# Patient Record
Sex: MALE | Race: WHITE | NOT HISPANIC OR LATINO | Employment: FULL TIME | URBAN - METROPOLITAN AREA
[De-identification: names, ages, dates, MRNs, and addresses within clinical notes are randomized per-mention and may not be internally consistent; named-entity substitution may affect disease eponyms.]

---

## 2023-03-30 PROBLEM — R55 VASOVAGAL EPISODE: Status: ACTIVE | Noted: 2023-03-30

## 2023-03-30 PROBLEM — E78.5 HYPERLIPIDEMIA: Status: ACTIVE | Noted: 2023-03-30

## 2023-03-30 PROBLEM — R00.1 BRADYCARDIA: Status: ACTIVE | Noted: 2023-03-30

## 2023-03-30 PROBLEM — Z86.69 HISTORY OF MIGRAINE HEADACHES: Status: ACTIVE | Noted: 2023-03-30

## 2023-03-30 PROBLEM — R42 VERTIGO: Status: ACTIVE | Noted: 2023-03-30

## 2023-03-30 PROBLEM — R42 DIZZINESS: Status: ACTIVE | Noted: 2023-03-30

## 2023-03-30 PROBLEM — M54.50 LOWER BACK PAIN: Status: ACTIVE | Noted: 2023-03-30

## 2023-03-30 PROBLEM — G43.909 MIGRAINES: Status: ACTIVE | Noted: 2023-03-30

## 2023-03-30 RX ORDER — ATORVASTATIN CALCIUM 10 MG/1
1 TABLET, FILM COATED ORAL DAILY
COMMUNITY
End: 2024-04-23 | Stop reason: ALTCHOICE

## 2023-03-31 ENCOUNTER — OFFICE VISIT (OUTPATIENT)
Dept: PRIMARY CARE | Facility: CLINIC | Age: 53
End: 2023-03-31
Payer: COMMERCIAL

## 2023-03-31 VITALS
OXYGEN SATURATION: 95 % | BODY MASS INDEX: 26.2 KG/M2 | WEIGHT: 183 LBS | SYSTOLIC BLOOD PRESSURE: 124 MMHG | HEART RATE: 75 BPM | DIASTOLIC BLOOD PRESSURE: 79 MMHG | TEMPERATURE: 97.7 F | HEIGHT: 70 IN

## 2023-03-31 DIAGNOSIS — R31.29 OTHER MICROSCOPIC HEMATURIA: ICD-10-CM

## 2023-03-31 DIAGNOSIS — R10.9 ACUTE LEFT FLANK PAIN: Primary | ICD-10-CM

## 2023-03-31 LAB
POC APPEARANCE, URINE: CLEAR
POC BILIRUBIN, URINE: NEGATIVE
POC BLOOD, URINE: ABNORMAL
POC COLOR, URINE: YELLOW
POC GLUCOSE, URINE: NEGATIVE MG/DL
POC KETONES, URINE: NEGATIVE MG/DL
POC LEUKOCYTES, URINE: NEGATIVE
POC NITRITE,URINE: NEGATIVE
POC PH, URINE: 5.5 PH
POC PROTEIN, URINE: NEGATIVE MG/DL
POC SPECIFIC GRAVITY, URINE: 1.01
POC UROBILINOGEN, URINE: 0.2 EU/DL

## 2023-03-31 PROCEDURE — 99214 OFFICE O/P EST MOD 30 MIN: CPT | Performed by: FAMILY MEDICINE

## 2023-03-31 PROCEDURE — 1036F TOBACCO NON-USER: CPT | Performed by: FAMILY MEDICINE

## 2023-03-31 PROCEDURE — 81003 URINALYSIS AUTO W/O SCOPE: CPT | Performed by: FAMILY MEDICINE

## 2023-03-31 ASSESSMENT — ENCOUNTER SYMPTOMS
FREQUENCY: 0
CHILLS: 0
FEVER: 0
DIARRHEA: 0
CONSTIPATION: 0
HEMATURIA: 0

## 2023-03-31 NOTE — PROGRESS NOTES
"Subjective   Patient ID: Trevor Yarbrough is a 52 y.o. male who presents for left flank pain. States his sx's onset 4 wks ago intermittent at first but as of this past week persistently. Denies urinary sx's.     3 weeks  with left lateral flank pain   Dull more constant   3/10      No kidney stones in past   Tylenol / motrin minimal relief     No change with positions     No back issues          Review of Systems   Constitutional:  Negative for chills and fever.   Gastrointestinal:  Negative for constipation and diarrhea.   Genitourinary:  Negative for frequency and hematuria.       Objective   /79   Pulse 75   Temp 36.5 °C (97.7 °F)   Ht 1.778 m (5' 10\")   Wt 83 kg (183 lb)   SpO2 95%   BMI 26.26 kg/m²     Physical Exam  Constitutional:       Appearance: Normal appearance. He is well-developed.   Cardiovascular:      Rate and Rhythm: Normal rate and regular rhythm.      Heart sounds: Normal heart sounds. No murmur heard.  Pulmonary:      Effort: Pulmonary effort is normal.      Breath sounds: Normal breath sounds.   Abdominal:      General: Abdomen is flat. Bowel sounds are normal.      Palpations: Abdomen is soft.   Musculoskeletal:      Comments: No rashes  No costovertebral angle tenderness   Neurological:      General: No focal deficit present.      Mental Status: He is alert.         Assessment/Plan   Problem List Items Addressed This Visit    None  Visit Diagnoses       Acute left flank pain    -  Primary    Relevant Orders    CT abdomen pelvis wo IV contrast    POCT UA Automated manually resulted    Other microscopic hematuria        Relevant Orders    CT abdomen pelvis wo IV contrast    POCT UA Automated manually resulted               "

## 2023-03-31 NOTE — PATIENT INSTRUCTIONS
Stat CT to rule out kidney stones      You can take nonsteroidal anti-inflammatories for your pain: Such as ibuprofen or Aleve.  It is important he follow the instructions on the labeling with these medications, taken with food.  Watch for stomach upset with these medications.  Tylenol is also helpful for pain, this medication does not treat inflammation, however, generally tends to not cause stomach upset.

## 2023-04-03 ENCOUNTER — TELEPHONE (OUTPATIENT)
Dept: PRIMARY CARE | Facility: CLINIC | Age: 53
End: 2023-04-03
Payer: COMMERCIAL

## 2023-04-03 NOTE — TELEPHONE ENCOUNTER
----- Message from Lexi Darden MD sent at 4/3/2023  9:33 AM EDT -----  Please call pt about radiology results CAT scan is normal  Likely symptoms are related to musculoskeletal pain, if not improving let me know, take some anti-inflammatories Advil or Aleve

## 2023-04-03 NOTE — TELEPHONE ENCOUNTER
Result Communication    Resulted Orders   CT abdomen pelvis wo IV contrast    Narrative    Interpreted By:  SCHOENBERGER, JOSEPH, MD  MRN: [id number]  Patient Name: RUPESH IBANEZ     STUDY:  CT ABDOMEN AND PELVIS WO CONTRAST;  3/31/2023 2:04 pm     INDICATION:  rule out stones.     COMPARISON:  None     ACCESSION NUMBER(S):  [id number]     ORDERING CLINICIAN:  AIMEE CARREON     TECHNIQUE:  CT of the abdomen and pelvis was performed. Contiguous axial images  were obtained at 3 mm slice thickness through the abdomen and pelvis.  Coronal and sagittal reconstructions at 3 mm slice thickness were  performed.  No intravenous or oral contrast agents were administered.     FINDINGS:  Please note that the evaluation of vessels, lymph nodes and organs is  limited without intravenous contrast.     LOWER CHEST:  Unremarkable     ABDOMEN:     LIVER:  Tiny cyst high in the right liver unchanged from prior.     BILE DUCTS:  Normal caliber.     GALLBLADDER:  No calcified stones. No wall thickening.     PANCREAS:  Within normal limits.     SPLEEN:  Within normal limits.     ADRENAL GLANDS:  Bilateral adrenal glands appear normal.     KIDNEYS AND URETERS:  The kidneys are normal in size and unremarkable in appearance. No  radiopaque collecting system stones are evident. No  hydroureteronephrosis or nephroureterolithiasis is identified.     PELVIS:     BLADDER:  Within normal limits.     REPRODUCTIVE ORGANS:  Prostate is somewhat enlarged. Otherwise unremarkable.     BOWEL:  The stomach is unremarkable.  The small and large bowel are normal in  caliber and demonstrate no wall thickening.  The appendix is not  definitely visualized. There is however no pericecal stranding or  fluid.     VESSELS:  There is no aneurysmal dilatation of the abdominal aorta. The IVC  appears normal.     PERITONEUM/RETROPERITONEUM/LYMPH NODES:  No ascites or free air, no fluid collection.  No abdominopelvic  lymphadenopathy is present.     ABDOMINAL  WALL:  Abdominal wall structures appear intact     BONES:  No suspicious osseous lesions are identified.       Impression    1.  No acute abdominal or pelvic findings. No evidence for  nephrolithiasis or obstructive uropathy or ureter stone. See  discussion above.          11:53 AM    SEBASTIAN. CA

## 2023-04-03 NOTE — RESULT ENCOUNTER NOTE
Please call pt about radiology results CAT scan is normal  Likely symptoms are related to musculoskeletal pain, if not improving let me know, take some anti-inflammatories Advil or Aleve

## 2023-09-29 LAB
ANION GAP IN SER/PLAS: 12 MMOL/L (ref 10–20)
BETA HYDROXYBUTYRATE (MMOL/L) IN SER/PLAS: NORMAL
C PEPTIDE (NG/ML) IN SER/PLAS: NORMAL
CALCIUM (MG/DL) IN SER/PLAS: 9 MG/DL (ref 8.6–10.3)
CARBON DIOXIDE, TOTAL (MMOL/L) IN SER/PLAS: 28 MMOL/L (ref 21–32)
CHLORIDE (MMOL/L) IN SER/PLAS: 102 MMOL/L (ref 98–107)
CORTISOL (UG/DL) IN SERUM - AM: 13.5 UG/DL (ref 5–20)
CREATININE (MG/DL) IN SER/PLAS: 1.01 MG/DL (ref 0.5–1.3)
ESTIMATED AVERAGE GLUCOSE FOR HBA1C: 117 MG/DL
GFR MALE: 88 ML/MIN/1.73M2
GLUCOSE (MG/DL) IN SER/PLAS: 90 MG/DL (ref 74–99)
GLUCOSE (MG/DL) IN SER/PLAS: NORMAL
HEMOGLOBIN A1C/HEMOGLOBIN TOTAL IN BLOOD: 5.7 %
INSULIN: NORMAL
POTASSIUM (MMOL/L) IN SER/PLAS: 4.2 MMOL/L (ref 3.5–5.3)
PROINSULIN, INTACT: NORMAL
SODIUM (MMOL/L) IN SER/PLAS: 138 MMOL/L (ref 136–145)
THYROTROPIN (MIU/L) IN SER/PLAS BY DETECTION LIMIT <= 0.05 MIU/L: 1.98 MIU/L (ref 0.44–3.98)
UREA NITROGEN (MG/DL) IN SER/PLAS: 16 MG/DL (ref 6–23)

## 2023-10-11 ENCOUNTER — LAB (OUTPATIENT)
Dept: LAB | Facility: LAB | Age: 53
End: 2023-10-11
Payer: COMMERCIAL

## 2023-10-11 ENCOUNTER — PHARMACY VISIT (OUTPATIENT)
Dept: PHARMACY | Facility: CLINIC | Age: 53
End: 2023-10-11
Payer: COMMERCIAL

## 2023-10-11 DIAGNOSIS — E16.1 OTHER HYPOGLYCEMIA: Primary | ICD-10-CM

## 2023-10-11 LAB — GLUCOSE SERPL-MCNC: 20 MG/DL (ref 74–99)

## 2023-10-11 PROCEDURE — 84206 ASSAY OF PROINSULIN: CPT

## 2023-10-11 PROCEDURE — 82010 KETONE BODYS QUAN: CPT

## 2023-10-11 PROCEDURE — 83525 ASSAY OF INSULIN: CPT

## 2023-10-11 PROCEDURE — 36415 COLL VENOUS BLD VENIPUNCTURE: CPT

## 2023-10-11 PROCEDURE — 82947 ASSAY GLUCOSE BLOOD QUANT: CPT

## 2023-10-11 PROCEDURE — 84681 ASSAY OF C-PEPTIDE: CPT

## 2023-10-12 ENCOUNTER — TELEPHONE (OUTPATIENT)
Dept: ENDOCRINOLOGY | Facility: HOSPITAL | Age: 53
End: 2023-10-12
Payer: COMMERCIAL

## 2023-10-12 DIAGNOSIS — E16.1 HYPOGLYCEMIA, ENDOGENOUS HYPERINSULINEMIA: Primary | ICD-10-CM

## 2023-10-12 LAB
B-OH-BUTYR SERPL-SCNC: 0.06 MMOL/L (ref 0.02–0.27)
C PEPTIDE SERPL-MCNC: 4 NG/ML (ref 0.7–3.9)
INSULIN SERPL-ACNC: 15 UIU/ML (ref 3–25)

## 2023-10-12 NOTE — TELEPHONE ENCOUNTER
I spoke to Trevor at 11:10.   Hypoglycemia with inappropriate insulin production.  He is finding hypoglycemic readings overnight on Sunita as well.     Advised hypoglycemia after fundoplication is not common in adults but well described in children, due to dumping syndrome.  Timing of onset of hypoglycemic reactions favors a complication of surgery.     However, fasting episodes indicate concern to rule out insulinoma.  No pancreatic mass on CT from March 2023.    Recommend:  Urine sulfonylurea screen, today  MRI pancreas    He may need a nuclear scan as well    If there is evidence of a discrete lesion, then will pursue endoscopic US and surgical consultation    Otherwise medical treatments may start with acarbose.  Alternatives of diazoxide, octreotide.

## 2023-10-13 ENCOUNTER — HOSPITAL ENCOUNTER (OUTPATIENT)
Dept: RADIOLOGY | Facility: HOSPITAL | Age: 53
Discharge: HOME | End: 2023-10-13
Payer: COMMERCIAL

## 2023-10-13 ENCOUNTER — LAB (OUTPATIENT)
Dept: LAB | Facility: LAB | Age: 53
End: 2023-10-13
Payer: COMMERCIAL

## 2023-10-13 ENCOUNTER — PHARMACY VISIT (OUTPATIENT)
Dept: PHARMACY | Facility: CLINIC | Age: 53
End: 2023-10-13
Payer: COMMERCIAL

## 2023-10-13 DIAGNOSIS — E16.1 HYPOGLYCEMIA, ENDOGENOUS HYPERINSULINEMIA: ICD-10-CM

## 2023-10-13 DIAGNOSIS — E16.1: Primary | ICD-10-CM

## 2023-10-13 DIAGNOSIS — D13.7: Primary | ICD-10-CM

## 2023-10-13 PROCEDURE — 74183 MRI ABD W/O CNTR FLWD CNTR: CPT | Performed by: RADIOLOGY

## 2023-10-13 PROCEDURE — RXMED WILLOW AMBULATORY MEDICATION CHARGE

## 2023-10-13 PROCEDURE — A9575 INJ GADOTERATE MEGLUMI 0.1ML: HCPCS | Performed by: INTERNAL MEDICINE

## 2023-10-13 PROCEDURE — 76376 3D RENDER W/INTRP POSTPROCES: CPT | Performed by: RADIOLOGY

## 2023-10-13 PROCEDURE — 82542 COL CHROMOTOGRAPHY QUAL/QUAN: CPT

## 2023-10-13 PROCEDURE — 74183 MRI ABD W/O CNTR FLWD CNTR: CPT

## 2023-10-13 PROCEDURE — 2550000001 HC RX 255 CONTRASTS: Performed by: INTERNAL MEDICINE

## 2023-10-13 RX ORDER — GADOTERATE MEGLUMINE 376.9 MG/ML
0.2 INJECTION INTRAVENOUS
Status: COMPLETED | OUTPATIENT
Start: 2023-10-13 | End: 2023-10-13

## 2023-10-13 RX ADMIN — GADOTERATE MEGLUMINE 15 ML: 376.9 INJECTION INTRAVENOUS at 17:05

## 2023-10-15 LAB — PROINSULIN P 12H FAST SERPL-SCNC: 4.5 PMOL/L

## 2023-10-20 ENCOUNTER — PHARMACY VISIT (OUTPATIENT)
Dept: PHARMACY | Facility: CLINIC | Age: 53
End: 2023-10-20
Payer: COMMERCIAL

## 2023-10-20 DIAGNOSIS — E16.1 HYPOGLYCEMIA, ENDOGENOUS HYPERINSULINEMIA: Primary | ICD-10-CM

## 2023-10-20 RX ORDER — DIAZOXIDE 50 MG/ML
3 SUSPENSION ORAL EVERY 8 HOURS
Qty: 150 ML | Refills: 1 | Status: SHIPPED | OUTPATIENT
Start: 2023-10-20 | End: 2024-02-26 | Stop reason: HOSPADM

## 2023-10-23 ENCOUNTER — PHARMACY VISIT (OUTPATIENT)
Dept: PHARMACY | Facility: CLINIC | Age: 53
End: 2023-10-23
Payer: COMMERCIAL

## 2023-10-24 ENCOUNTER — PHARMACY VISIT (OUTPATIENT)
Dept: PHARMACY | Facility: CLINIC | Age: 53
End: 2023-10-24

## 2023-10-24 PROCEDURE — RXOTC WILLOW AMBULATORY OTC CHARGE

## 2023-10-30 ENCOUNTER — PHARMACY VISIT (OUTPATIENT)
Dept: PHARMACY | Facility: CLINIC | Age: 53
End: 2023-10-30
Payer: COMMERCIAL

## 2023-10-30 PROCEDURE — RXMED WILLOW AMBULATORY MEDICATION CHARGE

## 2023-10-31 ENCOUNTER — HOSPITAL ENCOUNTER (OUTPATIENT)
Dept: RADIOLOGY | Facility: HOSPITAL | Age: 53
Discharge: HOME | End: 2023-10-31
Payer: COMMERCIAL

## 2023-10-31 DIAGNOSIS — E16.1: ICD-10-CM

## 2023-10-31 DIAGNOSIS — D13.7: ICD-10-CM

## 2023-10-31 PROCEDURE — 3430000001 HC RX 343 DIAGNOSTIC RADIOPHARMACEUTICALS: Performed by: INTERNAL MEDICINE

## 2023-10-31 PROCEDURE — 78815 PET IMAGE W/CT SKULL-THIGH: CPT | Mod: PET TUMOR INIT TX STRAT | Performed by: RADIOLOGY

## 2023-10-31 PROCEDURE — A9587 GALLIUM GA-68: HCPCS | Performed by: INTERNAL MEDICINE

## 2023-10-31 PROCEDURE — 78815 PET IMAGE W/CT SKULL-THIGH: CPT | Mod: PI

## 2023-10-31 RX ADMIN — 68GA-DOTATATE 5.9 MILLICURIE: KIT INTRAVENOUS at 07:55

## 2023-11-03 DIAGNOSIS — E16.1 HYPOGLYCEMIA, ENDOGENOUS HYPERINSULINEMIA: Primary | ICD-10-CM

## 2023-11-03 LAB
ACETOHEXAMIDE UR-MCNC: NEGATIVE UG/ML
CHLORPROPAMIDE UR-MCNC: NEGATIVE UG/ML
GLIMEPIRIDE UR-MCNC: NEGATIVE NG/ML
GLIPIZIDE SPEC-MCNC: NEGATIVE NG/ML
GLYBURIDE [MASS/VOLUME] IN SPECIMEN: NEGATIVE NG/ML
NATEGLINIDE UR-MCNC: NEGATIVE NG/ML
REPAGLINIDE UR-MCNC: NEGATIVE NG/ML
TOLAZAMIDE [MASS/VOLUME] IN SPECIMEN: NEGATIVE UG/ML
TOLBUTAMIDE [MASS/VOLUME] IN SPECIMEN: NEGATIVE UG/ML

## 2023-11-06 DIAGNOSIS — D13.7 INSULINOMA: Primary | ICD-10-CM

## 2023-11-10 DIAGNOSIS — D13.7 INSULINOMA: ICD-10-CM

## 2023-11-10 DIAGNOSIS — R93.89 ABNORMAL FINDING OF DIAGNOSTIC IMAGING: Primary | ICD-10-CM

## 2023-11-13 ENCOUNTER — PREP FOR PROCEDURE (OUTPATIENT)
Dept: GASTROENTEROLOGY | Facility: HOSPITAL | Age: 53
End: 2023-11-13
Payer: COMMERCIAL

## 2023-11-20 ENCOUNTER — HOSPITAL ENCOUNTER (OUTPATIENT)
Dept: GASTROENTEROLOGY | Facility: HOSPITAL | Age: 53
Setting detail: OUTPATIENT SURGERY
Discharge: HOME | End: 2023-11-20
Payer: COMMERCIAL

## 2023-11-20 ENCOUNTER — ANESTHESIA EVENT (OUTPATIENT)
Dept: GASTROENTEROLOGY | Facility: HOSPITAL | Age: 53
End: 2023-11-20
Payer: COMMERCIAL

## 2023-11-20 ENCOUNTER — ANESTHESIA (OUTPATIENT)
Dept: GASTROENTEROLOGY | Facility: HOSPITAL | Age: 53
End: 2023-11-20
Payer: COMMERCIAL

## 2023-11-20 VITALS
HEART RATE: 67 BPM | WEIGHT: 175 LBS | RESPIRATION RATE: 21 BRPM | DIASTOLIC BLOOD PRESSURE: 80 MMHG | HEIGHT: 71 IN | SYSTOLIC BLOOD PRESSURE: 114 MMHG | TEMPERATURE: 97.4 F | BODY MASS INDEX: 24.5 KG/M2 | OXYGEN SATURATION: 95 %

## 2023-11-20 DIAGNOSIS — D13.7 INSULINOMA: ICD-10-CM

## 2023-11-20 DIAGNOSIS — R93.89 ABNORMAL FINDING OF DIAGNOSTIC IMAGING: ICD-10-CM

## 2023-11-20 LAB — GLUCOSE BLD MANUAL STRIP-MCNC: 110 MG/DL (ref 74–99)

## 2023-11-20 PROCEDURE — 2500000004 HC RX 250 GENERAL PHARMACY W/ HCPCS (ALT 636 FOR OP/ED)

## 2023-11-20 PROCEDURE — 43259 EGD US EXAM DUODENUM/JEJUNUM: CPT | Performed by: INTERNAL MEDICINE

## 2023-11-20 PROCEDURE — 3700000002 HC GENERAL ANESTHESIA TIME - EACH INCREMENTAL 1 MINUTE: Performed by: INTERNAL MEDICINE

## 2023-11-20 PROCEDURE — 3700000001 HC GENERAL ANESTHESIA TIME - INITIAL BASE CHARGE: Performed by: INTERNAL MEDICINE

## 2023-11-20 PROCEDURE — 7100000010 HC PHASE TWO TIME - EACH INCREMENTAL 1 MINUTE: Performed by: INTERNAL MEDICINE

## 2023-11-20 PROCEDURE — 7100000009 HC PHASE TWO TIME - INITIAL BASE CHARGE: Performed by: INTERNAL MEDICINE

## 2023-11-20 PROCEDURE — A43237 PR ESOPHAGOGASTRODUODENOSCOPY US SCOPE W/ADJ STRXRS

## 2023-11-20 PROCEDURE — 82947 ASSAY GLUCOSE BLOOD QUANT: CPT

## 2023-11-20 PROCEDURE — A43237 PR ESOPHAGOGASTRODUODENOSCOPY US SCOPE W/ADJ STRXRS: Performed by: ANESTHESIOLOGY

## 2023-11-20 RX ORDER — PROPOFOL 10 MG/ML
INJECTION, EMULSION INTRAVENOUS AS NEEDED
Status: DISCONTINUED | OUTPATIENT
Start: 2023-11-20 | End: 2023-11-20

## 2023-11-20 RX ORDER — SODIUM CHLORIDE, SODIUM LACTATE, POTASSIUM CHLORIDE, CALCIUM CHLORIDE 600; 310; 30; 20 MG/100ML; MG/100ML; MG/100ML; MG/100ML
100 INJECTION, SOLUTION INTRAVENOUS CONTINUOUS
Status: DISCONTINUED | OUTPATIENT
Start: 2023-11-20 | End: 2023-11-21 | Stop reason: HOSPADM

## 2023-11-20 RX ORDER — LIDOCAINE HYDROCHLORIDE 10 MG/ML
0.1 INJECTION INFILTRATION; PERINEURAL ONCE
Status: DISCONTINUED | OUTPATIENT
Start: 2023-11-20 | End: 2023-11-21 | Stop reason: HOSPADM

## 2023-11-20 RX ORDER — PROPOFOL 10 MG/ML
INJECTION, EMULSION INTRAVENOUS CONTINUOUS PRN
Status: DISCONTINUED | OUTPATIENT
Start: 2023-11-20 | End: 2023-11-20

## 2023-11-20 RX ADMIN — PROPOFOL 60 MG: 10 INJECTION, EMULSION INTRAVENOUS at 08:11

## 2023-11-20 RX ADMIN — SODIUM CHLORIDE, SODIUM LACTATE, POTASSIUM CHLORIDE, AND CALCIUM CHLORIDE: 600; 310; 30; 20 INJECTION, SOLUTION INTRAVENOUS at 08:02

## 2023-11-20 RX ADMIN — PROPOFOL 25 MG: 10 INJECTION, EMULSION INTRAVENOUS at 08:24

## 2023-11-20 RX ADMIN — PROPOFOL 50 MG: 10 INJECTION, EMULSION INTRAVENOUS at 08:08

## 2023-11-20 RX ADMIN — PROPOFOL 200 MCG/KG/MIN: 10 INJECTION, EMULSION INTRAVENOUS at 08:08

## 2023-11-20 ASSESSMENT — PAIN SCALES - GENERAL
PAINLEVEL_OUTOF10: 0 - NO PAIN

## 2023-11-20 ASSESSMENT — COLUMBIA-SUICIDE SEVERITY RATING SCALE - C-SSRS
1. IN THE PAST MONTH, HAVE YOU WISHED YOU WERE DEAD OR WISHED YOU COULD GO TO SLEEP AND NOT WAKE UP?: NO
6. HAVE YOU EVER DONE ANYTHING, STARTED TO DO ANYTHING, OR PREPARED TO DO ANYTHING TO END YOUR LIFE?: NO
2. HAVE YOU ACTUALLY HAD ANY THOUGHTS OF KILLING YOURSELF?: NO

## 2023-11-20 ASSESSMENT — PAIN - FUNCTIONAL ASSESSMENT
PAIN_FUNCTIONAL_ASSESSMENT: 0-10
PAIN_FUNCTIONAL_ASSESSMENT: FLACC (FACE, LEGS, ACTIVITY, CRY, CONSOLABILITY)
PAIN_FUNCTIONAL_ASSESSMENT: 0-10
PAIN_FUNCTIONAL_ASSESSMENT: 0-10

## 2023-11-20 NOTE — ANESTHESIA POSTPROCEDURE EVALUATION
Patient: Trevor Yarbrough    Procedure Summary       Date: 11/20/23 Room / Location: [facility]    Anesthesia Start: 0802 Anesthesia Stop: 0834    Procedure: ENDOSCOPIC ULTRASOUND (UPPER) Diagnosis:       Abnormal finding of diagnostic imaging      Insulinoma    Scheduled Providers: Verona Borrego MD; Heavenly Chong RN; Tatyana Red MD Responsible Provider: Tatyana Red MD    Anesthesia Type: MAC ASA Status: 2            Anesthesia Type: MAC    Vitals Value Taken Time   /80 11/20/23 0905   Temp 36.3 °C (97.4 °F) 11/20/23 0835   Pulse 67 11/20/23 0905   Resp 21 11/20/23 0905   SpO2 95 % 11/20/23 0905       Anesthesia Post Evaluation    Patient participation: complete - patient participated  Level of consciousness: awake  Pain management: adequate  Airway patency: patent  Cardiovascular status: acceptable  Respiratory status: acceptable  Hydration status: acceptable  Postoperative Nausea and Vomiting: none    There were no known notable events for this encounter.

## 2023-11-20 NOTE — ANESTHESIA PREPROCEDURE EVALUATION
Patient: Trevor Yarbrough    Procedure Information       Date/Time: 11/20/23 0730    Scheduled providers: Verona Borrego MD; Heavenly Chong RN; Tatyana Red MD    Procedure: ENDOSCOPIC ULTRASOUND (UPPER)    Location: [facility]            Relevant Problems   Anesthesia (within normal limits)      Cardiovascular   (+) Hyperlipidemia      Endocrine  Work up for insulinoma has episodes of hypoglycemia      GI (within normal limits)      /Renal (within normal limits)      Neuro/Psych   (+) History of migraine headaches      Pulmonary (within normal limits)      GI/Hepatic (within normal limits)      Hematology (within normal limits)      Musculoskeletal (within normal limits)      Eyes, Ears, Nose, and Throat (within normal limits)      Infectious Disease (within normal limits)       Clinical information reviewed:   Tobacco  Allergies  Meds   Med Hx  Surg Hx   Fam Hx  Soc Hx        NPO Detail:  NPO/Void Status  Date of Last Liquid: 11/19/23  Time of Last Liquid: 2200  Date of Last Solid: 11/19/23  Time of Last Solid: 1700  Last Intake Type: Clear fluids         Physical Exam    Airway  Mallampati: I  TM distance: >3 FB  Neck ROM: full     Cardiovascular   Rhythm: regular  Rate: normal     Dental   Comments: Intact mising upper crown   Pulmonary - normal exam     Abdominal          Vitals:    11/20/23 0712   BP: 138/81   Pulse: 68   Resp: 18   Temp: 36.3 °C (97.3 °F)   SpO2: 97%       Past Surgical History:   Procedure Laterality Date    OTHER SURGICAL HISTORY  12/03/2018    Complete colonoscopy    OTHER SURGICAL HISTORY  12/03/2018    Knee arthroscopy    OTHER SURGICAL HISTORY  12/03/2018    Appendectomy    OTHER SURGICAL HISTORY  01/27/2020    Hernia repair    OTHER SURGICAL HISTORY  01/27/2020    Nissen fundoplication laparoscopic    OTHER SURGICAL HISTORY  01/27/2020    Diaphragmatic hernia repair     Past Medical History:   Diagnosis Date    Epigastric pain 05/09/2022    Abdominal  pain, acute, epigastric    Other chest pain 01/31/2022    Atypical chest pain    Personal history of other diseases of the digestive system 05/09/2022    History of gastroesophageal reflux (GERD)    Personal history of other diseases of the digestive system 01/27/2020    History of hiatal hernia    Personal history of other diseases of the digestive system 11/22/2019    History of esophagitis    Personal history of other specified conditions 01/31/2022    History of dysphagia       Current Outpatient Medications:     blood-glucose sensor device, CHANGE EVERY 14 DAYS, Disp: 2 each, Rfl: 11    diazoxide (Proglycem) 50 mg/mL suspension, Take 1.6 mL (80 mg) by mouth every 8 hours., Disp: 150 mL, Rfl: 1    atorvastatin (Lipitor) 10 mg tablet, Take 1 tablet (10 mg) by mouth once daily., Disp: , Rfl:   Prior to Admission medications    Medication Sig Start Date End Date Taking? Authorizing Provider   blood-glucose sensor device CHANGE EVERY 14 DAYS 9/22/23 9/21/24 Yes Naren Vega MD   diazoxide (Proglycem) 50 mg/mL suspension Take 1.6 mL (80 mg) by mouth every 8 hours. 10/20/23 12/19/23 Yes Naren Vega MD   atorvastatin (Lipitor) 10 mg tablet Take 1 tablet (10 mg) by mouth once daily.    Historical Provider, MD     No Known Allergies  Social History     Tobacco Use    Smoking status: Never    Smokeless tobacco: Never   Substance Use Topics    Alcohol use: Not Currently         Chemistry    Lab Results   Component Value Date/Time     09/29/2023 1032    K 4.2 09/29/2023 1032     09/29/2023 1032    CO2 28 09/29/2023 1032    BUN 16 09/29/2023 1032    CREATININE 1.01 09/29/2023 1032    Lab Results   Component Value Date/Time    CALCIUM 9.0 09/29/2023 1032    ALKPHOS 59 08/02/2022 2253    AST 24 08/02/2022 2253    ALT 23 08/02/2022 2253    BILITOT 0.5 08/02/2022 2253          Lab Results   Component Value Date/Time    WBC 6.5 08/02/2022 2253    HGB 14.4 08/02/2022 2253    HCT 42.2 08/02/2022 2253    PLT  158 08/02/2022 2253     Lab Results   Component Value Date/Time    PROTIME 12.5 12/13/2021 1645    INR 1.1 12/13/2021 1645     No results found for this or any previous visit (from the past 4464 hour(s)).  No results found for this or any previous visit from the past 1095 days.     Anesthesia Plan    ASA 2     general     Anesthetic plan and risks discussed with patient.  Use of blood products discussed with patient who consented to blood products.    Plan discussed with [facility].

## 2023-11-20 NOTE — H&P
History Of Present Illness  Trevor Yarbrough is a 53 y.o. male with reactive hypoglycemia. Concern for insulinoma.        Past Medical History  Past Medical History:   Diagnosis Date    Epigastric pain 05/09/2022    Abdominal pain, acute, epigastric    Other chest pain 01/31/2022    Atypical chest pain    Personal history of other diseases of the digestive system 05/09/2022    History of gastroesophageal reflux (GERD)    Personal history of other diseases of the digestive system 01/27/2020    History of hiatal hernia    Personal history of other diseases of the digestive system 11/22/2019    History of esophagitis    Personal history of other specified conditions 01/31/2022    History of dysphagia       Surgical History  Past Surgical History:   Procedure Laterality Date    OTHER SURGICAL HISTORY  12/03/2018    Complete colonoscopy    OTHER SURGICAL HISTORY  12/03/2018    Knee arthroscopy    OTHER SURGICAL HISTORY  12/03/2018    Appendectomy    OTHER SURGICAL HISTORY  01/27/2020    Hernia repair    OTHER SURGICAL HISTORY  01/27/2020    Nissen fundoplication laparoscopic    OTHER SURGICAL HISTORY  01/27/2020    Diaphragmatic hernia repair        Social History  He reports that he has never smoked. He has never used smokeless tobacco. He reports that he does not currently use alcohol. He reports that he does not use drugs.    Family History  Family History   Problem Relation Name Age of Onset    Hypertension Mother      Lung cancer Mother      Coronary artery disease Father      Hyperlipidemia Father      Diabetes Brother      Other (polyp of large intestine) Brother      Colon cancer Father's Brother  60    Colon cancer Other uncle     Other (polyp of large intestine) Other uncle         Allergies  Patient has no known allergies.    Review of Systems   All other systems reviewed and are negative.         Physical Exam  Vitals and nursing note reviewed.   Constitutional:       Appearance: Normal appearance.   HENT:      " Head: Normocephalic and atraumatic.   Cardiovascular:      Rate and Rhythm: Normal rate and regular rhythm.   Pulmonary:      Effort: Pulmonary effort is normal.      Breath sounds: Normal breath sounds.   Abdominal:      General: Abdomen is flat.      Palpations: Abdomen is soft.   Musculoskeletal:      Cervical back: Normal range of motion.   Skin:     General: Skin is dry.   Neurological:      Mental Status: He is alert.            Last Recorded Vitals  Blood pressure 138/81, pulse 68, temperature 36.3 °C (97.3 °F), temperature source Temporal, resp. rate 18, height 1.803 m (5' 11\"), weight 79.4 kg (175 lb), SpO2 97 %.    Relevant Results  Reviewed chart       Assessment/Plan   Proceed with EUS         Verona Borrego MD   "

## 2023-11-21 NOTE — ADDENDUM NOTE
Encounter addended by: Kisha Ponce RN on: 11/21/2023 11:36 AM   Actions taken: Flowsheet accepted no

## 2023-12-04 ENCOUNTER — PHARMACY VISIT (OUTPATIENT)
Dept: PHARMACY | Facility: CLINIC | Age: 53
End: 2023-12-04
Payer: COMMERCIAL

## 2023-12-04 PROCEDURE — RXMED WILLOW AMBULATORY MEDICATION CHARGE

## 2023-12-15 ENCOUNTER — OFFICE VISIT (OUTPATIENT)
Dept: ENDOCRINOLOGY | Facility: CLINIC | Age: 53
End: 2023-12-15
Payer: COMMERCIAL

## 2023-12-15 VITALS
WEIGHT: 184 LBS | HEART RATE: 78 BPM | BODY MASS INDEX: 25.66 KG/M2 | SYSTOLIC BLOOD PRESSURE: 129 MMHG | DIASTOLIC BLOOD PRESSURE: 73 MMHG

## 2023-12-15 DIAGNOSIS — E16.1 ADULT ONSET PERSISTENT HYPERINSULINEMIC HYPOGLYCEMIA WITHOUT INSULINOMA: Primary | ICD-10-CM

## 2023-12-15 PROCEDURE — 1036F TOBACCO NON-USER: CPT | Performed by: INTERNAL MEDICINE

## 2023-12-15 PROCEDURE — 99213 OFFICE O/P EST LOW 20 MIN: CPT | Performed by: INTERNAL MEDICINE

## 2023-12-15 ASSESSMENT — ENCOUNTER SYMPTOMS: UNEXPECTED WEIGHT CHANGE: 1

## 2023-12-15 NOTE — PATIENT INSTRUCTIONS
RECOMMENDATIONS  Continue diazoxide 80 mg three times daily    Alternative regimens  Acarbose before meals.  Side effect of flatulence  Octreotide injection, can be converted to monthly if successful.    Follow up 3 months  Repeat Imaging (MRI vs endoscopic US) before next appointment

## 2023-12-15 NOTE — PROGRESS NOTES
History Of Present Illness  Trevor Yarbrough is a 53 y.o. male with persistent hyperinsulinemic hypoglycemia    On and off diazoxide, worked dose back up to 80 mg TID at present and has had stable glucose for the past week.    Recurring GI symptoms on diazoxide.       Past Medical History  He has a past medical history of Epigastric pain (05/09/2022), Other chest pain (01/31/2022), Personal history of other diseases of the digestive system (05/09/2022), Personal history of other diseases of the digestive system (01/27/2020), Personal history of other diseases of the digestive system (11/22/2019), and Personal history of other specified conditions (01/31/2022).    Surgical History  He has a past surgical history that includes Other surgical history (12/03/2018); Other surgical history (12/03/2018); Other surgical history (12/03/2018); Other surgical history (01/27/2020); Other surgical history (01/27/2020); and Other surgical history (01/27/2020).     Social History  He reports that he has never smoked. He has never used smokeless tobacco. He reports that he does not currently use alcohol. He reports that he does not use drugs.    Family History  Family History   Problem Relation Name Age of Onset    Hypertension Mother      Lung cancer Mother      Coronary artery disease Father      Hyperlipidemia Father      Diabetes Brother      Other (polyp of large intestine) Brother      Colon cancer Father's Brother  60    Colon cancer Other uncle     Other (polyp of large intestine) Other uncle        Medications  Current Outpatient Medications   Medication Instructions    atorvastatin (Lipitor) 10 mg tablet 1 tablet, oral, Daily    blood-glucose sensor device CHANGE EVERY 14 DAYS    diazoxide (PROGLYCEM) 3 mg/kg/day, oral, Every 8 hours       Allergies  Patient has no known allergies.    Review of Systems   Constitutional:  Positive for unexpected weight change (gain).         Last Recorded Vitals  Blood pressure 129/73,  pulse 78, weight 83.5 kg (184 lb).    Physical Exam  Constitutional:       General: He is not in acute distress.  HENT:      Head: Normocephalic.   Neurological:      Mental Status: He is alert.   Psychiatric:         Mood and Affect: Affect normal.          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   No localization of tumor in pancreas on MRCP or endoscopic ultrasound.  Diazoxide is often effective but also causes hyperglycemia and GI side effects.      RECOMMENDATIONS  Continue diazoxide 80 mg three times daily    Alternative regimens  Acarbose before meals.  Side effect of flatulence  Octreotide injection, can be converted to monthly if successful.    Advised invasive option of partial pancreatectomy, which I do not recommend at this time.     Follow up 3 months  Repeat Imaging (MRI vs endoscopic US) before next appointment

## 2024-01-03 PROCEDURE — RXMED WILLOW AMBULATORY MEDICATION CHARGE

## 2024-01-05 ENCOUNTER — PHARMACY VISIT (OUTPATIENT)
Dept: PHARMACY | Facility: CLINIC | Age: 54
End: 2024-01-05
Payer: COMMERCIAL

## 2024-01-30 PROCEDURE — RXMED WILLOW AMBULATORY MEDICATION CHARGE

## 2024-01-31 ENCOUNTER — SPECIALTY PHARMACY (OUTPATIENT)
Dept: PHARMACY | Facility: CLINIC | Age: 54
End: 2024-01-31

## 2024-01-31 ENCOUNTER — TELEPHONE (OUTPATIENT)
Dept: ENDOCRINOLOGY | Facility: CLINIC | Age: 54
End: 2024-01-31
Payer: COMMERCIAL

## 2024-01-31 DIAGNOSIS — E16.1 ADULT ONSET PERSISTENT HYPERINSULINEMIC HYPOGLYCEMIA WITHOUT INSULINOMA: Primary | ICD-10-CM

## 2024-01-31 PROCEDURE — RXMED WILLOW AMBULATORY MEDICATION CHARGE

## 2024-01-31 RX ORDER — OCTREOTIDE ACETATE 100 UG/ML
100 INJECTION, SOLUTION INTRAVENOUS; SUBCUTANEOUS 3 TIMES DAILY
Qty: 90 ML | Refills: 0 | Status: SHIPPED | OUTPATIENT
Start: 2024-01-31 | End: 2024-02-26 | Stop reason: HOSPADM

## 2024-01-31 NOTE — TELEPHONE ENCOUNTER
I spoke to patient at 09:25.  Recurring hypoglycemia this weekend, resumed diazoxide 80 mg and still has a lot of hypoglycemia today.    History of spiking glucose and crashes when he takes diazoxide regularly.  He can go for several days without it usually.    Advised to try diazoxide at 140-150 mg q8h for the current crisis and will pursue a switch to Octreotide subcutaneous TID.  Rx to  Specialty pharmacy.

## 2024-02-01 ENCOUNTER — PHARMACY VISIT (OUTPATIENT)
Dept: PHARMACY | Facility: CLINIC | Age: 54
End: 2024-02-01
Payer: COMMERCIAL

## 2024-02-05 ENCOUNTER — PHARMACY VISIT (OUTPATIENT)
Dept: PHARMACY | Facility: CLINIC | Age: 54
End: 2024-02-05
Payer: COMMERCIAL

## 2024-02-06 ENCOUNTER — HOSPITAL ENCOUNTER (EMERGENCY)
Facility: HOSPITAL | Age: 54
Discharge: OTHER INSTITUTION | End: 2024-02-06
Attending: EMERGENCY MEDICINE
Payer: COMMERCIAL

## 2024-02-06 ENCOUNTER — APPOINTMENT (OUTPATIENT)
Dept: RADIOLOGY | Facility: HOSPITAL | Age: 54
DRG: 853 | End: 2024-02-06
Payer: COMMERCIAL

## 2024-02-06 ENCOUNTER — APPOINTMENT (OUTPATIENT)
Dept: RADIOLOGY | Facility: HOSPITAL | Age: 54
End: 2024-02-06
Payer: COMMERCIAL

## 2024-02-06 ENCOUNTER — ANESTHESIA (OUTPATIENT)
Dept: OPERATING ROOM | Facility: HOSPITAL | Age: 54
DRG: 853 | End: 2024-02-06
Payer: COMMERCIAL

## 2024-02-06 ENCOUNTER — ANESTHESIA EVENT (OUTPATIENT)
Dept: OPERATING ROOM | Facility: HOSPITAL | Age: 54
DRG: 853 | End: 2024-02-06
Payer: COMMERCIAL

## 2024-02-06 ENCOUNTER — APPOINTMENT (OUTPATIENT)
Dept: CARDIOLOGY | Facility: HOSPITAL | Age: 54
End: 2024-02-06
Payer: COMMERCIAL

## 2024-02-06 ENCOUNTER — HOSPITAL ENCOUNTER (INPATIENT)
Facility: HOSPITAL | Age: 54
LOS: 20 days | Discharge: HOME | DRG: 853 | End: 2024-02-26
Attending: EMERGENCY MEDICINE | Admitting: SURGERY
Payer: COMMERCIAL

## 2024-02-06 VITALS
RESPIRATION RATE: 26 BRPM | TEMPERATURE: 98.4 F | WEIGHT: 184.08 LBS | HEART RATE: 97 BPM | DIASTOLIC BLOOD PRESSURE: 114 MMHG | HEIGHT: 71 IN | OXYGEN SATURATION: 100 % | BODY MASS INDEX: 25.77 KG/M2 | SYSTOLIC BLOOD PRESSURE: 156 MMHG

## 2024-02-06 DIAGNOSIS — K85.90 ACUTE PANCREATITIS, UNSPECIFIED COMPLICATION STATUS, UNSPECIFIED PANCREATITIS TYPE (HHS-HCC): ICD-10-CM

## 2024-02-06 DIAGNOSIS — K56.609 SMALL BOWEL OBSTRUCTION (MULTI): Primary | ICD-10-CM

## 2024-02-06 DIAGNOSIS — K63.1 PERFORATED BOWEL (MULTI): Primary | ICD-10-CM

## 2024-02-06 DIAGNOSIS — G89.18 POSTOPERATIVE PAIN: ICD-10-CM

## 2024-02-06 DIAGNOSIS — K25.5 GASTRIC PERFORATION (MULTI): ICD-10-CM

## 2024-02-06 DIAGNOSIS — R78.81 BACTEREMIA: ICD-10-CM

## 2024-02-06 DIAGNOSIS — K66.8 PNEUMOPERITONEUM: ICD-10-CM

## 2024-02-06 PROBLEM — K21.9 GASTROESOPHAGEAL REFLUX DISEASE: Status: ACTIVE | Noted: 2024-02-06

## 2024-02-06 LAB
ABO GROUP (TYPE) IN BLOOD: NORMAL
ABO GROUP (TYPE) IN BLOOD: NORMAL
ALBUMIN SERPL BCP-MCNC: 3.2 G/DL (ref 3.4–5)
ALBUMIN SERPL-MCNC: 4.3 G/DL (ref 3.5–5)
ALP BLD-CCNC: 73 U/L (ref 35–125)
ALT SERPL-CCNC: 36 U/L (ref 5–40)
AMPHETAMINES UR QL SCN: ABNORMAL
ANION GAP BLDA CALCULATED.4IONS-SCNC: 10 MMO/L (ref 10–25)
ANION GAP BLDA CALCULATED.4IONS-SCNC: 12 MMO/L (ref 10–25)
ANION GAP BLDA CALCULATED.4IONS-SCNC: 17 MMO/L (ref 10–25)
ANION GAP SERPL CALC-SCNC: 12 MMOL/L (ref 10–20)
ANION GAP SERPL CALC-SCNC: 14 MMOL/L
ANION GAP SERPL CALC-SCNC: 14 MMOL/L (ref 10–20)
ANTIBODY SCREEN: NORMAL
APTT PPP: 27 SECONDS (ref 27–38)
APTT PPP: 30 SECONDS (ref 27–38)
AST SERPL-CCNC: 36 U/L (ref 5–40)
BARBITURATES UR QL SCN: ABNORMAL
BASE EXCESS BLDA CALC-SCNC: -12.2 MMOL/L (ref -2–3)
BASE EXCESS BLDA CALC-SCNC: -3.7 MMOL/L (ref -2–3)
BASE EXCESS BLDA CALC-SCNC: -5.5 MMOL/L (ref -2–3)
BASE EXCESS BLDA CALC-SCNC: -5.9 MMOL/L (ref -2–3)
BASE EXCESS BLDA CALC-SCNC: -6.3 MMOL/L (ref -2–3)
BASE EXCESS BLDA CALC-SCNC: -6.8 MMOL/L (ref -2–3)
BASE EXCESS BLDA CALC-SCNC: -7.3 MMOL/L (ref -2–3)
BASOPHILS # BLD AUTO: 0.04 X10*3/UL (ref 0–0.1)
BASOPHILS NFR BLD AUTO: 0.4 %
BENZODIAZ UR QL SCN: ABNORMAL
BILIRUB SERPL-MCNC: 0.3 MG/DL (ref 0.1–1.2)
BODY TEMPERATURE: 37 DEGREES CELSIUS
BUN SERPL-MCNC: 19 MG/DL (ref 8–25)
BUN SERPL-MCNC: 20 MG/DL (ref 6–23)
BUN SERPL-MCNC: 21 MG/DL (ref 6–23)
BZE UR QL SCN: ABNORMAL
CA-I BLDA-SCNC: 1.05 MMOL/L (ref 1.1–1.33)
CA-I BLDA-SCNC: 1.09 MMOL/L (ref 1.1–1.33)
CA-I BLDA-SCNC: 1.12 MMOL/L (ref 1.1–1.33)
CA-I BLDA-SCNC: 1.15 MMOL/L (ref 1.1–1.33)
CA-I BLDA-SCNC: 1.16 MMOL/L (ref 1.1–1.33)
CA-I BLDA-SCNC: 1.16 MMOL/L (ref 1.1–1.33)
CALCIUM SERPL-MCNC: 7.9 MG/DL (ref 8.6–10.6)
CALCIUM SERPL-MCNC: 8.2 MG/DL (ref 8.6–10.6)
CALCIUM SERPL-MCNC: 9.7 MG/DL (ref 8.5–10.4)
CANNABINOIDS UR QL SCN: ABNORMAL
CHLORIDE BLDA-SCNC: 105 MMOL/L (ref 98–107)
CHLORIDE BLDA-SCNC: 106 MMOL/L (ref 98–107)
CHLORIDE BLDA-SCNC: 106 MMOL/L (ref 98–107)
CHLORIDE BLDA-SCNC: 108 MMOL/L (ref 98–107)
CHLORIDE BLDA-SCNC: 109 MMOL/L (ref 98–107)
CHLORIDE BLDA-SCNC: 110 MMOL/L (ref 98–107)
CHLORIDE SERPL-SCNC: 103 MMOL/L (ref 97–107)
CHLORIDE SERPL-SCNC: 108 MMOL/L (ref 98–107)
CHLORIDE SERPL-SCNC: 111 MMOL/L (ref 98–107)
CO2 SERPL-SCNC: 21 MMOL/L (ref 21–32)
CO2 SERPL-SCNC: 22 MMOL/L (ref 21–32)
CO2 SERPL-SCNC: 25 MMOL/L (ref 24–31)
CREAT SERPL-MCNC: 1.2 MG/DL (ref 0.4–1.6)
CREAT SERPL-MCNC: 1.22 MG/DL (ref 0.5–1.3)
CREAT SERPL-MCNC: 1.27 MG/DL (ref 0.5–1.3)
EGFRCR SERPLBLD CKD-EPI 2021: 67 ML/MIN/1.73M*2
EGFRCR SERPLBLD CKD-EPI 2021: 70 ML/MIN/1.73M*2
EGFRCR SERPLBLD CKD-EPI 2021: 72 ML/MIN/1.73M*2
EOSINOPHIL # BLD AUTO: 0.38 X10*3/UL (ref 0–0.7)
EOSINOPHIL NFR BLD AUTO: 3.7 %
ERYTHROCYTE [DISTWIDTH] IN BLOOD BY AUTOMATED COUNT: 13.1 % (ref 11.5–14.5)
ERYTHROCYTE [DISTWIDTH] IN BLOOD BY AUTOMATED COUNT: 13.2 % (ref 11.5–14.5)
ERYTHROCYTE [DISTWIDTH] IN BLOOD BY AUTOMATED COUNT: 13.6 % (ref 11.5–14.5)
FENTANYL+NORFENTANYL UR QL SCN: ABNORMAL
GLUCOSE BLD MANUAL STRIP-MCNC: 124 MG/DL (ref 74–99)
GLUCOSE BLD MANUAL STRIP-MCNC: 132 MG/DL (ref 74–99)
GLUCOSE BLD MANUAL STRIP-MCNC: 150 MG/DL (ref 74–99)
GLUCOSE BLDA-MCNC: 104 MG/DL (ref 74–99)
GLUCOSE BLDA-MCNC: 142 MG/DL (ref 74–99)
GLUCOSE BLDA-MCNC: 175 MG/DL (ref 74–99)
GLUCOSE BLDA-MCNC: 179 MG/DL (ref 74–99)
GLUCOSE BLDA-MCNC: 180 MG/DL (ref 74–99)
GLUCOSE BLDA-MCNC: 93 MG/DL (ref 74–99)
GLUCOSE SERPL-MCNC: 123 MG/DL (ref 74–99)
GLUCOSE SERPL-MCNC: 124 MG/DL (ref 65–99)
GLUCOSE SERPL-MCNC: 169 MG/DL (ref 74–99)
HCO3 BLDA-SCNC: 16.7 MMOL/L (ref 22–26)
HCO3 BLDA-SCNC: 19.3 MMOL/L (ref 22–26)
HCO3 BLDA-SCNC: 19.7 MMOL/L (ref 22–26)
HCO3 BLDA-SCNC: 20.2 MMOL/L (ref 22–26)
HCO3 BLDA-SCNC: 20.7 MMOL/L (ref 22–26)
HCO3 BLDA-SCNC: 21.4 MMOL/L (ref 22–26)
HCO3 BLDA-SCNC: 22.1 MMOL/L (ref 22–26)
HCT VFR BLD AUTO: 40.2 % (ref 41–52)
HCT VFR BLD AUTO: 41.5 % (ref 41–52)
HCT VFR BLD AUTO: 44.6 % (ref 41–52)
HCT VFR BLD EST: 36 % (ref 41–52)
HCT VFR BLD EST: 37 % (ref 41–52)
HCT VFR BLD EST: 40 % (ref 41–52)
HCT VFR BLD EST: 43 % (ref 41–52)
HCT VFR BLD EST: 44 % (ref 41–52)
HCT VFR BLD EST: 48 % (ref 41–52)
HGB BLD-MCNC: 13.5 G/DL (ref 13.5–17.5)
HGB BLD-MCNC: 14.1 G/DL (ref 13.5–17.5)
HGB BLD-MCNC: 15.4 G/DL (ref 13.5–17.5)
HGB BLDA-MCNC: 12 G/DL (ref 13.5–17.5)
HGB BLDA-MCNC: 12.3 G/DL (ref 13.5–17.5)
HGB BLDA-MCNC: 13.3 G/DL (ref 13.5–17.5)
HGB BLDA-MCNC: 14.2 G/DL (ref 13.5–17.5)
HGB BLDA-MCNC: 14.6 G/DL (ref 13.5–17.5)
HGB BLDA-MCNC: 15.9 G/DL (ref 13.5–17.5)
IMM GRANULOCYTES # BLD AUTO: 0.03 X10*3/UL (ref 0–0.7)
IMM GRANULOCYTES NFR BLD AUTO: 0.3 % (ref 0–0.9)
INHALED O2 CONCENTRATION: 30 %
INHALED O2 CONCENTRATION: 50 %
INHALED O2 CONCENTRATION: 50 %
INHALED O2 CONCENTRATION: 60 %
INHALED O2 CONCENTRATION: 60 %
INR PPP: 1.2 (ref 0.9–1.1)
INR PPP: 1.3 (ref 0.9–1.1)
LACTATE BLDA-SCNC: 2.6 MMOL/L (ref 0.4–2)
LACTATE BLDA-SCNC: 3.4 MMOL/L (ref 0.4–2)
LACTATE BLDA-SCNC: 3.6 MMOL/L (ref 0.4–2)
LACTATE BLDA-SCNC: 3.8 MMOL/L (ref 0.4–2)
LACTATE BLDA-SCNC: 4 MMOL/L (ref 0.4–2)
LACTATE BLDA-SCNC: 6.9 MMOL/L (ref 0.4–2)
LACTATE BLDV-SCNC: 2 MMOL/L (ref 0.4–2)
LACTATE BLDV-SCNC: 4.1 MMOL/L (ref 0.4–2)
LIPASE SERPL-CCNC: 383 U/L (ref 16–63)
LYMPHOCYTES # BLD AUTO: 4.67 X10*3/UL (ref 1.2–4.8)
LYMPHOCYTES NFR BLD AUTO: 45.8 %
MAGNESIUM SERPL-MCNC: 1.52 MG/DL (ref 1.6–2.4)
MAGNESIUM SERPL-MCNC: 2.15 MG/DL (ref 1.6–2.4)
MCH RBC QN AUTO: 27.6 PG (ref 26–34)
MCH RBC QN AUTO: 27.8 PG (ref 26–34)
MCH RBC QN AUTO: 28.1 PG (ref 26–34)
MCHC RBC AUTO-ENTMCNC: 33.6 G/DL (ref 32–36)
MCHC RBC AUTO-ENTMCNC: 34 G/DL (ref 32–36)
MCHC RBC AUTO-ENTMCNC: 34.5 G/DL (ref 32–36)
MCV RBC AUTO: 80 FL (ref 80–100)
MCV RBC AUTO: 83 FL (ref 80–100)
MCV RBC AUTO: 83 FL (ref 80–100)
MONOCYTES # BLD AUTO: 0.95 X10*3/UL (ref 0.1–1)
MONOCYTES NFR BLD AUTO: 9.3 %
NEUTROPHILS # BLD AUTO: 4.12 X10*3/UL (ref 1.2–7.7)
NEUTROPHILS NFR BLD AUTO: 40.5 %
NRBC BLD-RTO: 0 /100 WBCS (ref 0–0)
OPIATES UR QL SCN: ABNORMAL
OXYCODONE+OXYMORPHONE UR QL SCN: ABNORMAL
OXYHGB MFR BLDA: 95.8 % (ref 94–98)
OXYHGB MFR BLDA: 96.1 % (ref 94–98)
OXYHGB MFR BLDA: 96.9 % (ref 94–98)
OXYHGB MFR BLDA: 97.2 % (ref 94–98)
OXYHGB MFR BLDA: 97.8 % (ref 94–98)
OXYHGB MFR BLDA: 97.9 % (ref 94–98)
OXYHGB MFR BLDA: 98.1 % (ref 94–98)
PCO2 BLDA: 41 MM HG (ref 38–42)
PCO2 BLDA: 42 MM HG (ref 38–42)
PCO2 BLDA: 48 MM HG (ref 38–42)
PCO2 BLDA: 50 MM HG (ref 38–42)
PCP UR QL SCN: ABNORMAL
PH BLDA: 7.15 PH (ref 7.38–7.42)
PH BLDA: 7.24 PH (ref 7.38–7.42)
PH BLDA: 7.27 PH (ref 7.38–7.42)
PH BLDA: 7.28 PH (ref 7.38–7.42)
PH BLDA: 7.3 PH (ref 7.38–7.42)
PH BLDA: 7.3 PH (ref 7.38–7.42)
PH BLDA: 7.33 PH (ref 7.38–7.42)
PHOSPHATE SERPL-MCNC: 2.8 MG/DL (ref 2.5–4.9)
PHOSPHATE SERPL-MCNC: 3.1 MG/DL (ref 2.5–4.9)
PLATELET # BLD AUTO: 184 X10*3/UL (ref 150–450)
PLATELET # BLD AUTO: 236 X10*3/UL (ref 150–450)
PLATELET # BLD AUTO: 294 X10*3/UL (ref 150–450)
PO2 BLDA: 103 MM HG (ref 85–95)
PO2 BLDA: 181 MM HG (ref 85–95)
PO2 BLDA: 200 MM HG (ref 85–95)
PO2 BLDA: 208 MM HG (ref 85–95)
PO2 BLDA: 210 MM HG (ref 85–95)
PO2 BLDA: 89 MM HG (ref 85–95)
PO2 BLDA: 94 MM HG (ref 85–95)
POTASSIUM BLDA-SCNC: 3.9 MMOL/L (ref 3.5–5.3)
POTASSIUM BLDA-SCNC: 3.9 MMOL/L (ref 3.5–5.3)
POTASSIUM BLDA-SCNC: 4.1 MMOL/L (ref 3.5–5.3)
POTASSIUM BLDA-SCNC: 5.1 MMOL/L (ref 3.5–5.3)
POTASSIUM BLDA-SCNC: 5.4 MMOL/L (ref 3.5–5.3)
POTASSIUM BLDA-SCNC: 5.6 MMOL/L (ref 3.5–5.3)
POTASSIUM SERPL-SCNC: 3.4 MMOL/L (ref 3.4–5.1)
POTASSIUM SERPL-SCNC: 4.4 MMOL/L (ref 3.5–5.3)
POTASSIUM SERPL-SCNC: 5.3 MMOL/L (ref 3.5–5.3)
PROT SERPL-MCNC: 7.6 G/DL (ref 5.9–7.9)
PROTHROMBIN TIME: 14 SECONDS (ref 9.8–12.8)
PROTHROMBIN TIME: 14.3 SECONDS (ref 9.8–12.8)
RBC # BLD AUTO: 4.85 X10*6/UL (ref 4.5–5.9)
RBC # BLD AUTO: 5.01 X10*6/UL (ref 4.5–5.9)
RBC # BLD AUTO: 5.57 X10*6/UL (ref 4.5–5.9)
RH FACTOR (ANTIGEN D): NORMAL
RH FACTOR (ANTIGEN D): NORMAL
SAO2 % BLDA: 100 % (ref 94–100)
SAO2 % BLDA: 98 % (ref 94–100)
SAO2 % BLDA: 99 % (ref 94–100)
SODIUM BLDA-SCNC: 132 MMOL/L (ref 136–145)
SODIUM BLDA-SCNC: 133 MMOL/L (ref 136–145)
SODIUM BLDA-SCNC: 135 MMOL/L (ref 136–145)
SODIUM BLDA-SCNC: 136 MMOL/L (ref 136–145)
SODIUM BLDA-SCNC: 136 MMOL/L (ref 136–145)
SODIUM BLDA-SCNC: 138 MMOL/L (ref 136–145)
SODIUM SERPL-SCNC: 138 MMOL/L (ref 136–145)
SODIUM SERPL-SCNC: 141 MMOL/L (ref 136–145)
SODIUM SERPL-SCNC: 142 MMOL/L (ref 133–145)
TROPONIN T SERPL-MCNC: 9 NG/L
WBC # BLD AUTO: 10.2 X10*3/UL (ref 4.4–11.3)
WBC # BLD AUTO: 2.5 X10*3/UL (ref 4.4–11.3)
WBC # BLD AUTO: 7.5 X10*3/UL (ref 4.4–11.3)

## 2024-02-06 PROCEDURE — 96375 TX/PRO/DX INJ NEW DRUG ADDON: CPT | Mod: 59 | Performed by: EMERGENCY MEDICINE

## 2024-02-06 PROCEDURE — 83735 ASSAY OF MAGNESIUM: CPT

## 2024-02-06 PROCEDURE — 3E043XZ INTRODUCTION OF VASOPRESSOR INTO CENTRAL VEIN, PERCUTANEOUS APPROACH: ICD-10-PCS | Performed by: SURGERY

## 2024-02-06 PROCEDURE — 84132 ASSAY OF SERUM POTASSIUM: CPT

## 2024-02-06 PROCEDURE — 2500000004 HC RX 250 GENERAL PHARMACY W/ HCPCS (ALT 636 FOR OP/ED)

## 2024-02-06 PROCEDURE — 1200000002 HC GENERAL ROOM WITH TELEMETRY DAILY

## 2024-02-06 PROCEDURE — 87040 BLOOD CULTURE FOR BACTERIA: CPT | Mod: 59,TRILAB | Performed by: EMERGENCY MEDICINE

## 2024-02-06 PROCEDURE — 2500000004 HC RX 250 GENERAL PHARMACY W/ HCPCS (ALT 636 FOR OP/ED): Performed by: STUDENT IN AN ORGANIZED HEALTH CARE EDUCATION/TRAINING PROGRAM

## 2024-02-06 PROCEDURE — 96376 TX/PRO/DX INJ SAME DRUG ADON: CPT | Mod: 59 | Performed by: EMERGENCY MEDICINE

## 2024-02-06 PROCEDURE — 99285 EMERGENCY DEPT VISIT HI MDM: CPT | Mod: 25 | Performed by: EMERGENCY MEDICINE

## 2024-02-06 PROCEDURE — 84132 ASSAY OF SERUM POTASSIUM: CPT | Performed by: NURSE PRACTITIONER

## 2024-02-06 PROCEDURE — 84484 ASSAY OF TROPONIN QUANT: CPT | Performed by: EMERGENCY MEDICINE

## 2024-02-06 PROCEDURE — 80307 DRUG TEST PRSMV CHEM ANLYZR: CPT

## 2024-02-06 PROCEDURE — 37799 UNLISTED PX VASCULAR SURGERY: CPT

## 2024-02-06 PROCEDURE — 99291 CRITICAL CARE FIRST HOUR: CPT | Performed by: SURGERY

## 2024-02-06 PROCEDURE — P9045 ALBUMIN (HUMAN), 5%, 250 ML: HCPCS | Mod: JZ

## 2024-02-06 PROCEDURE — 3700000002 HC GENERAL ANESTHESIA TIME - EACH INCREMENTAL 1 MINUTE: Performed by: SURGERY

## 2024-02-06 PROCEDURE — 36415 COLL VENOUS BLD VENIPUNCTURE: CPT | Performed by: EMERGENCY MEDICINE

## 2024-02-06 PROCEDURE — 99285 EMERGENCY DEPT VISIT HI MDM: CPT | Performed by: EMERGENCY MEDICINE

## 2024-02-06 PROCEDURE — 80053 COMPREHEN METABOLIC PANEL: CPT | Performed by: EMERGENCY MEDICINE

## 2024-02-06 PROCEDURE — 83605 ASSAY OF LACTIC ACID: CPT | Performed by: EMERGENCY MEDICINE

## 2024-02-06 PROCEDURE — 0DH67UZ INSERTION OF FEEDING DEVICE INTO STOMACH, VIA NATURAL OR ARTIFICIAL OPENING: ICD-10-PCS | Performed by: SURGERY

## 2024-02-06 PROCEDURE — 71045 X-RAY EXAM CHEST 1 VIEW: CPT

## 2024-02-06 PROCEDURE — 83605 ASSAY OF LACTIC ACID: CPT

## 2024-02-06 PROCEDURE — 82947 ASSAY GLUCOSE BLOOD QUANT: CPT

## 2024-02-06 PROCEDURE — 74177 CT ABD & PELVIS W/CONTRAST: CPT

## 2024-02-06 PROCEDURE — 0DB60ZZ EXCISION OF STOMACH, OPEN APPROACH: ICD-10-PCS | Performed by: SURGERY

## 2024-02-06 PROCEDURE — 93005 ELECTROCARDIOGRAM TRACING: CPT

## 2024-02-06 PROCEDURE — 0DBU0ZZ EXCISION OF OMENTUM, OPEN APPROACH: ICD-10-PCS | Performed by: SURGERY

## 2024-02-06 PROCEDURE — 2720000007 HC OR 272 NO HCPCS: Performed by: SURGERY

## 2024-02-06 PROCEDURE — 85027 COMPLETE CBC AUTOMATED: CPT

## 2024-02-06 PROCEDURE — 85610 PROTHROMBIN TIME: CPT

## 2024-02-06 PROCEDURE — 88307 TISSUE EXAM BY PATHOLOGIST: CPT | Mod: TC,SUR | Performed by: EMERGENCY MEDICINE

## 2024-02-06 PROCEDURE — 96374 THER/PROPH/DIAG INJ IV PUSH: CPT | Mod: 59 | Performed by: EMERGENCY MEDICINE

## 2024-02-06 PROCEDURE — 2500000004 HC RX 250 GENERAL PHARMACY W/ HCPCS (ALT 636 FOR OP/ED): Performed by: NURSE PRACTITIONER

## 2024-02-06 PROCEDURE — 99222 1ST HOSP IP/OBS MODERATE 55: CPT | Performed by: STUDENT IN AN ORGANIZED HEALTH CARE EDUCATION/TRAINING PROGRAM

## 2024-02-06 PROCEDURE — 96372 THER/PROPH/DIAG INJ SC/IM: CPT | Performed by: EMERGENCY MEDICINE

## 2024-02-06 PROCEDURE — 71045 X-RAY EXAM CHEST 1 VIEW: CPT | Mod: FOREIGN READ | Performed by: RADIOLOGY

## 2024-02-06 PROCEDURE — 0D160ZA BYPASS STOMACH TO JEJUNUM, OPEN APPROACH: ICD-10-PCS | Performed by: SURGERY

## 2024-02-06 PROCEDURE — 84100 ASSAY OF PHOSPHORUS: CPT

## 2024-02-06 PROCEDURE — 83690 ASSAY OF LIPASE: CPT | Performed by: EMERGENCY MEDICINE

## 2024-02-06 PROCEDURE — 85610 PROTHROMBIN TIME: CPT | Performed by: NURSE PRACTITIONER

## 2024-02-06 PROCEDURE — 2500000005 HC RX 250 GENERAL PHARMACY W/O HCPCS

## 2024-02-06 PROCEDURE — 88342 IMHCHEM/IMCYTCHM 1ST ANTB: CPT | Performed by: STUDENT IN AN ORGANIZED HEALTH CARE EDUCATION/TRAINING PROGRAM

## 2024-02-06 PROCEDURE — 82805 BLOOD GASES W/O2 SATURATION: CPT

## 2024-02-06 PROCEDURE — 2500000004 HC RX 250 GENERAL PHARMACY W/ HCPCS (ALT 636 FOR OP/ED): Performed by: EMERGENCY MEDICINE

## 2024-02-06 PROCEDURE — 37799 UNLISTED PX VASCULAR SURGERY: CPT | Performed by: NURSE PRACTITIONER

## 2024-02-06 PROCEDURE — 71045 X-RAY EXAM CHEST 1 VIEW: CPT | Performed by: STUDENT IN AN ORGANIZED HEALTH CARE EDUCATION/TRAINING PROGRAM

## 2024-02-06 PROCEDURE — 88341 IMHCHEM/IMCYTCHM EA ADD ANTB: CPT | Performed by: STUDENT IN AN ORGANIZED HEALTH CARE EDUCATION/TRAINING PROGRAM

## 2024-02-06 PROCEDURE — 3600000008 HC OR TIME - EACH INCREMENTAL 1 MINUTE - PROCEDURE LEVEL THREE: Performed by: SURGERY

## 2024-02-06 PROCEDURE — 83735 ASSAY OF MAGNESIUM: CPT | Performed by: NURSE PRACTITIONER

## 2024-02-06 PROCEDURE — 96374 THER/PROPH/DIAG INJ IV PUSH: CPT

## 2024-02-06 PROCEDURE — 96372 THER/PROPH/DIAG INJ SC/IM: CPT

## 2024-02-06 PROCEDURE — 0DN60ZZ RELEASE STOMACH, OPEN APPROACH: ICD-10-PCS | Performed by: SURGERY

## 2024-02-06 PROCEDURE — 3600000003 HC OR TIME - INITIAL BASE CHARGE - PROCEDURE LEVEL THREE: Performed by: SURGERY

## 2024-02-06 PROCEDURE — 85025 COMPLETE CBC W/AUTO DIFF WBC: CPT | Performed by: EMERGENCY MEDICINE

## 2024-02-06 PROCEDURE — 02HV33Z INSERTION OF INFUSION DEVICE INTO SUPERIOR VENA CAVA, PERCUTANEOUS APPROACH: ICD-10-PCS | Performed by: SURGERY

## 2024-02-06 PROCEDURE — 3700000001 HC GENERAL ANESTHESIA TIME - INITIAL BASE CHARGE: Performed by: SURGERY

## 2024-02-06 PROCEDURE — 0DB70ZZ EXCISION OF STOMACH, PYLORUS, OPEN APPROACH: ICD-10-PCS | Performed by: SURGERY

## 2024-02-06 PROCEDURE — 88307 TISSUE EXAM BY PATHOLOGIST: CPT | Performed by: STUDENT IN AN ORGANIZED HEALTH CARE EDUCATION/TRAINING PROGRAM

## 2024-02-06 PROCEDURE — 86900 BLOOD TYPING SEROLOGIC ABO: CPT

## 2024-02-06 PROCEDURE — 71045 X-RAY EXAM CHEST 1 VIEW: CPT | Performed by: RADIOLOGY

## 2024-02-06 PROCEDURE — 85730 THROMBOPLASTIN TIME PARTIAL: CPT

## 2024-02-06 PROCEDURE — 88312 SPECIAL STAINS GROUP 1: CPT | Performed by: STUDENT IN AN ORGANIZED HEALTH CARE EDUCATION/TRAINING PROGRAM

## 2024-02-06 PROCEDURE — 74177 CT ABD & PELVIS W/CONTRAST: CPT | Mod: FOREIGN READ | Performed by: RADIOLOGY

## 2024-02-06 PROCEDURE — 44050 REDUCE BOWEL OBSTRUCTION: CPT | Performed by: STUDENT IN AN ORGANIZED HEALTH CARE EDUCATION/TRAINING PROGRAM

## 2024-02-06 PROCEDURE — 2550000001 HC RX 255 CONTRASTS: Performed by: EMERGENCY MEDICINE

## 2024-02-06 RX ORDER — SUCCINYLCHOLINE CHLORIDE 20 MG/ML
INJECTION INTRAMUSCULAR; INTRAVENOUS AS NEEDED
Status: DISCONTINUED | OUTPATIENT
Start: 2024-02-06 | End: 2024-02-06

## 2024-02-06 RX ORDER — DEXAMETHASONE SODIUM PHOSPHATE 4 MG/ML
INJECTION, SOLUTION INTRA-ARTICULAR; INTRALESIONAL; INTRAMUSCULAR; INTRAVENOUS; SOFT TISSUE AS NEEDED
Status: DISCONTINUED | OUTPATIENT
Start: 2024-02-06 | End: 2024-02-06

## 2024-02-06 RX ORDER — FAMOTIDINE 10 MG/ML
20 INJECTION INTRAVENOUS 2 TIMES DAILY
Status: DISCONTINUED | OUTPATIENT
Start: 2024-02-06 | End: 2024-02-09

## 2024-02-06 RX ORDER — HYDROMORPHONE HYDROCHLORIDE 1 MG/ML
1 INJECTION, SOLUTION INTRAMUSCULAR; INTRAVENOUS; SUBCUTANEOUS ONCE
Status: COMPLETED | OUTPATIENT
Start: 2024-02-06 | End: 2024-02-06

## 2024-02-06 RX ORDER — PHENYLEPHRINE 10 MG/250 ML(40 MCG/ML)IN 0.9 % SOD.CHLORIDE INTRAVENOUS
CONTINUOUS PRN
Status: DISCONTINUED | OUTPATIENT
Start: 2024-02-06 | End: 2024-02-06

## 2024-02-06 RX ORDER — FENTANYL CITRATE 50 UG/ML
50 INJECTION, SOLUTION INTRAMUSCULAR; INTRAVENOUS ONCE
Status: COMPLETED | OUTPATIENT
Start: 2024-02-06 | End: 2024-02-06

## 2024-02-06 RX ORDER — FLUCONAZOLE 2 MG/ML
400 INJECTION, SOLUTION INTRAVENOUS EVERY 24 HOURS
Status: DISCONTINUED | OUTPATIENT
Start: 2024-02-06 | End: 2024-02-10

## 2024-02-06 RX ORDER — MIDAZOLAM HYDROCHLORIDE 1 MG/ML
INJECTION INTRAMUSCULAR; INTRAVENOUS AS NEEDED
Status: DISCONTINUED | OUTPATIENT
Start: 2024-02-06 | End: 2024-02-06

## 2024-02-06 RX ORDER — HYDROMORPHONE HYDROCHLORIDE 1 MG/ML
0.4 INJECTION, SOLUTION INTRAMUSCULAR; INTRAVENOUS; SUBCUTANEOUS EVERY 5 MIN PRN
Status: CANCELLED | OUTPATIENT
Start: 2024-02-06

## 2024-02-06 RX ORDER — FENTANYL CITRATE 50 UG/ML
100 INJECTION, SOLUTION INTRAMUSCULAR; INTRAVENOUS ONCE
Status: COMPLETED | OUTPATIENT
Start: 2024-02-06 | End: 2024-02-06

## 2024-02-06 RX ORDER — PHENYLEPHRINE HCL IN 0.9% NACL 0.4MG/10ML
SYRINGE (ML) INTRAVENOUS AS NEEDED
Status: DISCONTINUED | OUTPATIENT
Start: 2024-02-06 | End: 2024-02-06

## 2024-02-06 RX ORDER — POTASSIUM CHLORIDE 14.9 MG/ML
20 INJECTION INTRAVENOUS EVERY 6 HOURS PRN
Status: DISCONTINUED | OUTPATIENT
Start: 2024-02-06 | End: 2024-02-12

## 2024-02-06 RX ORDER — SODIUM CHLORIDE, SODIUM LACTATE, POTASSIUM CHLORIDE, CALCIUM CHLORIDE 600; 310; 30; 20 MG/100ML; MG/100ML; MG/100ML; MG/100ML
100 INJECTION, SOLUTION INTRAVENOUS CONTINUOUS
Status: CANCELLED | OUTPATIENT
Start: 2024-02-06

## 2024-02-06 RX ORDER — DIPHENHYDRAMINE HYDROCHLORIDE 50 MG/ML
50 INJECTION INTRAMUSCULAR; INTRAVENOUS ONCE
Status: COMPLETED | OUTPATIENT
Start: 2024-02-06 | End: 2024-02-06

## 2024-02-06 RX ORDER — LIDOCAINE HYDROCHLORIDE 20 MG/ML
INJECTION, SOLUTION INFILTRATION; PERINEURAL AS NEEDED
Status: DISCONTINUED | OUTPATIENT
Start: 2024-02-06 | End: 2024-02-06

## 2024-02-06 RX ORDER — CALCIUM GLUCONATE 20 MG/ML
1 INJECTION, SOLUTION INTRAVENOUS EVERY 6 HOURS PRN
Status: DISCONTINUED | OUTPATIENT
Start: 2024-02-06 | End: 2024-02-12

## 2024-02-06 RX ORDER — LIDOCAINE HYDROCHLORIDE 10 MG/ML
0.1 INJECTION INFILTRATION; PERINEURAL ONCE
Status: CANCELLED | OUTPATIENT
Start: 2024-02-06 | End: 2024-02-06

## 2024-02-06 RX ORDER — SODIUM CHLORIDE, SODIUM LACTATE, POTASSIUM CHLORIDE, CALCIUM CHLORIDE 600; 310; 30; 20 MG/100ML; MG/100ML; MG/100ML; MG/100ML
75 INJECTION, SOLUTION INTRAVENOUS CONTINUOUS
Status: DISCONTINUED | OUTPATIENT
Start: 2024-02-06 | End: 2024-02-11

## 2024-02-06 RX ORDER — ACETAMINOPHEN 325 MG/1
650 TABLET ORAL EVERY 4 HOURS PRN
Status: CANCELLED | OUTPATIENT
Start: 2024-02-06

## 2024-02-06 RX ORDER — CALCIUM CHLORIDE INJECTION 100 MG/ML
INJECTION, SOLUTION INTRAVENOUS AS NEEDED
Status: DISCONTINUED | OUTPATIENT
Start: 2024-02-06 | End: 2024-02-06

## 2024-02-06 RX ORDER — ROCURONIUM BROMIDE 10 MG/ML
INJECTION, SOLUTION INTRAVENOUS AS NEEDED
Status: DISCONTINUED | OUTPATIENT
Start: 2024-02-06 | End: 2024-02-06

## 2024-02-06 RX ORDER — ENOXAPARIN SODIUM 100 MG/ML
40 INJECTION SUBCUTANEOUS DAILY
Status: DISCONTINUED | OUTPATIENT
Start: 2024-02-07 | End: 2024-02-07

## 2024-02-06 RX ORDER — MAGNESIUM SULFATE HEPTAHYDRATE 40 MG/ML
4 INJECTION, SOLUTION INTRAVENOUS EVERY 6 HOURS PRN
Status: DISCONTINUED | OUTPATIENT
Start: 2024-02-06 | End: 2024-02-12

## 2024-02-06 RX ORDER — CALCIUM GLUCONATE 20 MG/ML
2 INJECTION, SOLUTION INTRAVENOUS EVERY 6 HOURS PRN
Status: DISCONTINUED | OUTPATIENT
Start: 2024-02-06 | End: 2024-02-12

## 2024-02-06 RX ORDER — OXYCODONE HYDROCHLORIDE 5 MG/1
5 TABLET ORAL EVERY 4 HOURS PRN
Status: CANCELLED | OUTPATIENT
Start: 2024-02-06

## 2024-02-06 RX ORDER — FENTANYL CITRATE-0.9 % NACL/PF 10 MCG/ML
25-200 PLASTIC BAG, INJECTION (ML) INTRAVENOUS CONTINUOUS
Status: DISCONTINUED | OUTPATIENT
Start: 2024-02-06 | End: 2024-02-10

## 2024-02-06 RX ORDER — ONDANSETRON HYDROCHLORIDE 2 MG/ML
4 INJECTION, SOLUTION INTRAVENOUS ONCE
Status: COMPLETED | OUTPATIENT
Start: 2024-02-06 | End: 2024-02-06

## 2024-02-06 RX ORDER — PROPOFOL 10 MG/ML
5-40 INJECTION, EMULSION INTRAVENOUS CONTINUOUS
Status: DISCONTINUED | OUTPATIENT
Start: 2024-02-06 | End: 2024-02-10

## 2024-02-06 RX ORDER — FLUCONAZOLE 2 MG/ML
INJECTION, SOLUTION INTRAVENOUS AS NEEDED
Status: DISCONTINUED | OUTPATIENT
Start: 2024-02-06 | End: 2024-02-06

## 2024-02-06 RX ORDER — ENOXAPARIN SODIUM 100 MG/ML
30 INJECTION SUBCUTANEOUS EVERY 12 HOURS
Status: DISCONTINUED | OUTPATIENT
Start: 2024-02-06 | End: 2024-02-06

## 2024-02-06 RX ORDER — OXYCODONE HYDROCHLORIDE 5 MG/1
10 TABLET ORAL EVERY 4 HOURS PRN
Status: CANCELLED | OUTPATIENT
Start: 2024-02-06

## 2024-02-06 RX ORDER — NORETHINDRONE AND ETHINYL ESTRADIOL 0.5-0.035
KIT ORAL AS NEEDED
Status: DISCONTINUED | OUTPATIENT
Start: 2024-02-06 | End: 2024-02-06

## 2024-02-06 RX ORDER — HYDROMORPHONE HYDROCHLORIDE 1 MG/ML
0.2 INJECTION, SOLUTION INTRAMUSCULAR; INTRAVENOUS; SUBCUTANEOUS EVERY 5 MIN PRN
Status: CANCELLED | OUTPATIENT
Start: 2024-02-06

## 2024-02-06 RX ORDER — PROPOFOL 10 MG/ML
INJECTION, EMULSION INTRAVENOUS AS NEEDED
Status: DISCONTINUED | OUTPATIENT
Start: 2024-02-06 | End: 2024-02-06

## 2024-02-06 RX ORDER — FENTANYL CITRATE 50 UG/ML
INJECTION, SOLUTION INTRAMUSCULAR; INTRAVENOUS AS NEEDED
Status: DISCONTINUED | OUTPATIENT
Start: 2024-02-06 | End: 2024-02-06

## 2024-02-06 RX ORDER — PROPOFOL 10 MG/ML
INJECTION, EMULSION INTRAVENOUS CONTINUOUS PRN
Status: DISCONTINUED | OUTPATIENT
Start: 2024-02-06 | End: 2024-02-06

## 2024-02-06 RX ORDER — MAGNESIUM SULFATE HEPTAHYDRATE 40 MG/ML
2 INJECTION, SOLUTION INTRAVENOUS EVERY 6 HOURS PRN
Status: DISCONTINUED | OUTPATIENT
Start: 2024-02-06 | End: 2024-02-12

## 2024-02-06 RX ORDER — NOREPINEPHRINE BITARTRATE/D5W 8 MG/250ML
0-3.3 PLASTIC BAG, INJECTION (ML) INTRAVENOUS CONTINUOUS
Status: DISCONTINUED | OUTPATIENT
Start: 2024-02-06 | End: 2024-02-08

## 2024-02-06 RX ORDER — METOCLOPRAMIDE HYDROCHLORIDE 5 MG/ML
10 INJECTION INTRAMUSCULAR; INTRAVENOUS ONCE
Status: COMPLETED | OUTPATIENT
Start: 2024-02-06 | End: 2024-02-06

## 2024-02-06 RX ORDER — SODIUM CHLORIDE, SODIUM LACTATE, POTASSIUM CHLORIDE, CALCIUM CHLORIDE 600; 310; 30; 20 MG/100ML; MG/100ML; MG/100ML; MG/100ML
INJECTION, SOLUTION INTRAVENOUS CONTINUOUS PRN
Status: DISCONTINUED | OUTPATIENT
Start: 2024-02-06 | End: 2024-02-06

## 2024-02-06 RX ORDER — ALBUMIN HUMAN 50 G/1000ML
SOLUTION INTRAVENOUS AS NEEDED
Status: DISCONTINUED | OUTPATIENT
Start: 2024-02-06 | End: 2024-02-06

## 2024-02-06 RX ORDER — VANCOMYCIN HYDROCHLORIDE 1 G/200ML
1000 INJECTION, SOLUTION INTRAVENOUS EVERY 12 HOURS
Status: DISCONTINUED | OUTPATIENT
Start: 2024-02-06 | End: 2024-02-10

## 2024-02-06 RX ORDER — HYDROMORPHONE HYDROCHLORIDE 1 MG/ML
INJECTION, SOLUTION INTRAMUSCULAR; INTRAVENOUS; SUBCUTANEOUS AS NEEDED
Status: DISCONTINUED | OUTPATIENT
Start: 2024-02-06 | End: 2024-02-06

## 2024-02-06 RX ORDER — KETOROLAC TROMETHAMINE 30 MG/ML
15 INJECTION, SOLUTION INTRAMUSCULAR; INTRAVENOUS ONCE
Status: COMPLETED | OUTPATIENT
Start: 2024-02-06 | End: 2024-02-06

## 2024-02-06 RX ADMIN — HYDROMORPHONE HYDROCHLORIDE 0.2 MG: 1 INJECTION, SOLUTION INTRAMUSCULAR; INTRAVENOUS; SUBCUTANEOUS at 10:17

## 2024-02-06 RX ADMIN — SODIUM CHLORIDE, POTASSIUM CHLORIDE, SODIUM LACTATE AND CALCIUM CHLORIDE 1000 ML: 600; 310; 30; 20 INJECTION, SOLUTION INTRAVENOUS at 22:09

## 2024-02-06 RX ADMIN — EPHEDRINE SULFATE 5 MG: 50 INJECTION, SOLUTION INTRAVENOUS at 10:02

## 2024-02-06 RX ADMIN — FENTANYL CITRATE 100 MCG: 50 INJECTION INTRAMUSCULAR; INTRAVENOUS at 01:24

## 2024-02-06 RX ADMIN — SUCCINYLCHOLINE CHLORIDE 140 MG: 20 INJECTION, SOLUTION INTRAMUSCULAR; INTRAVENOUS at 07:16

## 2024-02-06 RX ADMIN — ENOXAPARIN SODIUM 30 MG: 100 INJECTION SUBCUTANEOUS at 11:25

## 2024-02-06 RX ADMIN — FLUCONAZOLE 400 MG: 2 INJECTION, SOLUTION INTRAVENOUS at 08:38

## 2024-02-06 RX ADMIN — FENTANYL CITRATE 50 MCG: 50 INJECTION INTRAMUSCULAR; INTRAVENOUS at 02:46

## 2024-02-06 RX ADMIN — SODIUM CHLORIDE, POTASSIUM CHLORIDE, SODIUM LACTATE AND CALCIUM CHLORIDE 100 ML/HR: 600; 310; 30; 20 INJECTION, SOLUTION INTRAVENOUS at 11:26

## 2024-02-06 RX ADMIN — PIPERACILLIN SODIUM AND TAZOBACTAM SODIUM 3.38 G: 3; .375 INJECTION, SOLUTION INTRAVENOUS at 23:43

## 2024-02-06 RX ADMIN — MIDAZOLAM HYDROCHLORIDE 1 MG: 1 INJECTION, SOLUTION INTRAMUSCULAR; INTRAVENOUS at 07:08

## 2024-02-06 RX ADMIN — LIDOCAINE HYDROCHLORIDE 80 MG: 20 INJECTION, SOLUTION INFILTRATION; PERINEURAL at 07:16

## 2024-02-06 RX ADMIN — Medication 25 MCG/HR: at 11:26

## 2024-02-06 RX ADMIN — PROPOFOL 20 MCG/KG/MIN: 10 INJECTION, EMULSION INTRAVENOUS at 11:26

## 2024-02-06 RX ADMIN — Medication 100 MCG/HR: at 18:38

## 2024-02-06 RX ADMIN — PROPOFOL 110 MG: 10 INJECTION, EMULSION INTRAVENOUS at 07:16

## 2024-02-06 RX ADMIN — FLUCONAZOLE 400 MG: 2 INJECTION, SOLUTION INTRAVENOUS at 15:11

## 2024-02-06 RX ADMIN — FENTANYL CITRATE 100 MCG: 50 INJECTION INTRAMUSCULAR; INTRAVENOUS at 05:06

## 2024-02-06 RX ADMIN — Medication 200 MCG: at 08:27

## 2024-02-06 RX ADMIN — HYDROCORTISONE SODIUM SUCCINATE 60 MG: 100 INJECTION, POWDER, FOR SOLUTION INTRAMUSCULAR; INTRAVENOUS at 23:11

## 2024-02-06 RX ADMIN — PIPERACILLIN SODIUM AND TAZOBACTAM SODIUM 4.5 G: 4; .5 INJECTION, SOLUTION INTRAVENOUS at 08:33

## 2024-02-06 RX ADMIN — ONDANSETRON 4 MG: 2 INJECTION INTRAMUSCULAR; INTRAVENOUS at 02:08

## 2024-02-06 RX ADMIN — ROCURONIUM BROMIDE 20 MG: 10 INJECTION INTRAVENOUS at 08:39

## 2024-02-06 RX ADMIN — PIPERACILLIN SODIUM AND TAZOBACTAM SODIUM 3.38 G: 3; .375 INJECTION, SOLUTION INTRAVENOUS at 11:25

## 2024-02-06 RX ADMIN — ROCURONIUM BROMIDE 20 MG: 10 INJECTION INTRAVENOUS at 09:47

## 2024-02-06 RX ADMIN — EPHEDRINE SULFATE 10 MG: 50 INJECTION, SOLUTION INTRAVENOUS at 10:15

## 2024-02-06 RX ADMIN — FENTANYL CITRATE 100 MCG: 50 INJECTION, SOLUTION INTRAMUSCULAR; INTRAVENOUS at 04:02

## 2024-02-06 RX ADMIN — FENTANYL CITRATE 25 MCG: 50 INJECTION, SOLUTION INTRAMUSCULAR; INTRAVENOUS at 07:08

## 2024-02-06 RX ADMIN — SODIUM CHLORIDE, POTASSIUM CHLORIDE, SODIUM LACTATE AND CALCIUM CHLORIDE: 600; 310; 30; 20 INJECTION, SOLUTION INTRAVENOUS at 07:08

## 2024-02-06 RX ADMIN — FAMOTIDINE 20 MG: 10 INJECTION INTRAVENOUS at 11:26

## 2024-02-06 RX ADMIN — EPHEDRINE SULFATE 10 MG: 50 INJECTION, SOLUTION INTRAVENOUS at 10:36

## 2024-02-06 RX ADMIN — Medication 80 MCG: at 07:16

## 2024-02-06 RX ADMIN — NOREPINEPHRINE BITARTRATE 0.02 MCG/KG/MIN: 8 INJECTION, SOLUTION INTRAVENOUS at 15:48

## 2024-02-06 RX ADMIN — HYDROMORPHONE HYDROCHLORIDE 1 MG: 1 INJECTION, SOLUTION INTRAMUSCULAR; INTRAVENOUS; SUBCUTANEOUS at 02:08

## 2024-02-06 RX ADMIN — FENTANYL CITRATE 50 MCG: 50 INJECTION INTRAMUSCULAR; INTRAVENOUS at 04:49

## 2024-02-06 RX ADMIN — KETOROLAC TROMETHAMINE 15 MG: 30 INJECTION INTRAMUSCULAR; INTRAVENOUS at 02:46

## 2024-02-06 RX ADMIN — PIPERACILLIN SODIUM AND TAZOBACTAM SODIUM 4.5 G: 4; .5 INJECTION, SOLUTION INTRAVENOUS at 04:33

## 2024-02-06 RX ADMIN — ALBUMIN HUMAN 250 ML: 0.05 INJECTION, SOLUTION INTRAVENOUS at 08:02

## 2024-02-06 RX ADMIN — Medication 80 MCG: at 09:56

## 2024-02-06 RX ADMIN — DEXAMETHASONE SODIUM PHOSPHATE 4 MG: 4 INJECTION, SOLUTION INTRAMUSCULAR; INTRAVENOUS at 07:46

## 2024-02-06 RX ADMIN — Medication 80 MCG: at 08:50

## 2024-02-06 RX ADMIN — FAMOTIDINE 20 MG: 10 INJECTION INTRAVENOUS at 20:11

## 2024-02-06 RX ADMIN — NOREPINEPHRINE BITARTRATE 0.14 MCG/KG/MIN: 8 INJECTION, SOLUTION INTRAVENOUS at 23:43

## 2024-02-06 RX ADMIN — EPHEDRINE SULFATE 5 MG: 50 INJECTION, SOLUTION INTRAVENOUS at 09:37

## 2024-02-06 RX ADMIN — MAGNESIUM SULFATE 4 G: 4 INJECTION INTRAVENOUS at 13:45

## 2024-02-06 RX ADMIN — ROCURONIUM BROMIDE 60 MG: 10 INJECTION INTRAVENOUS at 07:21

## 2024-02-06 RX ADMIN — ALBUMIN HUMAN 250 ML: 0.05 INJECTION, SOLUTION INTRAVENOUS at 08:32

## 2024-02-06 RX ADMIN — Medication 80 MCG: at 09:13

## 2024-02-06 RX ADMIN — SODIUM CHLORIDE, POTASSIUM CHLORIDE, SODIUM LACTATE AND CALCIUM CHLORIDE 1000 ML: 600; 310; 30; 20 INJECTION, SOLUTION INTRAVENOUS at 19:30

## 2024-02-06 RX ADMIN — Medication 120 MCG: at 07:37

## 2024-02-06 RX ADMIN — Medication 0.2 MCG/KG/MIN: at 07:38

## 2024-02-06 RX ADMIN — Medication 120 MCG: at 07:24

## 2024-02-06 RX ADMIN — Medication 80 MCG: at 07:28

## 2024-02-06 RX ADMIN — Medication 120 MCG: at 08:16

## 2024-02-06 RX ADMIN — VASOPRESSIN 0.03 UNITS/MIN: 0.2 INJECTION INTRAVENOUS at 19:16

## 2024-02-06 RX ADMIN — ALBUMIN HUMAN 250 ML: 0.05 INJECTION, SOLUTION INTRAVENOUS at 09:26

## 2024-02-06 RX ADMIN — CALCIUM CHLORIDE 0.5 G: 100 INJECTION INTRAVENOUS; INTRAVENTRICULAR at 09:15

## 2024-02-06 RX ADMIN — IOHEXOL 75 ML: 350 INJECTION, SOLUTION INTRAVENOUS at 02:26

## 2024-02-06 RX ADMIN — FENTANYL CITRATE 100 MCG: 50 INJECTION INTRAMUSCULAR; INTRAVENOUS at 03:15

## 2024-02-06 RX ADMIN — PIPERACILLIN SODIUM AND TAZOBACTAM SODIUM 3.38 G: 3; .375 INJECTION, SOLUTION INTRAVENOUS at 17:10

## 2024-02-06 RX ADMIN — PROPOFOL 20 MCG/KG/MIN: 10 INJECTION, EMULSION INTRAVENOUS at 18:38

## 2024-02-06 RX ADMIN — FENTANYL CITRATE 25 MCG: 50 INJECTION, SOLUTION INTRAMUSCULAR; INTRAVENOUS at 07:16

## 2024-02-06 RX ADMIN — HYDROMORPHONE HYDROCHLORIDE 1 MG: 1 INJECTION, SOLUTION INTRAMUSCULAR; INTRAVENOUS; SUBCUTANEOUS at 06:18

## 2024-02-06 RX ADMIN — MIDAZOLAM HYDROCHLORIDE 1 MG: 1 INJECTION, SOLUTION INTRAMUSCULAR; INTRAVENOUS at 07:16

## 2024-02-06 RX ADMIN — METOCLOPRAMIDE 10 MG: 5 INJECTION, SOLUTION INTRAMUSCULAR; INTRAVENOUS at 02:32

## 2024-02-06 RX ADMIN — SODIUM CHLORIDE, POTASSIUM CHLORIDE, SODIUM LACTATE AND CALCIUM CHLORIDE 500 ML: 600; 310; 30; 20 INJECTION, SOLUTION INTRAVENOUS at 13:49

## 2024-02-06 RX ADMIN — PROPOFOL 30 MCG/KG/MIN: 10 INJECTION, EMULSION INTRAVENOUS at 10:28

## 2024-02-06 RX ADMIN — VANCOMYCIN HYDROCHLORIDE 1000 MG: 1 INJECTION, SOLUTION INTRAVENOUS at 22:38

## 2024-02-06 RX ADMIN — SODIUM CHLORIDE, POTASSIUM CHLORIDE, SODIUM LACTATE AND CALCIUM CHLORIDE 1000 ML: 600; 310; 30; 20 INJECTION, SOLUTION INTRAVENOUS at 14:59

## 2024-02-06 RX ADMIN — DIPHENHYDRAMINE HYDROCHLORIDE 50 MG: 50 INJECTION, SOLUTION INTRAMUSCULAR; INTRAVENOUS at 02:32

## 2024-02-06 RX ADMIN — SODIUM CHLORIDE, POTASSIUM CHLORIDE, SODIUM LACTATE AND CALCIUM CHLORIDE 500 ML: 600; 310; 30; 20 INJECTION, SOLUTION INTRAVENOUS at 13:02

## 2024-02-06 RX ADMIN — ONDANSETRON 4 MG: 2 INJECTION INTRAMUSCULAR; INTRAVENOUS at 05:06

## 2024-02-06 RX ADMIN — Medication 200 MCG: at 08:29

## 2024-02-06 SDOH — HEALTH STABILITY: MENTAL HEALTH: CURRENT SMOKER: 0

## 2024-02-06 SDOH — SOCIAL STABILITY: SOCIAL INSECURITY: WERE YOU ABLE TO COMPLETE ALL THE BEHAVIORAL HEALTH SCREENINGS?: NO

## 2024-02-06 SDOH — SOCIAL STABILITY: SOCIAL INSECURITY: ABUSE: ADULT

## 2024-02-06 SDOH — SOCIAL STABILITY: SOCIAL INSECURITY: HAS ANYONE EVER THREATENED TO HURT YOUR FAMILY OR YOUR PETS?: UNABLE TO ASSESS

## 2024-02-06 SDOH — SOCIAL STABILITY: SOCIAL INSECURITY: ARE YOU OR HAVE YOU BEEN THREATENED OR ABUSED PHYSICALLY, EMOTIONALLY, OR SEXUALLY BY ANYONE?: UNABLE TO ASSESS

## 2024-02-06 SDOH — SOCIAL STABILITY: SOCIAL INSECURITY: DO YOU FEEL ANYONE HAS EXPLOITED OR TAKEN ADVANTAGE OF YOU FINANCIALLY OR OF YOUR PERSONAL PROPERTY?: UNABLE TO ASSESS

## 2024-02-06 SDOH — SOCIAL STABILITY: SOCIAL INSECURITY: DO YOU FEEL UNSAFE GOING BACK TO THE PLACE WHERE YOU ARE LIVING?: UNABLE TO ASSESS

## 2024-02-06 SDOH — SOCIAL STABILITY: SOCIAL INSECURITY: DOES ANYONE TRY TO KEEP YOU FROM HAVING/CONTACTING OTHER FRIENDS OR DOING THINGS OUTSIDE YOUR HOME?: UNABLE TO ASSESS

## 2024-02-06 SDOH — SOCIAL STABILITY: SOCIAL INSECURITY: ARE THERE ANY APPARENT SIGNS OF INJURIES/BEHAVIORS THAT COULD BE RELATED TO ABUSE/NEGLECT?: UNABLE TO ASSESS

## 2024-02-06 ASSESSMENT — PAIN SCALES - WONG BAKER: WONGBAKER_NUMERICALRESPONSE: HURTS WHOLE LOT

## 2024-02-06 ASSESSMENT — PAIN DESCRIPTION - PAIN TYPE
TYPE: ACUTE PAIN

## 2024-02-06 ASSESSMENT — PAIN - FUNCTIONAL ASSESSMENT
PAIN_FUNCTIONAL_ASSESSMENT: 0-10
PAIN_FUNCTIONAL_ASSESSMENT: CPOT (CRITICAL CARE PAIN OBSERVATION TOOL)
PAIN_FUNCTIONAL_ASSESSMENT: 0-10

## 2024-02-06 ASSESSMENT — PAIN SCALES - GENERAL
PAINLEVEL_OUTOF10: 6
PAINLEVEL_OUTOF10: 0 - NO PAIN
PAINLEVEL_OUTOF10: 10 - WORST POSSIBLE PAIN
PAINLEVEL_OUTOF10: 10 - WORST POSSIBLE PAIN
PAINLEVEL_OUTOF10: 8
PAINLEVEL_OUTOF10: 10 - WORST POSSIBLE PAIN
PAINLEVEL_OUTOF10: 10 - WORST POSSIBLE PAIN
PAINLEVEL_OUTOF10: 8
PAINLEVEL_OUTOF10: 8
PAINLEVEL_OUTOF10: 10 - WORST POSSIBLE PAIN
PAINLEVEL_OUTOF10: 10 - WORST POSSIBLE PAIN
PAINLEVEL_OUTOF10: 6
PAINLEVEL_OUTOF10: 10 - WORST POSSIBLE PAIN
PAINLEVEL_OUTOF10: 4

## 2024-02-06 ASSESSMENT — ACTIVITIES OF DAILY LIVING (ADL)
FEEDING YOURSELF: UNABLE TO ASSESS
ADEQUATE_TO_COMPLETE_ADL: UNABLE TO ASSESS
HEARING - RIGHT EAR: UNABLE TO ASSESS
DRESSING YOURSELF: UNABLE TO ASSESS
WALKS IN HOME: UNABLE TO ASSESS
JUDGMENT_ADEQUATE_SAFELY_COMPLETE_DAILY_ACTIVITIES: UNABLE TO ASSESS
TOILETING: UNABLE TO ASSESS
BATHING: UNABLE TO ASSESS
GROOMING: UNABLE TO ASSESS
PATIENT'S MEMORY ADEQUATE TO SAFELY COMPLETE DAILY ACTIVITIES?: UNABLE TO ASSESS
HEARING - LEFT EAR: UNABLE TO ASSESS
LACK_OF_TRANSPORTATION: PATIENT UNABLE TO ANSWER

## 2024-02-06 ASSESSMENT — COGNITIVE AND FUNCTIONAL STATUS - GENERAL: PATIENT BASELINE BEDBOUND: UNABLE TO ASSESS AT THIS TIME

## 2024-02-06 ASSESSMENT — PAIN DESCRIPTION - FREQUENCY: FREQUENCY: CONSTANT/CONTINUOUS

## 2024-02-06 ASSESSMENT — PAIN DESCRIPTION - LOCATION
LOCATION: ABDOMEN

## 2024-02-06 ASSESSMENT — PAIN DESCRIPTION - ONSET: ONSET: SUDDEN

## 2024-02-06 ASSESSMENT — COLUMBIA-SUICIDE SEVERITY RATING SCALE - C-SSRS
2. HAVE YOU ACTUALLY HAD ANY THOUGHTS OF KILLING YOURSELF?: NO
2. HAVE YOU ACTUALLY HAD ANY THOUGHTS OF KILLING YOURSELF?: NO
1. IN THE PAST MONTH, HAVE YOU WISHED YOU WERE DEAD OR WISHED YOU COULD GO TO SLEEP AND NOT WAKE UP?: NO
1. IN THE PAST MONTH, HAVE YOU WISHED YOU WERE DEAD OR WISHED YOU COULD GO TO SLEEP AND NOT WAKE UP?: NO
6. HAVE YOU EVER DONE ANYTHING, STARTED TO DO ANYTHING, OR PREPARED TO DO ANYTHING TO END YOUR LIFE?: NO
6. HAVE YOU EVER DONE ANYTHING, STARTED TO DO ANYTHING, OR PREPARED TO DO ANYTHING TO END YOUR LIFE?: NO

## 2024-02-06 ASSESSMENT — PAIN DESCRIPTION - DESCRIPTORS
DESCRIPTORS: ACHING;CRAMPING;SHARP
DESCRIPTORS: CRAMPING;SHARP

## 2024-02-06 ASSESSMENT — PAIN DESCRIPTION - PROGRESSION: CLINICAL_PROGRESSION: NOT CHANGED

## 2024-02-06 NOTE — ED PROVIDER NOTES
HPI   Chief Complaint   Patient presents with    Abdominal Pain     Pt has had severe abdominal pain for the last 2 hours. Pt started a new medication recently. Pt feeling bloated and nauseous.       49-year-old male with a history of an insulinoma comes to the emergency department with a chief complaint of abdominal pain.  The patient just recently started octreotide for treatment of his insulinoma.  Soon after he developed acute abdominal pain with distention.  He has been unable to eat or drink anything and his pain has been increasing steadily for the last 3 hours.  He denies any history of trauma, constipation, diarrhea, fevers, recent surgeries.  He notes that he had an appendectomy as a child and a remote fundoplication.      History provided by:  Patient and relative  History limited by:  Acuity of condition   used: No                        No data recorded                  Patient History   Past Medical History:   Diagnosis Date    Epigastric pain 05/09/2022    Abdominal pain, acute, epigastric    Other chest pain 01/31/2022    Atypical chest pain    Personal history of other diseases of the digestive system 05/09/2022    History of gastroesophageal reflux (GERD)    Personal history of other diseases of the digestive system 01/27/2020    History of hiatal hernia    Personal history of other diseases of the digestive system 11/22/2019    History of esophagitis    Personal history of other specified conditions 01/31/2022    History of dysphagia     Past Surgical History:   Procedure Laterality Date    OTHER SURGICAL HISTORY  12/03/2018    Complete colonoscopy    OTHER SURGICAL HISTORY  12/03/2018    Knee arthroscopy    OTHER SURGICAL HISTORY  12/03/2018    Appendectomy    OTHER SURGICAL HISTORY  01/27/2020    Hernia repair    OTHER SURGICAL HISTORY  01/27/2020    Nissen fundoplication laparoscopic    OTHER SURGICAL HISTORY  01/27/2020    Diaphragmatic hernia repair     Family History    Problem Relation Name Age of Onset    Hypertension Mother      Lung cancer Mother      Coronary artery disease Father      Hyperlipidemia Father      Diabetes Brother      Other (polyp of large intestine) Brother      Colon cancer Father's Brother  60    Colon cancer Other uncle     Other (polyp of large intestine) Other uncle      Social History     Tobacco Use    Smoking status: Never    Smokeless tobacco: Never   Substance Use Topics    Alcohol use: Not Currently    Drug use: Never       Physical Exam   ED Triage Vitals [02/06/24 0113]   Temperature Heart Rate Respirations BP   36.3 °C (97.3 °F) 73 (!) 22 --      Pulse Ox Temp Source Heart Rate Source Patient Position   99 % Temporal Monitor Sitting      BP Location FiO2 (%)     Right arm --       Physical Exam  Vitals and nursing note reviewed.   Constitutional:       General: He is in acute distress.      Appearance: He is well-developed and normal weight. He is ill-appearing and diaphoretic.   HENT:      Head: Normocephalic and atraumatic.   Eyes:      General: No scleral icterus.     Conjunctiva/sclera: Conjunctivae normal.   Cardiovascular:      Rate and Rhythm: Normal rate and regular rhythm.      Heart sounds: No murmur heard.  Pulmonary:      Effort: Respiratory distress present.      Breath sounds: Normal breath sounds.      Comments: Tachypneic  Abdominal:      General: Bowel sounds are decreased. There is distension.      Palpations: Abdomen is rigid.      Tenderness: There is generalized abdominal tenderness. There is guarding.   Musculoskeletal:         General: No swelling.      Cervical back: Neck supple.   Skin:     General: Skin is warm.      Capillary Refill: Capillary refill takes less than 2 seconds.   Neurological:      General: No focal deficit present.      Mental Status: He is alert.   Psychiatric:         Mood and Affect: Mood normal.         ED Course & MDM   ED Course as of 03/11/24 1004   Tue Feb 06, 2024   0812 ECG 12 lead  See  interpretation  ECG performed at 1:44 AM and interpreted by me showing  Normal sinus rhythm  NAD  Intervals within normal limits  No STEMI  Nonspecific ST abnormality  Abnormal ECG  When compared with ECG of 13-DEC-2021 15:59,  Nonspecific T wave abnormality now evident in Anterior leads [EG]   Mon Mar 11, 2024   0957 ECG 12 Lead [EG]      ED Course User Index  [EG] Michelle Jose MD         Diagnoses as of 03/11/24 1004   Small bowel obstruction (CMS/HCC)   Gastric perforation (CMS/HCC)   Acute pancreatitis, unspecified complication status, unspecified pancreatitis type       Medical Decision Making    HPI:  As Above  PMHx/PSHx/Meds/Allergies/SH/FH as per nursing documentation and reviewed.  Review of systems: Total of 10 systems reviewed and otherwise negative except as noted elsewhere    DDX: As described in MDM    If performed, radiology listed above interpreted by me and confirmed by the Radiologist.  Medications administered during this visit (name and route): see MAR  Social determinants of health considered for this visit: lives at home  If performed, EKG interpreted by me and detailed above    [facility] Summary/considerations:  53-year-old male presenting with acute abdominal pain and CT evidence of obstruction and perforation.  Patient was given several doses of fentanyl and Dilaudid with minimal relief.  He was also given ketorolac IV and 80 mg of Protonix.  As the patient had significant distention NG tube was placed for decompression of his stomach that is likely source of the abdominal free air.  As he had a perforation, he will be treated with Zosyn 4.5 mg IV and a blood culture is taken although his vital signs and lack of leukocytosis or leukopenia do not support the diagnosis of sepsis.  However, the gastric contents in his abdomen can lead to serious infection and potential sepsis.  Patient is instructed to be n.p.o. and had requested transfer to [facility] for surgical management as [facility] does  not have a general surgeon at this time.  Due to capacity, the only available general surgery service is at [facility].  Patient will be transferred to [facility].     31 minutes of critical care time were spent on re-examinations, close monitoring, data analysis and communication with patient/family/other providers and do not include time spent on procedures. Not concurrent with other providers    The patient was seen and triaged by our nursing/medic staff, their vitals were taken and the staff notes were reviewed.  If the patient arrived by an EMS squad or an outside agency, we discussed the case with transporting EMS medic, police, or other historians. My initial assessment was attention to their airway, breathing, and circulatory status.  We addressed any immediate or life threatening findings and completed a medical history and a physical exam if the patient or those legally responsible were in agreement with this.   Prior to the patient being discharged, I or my PA/NP or the nursing staff discussed the differential, results and discharge plan with the patient and/or family/friend/caregiver if present.  I emphasized the importance of follow-up in 2-3 days unless otherwise specified.  I explained reasons for the patient to return to the Emergency Department. Additional verbal discharge instructions were also given and discussed with the patient to supplement those generated by the EMR. We also discussed medications that were prescribed (if any) including common side effects and interactions. The patient was advised to abstain from driving, operating heavy machinery or making significant decisions while taking medications such as antihistamines, benzodiazepines, opiates and muscle relaxers. All questions were addressed.  They understand return precautions and discharge instructions. The patient and/or family/friend/caregiver expressed understanding.  **Disclaimer:  This note was dictated by speech recognition  technology.  Minor errors in transcription may be present.  Please contact for clarification or corrections.    In the case the patient eloped or refused treatment/admission, we offered to the best of our ability to provide care to the patient at the time of this encounter.    Amount and/or Complexity of Data Reviewed  Labs: ordered. Decision-making details documented in ED Course.  Radiology: ordered and independent interpretation performed. Decision-making details documented in ED Course.  ECG/medicine tests: ordered and independent interpretation performed. Decision-making details documented in ED Course.        Procedure  Procedures     Michelle Jose MD  02/06/24 0816       Michelle Jose MD  03/11/24 1004

## 2024-02-06 NOTE — ANESTHESIA PROCEDURE NOTES
Airway  Date/Time: 2/6/2024 7:17 AM  Urgency: emergent    Airway not difficult    Staffing  Performed: PHYLLIS   Authorized by: Freddy Murillo DO    Performed by: PHYLLIS Gonzalez  Patient location during procedure: OR    Consent for Airway (if performed for an anesthetic, see related documentation for consents)  Patient identity confirmed: verbally with patient  Consent: No emergent situation. Verbal consent obtained. Written consent obtained.  Risks and benefits: risks, benefits and alternatives were discussed  Consent given by: spouse      Indications and Patient Condition  Indications for airway management: airway protection and anesthesia  Sedation level: deep  Preoxygenated: yes  Patient position: sniffing  Mask difficulty assessment: 0 - not attempted    Final Airway Details  Final airway type: endotracheal airway      Successful airway: ETT  Cuffed: yes   Successful intubation technique: direct laryngoscopy  Facilitating devices/methods: intubating stylet and cricoid pressure  Endotracheal tube insertion site: oral  Blade: Nils  Blade size: #4  ETT size (mm): 7.5  Cormack-Lehane Classification: grade IIb - view of arytenoids or posterior of glottis only  Placement verified by: chest auscultation and capnometry   Measured from: lips  ETT to lips (cm): 23  Number of attempts at approach: 1

## 2024-02-06 NOTE — BRIEF OP NOTE
Date: 2024  OR Location: [facility] OR    Name: Trevor Yarbrough, : 1970, Age: 53 y.o., MRN: [id number], Sex: male    Diagnosis  Pre-op Diagnosis     * Pneumoperitoneum [K66.8] Post-op Diagnosis     * Pneumoperitoneum [K66.8]     Procedures  Exploration Laparotomy, DEVOLVULIZED BOWEL, SUBTOTAL GASTRECTOMY  34498 - SD EXPLORATORY LAPAROTOMY CELIOTOMY W/WO BIOPSY SPX      Surgeons      * Aniket Tai - Primary     * Pam Loaiza    Resident/Fellow/Other Assistant:  Surgeon(s) and Role:     * Rianna Flynn DO - Assisting     * Dylon Webster MD - Resident - Assisting     * Nik Quiroz MD - Resident - Assisting     * Pam Loaiza MD    Procedure Summary  Anesthesia: General  ASA: II  Anesthesia Staff: Anesthesiologist: Freddy Murillo DO  C-AA: PHYLLIS Tobin; PHYLLIS Gonzalez  Estimated Blood Loss: 200 mL  Intra-op Medications: Administrations occurring from 0700 to 1000 on 24:  * No intraprocedure medications in log *           Anesthesia Record               Intraprocedure I/O Totals          Intake    Propofol Drip 0.00 mL    The total shown is the total volume documented since Anesthesia Start was filed.    Phenylephrine Drip 0.00 mL    The total shown is the total volume documented since Anesthesia Start was filed.    LR infusion 2700.00 mL    Total Intake 2700 mL       Output    Urine 325 mL    Total Output 325 mL       Net    Net Volume 2375 mL          Specimen:   ID Type Source Tests Collected by Time   1 : STOMACH Tissue STOMACH GASTRECTOMY SURGICAL PATHOLOGY EXAM Aniket Tai MD 2024 0916        Staff:   Circulator: Yessenia Doyle, RN; Anayeli Rodriguez, JENNIFER; Real Ryder RN  Scrub Person: Bonny Thibodeaux; Na Herrera RN          Findings: Massive hole in the lesser curvature of stomach with food particulate in the abdomen, Volvulus of the small intestine with two distinct points of compression. Small bowel viable. Stomach mucosa appeared  ischemic.     Complications:  None; patient tolerated the procedure well.     Disposition: ICU - intubated and critically ill.  Condition: stable  Specimens Collected:   ID Type Source Tests Collected by Time   1 : STOMACH Tissue STOMACH GASTRECTOMY SURGICAL PATHOLOGY EXAM Aniket Tai MD 2/6/2024 0992     Attending Attestation: I was present and scrubbed for the entire procedure.    Aniket Tia  Phone Number: [phone]

## 2024-02-06 NOTE — CARE PLAN
Problem: Pain  Goal: My pain/discomfort is manageable  Outcome: Progressing     Problem: Safety  Goal: Patient will be injury free during hospitalization  Outcome: Progressing  Goal: I will remain free of falls  Outcome: Progressing     Problem: Daily Care  Goal: Daily care needs are met  Outcome: Progressing     Problem: Psychosocial Needs  Goal: Demonstrates ability to cope with hospitalization/illness  Outcome: Progressing  Goal: Collaborate with me, my family, and caregiver to identify my specific goals  Outcome: Progressing     Problem: Discharge Barriers  Goal: My discharge needs are met  Outcome: Progressing     Problem: Skin  Goal: Decreased wound size/increased tissue granulation at next dressing change  Outcome: Progressing  Goal: Participates in plan/prevention/treatment measures  Outcome: Progressing  Goal: Prevent/manage excess moisture  Outcome: Progressing  Goal: Prevent/minimize sheer/friction injuries  Outcome: Progressing  Goal: Promote/optimize nutrition  Outcome: Progressing  Goal: Promote skin healing  Outcome: Progressing     Problem: Fall/Injury  Goal: Not fall by end of shift  Outcome: Progressing  Goal: Be free from injury by end of the shift  Outcome: Progressing  Goal: Verbalize understanding of personal risk factors for fall in the hospital  Outcome: Progressing  Goal: Verbalize understanding of risk factor reduction measures to prevent injury from fall in the home  Outcome: Progressing  Goal: Use assistive devices by end of the shift  Outcome: Progressing  Goal: Pace activities to prevent fatigue by end of the shift  Outcome: Progressing     Problem: Safety - Medical Restraint  Goal: Remains free of injury from restraints (Restraint for Interference with Medical Device)  Outcome: Progressing  Goal: Free from restraint(s) (Restraint for Interference with Medical Device)  Outcome: Progressing   The patient's goals for the shift include Unable to state patient intubated    The clinical  goals for the shift include pain management, vital sign stability.     Over the shift, the patient did not make progress toward the following goals. Barriers to progression include patient is intubated with an open abdomen. Recommendations to address these barriers include extubation and closure of abdomen.

## 2024-02-06 NOTE — SIGNIFICANT EVENT
Wife(Divina Orozco) has taken patients cell phone home with her.  Divina states there are no other belongings left at the hospital

## 2024-02-06 NOTE — ED NOTES
Nursing report given to Chaparrita Cary  Main ED     Geovany Rahman, JENNIFER  02/06/24 0661     Name band;

## 2024-02-06 NOTE — ED NOTES
Patient transported to OR with RN and provider at bedside. Patient stable.      Naima Clark RN  02/06/24 0682

## 2024-02-06 NOTE — ANESTHESIA PROCEDURE NOTES
Peripheral IV  Date/Time: 2/6/2024 7:15 AM      Placement  Needle size: 16 G  Laterality: right  Location: wrist  Site prep: alcohol  Technique: anatomical landmarks  Attempts: 1

## 2024-02-06 NOTE — ANESTHESIA PROCEDURE NOTES
Arterial Line:    Date/Time: 2/6/2024 7:35 AM    Staffing  Performed: attending   Authorized by: Freddy Murillo DO    Performed by: PHYLLIS Gonzalez    An arterial line was placed. Procedure performed using surface landmarks.in the OR for the following indication(s): continuous blood pressure monitoring and blood sampling needed.    A 20 gauge (size), 1 and 3/4 inch (length), Angiocath (type) catheter was placed into the Left radial artery, secured by Tegaderm,   Seldinger technique not used.  Events:  greater than 3 attempts and patient tolerated procedure well with no complications.

## 2024-02-06 NOTE — H&P
[facility]  TRAUMA ICU - PROGRESS NOTE    Patient Name: Trevor Yarbrough  MRN: [id number]  Admit Date: 206  : 1970  AGE: 53 y.o.   GENDER: male    ==============================================================================  TODAY'S ASSESSMENT AND PLAN OF CARE:  Trevor Yarbrough is a 53 y.o. male with a history of insulinoma and prior fundoplication for hiatal hernia who was transferred from OSH after presenting with abdominal pain and findings of pneumoperitoneum on imaging one day after starting octreotide for management of insulinoma. Patient was taken to the OR on  with ACS and found to have a gastric perforation along the lesser curvature as well as a small bowel volvulus. He underwent an ex lap and partial gastrectomy, and was ultimately left in discontinuity with an Abthera in place. Per ACS, proximal stomach is questionably viable.     Of note, patient hyperinsulinemic hypoglycemia without any localization of tumor (presumably insulinoma). Has been on and off diazoxide. Had started octreotide tx yesterday for management.     OR Course:   - ex lap, partial gastrectomy, Abthera placement    NEURO/PAIN/SEDATION:   #Pain  #Sedation  - Fentanyl, prop gtt  - Follow up tox screen    RESPIRATORY:  #Intubated  - Daily SBT  - Will remain intubated until RTOR    CARDIOVASC:  - Continue to monitor    GI:  #Hx insulinoma  #Hx fundoplication  #Gastric perforation s/p partial gastrectomy, open abdomen  #SBO 2/2 volvulus  - NPO  - NGT to LIWS  - Follow up pathology  - Pending RTOR with ACS, possibly     FEN/:   - Garnett in place, strict I's and O's  - mIVF  - Replete lytes prn     HEMATOLOGIC:   - Daily CBC    ENDOCRINE:  #Hyperinsulinemic hypoglycemia, insulinoma  - q4h POCT  - Blood glucose 124 on arrival, will continue to closely monitor    MUSCULOSKELETAL/SKIN:   - PT/OT when able    INFECTIOUS DISEASE:   - Daily CBC  - Fluconazole, zosyn (-) for anticipated 4 day  "course    GI PROPHYLAXIS: H2 antagonist    DVT PROPHYLAXIS: Lovenox, SCDs    DISPOSITION: TSICU    Discussed with Dr. Kasper.    Marie Aden MD  PGY-2 General Surgery  TSICU [id number]  ==============================================================================  CHIEF COMPLAINT / OVERNIGHT EVENTS / HPI:   Gastric perforation. Intubated, sedated, open abdomen.    MEDICAL HISTORY / ROS:  Admission history and ROS reviewed. Pertinent changes as follows: Unable to obtain given patient condition.    PHYSICAL EXAM:  Heart Rate:  []   Temp:  [36.3 °C (97.3 °F)-36.9 °C (98.4 °F)]   Resp:  [18-26]   BP: (139-178)/()   Height:  [180.3 cm (5' 11\")]   Weight:  [83.5 kg (184 lb 1.4 oz)]   SpO2:  [94 %-100 %]     General: critically ill in ICU bed  HEENT: ETT in place, NGT to LIWS with dark sanguinous output in cannister  Resp: intubated, ventilated on AC/VC  CV: sinus tachycardia to low 100's on monitor  Abd: soft, distended, Abthera in place to suction with ss output in cannister  : carlton with clear yellow urine in bag  MSK: moves all extremities  Ext: no LE edema  Neuro: follows commands    IMAGING SUMMARY:   Preoperative CT A/P:  Large amount of intra-abdominal free air. Mid small bowel obstruction.   There is marked distention of the fluid-filled stomach. The perforation appears to be along the lesser curvature of the stomach with adjacent gastric contents outside the gastric lumen.   Small LEFT inguinal hernia containing free air. No associated herniated bowel.   Lingular and LEFT lower lobe subsegmental atelectasis.   There are a few scattered colonic diverticulum. No findings of diverticulitis.     LABS:  Results from last 7 days   Lab Units 02/06/24  0136   WBC AUTO x10*3/uL 10.2   HEMOGLOBIN g/dL 15.4   HEMATOCRIT % 44.6   PLATELETS AUTO x10*3/uL 236   NEUTROS PCT AUTO % 40.5   LYMPHS PCT AUTO % 45.8   MONOS PCT AUTO % 9.3   EOS PCT AUTO % 3.7         Results from last 7 days   Lab Units 02/06/24  0136 "   SODIUM mmol/L 142   POTASSIUM mmol/L 3.4   CHLORIDE mmol/L 103   CO2 mmol/L 25   BUN mg/dL 19   CREATININE mg/dL 1.20   CALCIUM mg/dL 9.7   PROTEIN TOTAL g/dL 7.6   BILIRUBIN TOTAL mg/dL 0.3   ALK PHOS U/L 73   ALT U/L 36   AST U/L 36   GLUCOSE mg/dL 124*     Results from last 7 days   Lab Units 02/06/24  0136   BILIRUBIN TOTAL mg/dL 0.3     Results from last 7 days   Lab Units 02/06/24  0945 02/06/24  0900 02/06/24  0753   POCT PH, ARTERIAL pH 7.28* 7.27* 7.15*   POCT PCO2, ARTERIAL mm Hg 42 42 48*   POCT PO2, ARTERIAL mm Hg 208* 210* 200*   POCT HCO3 CALCULATED, ARTERIAL mmol/L 19.7* 19.3* 16.7*   POCT BASE EXCESS, ARTERIAL mmol/L -6.8* -7.3* -12.2*     I have reviewed all medications, laboratory results, and imaging pertinent for today's encounter.   Chart reviewed discussed with ACS team patient examined with team at bedside discussed with family at bedside wife and father. Details of care reviewed he remains intubated sedated appears comfortable Symmetric chest expansion RRR ABD post op changes NGT in remnant of proximal stomach  ( contained with wrap depth marked so as to preclude advancement)  all questions addressed         This critically ill patient continues to be at-risk for clinically significant deterioration / failure due to the above mentioned dysfunctional, unstable organ systems.  I have personally identified and managed all complex critical care issues to prevent aforementioned clinical deterioration.  Critical care time is spent at bedside and/or the immediate area and has included, but is not limited to, the review of diagnostic tests, labs, radiographs, serial assessments of hemodynamics, respiratory status, ventilatory management, and family updates.  Time spent in procedures and teaching are reported separately.     CRITICAL CARE TIME:  45 minutes   Will Kasper MD

## 2024-02-06 NOTE — H&P
[facility]  ACUTE CARE SURGERY - HISTORY AND PHYSICAL / CONSULT    Patient Name: Trevor Yarbrough  MRN: [id number]  Admit Date: 206  : 1970  AGE: 53 y.o.   GENDER: male  ==============================================================================  TODAY'S ASSESSMENT AND PLAN OF CARE:  52yo M with insulinoma who presents with pneumoperitoneum. Peritonitic on exam.     OR with ACS for emergent exlap. Admit to ACS afterwards.    ==============================================================================  CHIEF COMPLAINT/REASON FOR CONSULT:  Pt is a 52yo M presenting with pneumoperitoneum. Hx of insulinoma and took octreotide for the first time yesterday afternoon. Felt 10/10 pain a few hours later with n/v.     In [facility] ED, tachycardic to 100s but not hypotensive. Peritonitic on exam.    Fundoplication 4 yrs ago for hiatal hernia and remote appendectomy    PAST MEDICAL HISTORY:   PMH:   Past Medical History:   Diagnosis Date    Epigastric pain 2022    Abdominal pain, acute, epigastric    Other chest pain 2022    Atypical chest pain    Personal history of other diseases of the digestive system 2022    History of gastroesophageal reflux (GERD)    Personal history of other diseases of the digestive system 2020    History of hiatal hernia    Personal history of other diseases of the digestive system 2019    History of esophagitis    Personal history of other specified conditions 2022    History of dysphagia         PSH:   Past Surgical History:   Procedure Laterality Date    OTHER SURGICAL HISTORY  2018    Complete colonoscopy    OTHER SURGICAL HISTORY  2018    Knee arthroscopy    OTHER SURGICAL HISTORY  2018    Appendectomy    OTHER SURGICAL HISTORY  2020    Hernia repair    OTHER SURGICAL HISTORY  2020    Nissen fundoplication laparoscopic    OTHER SURGICAL HISTORY  2020    Diaphragmatic hernia repair      FH:   Family History   Problem Relation Name Age of Onset    Hypertension Mother      Lung cancer Mother      Coronary artery disease Father      Hyperlipidemia Father      Diabetes Brother      Other (polyp of large intestine) Brother      Colon cancer Father's Brother  60    Colon cancer Other uncle     Other (polyp of large intestine) Other uncle      SOCIAL HISTORY:    Smoking:    Social History     Tobacco Use   Smoking Status Never   Smokeless Tobacco Never       Alcohol:    Social History     Substance and Sexual Activity   Alcohol Use Not Currently       MEDICATIONS:   Prior to Admission medications    Medication Sig Start Date End Date Taking? Authorizing Provider   atorvastatin (Lipitor) 10 mg tablet Take 1 tablet (10 mg) by mouth once daily.    Historical Provider, MD   blood-glucose sensor device CHANGE EVERY 14 DAYS 9/22/23 9/21/24  Naren Vega MD   diazoxide (Proglycem) 50 mg/mL suspension Take 1.6 mL (80 mg) by mouth every 8 hours. 10/20/23 12/19/23  Naren Vega MD   octreotide (SandoSTATIN) 100 mcg/mL injection Inject 1 mL (100 mcg) under the skin 3 times a day. 1/31/24 3/6/24  Naren Vega MD     ALLERGIES:   No Known Allergies    REVIEW OF SYSTEMS:  Review of Systems    See above     PHYSICAL EXAM:  Physical Exam    Physical Exam     Constitutional- acute distress  Cards- tachycardic to 100s but not hypotensive   Resp- labored breathing d/t pain on 2L NC  Abdomen- significantly distended, firm, peritonitic  Extremities- BURKETT   Skin- warm, dry   Neuro- alert and oriented x3   Psych- anxious mood   Tubes/lines- PIV   IMAGING SUMMARY:  (summary of findings, not a copy of dictation)  Pneumoperitoneum on CT    LABS:  Results from last 7 days   Lab Units 02/06/24  0136   WBC AUTO x10*3/uL 10.2   HEMOGLOBIN g/dL 15.4   HEMATOCRIT % 44.6   PLATELETS AUTO x10*3/uL 236   NEUTROS PCT AUTO % 40.5   LYMPHS PCT AUTO % 45.8   MONOS PCT AUTO % 9.3   EOS PCT AUTO % 3.7         Results from last  7 days   Lab Units 02/06/24  0136   SODIUM mmol/L 142   POTASSIUM mmol/L 3.4   CHLORIDE mmol/L 103   CO2 mmol/L 25   BUN mg/dL 19   CREATININE mg/dL 1.20   CALCIUM mg/dL 9.7   PROTEIN TOTAL g/dL 7.6   BILIRUBIN TOTAL mg/dL 0.3   ALK PHOS U/L 73   ALT U/L 36   AST U/L 36   GLUCOSE mg/dL 124*     Results from last 7 days   Lab Units 02/06/24  0136   BILIRUBIN TOTAL mg/dL 0.3             I have reviewed all laboratory and imaging results ordered/pertinent for this encounter.

## 2024-02-06 NOTE — SIGNIFICANT EVENT
ACS Post-Operative Check    S: Patient is s/p ex lap and subtotal gastrectomy earlier today. Patient became hypotensive post operatively and was not responsive to fluids so a central line was placed and he is now on 0.07 of Levo. Nursing reports 500cc serosanguinous output from abthera since OR.    O:  Temp:  [36.3 °C (97.3 °F)-36.9 °C (98.4 °F)] 36.6 °C (97.9 °F)  Heart Rate:  [] 89  Resp:  [16-26] 20  BP: ()/() 100/74  Arterial Line BP 1: ()/(46-63) 85/52  FiO2 (%):  [28 %] 28 %  General: Intubated and sedated  Neck: L subclavian central line in place  Cardio: On Levo 0.07  Abd: NG in place with minimal dark red output. Abthera in place with approximately 500cc of serosanguinous output since OR.    A: Patient is s/p ex lap and subtotal gastrectomy earlier today. Patient remains critically ill in the ICU on pressors.     - Continue to monitor abthera output  - Continue fluconazole and zosyn  - Start SQH tonight for DVT ppx  - NPO with NG in place to LIWS  - Return to OR in the coming days for second look    Pat Cornell MD  General Surgery  ACS [id number]

## 2024-02-06 NOTE — OP NOTE
Exploration Laparotomy Operative Note     Date: 2024  OR Location: [facility] OR    Name: Trevor Yarbrough, : 1970, Age: 53 y.o., MRN: [id number], Sex: male    Diagnosis  Pre-op Diagnosis     * Pneumoperitoneum [K66.8] Post-op Diagnosis     * Pneumoperitoneum [K66.8]     Procedures  Exploration Laparotomy  51893 - AL EXPLORATORY LAPAROTOMY CELIOTOMY W/WO BIOPSY SPX      Surgeons   Pam Loaiza MD    Resident/Fellow/Other Assistant:  Surgeon(s) and Role:     * Dylon Webster MD - Resident - Assisting     * Nik Quiroz MD - Resident - Assisting     * Aniket Tai MD    Procedure Summary  Anesthesia: General  ASA: II  Anesthesia Staff: Anesthesiologist: Freddy Murillo DO  C-AA: PHYLLIS Tobin; PHYLLIS Gonzalez  Estimated Blood Loss: 5 mL   Intra-op Medications: Administrations occurring from 0700 to 1000 on 24:  * No intraprocedure medications in log *           Anesthesia Record               Intraprocedure I/O Totals          Intake    Phenylephrine Drip 0.00 mL    The total shown is the total volume documented since Anesthesia Start was filed.    LR infusion 1000.00 mL    Total Intake 1000 mL          Specimen: No specimens collected     Staff:   Circulator: Yessenia Doyle RN; Anayeli Rodriguez RN; Real Ryder RN  Scrub Person: Bonny Thibodeaux         Drains and/or Catheters:   Urethral Catheter (Active)       Indications: Trevor Yarbrough is an 53 y.o. male who is having surgery for Pneumoperitoneum [K66.8]. Suspect gastric perforation. Also CT evidence of small bowel obstruction.     The patient was seen in the preoperative area. The risks, benefits, complications, treatment options, non-operative alternatives, expected recovery and outcomes were discussed with the patient. The possibilities of reaction to medication, pulmonary aspiration, injury to surrounding structures, bleeding, recurrent infection, the need for additional procedures, failure to diagnose a condition, and  creating a complication requiring transfusion or operation were discussed with the patient. The patient concurred with the proposed plan, giving informed consent.  The site of surgery was properly noted/marked if necessary per policy. The patient has been actively warmed in preoperative area. Preoperative antibiotics (Zosyn)  have been ordered and given within 2 hours of incision. Venous thrombosis prophylaxis have been ordered including bilateral sequential compression devices    Procedure Details:   The patient was identified preoperatively by two identifiers. They were brought to the operating room and placed on the table in supine position. A standard surgical briefing was performed. General anesthesia was induced and the patient was intubated. Patient was secured in position with arms out and all pressure points were padded. Skin was prepped using ChloraPrep  and draped in the usual sterile fashion. A timeout was performed.    We made an upper midline laparotomy. Upon entry into the peritoneum there was al large rush of air, and we encountered a large amount of murky brown fluid. We noted a diffusely distended and taut stomach. The Anesthesia team worked on replacing his NGT. We elevated the transverse colon to identify the ligament of Tritz. We ran the small bowel from proximal to distal. The proximal bowel was markedly dilated, but without evidence of perforation. About jail through the length of the bowel, we encounter a mesenteric volvulus that was causing an overt obstruction. The small bowel distal to this was completely decompressed, to the ileocecal junction. We untorsed the volvulus and inspected the small bowel again, with no new findings.     We then turned our attention to the stomach. Despite functional NGT it was still distended. There were no obvious anterior or greater curve perforations. We entered the lesser sac, and there was a scant amount of brown particulate fluid and air within, but no  posterior wall perforation seen. We took down some adhesions to the liver, and encountered the likely perforation along the lesser curve of the stomach in the prepyloric region. There was air and gastric content actively spewing from this area. We began dividing the lesser omentum/gastrohepatic ligament to better identify the perforation. At this point my partner Dr. Meliton Tai came in, and I turned over the remainder of the procedure to him.    As the attending surgeon, I was present for all portions of the procedure.      Attending Attestation: I was present and scrubbed for the portions of the procedure noted above.    Pam Loaiza  Phone Number: [phone]

## 2024-02-06 NOTE — ED TRIAGE NOTES
Transfer from [facility] for free air in abd. Upon arrival pt has hard distended abd. Zosyn 4.5 running upon arrival to ED. NG in place.

## 2024-02-06 NOTE — PROCEDURES
Central Line Procedure Note    02/06/24    Performed by: Marie Aden MD    Indication: Hypotension 2/2 septic shock    Site: Left subclavian vein    [x] Procedure done under sterile conditions     The patient was prepped and draped in the usual sterile manner using chlorhexidine scrub. The deltopectoral groove, sternal notch, and distal 2/3 of the clavicle were identified as landmarks. 5 ml 1% lidocaine was inserted along the midpoint of the clavicle for local anesthesia. Then, a needle was inserted one fingerbreadth below this point, aiming towards the sternoclavicular notch. Upon contact with the clavicle, the needle was directed posteriorly until dark venous blood was aspirated. A manometer was then attached to the needle and did not show any rise in the level of blood, thereby confirming venous placement of the wire. A wire was passed through the needle into the lumen of the vessel. A skin nick was made using an 11-blade and the path was dilated. Finally, the catheter was placed over the wire and the wire was fed back through the brown port. All three lumens were flushed to confirm patency and the catheter was sutured in place.    The patient tolerated the procedure well without any immediate complications. The area was cleaned and a sterile chlorhexidine dressing was applied. An X-ray has been ordered. Will confirm placement on imaging prior to use.    # Attempts: 1    [x] Successful [] Unsuccessful     Complications: None    Marie Aden MD  PGY-2 General Surgery  [facility] [id number]

## 2024-02-07 ENCOUNTER — ANESTHESIA (OUTPATIENT)
Dept: OPERATING ROOM | Facility: HOSPITAL | Age: 54
DRG: 853 | End: 2024-02-07
Payer: COMMERCIAL

## 2024-02-07 ENCOUNTER — APPOINTMENT (OUTPATIENT)
Dept: CARDIOLOGY | Facility: HOSPITAL | Age: 54
DRG: 853 | End: 2024-02-07
Payer: COMMERCIAL

## 2024-02-07 ENCOUNTER — APPOINTMENT (OUTPATIENT)
Dept: RADIOLOGY | Facility: HOSPITAL | Age: 54
DRG: 853 | End: 2024-02-07
Payer: COMMERCIAL

## 2024-02-07 ENCOUNTER — ANESTHESIA EVENT (OUTPATIENT)
Dept: OPERATING ROOM | Facility: HOSPITAL | Age: 54
DRG: 853 | End: 2024-02-07
Payer: COMMERCIAL

## 2024-02-07 PROBLEM — D64.9 ANEMIA: Status: ACTIVE | Noted: 2024-02-07

## 2024-02-07 LAB
ALBUMIN SERPL BCP-MCNC: 2.3 G/DL (ref 3.4–5)
ALBUMIN SERPL BCP-MCNC: 2.3 G/DL (ref 3.4–5)
ALBUMIN SERPL BCP-MCNC: 2.4 G/DL (ref 3.4–5)
ALBUMIN SERPL BCP-MCNC: 2.4 G/DL (ref 3.4–5)
ALBUMIN SERPL BCP-MCNC: 2.6 G/DL (ref 3.4–5)
ANION GAP BLDA CALCULATED.4IONS-SCNC: 10 MMO/L (ref 10–25)
ANION GAP BLDA CALCULATED.4IONS-SCNC: 10 MMO/L (ref 10–25)
ANION GAP BLDA CALCULATED.4IONS-SCNC: 11 MMO/L (ref 10–25)
ANION GAP BLDA CALCULATED.4IONS-SCNC: 11 MMO/L (ref 10–25)
ANION GAP BLDA CALCULATED.4IONS-SCNC: 12 MMO/L (ref 10–25)
ANION GAP BLDA CALCULATED.4IONS-SCNC: 8 MMO/L (ref 10–25)
ANION GAP SERPL CALC-SCNC: 12 MMOL/L (ref 10–20)
ANION GAP SERPL CALC-SCNC: 13 MMOL/L (ref 10–20)
ANION GAP SERPL CALC-SCNC: 9 MMOL/L (ref 10–20)
AORTIC VALVE PEAK VELOCITY: 1.54 M/S
APTT PPP: 30 SECONDS (ref 27–38)
APTT PPP: 32 SECONDS (ref 27–38)
AV PEAK GRADIENT: 9.5 MMHG
AVA (PEAK VEL): 3.16 CM2
BASE EXCESS BLDA CALC-SCNC: -0.7 MMOL/L (ref -2–3)
BASE EXCESS BLDA CALC-SCNC: -2.8 MMOL/L (ref -2–3)
BASE EXCESS BLDA CALC-SCNC: -3 MMOL/L (ref -2–3)
BASE EXCESS BLDA CALC-SCNC: -3.2 MMOL/L (ref -2–3)
BASE EXCESS BLDA CALC-SCNC: -3.5 MMOL/L (ref -2–3)
BASE EXCESS BLDA CALC-SCNC: -4.5 MMOL/L (ref -2–3)
BODY TEMPERATURE: 37 DEGREES CELSIUS
BUN SERPL-MCNC: 20 MG/DL (ref 6–23)
BUN SERPL-MCNC: 21 MG/DL (ref 6–23)
BUN SERPL-MCNC: 22 MG/DL (ref 6–23)
BUN SERPL-MCNC: 23 MG/DL (ref 6–23)
BUN SERPL-MCNC: 23 MG/DL (ref 6–23)
CA-I BLD-SCNC: 1.05 MMOL/L (ref 1.1–1.33)
CA-I BLD-SCNC: 1.07 MMOL/L (ref 1.1–1.33)
CA-I BLD-SCNC: 1.11 MMOL/L (ref 1.1–1.33)
CA-I BLD-SCNC: 1.11 MMOL/L (ref 1.1–1.33)
CA-I BLDA-SCNC: 1.1 MMOL/L (ref 1.1–1.33)
CA-I BLDA-SCNC: 1.11 MMOL/L (ref 1.1–1.33)
CA-I BLDA-SCNC: 1.12 MMOL/L (ref 1.1–1.33)
CA-I BLDA-SCNC: 1.12 MMOL/L (ref 1.1–1.33)
CA-I BLDA-SCNC: 1.13 MMOL/L (ref 1.1–1.33)
CA-I BLDA-SCNC: 1.16 MMOL/L (ref 1.1–1.33)
CALCIUM SERPL-MCNC: 7.3 MG/DL (ref 8.6–10.6)
CALCIUM SERPL-MCNC: 7.4 MG/DL (ref 8.6–10.6)
CALCIUM SERPL-MCNC: 7.4 MG/DL (ref 8.6–10.6)
CALCIUM SERPL-MCNC: 7.5 MG/DL (ref 8.6–10.6)
CALCIUM SERPL-MCNC: 7.6 MG/DL (ref 8.6–10.6)
CHLORIDE BLDA-SCNC: 101 MMOL/L (ref 98–107)
CHLORIDE BLDA-SCNC: 102 MMOL/L (ref 98–107)
CHLORIDE BLDA-SCNC: 103 MMOL/L (ref 98–107)
CHLORIDE BLDA-SCNC: 104 MMOL/L (ref 98–107)
CHLORIDE SERPL-SCNC: 104 MMOL/L (ref 98–107)
CHLORIDE SERPL-SCNC: 106 MMOL/L (ref 98–107)
CHLORIDE SERPL-SCNC: 106 MMOL/L (ref 98–107)
CO2 SERPL-SCNC: 23 MMOL/L (ref 21–32)
CO2 SERPL-SCNC: 24 MMOL/L (ref 21–32)
CO2 SERPL-SCNC: 24 MMOL/L (ref 21–32)
CO2 SERPL-SCNC: 25 MMOL/L (ref 21–32)
CO2 SERPL-SCNC: 26 MMOL/L (ref 21–32)
CREAT SERPL-MCNC: 1.04 MG/DL (ref 0.5–1.3)
CREAT SERPL-MCNC: 1.14 MG/DL (ref 0.5–1.3)
CREAT SERPL-MCNC: 1.15 MG/DL (ref 0.5–1.3)
CREAT SERPL-MCNC: 1.17 MG/DL (ref 0.5–1.3)
CREAT SERPL-MCNC: 1.2 MG/DL (ref 0.5–1.3)
EGFRCR SERPLBLD CKD-EPI 2021: 72 ML/MIN/1.73M*2
EGFRCR SERPLBLD CKD-EPI 2021: 74 ML/MIN/1.73M*2
EGFRCR SERPLBLD CKD-EPI 2021: 76 ML/MIN/1.73M*2
EGFRCR SERPLBLD CKD-EPI 2021: 76 ML/MIN/1.73M*2
EGFRCR SERPLBLD CKD-EPI 2021: 85 ML/MIN/1.73M*2
EJECTION FRACTION APICAL 4 CHAMBER: 72.3
EJECTION FRACTION: 74 %
ERYTHROCYTE [DISTWIDTH] IN BLOOD BY AUTOMATED COUNT: 13.4 % (ref 11.5–14.5)
ERYTHROCYTE [DISTWIDTH] IN BLOOD BY AUTOMATED COUNT: 13.5 % (ref 11.5–14.5)
ERYTHROCYTE [DISTWIDTH] IN BLOOD BY AUTOMATED COUNT: 13.5 % (ref 11.5–14.5)
ERYTHROCYTE [DISTWIDTH] IN BLOOD BY AUTOMATED COUNT: 13.6 % (ref 11.5–14.5)
ERYTHROCYTE [DISTWIDTH] IN BLOOD BY AUTOMATED COUNT: 13.7 % (ref 11.5–14.5)
GLUCOSE BLD MANUAL STRIP-MCNC: 129 MG/DL (ref 74–99)
GLUCOSE BLD MANUAL STRIP-MCNC: 138 MG/DL (ref 74–99)
GLUCOSE BLD MANUAL STRIP-MCNC: 170 MG/DL (ref 74–99)
GLUCOSE BLD MANUAL STRIP-MCNC: 183 MG/DL (ref 74–99)
GLUCOSE BLD MANUAL STRIP-MCNC: 190 MG/DL (ref 74–99)
GLUCOSE BLD MANUAL STRIP-MCNC: 211 MG/DL (ref 74–99)
GLUCOSE BLDA-MCNC: 135 MG/DL (ref 74–99)
GLUCOSE BLDA-MCNC: 173 MG/DL (ref 74–99)
GLUCOSE BLDA-MCNC: 189 MG/DL (ref 74–99)
GLUCOSE BLDA-MCNC: 200 MG/DL (ref 74–99)
GLUCOSE BLDA-MCNC: 207 MG/DL (ref 74–99)
GLUCOSE BLDA-MCNC: 207 MG/DL (ref 74–99)
GLUCOSE SERPL-MCNC: 128 MG/DL (ref 74–99)
GLUCOSE SERPL-MCNC: 175 MG/DL (ref 74–99)
GLUCOSE SERPL-MCNC: 190 MG/DL (ref 74–99)
GLUCOSE SERPL-MCNC: 194 MG/DL (ref 74–99)
GLUCOSE SERPL-MCNC: 203 MG/DL (ref 74–99)
HCO3 BLDA-SCNC: 22.1 MMOL/L (ref 22–26)
HCO3 BLDA-SCNC: 22.7 MMOL/L (ref 22–26)
HCO3 BLDA-SCNC: 23.2 MMOL/L (ref 22–26)
HCO3 BLDA-SCNC: 23.5 MMOL/L (ref 22–26)
HCO3 BLDA-SCNC: 23.7 MMOL/L (ref 22–26)
HCO3 BLDA-SCNC: 25.4 MMOL/L (ref 22–26)
HCT VFR BLD AUTO: 31.8 % (ref 41–52)
HCT VFR BLD AUTO: 32.8 % (ref 41–52)
HCT VFR BLD AUTO: 33.4 % (ref 41–52)
HCT VFR BLD AUTO: 33.6 % (ref 41–52)
HCT VFR BLD AUTO: 34.8 % (ref 41–52)
HCT VFR BLD EST: 34 % (ref 41–52)
HCT VFR BLD EST: 34 % (ref 41–52)
HCT VFR BLD EST: 35 % (ref 41–52)
HCT VFR BLD EST: 36 % (ref 41–52)
HCT VFR BLD EST: 36 % (ref 41–52)
HCT VFR BLD EST: 38 % (ref 41–52)
HGB BLD-MCNC: 10.7 G/DL (ref 13.5–17.5)
HGB BLD-MCNC: 10.9 G/DL (ref 13.5–17.5)
HGB BLD-MCNC: 11.1 G/DL (ref 13.5–17.5)
HGB BLD-MCNC: 11.4 G/DL (ref 13.5–17.5)
HGB BLD-MCNC: 12.5 G/DL (ref 13.5–17.5)
HGB BLDA-MCNC: 11.3 G/DL (ref 13.5–17.5)
HGB BLDA-MCNC: 11.4 G/DL (ref 13.5–17.5)
HGB BLDA-MCNC: 11.6 G/DL (ref 13.5–17.5)
HGB BLDA-MCNC: 11.9 G/DL (ref 13.5–17.5)
HGB BLDA-MCNC: 12 G/DL (ref 13.5–17.5)
HGB BLDA-MCNC: 12.6 G/DL (ref 13.5–17.5)
INHALED O2 CONCENTRATION: 30 %
INR PPP: 1.5 (ref 0.9–1.1)
INR PPP: 1.7 (ref 0.9–1.1)
LACTATE BLDA-SCNC: 1.3 MMOL/L (ref 0.4–2)
LACTATE BLDA-SCNC: 1.8 MMOL/L (ref 0.4–2)
LACTATE BLDA-SCNC: 2.5 MMOL/L (ref 0.4–2)
LACTATE BLDA-SCNC: 2.8 MMOL/L (ref 0.4–2)
LACTATE BLDA-SCNC: 3 MMOL/L (ref 0.4–2)
LACTATE BLDA-SCNC: 3.5 MMOL/L (ref 0.4–2)
LEFT ATRIUM VOLUME AREA LENGTH INDEX BSA: 14.6 ML/M2
LEFT VENTRICLE INTERNAL DIMENSION DIASTOLE: 5 CM (ref 3.5–6)
LEFT VENTRICULAR OUTFLOW TRACT DIAMETER: 2.3 CM
MAGNESIUM SERPL-MCNC: 1.88 MG/DL (ref 1.6–2.4)
MAGNESIUM SERPL-MCNC: 2.06 MG/DL (ref 1.6–2.4)
MAGNESIUM SERPL-MCNC: 2.06 MG/DL (ref 1.6–2.4)
MAGNESIUM SERPL-MCNC: 2.12 MG/DL (ref 1.6–2.4)
MAGNESIUM SERPL-MCNC: 2.52 MG/DL (ref 1.6–2.4)
MCH RBC QN AUTO: 27.5 PG (ref 26–34)
MCH RBC QN AUTO: 27.6 PG (ref 26–34)
MCH RBC QN AUTO: 27.9 PG (ref 26–34)
MCH RBC QN AUTO: 28.1 PG (ref 26–34)
MCH RBC QN AUTO: 28.6 PG (ref 26–34)
MCHC RBC AUTO-ENTMCNC: 33.2 G/DL (ref 32–36)
MCHC RBC AUTO-ENTMCNC: 33.2 G/DL (ref 32–36)
MCHC RBC AUTO-ENTMCNC: 33.6 G/DL (ref 32–36)
MCHC RBC AUTO-ENTMCNC: 33.9 G/DL (ref 32–36)
MCHC RBC AUTO-ENTMCNC: 35.9 G/DL (ref 32–36)
MCV RBC AUTO: 80 FL (ref 80–100)
MCV RBC AUTO: 83 FL (ref 80–100)
MITRAL VALVE E/A RATIO: 1.03
MITRAL VALVE E/E' RATIO: 8.47
NRBC BLD-RTO: 0 /100 WBCS (ref 0–0)
OXYHGB MFR BLDA: 93.4 % (ref 94–98)
OXYHGB MFR BLDA: 93.5 % (ref 94–98)
OXYHGB MFR BLDA: 93.7 % (ref 94–98)
OXYHGB MFR BLDA: 94.4 % (ref 94–98)
OXYHGB MFR BLDA: 95.2 % (ref 94–98)
OXYHGB MFR BLDA: 95.8 % (ref 94–98)
PCO2 BLDA: 42 MM HG (ref 38–42)
PCO2 BLDA: 46 MM HG (ref 38–42)
PCO2 BLDA: 46 MM HG (ref 38–42)
PCO2 BLDA: 47 MM HG (ref 38–42)
PCO2 BLDA: 47 MM HG (ref 38–42)
PCO2 BLDA: 50 MM HG (ref 38–42)
PH BLDA: 7.28 PH (ref 7.38–7.42)
PH BLDA: 7.29 PH (ref 7.38–7.42)
PH BLDA: 7.31 PH (ref 7.38–7.42)
PH BLDA: 7.31 PH (ref 7.38–7.42)
PH BLDA: 7.34 PH (ref 7.38–7.42)
PH BLDA: 7.34 PH (ref 7.38–7.42)
PHOSPHATE SERPL-MCNC: 2.6 MG/DL (ref 2.5–4.9)
PHOSPHATE SERPL-MCNC: 2.8 MG/DL (ref 2.5–4.9)
PHOSPHATE SERPL-MCNC: 3.1 MG/DL (ref 2.5–4.9)
PHOSPHATE SERPL-MCNC: 3.1 MG/DL (ref 2.5–4.9)
PHOSPHATE SERPL-MCNC: 3.2 MG/DL (ref 2.5–4.9)
PLATELET # BLD AUTO: 162 X10*3/UL (ref 150–450)
PLATELET # BLD AUTO: 180 X10*3/UL (ref 150–450)
PLATELET # BLD AUTO: 219 X10*3/UL (ref 150–450)
PLATELET # BLD AUTO: 223 X10*3/UL (ref 150–450)
PLATELET # BLD AUTO: 250 X10*3/UL (ref 150–450)
PO2 BLDA: 71 MM HG (ref 85–95)
PO2 BLDA: 73 MM HG (ref 85–95)
PO2 BLDA: 73 MM HG (ref 85–95)
PO2 BLDA: 74 MM HG (ref 85–95)
PO2 BLDA: 77 MM HG (ref 85–95)
PO2 BLDA: 90 MM HG (ref 85–95)
POTASSIUM BLDA-SCNC: 4.2 MMOL/L (ref 3.5–5.3)
POTASSIUM BLDA-SCNC: 4.3 MMOL/L (ref 3.5–5.3)
POTASSIUM BLDA-SCNC: 4.4 MMOL/L (ref 3.5–5.3)
POTASSIUM BLDA-SCNC: 4.4 MMOL/L (ref 3.5–5.3)
POTASSIUM BLDA-SCNC: 4.6 MMOL/L (ref 3.5–5.3)
POTASSIUM BLDA-SCNC: 5.3 MMOL/L (ref 3.5–5.3)
POTASSIUM SERPL-SCNC: 4.2 MMOL/L (ref 3.5–5.3)
POTASSIUM SERPL-SCNC: 4.4 MMOL/L (ref 3.5–5.3)
POTASSIUM SERPL-SCNC: 4.4 MMOL/L (ref 3.5–5.3)
POTASSIUM SERPL-SCNC: 4.6 MMOL/L (ref 3.5–5.3)
POTASSIUM SERPL-SCNC: 5.5 MMOL/L (ref 3.5–5.3)
PROTHROMBIN TIME: 16.6 SECONDS (ref 9.8–12.8)
PROTHROMBIN TIME: 19.4 SECONDS (ref 9.8–12.8)
RBC # BLD AUTO: 3.84 X10*6/UL (ref 4.5–5.9)
RBC # BLD AUTO: 3.95 X10*6/UL (ref 4.5–5.9)
RBC # BLD AUTO: 4.03 X10*6/UL (ref 4.5–5.9)
RBC # BLD AUTO: 4.05 X10*6/UL (ref 4.5–5.9)
RBC # BLD AUTO: 4.37 X10*6/UL (ref 4.5–5.9)
RIGHT VENTRICLE FREE WALL PEAK S': 19.4 CM/S
SAO2 % BLDA: 96 % (ref 94–100)
SAO2 % BLDA: 97 % (ref 94–100)
SAO2 % BLDA: 97 % (ref 94–100)
SAO2 % BLDA: 98 % (ref 94–100)
SODIUM BLDA-SCNC: 131 MMOL/L (ref 136–145)
SODIUM BLDA-SCNC: 132 MMOL/L (ref 136–145)
SODIUM BLDA-SCNC: 133 MMOL/L (ref 136–145)
SODIUM SERPL-SCNC: 135 MMOL/L (ref 136–145)
SODIUM SERPL-SCNC: 136 MMOL/L (ref 136–145)
SODIUM SERPL-SCNC: 136 MMOL/L (ref 136–145)
SODIUM SERPL-SCNC: 137 MMOL/L (ref 136–145)
SODIUM SERPL-SCNC: 137 MMOL/L (ref 136–145)
TRICUSPID ANNULAR PLANE SYSTOLIC EXCURSION: 2.3 CM
VANCOMYCIN SERPL-MCNC: 17.9 UG/ML (ref 5–20)
WBC # BLD AUTO: 10.1 X10*3/UL (ref 4.4–11.3)
WBC # BLD AUTO: 11.9 X10*3/UL (ref 4.4–11.3)
WBC # BLD AUTO: 12.5 X10*3/UL (ref 4.4–11.3)
WBC # BLD AUTO: 7.4 X10*3/UL (ref 4.4–11.3)
WBC # BLD AUTO: 9.6 X10*3/UL (ref 4.4–11.3)

## 2024-02-07 PROCEDURE — 2500000004 HC RX 250 GENERAL PHARMACY W/ HCPCS (ALT 636 FOR OP/ED)

## 2024-02-07 PROCEDURE — 93005 ELECTROCARDIOGRAM TRACING: CPT

## 2024-02-07 PROCEDURE — 80202 ASSAY OF VANCOMYCIN: CPT | Performed by: STUDENT IN AN ORGANIZED HEALTH CARE EDUCATION/TRAINING PROGRAM

## 2024-02-07 PROCEDURE — 93306 TTE W/DOPPLER COMPLETE: CPT | Performed by: INTERNAL MEDICINE

## 2024-02-07 PROCEDURE — 94762 N-INVAS EAR/PLS OXIMTRY CONT: CPT

## 2024-02-07 PROCEDURE — 3700000002 HC GENERAL ANESTHESIA TIME - EACH INCREMENTAL 1 MINUTE: Performed by: SURGERY

## 2024-02-07 PROCEDURE — 71045 X-RAY EXAM CHEST 1 VIEW: CPT | Performed by: STUDENT IN AN ORGANIZED HEALTH CARE EDUCATION/TRAINING PROGRAM

## 2024-02-07 PROCEDURE — 2500000002 HC RX 250 W HCPCS SELF ADMINISTERED DRUGS (ALT 637 FOR MEDICARE OP, ALT 636 FOR OP/ED): Performed by: STUDENT IN AN ORGANIZED HEALTH CARE EDUCATION/TRAINING PROGRAM

## 2024-02-07 PROCEDURE — 37799 UNLISTED PX VASCULAR SURGERY: CPT | Performed by: NURSE PRACTITIONER

## 2024-02-07 PROCEDURE — 93306 TTE W/DOPPLER COMPLETE: CPT

## 2024-02-07 PROCEDURE — 2500000005 HC RX 250 GENERAL PHARMACY W/O HCPCS: Performed by: ANESTHESIOLOGIST ASSISTANT

## 2024-02-07 PROCEDURE — 94003 VENT MGMT INPAT SUBQ DAY: CPT

## 2024-02-07 PROCEDURE — 2500000004 HC RX 250 GENERAL PHARMACY W/ HCPCS (ALT 636 FOR OP/ED): Performed by: SURGERY

## 2024-02-07 PROCEDURE — 99291 CRITICAL CARE FIRST HOUR: CPT | Performed by: SURGERY

## 2024-02-07 PROCEDURE — 93010 ELECTROCARDIOGRAM REPORT: CPT | Performed by: INTERNAL MEDICINE

## 2024-02-07 PROCEDURE — 37799 UNLISTED PX VASCULAR SURGERY: CPT | Performed by: STUDENT IN AN ORGANIZED HEALTH CARE EDUCATION/TRAINING PROGRAM

## 2024-02-07 PROCEDURE — 3600000008 HC OR TIME - EACH INCREMENTAL 1 MINUTE - PROCEDURE LEVEL THREE: Performed by: SURGERY

## 2024-02-07 PROCEDURE — A49000 PR EXPLORATORY OF ABDOMEN: Performed by: PAIN MEDICINE

## 2024-02-07 PROCEDURE — 71045 X-RAY EXAM CHEST 1 VIEW: CPT

## 2024-02-07 PROCEDURE — 85610 PROTHROMBIN TIME: CPT | Performed by: NURSE PRACTITIONER

## 2024-02-07 PROCEDURE — 82330 ASSAY OF CALCIUM: CPT

## 2024-02-07 PROCEDURE — 94640 AIRWAY INHALATION TREATMENT: CPT

## 2024-02-07 PROCEDURE — 82435 ASSAY OF BLOOD CHLORIDE: CPT

## 2024-02-07 PROCEDURE — 2500000005 HC RX 250 GENERAL PHARMACY W/O HCPCS: Performed by: STUDENT IN AN ORGANIZED HEALTH CARE EDUCATION/TRAINING PROGRAM

## 2024-02-07 PROCEDURE — 3600000003 HC OR TIME - INITIAL BASE CHARGE - PROCEDURE LEVEL THREE: Performed by: SURGERY

## 2024-02-07 PROCEDURE — 83735 ASSAY OF MAGNESIUM: CPT

## 2024-02-07 PROCEDURE — 85027 COMPLETE CBC AUTOMATED: CPT | Performed by: NURSE PRACTITIONER

## 2024-02-07 PROCEDURE — 84132 ASSAY OF SERUM POTASSIUM: CPT | Performed by: PHYSICIAN ASSISTANT

## 2024-02-07 PROCEDURE — 2500000004 HC RX 250 GENERAL PHARMACY W/ HCPCS (ALT 636 FOR OP/ED): Performed by: STUDENT IN AN ORGANIZED HEALTH CARE EDUCATION/TRAINING PROGRAM

## 2024-02-07 PROCEDURE — 84132 ASSAY OF SERUM POTASSIUM: CPT

## 2024-02-07 PROCEDURE — 82330 ASSAY OF CALCIUM: CPT | Performed by: STUDENT IN AN ORGANIZED HEALTH CARE EDUCATION/TRAINING PROGRAM

## 2024-02-07 PROCEDURE — 2720000007 HC OR 272 NO HCPCS: Performed by: SURGERY

## 2024-02-07 PROCEDURE — 83735 ASSAY OF MAGNESIUM: CPT | Performed by: NURSE PRACTITIONER

## 2024-02-07 PROCEDURE — 84132 ASSAY OF SERUM POTASSIUM: CPT | Performed by: NURSE PRACTITIONER

## 2024-02-07 PROCEDURE — 2500000005 HC RX 250 GENERAL PHARMACY W/O HCPCS

## 2024-02-07 PROCEDURE — 2500000004 HC RX 250 GENERAL PHARMACY W/ HCPCS (ALT 636 FOR OP/ED): Performed by: NURSE PRACTITIONER

## 2024-02-07 PROCEDURE — A49000 PR EXPLORATORY OF ABDOMEN: Performed by: ANESTHESIOLOGIST ASSISTANT

## 2024-02-07 PROCEDURE — 3700000001 HC GENERAL ANESTHESIA TIME - INITIAL BASE CHARGE: Performed by: SURGERY

## 2024-02-07 PROCEDURE — 85610 PROTHROMBIN TIME: CPT

## 2024-02-07 PROCEDURE — 2500000004 HC RX 250 GENERAL PHARMACY W/ HCPCS (ALT 636 FOR OP/ED): Performed by: ANESTHESIOLOGIST ASSISTANT

## 2024-02-07 PROCEDURE — 82947 ASSAY GLUCOSE BLOOD QUANT: CPT

## 2024-02-07 PROCEDURE — 2020000001 HC ICU ROOM DAILY

## 2024-02-07 PROCEDURE — 85027 COMPLETE CBC AUTOMATED: CPT

## 2024-02-07 RX ORDER — ALBUTEROL SULFATE 0.83 MG/ML
20 SOLUTION RESPIRATORY (INHALATION) ONCE
Status: COMPLETED | OUTPATIENT
Start: 2024-02-07 | End: 2024-02-07

## 2024-02-07 RX ORDER — DEXTROSE MONOHYDRATE 100 MG/ML
0.3 INJECTION, SOLUTION INTRAVENOUS ONCE AS NEEDED
Status: COMPLETED | OUTPATIENT
Start: 2024-02-07 | End: 2024-02-12

## 2024-02-07 RX ORDER — DEXTROSE MONOHYDRATE 100 MG/ML
50 INJECTION, SOLUTION INTRAVENOUS CONTINUOUS
Status: DISCONTINUED | OUTPATIENT
Start: 2024-02-07 | End: 2024-02-07

## 2024-02-07 RX ORDER — HEPARIN SODIUM 5000 [USP'U]/ML
5000 INJECTION, SOLUTION INTRAVENOUS; SUBCUTANEOUS EVERY 8 HOURS
Status: DISCONTINUED | OUTPATIENT
Start: 2024-02-07 | End: 2024-02-12

## 2024-02-07 RX ORDER — MIDAZOLAM HYDROCHLORIDE 1 MG/ML
INJECTION INTRAMUSCULAR; INTRAVENOUS AS NEEDED
Status: DISCONTINUED | OUTPATIENT
Start: 2024-02-07 | End: 2024-02-07

## 2024-02-07 RX ORDER — DEXTROSE 50 % IN WATER (D50W) INTRAVENOUS SYRINGE
25 ONCE
Status: COMPLETED | OUTPATIENT
Start: 2024-02-07 | End: 2024-02-07

## 2024-02-07 RX ORDER — SODIUM CHLORIDE, SODIUM LACTATE, POTASSIUM CHLORIDE, CALCIUM CHLORIDE 600; 310; 30; 20 MG/100ML; MG/100ML; MG/100ML; MG/100ML
INJECTION, SOLUTION INTRAVENOUS CONTINUOUS PRN
Status: DISCONTINUED | OUTPATIENT
Start: 2024-02-07 | End: 2024-02-07

## 2024-02-07 RX ORDER — ROCURONIUM BROMIDE 10 MG/ML
INJECTION, SOLUTION INTRAVENOUS AS NEEDED
Status: DISCONTINUED | OUTPATIENT
Start: 2024-02-07 | End: 2024-02-07

## 2024-02-07 RX ORDER — DEXTROSE 50 % IN WATER (D50W) INTRAVENOUS SYRINGE
25
Status: DISCONTINUED | OUTPATIENT
Start: 2024-02-07 | End: 2024-02-26 | Stop reason: HOSPADM

## 2024-02-07 RX ORDER — FENTANYL CITRATE 50 UG/ML
INJECTION, SOLUTION INTRAMUSCULAR; INTRAVENOUS AS NEEDED
Status: DISCONTINUED | OUTPATIENT
Start: 2024-02-07 | End: 2024-02-07

## 2024-02-07 RX ORDER — INSULIN LISPRO 100 [IU]/ML
0-5 INJECTION, SOLUTION INTRAVENOUS; SUBCUTANEOUS
Status: DISCONTINUED | OUTPATIENT
Start: 2024-02-07 | End: 2024-02-26 | Stop reason: HOSPADM

## 2024-02-07 RX ADMIN — FENTANYL CITRATE 100 MCG: 50 INJECTION, SOLUTION INTRAMUSCULAR; INTRAVENOUS at 11:36

## 2024-02-07 RX ADMIN — PROPOFOL 20 MCG/KG/MIN: 10 INJECTION, EMULSION INTRAVENOUS at 22:11

## 2024-02-07 RX ADMIN — INSULIN HUMAN 10 UNITS: 100 INJECTION, SOLUTION PARENTERAL at 02:30

## 2024-02-07 RX ADMIN — VANCOMYCIN HYDROCHLORIDE 1000 MG: 1 INJECTION, SOLUTION INTRAVENOUS at 10:04

## 2024-02-07 RX ADMIN — FLUCONAZOLE 400 MG: 2 INJECTION, SOLUTION INTRAVENOUS at 11:30

## 2024-02-07 RX ADMIN — SODIUM CHLORIDE, POTASSIUM CHLORIDE, SODIUM LACTATE AND CALCIUM CHLORIDE 100 ML/HR: 600; 310; 30; 20 INJECTION, SOLUTION INTRAVENOUS at 09:41

## 2024-02-07 RX ADMIN — Medication 125 MCG/HR: at 02:26

## 2024-02-07 RX ADMIN — DEXTROSE MONOHYDRATE 25 G: 25 INJECTION, SOLUTION INTRAVENOUS at 02:31

## 2024-02-07 RX ADMIN — HYDROCORTISONE SODIUM SUCCINATE 50 MG: 100 INJECTION, POWDER, FOR SOLUTION INTRAMUSCULAR; INTRAVENOUS at 21:47

## 2024-02-07 RX ADMIN — ROCURONIUM BROMIDE 40 MG: 10 INJECTION, SOLUTION INTRAVENOUS at 11:21

## 2024-02-07 RX ADMIN — INSULIN LISPRO 1 UNITS: 100 INJECTION, SOLUTION INTRAVENOUS; SUBCUTANEOUS at 13:19

## 2024-02-07 RX ADMIN — CALCIUM GLUCONATE 1 G: 20 INJECTION, SOLUTION INTRAVENOUS at 10:20

## 2024-02-07 RX ADMIN — PIPERACILLIN SODIUM AND TAZOBACTAM SODIUM 3.38 G: 3; .375 INJECTION, SOLUTION INTRAVENOUS at 16:41

## 2024-02-07 RX ADMIN — HEPARIN SODIUM 5000 UNITS: 5000 INJECTION INTRAVENOUS; SUBCUTANEOUS at 02:19

## 2024-02-07 RX ADMIN — PROPOFOL 20 MCG/KG/MIN: 10 INJECTION, EMULSION INTRAVENOUS at 03:51

## 2024-02-07 RX ADMIN — HEPARIN SODIUM 5000 UNITS: 5000 INJECTION INTRAVENOUS; SUBCUTANEOUS at 16:41

## 2024-02-07 RX ADMIN — CALCIUM GLUCONATE 1 G: 20 INJECTION, SOLUTION INTRAVENOUS at 01:30

## 2024-02-07 RX ADMIN — HYDROCORTISONE SODIUM SUCCINATE 60 MG: 100 INJECTION, POWDER, FOR SOLUTION INTRAMUSCULAR; INTRAVENOUS at 04:28

## 2024-02-07 RX ADMIN — ALBUTEROL SULFATE 20 MG: 2.5 SOLUTION RESPIRATORY (INHALATION) at 02:53

## 2024-02-07 RX ADMIN — SUGAMMADEX 200 MG: 100 INJECTION, SOLUTION INTRAVENOUS at 12:36

## 2024-02-07 RX ADMIN — HEPARIN SODIUM 5000 UNITS: 5000 INJECTION INTRAVENOUS; SUBCUTANEOUS at 09:24

## 2024-02-07 RX ADMIN — PIPERACILLIN SODIUM AND TAZOBACTAM SODIUM 3.38 G: 3; .375 INJECTION, SOLUTION INTRAVENOUS at 05:45

## 2024-02-07 RX ADMIN — VANCOMYCIN HYDROCHLORIDE 1000 MG: 1 INJECTION, SOLUTION INTRAVENOUS at 21:18

## 2024-02-07 RX ADMIN — MIDAZOLAM HYDROCHLORIDE 2 MG: 1 INJECTION, SOLUTION INTRAMUSCULAR; INTRAVENOUS at 11:05

## 2024-02-07 RX ADMIN — Medication 125 MCG/HR: at 10:20

## 2024-02-07 RX ADMIN — FAMOTIDINE 20 MG: 10 INJECTION INTRAVENOUS at 21:18

## 2024-02-07 RX ADMIN — HYDROCORTISONE SODIUM SUCCINATE 50 MG: 100 INJECTION, POWDER, FOR SOLUTION INTRAMUSCULAR; INTRAVENOUS at 10:04

## 2024-02-07 RX ADMIN — SODIUM CHLORIDE, POTASSIUM CHLORIDE, SODIUM LACTATE AND CALCIUM CHLORIDE: 600; 310; 30; 20 INJECTION, SOLUTION INTRAVENOUS at 11:20

## 2024-02-07 RX ADMIN — FAMOTIDINE 20 MG: 10 INJECTION INTRAVENOUS at 09:24

## 2024-02-07 RX ADMIN — Medication 125 MCG/HR: at 17:58

## 2024-02-07 RX ADMIN — NOREPINEPHRINE BITARTRATE 0.03 MCG/KG/MIN: 8 INJECTION, SOLUTION INTRAVENOUS at 13:08

## 2024-02-07 RX ADMIN — PROPOFOL 20 MCG/KG/MIN: 10 INJECTION, EMULSION INTRAVENOUS at 13:08

## 2024-02-07 RX ADMIN — INSULIN LISPRO 2 UNITS: 100 INJECTION, SOLUTION INTRAVENOUS; SUBCUTANEOUS at 08:44

## 2024-02-07 RX ADMIN — MAGNESIUM SULFATE HEPTAHYDRATE 2 G: 40 INJECTION, SOLUTION INTRAVENOUS at 02:29

## 2024-02-07 RX ADMIN — VASOPRESSIN 0.03 UNITS/MIN: 0.2 INJECTION INTRAVENOUS at 03:42

## 2024-02-07 RX ADMIN — PERFLUTREN 10 ML OF DILUTION: 6.52 INJECTION, SUSPENSION INTRAVENOUS at 13:38

## 2024-02-07 RX ADMIN — HYDROCORTISONE SODIUM SUCCINATE 50 MG: 100 INJECTION, POWDER, FOR SOLUTION INTRAMUSCULAR; INTRAVENOUS at 16:41

## 2024-02-07 RX ADMIN — PIPERACILLIN SODIUM AND TAZOBACTAM SODIUM 3.38 G: 3; .375 INJECTION, SOLUTION INTRAVENOUS at 11:30

## 2024-02-07 ASSESSMENT — PAIN - FUNCTIONAL ASSESSMENT

## 2024-02-07 ASSESSMENT — PAIN SCALES - GENERAL
PAINLEVEL_OUTOF10: 0 - NO PAIN

## 2024-02-07 ASSESSMENT — ACTIVITIES OF DAILY LIVING (ADL): LACK_OF_TRANSPORTATION: PATIENT UNABLE TO ANSWER

## 2024-02-07 NOTE — PROGRESS NOTES
Occupational Therapy    Communication Note    Missed Visit: Yes  Missed Visit Reason:  (Pt discussed during ID rounds with medical team. pt with open abdomen, pending RTOR. OT evaluation held at this time,)      02/07/24 at 8:59 AM   Purvi Arredondo, OT   Rehab Office: [phone]

## 2024-02-07 NOTE — SIGNIFICANT EVENT
Postoperative Check Note    Subjective  Trevor Yarbrough is a 53 y.o. male who is now POD0 s/p ex lap and subtotal gastrectomy. Patient became hypotensive post operatively and was not responsive to fluids so a central line was placed and he is now on 0.07 of Levo. He is currently on .16 of levo.    Objective  Vitals:  Visit Vitals  /65   Pulse 100   Temp 38.4 °C (101.1 °F) (Temporal)   Resp 20       Physical Exam:  GEN: critically ill in ICU  HEENT: Sclera anicteric. Moist mucous membranes. ETT in place, NGT to LIWS with dark sanguinous output  RESP: intubated, ventilated on AC/VC   CV: Regular rate, normotensive  GI: Abdomen soft, moderately distended, extremely tender throughout with guarding   : Garnett in place with clear yellow urine.  MSK: No gross deformities. Moves all extremities spontaneously.  NEURO: follows commands  PSYCH: Appropriate mood and affect.  SKIN: Abthera in place over midline wound with sanguinous output    Most recent labs:            13.5     2.5>-----<184              40.2     141  111  20                  ----------------<123     4.4  22  1.22          Mg 1.52           Assessment  Trevor Yarbrough is a 53 y.o. male who is now POD0 s/p ex lap and subtotal gastrectomy.  Patient is in critical condition in the SICU  The plan is as follows:    - Continue to monitor abthera output  - Continue fluconazole and zosyn  - Start SQH tonight for DVT ppx  - NPO with NG in place to LIWS  - Continue to monitor vitals, pressor requirements       Constanza Rod MD  PGY-1 General Surgery  Acute Care Surgery [id number]

## 2024-02-07 NOTE — CARE PLAN
Problem: Pain  Goal: My pain/discomfort is manageable  Outcome: Progressing     Problem: Safety  Goal: Patient will be injury free during hospitalization  Outcome: Progressing  Goal: I will remain free of falls  Outcome: Progressing     Problem: Daily Care  Goal: Daily care needs are met  Outcome: Progressing     Problem: Psychosocial Needs  Goal: Demonstrates ability to cope with hospitalization/illness  Outcome: Progressing  Goal: Collaborate with me, my family, and caregiver to identify my specific goals  Outcome: Progressing     Problem: Discharge Barriers  Goal: My discharge needs are met  Outcome: Progressing     Problem: Skin  Goal: Decreased wound size/increased tissue granulation at next dressing change  Outcome: Progressing  Flowsheets (Taken 2/7/2024 0735)  Decreased wound size/increased tissue granulation at next dressing change:   Promote sleep for wound healing   Protective dressings over bony prominences   Utilize specialty bed per algorithm  Goal: Participates in plan/prevention/treatment measures  Outcome: Progressing  Flowsheets (Taken 2/7/2024 0735)  Participates in plan/prevention/treatment measures:   Discuss with provider PT/OT consult   Elevate heels   Increase activity/out of bed for meals  Goal: Prevent/manage excess moisture  Outcome: Progressing  Flowsheets (Taken 2/7/2024 0735)  Prevent/manage excess moisture:   Cleanse incontinence/protect with barrier cream   Monitor for/manage infection if present   Follow provider orders for dressing changes   Moisturize dry skin  Goal: Prevent/minimize sheer/friction injuries  Outcome: Progressing  Flowsheets (Taken 2/7/2024 0735)  Prevent/minimize sheer/friction injuries:   Use pull sheet   Increase activity/out of bed for meals   Complete micro-shifts as needed if patient unable. Adjust patient position to relieve pressure points, not a full turn   HOB 30 degrees or less   Turn/reposition every 2 hours/use positioning/transfer devices   Utilize  specialty bed per algorithm  Goal: Promote/optimize nutrition  Outcome: Progressing  Flowsheets (Taken 2/7/2024 0735)  Promote/optimize nutrition:   Monitor/record intake including meals   Assist with feeding   Consume > 50% meals/supplements   Discuss with provider if NPO > 2 days  Goal: Promote skin healing  Outcome: Progressing  Flowsheets (Taken 2/7/2024 0735)  Promote skin healing:   Turn/reposition every 2 hours/use positioning/transfer devices   Protective dressings over bony prominences   Assess skin/pad under line(s)/device(s)   Rotate device position/do not position patient on device   Ensure correct size (line/device) and apply per  instructions     Problem: Fall/Injury  Goal: Not fall by end of shift  Outcome: Progressing  Goal: Be free from injury by end of the shift  Outcome: Progressing  Goal: Verbalize understanding of personal risk factors for fall in the hospital  Outcome: Progressing  Goal: Verbalize understanding of risk factor reduction measures to prevent injury from fall in the home  Outcome: Progressing  Goal: Use assistive devices by end of the shift  Outcome: Progressing  Goal: Pace activities to prevent fatigue by end of the shift  Outcome: Progressing     Problem: Safety - Medical Restraint  Goal: Remains free of injury from restraints (Restraint for Interference with Medical Device)  Outcome: Progressing  Goal: Free from restraint(s) (Restraint for Interference with Medical Device)  Outcome: Progressing     Problem: Pain - Adult  Goal: Verbalizes/displays adequate comfort level or baseline comfort level  Outcome: Progressing     Problem: Safety - Adult  Goal: Free from fall injury  Outcome: Progressing     Problem: Discharge Planning  Goal: Discharge to home or other facility with appropriate resources  Outcome: Progressing     Problem: Chronic Conditions and Co-morbidities  Goal: Patient's chronic conditions and co-morbidity symptoms are monitored and maintained or  improved  Outcome: Progressing     Problem: Knowledge Deficit  Goal: Patient/family/caregiver demonstrates understanding of disease process, treatment plan, medications, and discharge instructions  Outcome: Progressing     Problem: Mechanical Ventilation  Goal: Patient Will Maintain Patent Airway  Outcome: Progressing  Goal: Oral health is maintained or improved  Outcome: Progressing  Goal: Tracheostomy will be managed safely  Outcome: Progressing  Goal: ET tube will be managed safely  Outcome: Progressing  Goal: Ability to express needs and understand communication  Outcome: Progressing  Goal: Mobility/activity is maintained at optimum level for patient  Outcome: Progressing

## 2024-02-07 NOTE — PROGRESS NOTES
[facility]  TRAUMA ICU - PROGRESS NOTE    Patient Name: Trevor Yarbrough  MRN: [id number]  Admit Date: 206  : 1970  AGE: 53 y.o.   GENDER: male    ==============================================================================  TODAY'S ASSESSMENT AND PLAN OF CARE:  Trevor Yarbrough is a 53 y.o. male with a history of insulinoma and prior fundoplication for hiatal hernia who was transferred from OSH after presenting with abdominal pain and findings of pneumoperitoneum on imaging one day after starting octreotide for management of insulinoma. Patient was taken to the OR on  with ACS and found to have a gastric perforation along the lesser curvature as well as a small bowel volvulus. He underwent an ex lap and partial gastrectomy, and was ultimately left in discontinuity with an Abthera in place. Per ACS, proximal stomach is questionably viable. Patient is critically ill in septic shock.    OR Course:   - ex lap, partial gastrectomy, Abthera placement    NEURO/PAIN/SEDATION:   #Pain  #Sedation  - Fentanyl, prop gtt  - Utox negative    RESPIRATORY:  #Intubated  - Daily SBT  - Will remain intubated until RTOR    CARDIOVASC:  #Hypotension 2/2 septic shock  - Initiated on levophed, vasopressin noted addition of Vasopression last evening as well as descrepancey between arterial line and automatic BP cuff.   - Will wean pressors as tolerated utilizing systolic BP from arterial line as wide pulse pressure renders arterial line Map inexact    GI:  #Hx insulinoma  #Hx fundoplication  #Gastric perforation s/p partial gastrectomy, open abdomen  #SBO 2/2 volvulus  - NPO  - NGT to LIWS  - Follow up pathology  - Pending RTOR with ACS, possibly today  discussed with ACS team no gross contaimination residual stomach proximal to mid sntrum not compromised. SB appears perfused  will keep intubated remains in discontinuity RTOR  in future for reevaluation and restoration of continuity  "    FEN/:   - Carlton in place, strict I's and O's --> UOP 0.6 ml/hr  - mIVF: LR @ 100 ml/hr  - Hyperkalemia tx overnight  - Replete lytes prn     HEMATOLOGIC:   - Daily CBC    ENDOCRINE:  #Hyperinsulinemic hypoglycemia, insulinoma  - q4h POCT  - Initiated on stress dose steroids on 2/7    MUSCULOSKELETAL/SKIN:   - PT/OT when able    INFECTIOUS DISEASE:   - Daily CBC  - Fluconazole, zosyn (2/6-) for anticipated 4 day course  - Initiated on vancomycin given gram positive cocci in pairs/chains on bcx     GI PROPHYLAXIS: H2 antagonist    DVT PROPHYLAXIS: Lovenox, SCDs    LINES:  - Melisa (2/6-)  - L subclavian central line (2/6-)  - Carlton (2/6-)  - NGT (2/6-)  - PIV    DISPOSITION: TSICU    Discussed with Dr. Kasper.    Marie Aden MD  PGY-2 General Surgery  TSICU [id number]  ==============================================================================  CHIEF COMPLAINT / OVERNIGHT EVENTS / HPI:   Patient initiated on pressors and stress dose steroids postoperatively. Now on 0.14 levo, 0.03 vaso. Lactate 3.5.    MEDICAL HISTORY / ROS:  Admission history and ROS reviewed. Pertinent changes as follows: Unable to obtain given patient condition.    PHYSICAL EXAM:  Heart Rate:  []   Temp:  [36.1 °C (97 °F)-38.4 °C (101.1 °F)]   Resp:  [16-22]   BP: ()/(60-80)   Height:  [180.3 cm (5' 10.98\")]   Weight:  [85.5 kg (188 lb 7.9 oz)-87.5 kg (193 lb)]   SpO2:  [94 %-100 %]     General: critically ill in ICU bed  HEENT: ETT in place, NGT to LIWS with dark sanguinous output in cannister  Resp: intubated, ventilated on AC/VC  CV: sinus tachycardia on monitor, on pressors  Abd: soft, distended, Abthera in place to suction with ss output in cannister  : carlton with clear yellow urine in bag  MSK: moves all extremities  Ext: no LE edema  Neuro: follows commands    IMAGING SUMMARY:   CXR 2/7: Suggestion of mild pulmonary edema with bibasilar presumed atelectasis with or without trace/small pleural effusions, left more than " right.     LABS:  Results from last 7 days   Lab Units 02/07/24 0428 02/07/24 0013 02/06/24 1921 02/06/24  1111 02/06/24  0136   WBC AUTO x10*3/uL 11.9* 10.1 7.5   < > 10.2   HEMOGLOBIN g/dL 11.4* 12.5* 14.1   < > 15.4   HEMATOCRIT % 33.6* 34.8* 41.5   < > 44.6   PLATELETS AUTO x10*3/uL 219 250 294   < > 236   NEUTROS PCT AUTO %  --   --   --   --  40.5   LYMPHS PCT AUTO %  --   --   --   --  45.8   MONOS PCT AUTO %  --   --   --   --  9.3   EOS PCT AUTO %  --   --   --   --  3.7    < > = values in this interval not displayed.       Results from last 7 days   Lab Units 02/07/24 0013 02/06/24 1921 02/06/24  1111   APTT seconds 30 30 27   INR  1.5* 1.2* 1.3*     Results from last 7 days   Lab Units 02/07/24 0428 02/07/24 0013 02/06/24 1921 02/06/24  1111 02/06/24  0136   SODIUM mmol/L 137 137 138   < > 142   POTASSIUM mmol/L 4.2 5.5* 5.3   < > 3.4   CHLORIDE mmol/L 106 106 108*   < > 103   CO2 mmol/L 23 24 21   < > 25   BUN mg/dL 22 20 21   < > 19   CREATININE mg/dL 1.20 1.15 1.27   < > 1.20   CALCIUM mg/dL 7.6* 7.3* 7.9*   < > 9.7   PROTEIN TOTAL g/dL  --   --   --   --  7.6   BILIRUBIN TOTAL mg/dL  --   --   --   --  0.3   ALK PHOS U/L  --   --   --   --  73   ALT U/L  --   --   --   --  36   AST U/L  --   --   --   --  36   GLUCOSE mg/dL 175* 194* 169*   < > 124*    < > = values in this interval not displayed.       Results from last 7 days   Lab Units 02/06/24  0136   BILIRUBIN TOTAL mg/dL 0.3       Results from last 7 days   Lab Units 02/07/24  0427 02/07/24  0012 02/06/24  2242   POCT PH, ARTERIAL pH 7.29* 7.34* 7.33*   POCT PCO2, ARTERIAL mm Hg 46* 42 42   POCT PO2, ARTERIAL mm Hg 71* 90 94   POCT HCO3 CALCULATED, ARTERIAL mmol/L 22.1 22.7 22.1   POCT BASE EXCESS, ARTERIAL mmol/L -4.5* -3.0* -3.7*       I have reviewed all medications, laboratory results, and imaging pertinent for today's encounter.     Remains intubated sedated upon return from OR discussed with ACS service operative findings noted as  above . RTOR in future for washout and possible re establishment of continuity .  Will synchronize ventilator rate reduced with minimal change in exhaled tidal volume but improvement in hemodynamics. Symmetric chest expansion  RRR ABD post op changes discussed with wife and mother in law at bedside              This critically ill patient continues to be at-risk for clinically significant deterioration / failure due to the above mentioned dysfunctional, unstable organ systems.  I have personally identified and managed all complex critical care issues to prevent aforementioned clinical deterioration.  Critical care time is spent at bedside and/or the immediate area and has included, but is not limited to, the review of diagnostic tests, labs, radiographs, serial assessments of hemodynamics, respiratory status, ventilatory management, and family updates.  Time spent in procedures and teaching are reported separately.     CRITICAL CARE TIME:  45 minutes

## 2024-02-07 NOTE — ANESTHESIA PREPROCEDURE EVALUATION
Patient: Trevor Yarbrough    Procedure Information       Date/Time: 02/07/24 0955    Procedure: Re-opening Exploration Laparotomy, abdominal washout, possible abdominal closure, possible ostomy. (Abdomen)    Location: [facility] OR 11 / [facility] OR    Surgeons: Aniket Tai MD            Relevant Problems   Anesthesia (within normal limits)      Cardiovascular   (+) Hyperlipidemia      Endocrine  Hx hypoglycemia with possible insulinoma treated with octreotide      GI   (+) Gastroesophageal reflux disease      /Renal  Cr 1.17      Neuro/Psych  On sedation in ICU   (+) History of migraine headaches      Pulmonary  Intubated on vent in ICU due to recent abdominal surgery, pressors, etc.       GI/Hepatic (within normal limits)      Hematology   (+) Anemia       Clinical information reviewed:    Allergies  Meds               NPO Detail:  No data recorded     Vitals:    02/07/24 1030   BP:    Pulse: 85   Resp: 12   Temp:    SpO2: 95%       Past Surgical History:   Procedure Laterality Date    OTHER SURGICAL HISTORY  12/03/2018    Complete colonoscopy    OTHER SURGICAL HISTORY  12/03/2018    Knee arthroscopy    OTHER SURGICAL HISTORY  12/03/2018    Appendectomy    OTHER SURGICAL HISTORY  01/27/2020    Hernia repair    OTHER SURGICAL HISTORY  01/27/2020    Nissen fundoplication laparoscopic    OTHER SURGICAL HISTORY  01/27/2020    Diaphragmatic hernia repair     Past Medical History:   Diagnosis Date    Epigastric pain 05/09/2022    Abdominal pain, acute, epigastric    Other chest pain 01/31/2022    Atypical chest pain    Personal history of other diseases of the digestive system 05/09/2022    History of gastroesophageal reflux (GERD)    Personal history of other diseases of the digestive system 01/27/2020    History of hiatal hernia    Personal history of other diseases of the digestive system 11/22/2019    History of esophagitis    Personal history of other specified conditions 01/31/2022    History of  dysphagia       Current Facility-Administered Medications:     calcium gluconate in NS IV 1 g, 1 g, intravenous, q6h PRN, Jason Coles MD, Stopped at 02/07/24 1050    calcium gluconate in NS IV 2 g, 2 g, intravenous, q6h PRN, Jason oCles MD    dextrose 10 % in water (D10W) infusion, 0.3 g/kg/hr, intravenous, Once PRN, Neo Wright DO    dextrose 50 % injection 25 g, 25 g, intravenous, q15 min PRN, Neo Wright DO    famotidine PF (Pepcid) injection 20 mg, 20 mg, intravenous, BID, Marie Aden MD, 20 mg at 02/07/24 0924    fentanyl (Sublimaze) 1000 mcg in sodium chloride 0.9% 100 mL (10 mcg/mL) infusion (premix),  mcg/hr, intravenous, Continuous, Marie Aden MD, Last Rate: 12.5 mL/hr at 02/07/24 1034, 125 mcg/hr at 02/07/24 1034    fluconazole in NaCl (iso-osm) (Diflucan) IVPB 400 mg, 400 mg, intravenous, q24h, Marie Aden MD, 400 mg at 02/06/24 1511    glucagon (Glucagen) injection 1 mg, 1 mg, intramuscular, q15 min PRN, Neo Wright DO    heparin (porcine) injection 5,000 Units, 5,000 Units, subcutaneous, q8h, Tommy Bojorquez MD, 5,000 Units at 02/07/24 0924    hydrocortisone sod succ (PF) (Solu-CORTEF) injection 50 mg, 50 mg, intravenous, q6h, Will Kasper MD, 50 mg at 02/07/24 1004    insulin lispro (HumaLOG) injection 0-5 Units, 0-5 Units, subcutaneous, TID with meals, Neo Wright DO, 2 Units at 02/07/24 0844    lactated Ringer's infusion, 100 mL/hr, intravenous, Continuous, Marie Aden MD, Last Rate: 100 mL/hr at 02/07/24 1034, Continued by Anesthesia at 02/07/24 1034    magnesium sulfate IV 2 g, 2 g, intravenous, q6h PRN, Jason Coles MD, Stopped at 02/07/24 0429    magnesium sulfate IV 4 g, 4 g, intravenous, q6h PRN, Jason Coles MD, Stopped at 02/06/24 1745    norepinephrine (Levophed) 8 mg in dextrose 5% 250 mL (0.032 mg/mL) infusion (premix), 0-3.3 mcg/kg/min, intravenous, Continuous, Marie Aden MD, Last Rate: 19.24 mL/hr at 02/07/24 1034, Continued by Anesthesia  at 02/07/24 1034    oxygen (O2) therapy, , inhalation, Continuous PRN - O2/gases, Will Kasper MD    piperacillin-tazobactam-dextrose (Zosyn) IV 3.375 g, 3.375 g, intravenous, q6h, Marie Aden MD, Stopped at 02/07/24 0615    potassium chloride 20 mEq in 100 mL IV premix, 20 mEq, intravenous, q6h PRN, Jason Coles MD    propofol (Diprivan) infusion, 5-20 mcg/kg/min, intravenous, Continuous, Marie Aden MD, Last Rate: 10.02 mL/hr at 02/07/24 1034, Continued by Anesthesia at 02/07/24 1034    vancomycin (Vancocin) 1,000 mg in dextrose 5% water 200 mL, 1,000 mg, intravenous, q12h, Tommy Bojorquez MD, Last Rate: 200 mL/hr at 02/07/24 1004, 1,000 mg at 02/07/24 1004    vasopressin (Vasostrict) 0.2 unit/mL infusion, 0.03 Units/min, intravenous, Continuous, Verona Otto, OSMIN-CNP, Last Rate: 9 mL/hr at 02/07/24 1034, 0.03 Units/min at 02/07/24 1034  Prior to Admission medications    Medication Sig Start Date End Date Taking? Authorizing Provider   atorvastatin (Lipitor) 10 mg tablet Take 1 tablet (10 mg) by mouth once daily.    Historical Provider, MD   blood-glucose sensor device CHANGE EVERY 14 DAYS 9/22/23 9/21/24  Naren Vega MD   diazoxide (Proglycem) 50 mg/mL suspension Take 1.6 mL (80 mg) by mouth every 8 hours. 10/20/23 12/19/23  Naren Vega MD   octreotide (SandoSTATIN) 100 mcg/mL injection Inject 1 mL (100 mcg) under the skin 3 times a day. 1/31/24 3/6/24  Naren Vega MD     No Known Allergies  Social History     Tobacco Use    Smoking status: Never    Smokeless tobacco: Never   Substance Use Topics    Alcohol use: Not Currently         Chemistry    Lab Results   Component Value Date/Time     02/07/2024 0914    K 4.4 02/07/2024 0914     02/07/2024 0914    CO2 24 02/07/2024 0914    BUN 23 02/07/2024 0914    CREATININE 1.17 02/07/2024 0914    Lab Results   Component Value Date/Time    CALCIUM 7.5 (L) 02/07/2024 0914    ALKPHOS 73 02/06/2024 0136    AST 36 02/06/2024 0136     ALT 36 02/06/2024 0136    BILITOT 0.3 02/06/2024 0136          Lab Results   Component Value Date/Time    WBC 12.5 (H) 02/07/2024 0914    HGB 11.1 (L) 02/07/2024 0914    HCT 33.4 (L) 02/07/2024 0914     02/07/2024 0914     Lab Results   Component Value Date/Time    PROTIME 16.6 (H) 02/07/2024 0013    INR 1.5 (H) 02/07/2024 0013     Encounter Date: 02/06/24   ECG 12 Lead   Result Value    Ventricular Rate 72    Atrial Rate 72    MA Interval 174    QRS Duration 78    QT Interval 366    QTC Calculation(Bazett) 400    P Axis 43    R Axis 55    T Axis 76    QRS Count 11    Q Onset 224    P Onset 137    P Offset 184    T Offset 407    QTC Fredericia 389    Narrative    Normal sinus rhythm  Septal infarct , age undetermined  Abnormal ECG  When compared with ECG of 07-FEB-2024 02:10,  No significant change was found         Physical Exam    Airway  Mallampati: unable to assess     Cardiovascular   Rhythm: regular  Rate: normal  (+) murmur  Comments: Soft ABHINAV RUSB 2/6   Dental   Comments: Intubated on vent   Pulmonary   Breath sounds clear to auscultation     Abdominal            Anesthesia Plan    History of general anesthesia?: yes  History of complications of general anesthesia?: no    ASA 3     general     Anesthetic plan and risks discussed with spouse.    Plan discussed with CAA.

## 2024-02-07 NOTE — PROGRESS NOTES
"Vancomycin Dosing by Pharmacy- INITIAL    Trevor Yarbrough is a 53 y.o. year old male who Pharmacy has been consulted for vancomycin dosing for other abdominal infection . Based on the patient's indication and renal status this patient will be dosed based on a goal AUC of 400-600.     Scr is slightly trending up.    Visit Vitals  /66   Pulse 83   Temp 36.9 °C (98.5 °F) (Temporal)   Resp 20        Lab Results   Component Value Date    CREATININE 1.27 02/06/2024    CREATININE 1.22 02/06/2024    CREATININE 1.20 02/06/2024    CREATININE 1.01 09/29/2023    CREATININE 1.01 08/02/2022    CREATININE 1.01 12/17/2021    CREATININE 0.99 12/13/2021        Patient weight is No results found for: \"PTWEIGHT\"    No results found for: \"CULTURE\"     I/O last 3 completed shifts:  In: 6264.2 (73.3 mL/kg) [I.V.:4418.4 (51.7 mL/kg); IV Piggyback:1845.8]  Out: 1720 (20.1 mL/kg) [Urine:770 (0.3 mL/kg/hr); Drains:750; Blood:200]  Weight: 85.5 kg   [unfilled]    Lab Results   Component Value Date    PATIENTTEMP 37.0 02/06/2024    PATIENTTEMP 37.0 02/06/2024    PATIENTTEMP 37.0 02/06/2024          Assessment/Plan     Patient will not be given a loading dose.  Will initiate vancomycin maintenance,  1000 mg every 12 hours.    This dosing regimen is predicted by [employer] to result in the following pharmacokinetic parameters:  Exposure target: AUC24 (range)400-600 mg/L.hr   AUC24,ss: 577 mg/L.hr  Probability of AUC24 > 400: 86 %  Ctrough,ss: 19.2 mg/L  Probability of Ctrough,ss > 20: 46 %  Probability of nephrotoxicity (Lodise CAROLANN 2009): 16 %    Follow-up level will be ordered on 2/7 with AM labs unless clinically indicated sooner.  Will continue to monitor renal function daily while on vancomycin and order serum creatinine at least every 48 hours if not already ordered.  Follow for continued vancomycin needs, clinical response, and signs/symptoms of toxicity.       Jori Mcgraw, PharmD       "

## 2024-02-07 NOTE — BRIEF OP NOTE
Date: 2024  OR Location: [facility] OR    Name: Trevor Yarbrough, : 1970, Age: 53 y.o., MRN: [id number], Sex: male    Diagnosis  Pre-op Diagnosis     * Pneumoperitoneum [K66.8] Post-op Diagnosis     * Pneumoperitoneum [K66.8]     Procedures  Re-opening Exploration Laparotomy, abdominal washout, possible abdominal closure, possible ostomy.  14014 - MA EXPLORATORY LAPAROTOMY CELIOTOMY W/WO BIOPSY SPX      Surgeons      * Aniket Tai - Primary    Resident/Fellow/Other Assistant:  Surgeon(s) and Role:     * Dylon Webster MD - Resident - Assisting     * Nik Quiroz MD - Resident - Assisting    Procedure Summary  Anesthesia: General  ASA: III  Anesthesia Staff: Anesthesiologist: Francis Alexandre MD  C-AA: PHYLLIS Aleman  Estimated Blood Loss: 10 mL  Intra-op Medications:   Administrations occurring from 0955 to 1345 on 24:   Medication Name Total Dose   calcium gluconate in NS IV 1 g 50 mL   calcium gluconate in NS IV 2 g Cannot be calculated   dextrose 10 % in water (D10W) infusion Cannot be calculated   dextrose 50 % injection 25 g Cannot be calculated   famotidine PF (Pepcid) injection 20 mg Cannot be calculated   fentanyl (Sublimaze) 1000 mcg in sodium chloride 0.9% 100 mL (10 mcg/mL) infusion (premix) 427.08 mcg   fluconazole in NaCl (iso-osm) (Diflucan) IVPB 400 mg 400 mg   glucagon (Glucagen) injection 1 mg Cannot be calculated   heparin (porcine) injection 5,000 Units Cannot be calculated   hydrocortisone sod succ (PF) (Solu-CORTEF) injection 50 mg 50 mg   insulin lispro (HumaLOG) injection 0-5 Units Cannot be calculated   lactated Ringer's infusion Cannot be calculated   magnesium sulfate IV 2 g Cannot be calculated   magnesium sulfate IV 4 g Cannot be calculated   norepinephrine (Levophed) 8 mg in dextrose 5% 250 mL (0.032 mg/mL) infusion (premix) 0.29 mg   piperacillin-tazobactam-dextrose (Zosyn) IV 3.375 g 50 mL   potassium chloride 20 mEq in 100 mL IV premix Cannot be calculated    vancomycin (Vancocin) 1,000 mg in dextrose 5% water 200 mL 200 mL   vasopressin (Vasostrict) 0.2 unit/mL infusion 0.08 Units              Anesthesia Record               Intraprocedure I/O Totals          Intake    Fentanyl Drip 0.00 mL    The total shown is the total volume documented since Anesthesia Start was filed.    Norepinephrine Drip 0.00 mL    The total shown is the total volume documented since Anesthesia Start was filed.    Propofol Drip 0.00 mL    The total shown is the total volume documented since Anesthesia Start was filed.    lactated Ringer's 100.00 mL    Vasopressin Drip 0.00 mL    The total shown is the total volume documented since Anesthesia Start was filed.    Total Intake 100 mL       Output    Urine 40 mL    Est. Blood Loss 10 mL    Total Output 50 mL       Net    Net Volume 50 mL          Specimen: No specimens collected     Staff:   Circulator: Real Ryder RN; Anitra Joe RN  Relief Scrub: [facility]  Scrub Person: Kimberly Mendoza; Bonny Thibodeaux          Findings: proximal stomach viable with negative leak test; distal stomach viable; viable small bowel and colon; Left in discontinuity with abthera    Complications:  None; patient tolerated the procedure well.     Disposition: ICU - intubated and critically ill.  Condition: stable  Specimens Collected: No specimens collected  Attending Attestation:     Aniket Tai  Phone Number: [phone]

## 2024-02-07 NOTE — ANESTHESIA POSTPROCEDURE EVALUATION
Patient: Trevor Yarbrough    Procedure Summary       Date: 02/07/24 Room / Location: [facility] OR 11 / [facility]    Anesthesia Start: 1105 Anesthesia Stop: 1240    Procedure: Re-opening Exploration Laparotomy, abdominal washout, possible abdominal closure, possible ostomy. (Abdomen) Diagnosis:       Pneumoperitoneum      (Pneumoperitoneum [K66.8])    Surgeons: Aniket Tai MD Responsible Provider: Francis Alexandre MD    Anesthesia Type: general ASA Status: 3            Anesthesia Type: general    Vitals Value Taken Time   /59 02/07/24 1400   Temp See RN notes 02/07/24 1403   Pulse 94 02/07/24 1402   Resp 15 02/07/24 1402   SpO2 94 % 02/07/24 1402   Vitals shown include unvalidated device data.    Anesthesia Post Evaluation    Patient location during evaluation: ICU  Patient participation: complete - patient cannot participate  Level of consciousness: sedated  Pain management: adequate  Airway patency: patent  Cardiovascular status: acceptable (still on pressors, but SBP about 100)  Respiratory status: acceptable  Hydration status: acceptable  Postoperative Nausea and Vomiting: none        There were no known notable events for this encounter.

## 2024-02-07 NOTE — PROGRESS NOTES
Physical Therapy                 Therapy Communication Note    Patient Name: Trevor Yarbrough  MRN: [id number]  Today's Date: 2/7/2024     Discipline: Physical Therapy    Missed Visit Reason: Missed Visit Reason:  (Pt on hold pending RTOR, will defer PT eval at this time.)    Missed Time: Attempt

## 2024-02-07 NOTE — ED PROVIDER NOTES
External Records Reviewed: OS records      HPI  Trevor Yarbrough is a 53 y.o. male with a history of esophagitis, GERD presenting to the emergency department as an outside hospital transfer for pneumoperitoneum requiring surgical intervention.  The patient is reporting excruciating abdominal pain and distention.    PMH  Past Medical History:   Diagnosis Date    Epigastric pain 05/09/2022    Abdominal pain, acute, epigastric    Other chest pain 01/31/2022    Atypical chest pain    Personal history of other diseases of the digestive system 05/09/2022    History of gastroesophageal reflux (GERD)    Personal history of other diseases of the digestive system 01/27/2020    History of hiatal hernia    Personal history of other diseases of the digestive system 11/22/2019    History of esophagitis    Personal history of other specified conditions 01/31/2022    History of dysphagia       Meds  Current Outpatient Medications   Medication Instructions    atorvastatin (Lipitor) 10 mg tablet 1 tablet, oral, Daily    blood-glucose sensor device CHANGE EVERY 14 DAYS    diazoxide (PROGLYCEM) 3 mg/kg/day, oral, Every 8 hours    octreotide (SANDOSTATIN) 100 mcg, subcutaneous, 3 times daily       Allergies  No Known Allergies     SHx  Social History     Tobacco Use    Smoking status: Never    Smokeless tobacco: Never   Substance Use Topics    Alcohol use: Not Currently    Drug use: Never       ROS  Review of Systems   Constitutional:  Negative for chills and fever.   HENT:  Negative for ear pain and sore throat.    Eyes:  Negative for pain and visual disturbance.   Respiratory:  Negative for cough and shortness of breath.    Cardiovascular:  Negative for chest pain and palpitations.   Gastrointestinal:  Positive for abdominal distention and abdominal pain. Negative for vomiting.   Genitourinary:  Negative for dysuria and hematuria.   Musculoskeletal:  Negative for arthralgias and back pain.   Skin:  Negative for color change and rash.  "  Neurological:  Negative for seizures and syncope.   All other systems reviewed and are negative.      ------------------------------------------------------------------------------------------------------------------------------------------    /68   Pulse 84   Temp 36.1 °C (97 °F) (Temporal)   Resp 20   Ht 1.803 m (5' 10.98\")   Wt 87.5 kg (193 lb)   SpO2 95%   BMI 26.93 kg/m²     Physical Exam  Vitals and nursing note reviewed.   Constitutional:       General: He is not in acute distress.     Appearance: Normal appearance.   HENT:      Head: Normocephalic and atraumatic.      Right Ear: External ear normal.      Left Ear: External ear normal.   Eyes:      Extraocular Movements: Extraocular movements intact.      Conjunctiva/sclera: Conjunctivae normal.   Cardiovascular:      Rate and Rhythm: Regular rhythm. Tachycardia present.      Pulses: Normal pulses.      Heart sounds: Normal heart sounds. No murmur heard.     No friction rub. No gallop.   Pulmonary:      Effort: Pulmonary effort is normal. No respiratory distress.      Breath sounds: No wheezing, rhonchi or rales.   Abdominal:      General: There is no distension.      Palpations: Abdomen is rigid.      Tenderness: There is generalized abdominal tenderness. There is guarding and rebound.   Musculoskeletal:         General: No swelling. Normal range of motion.      Cervical back: Neck supple.      Right lower leg: No edema.      Left lower leg: No edema.   Skin:     General: Skin is warm and dry.   Neurological:      General: No focal deficit present.      Mental Status: He is alert and oriented to person, place, and time.   Psychiatric:         Mood and Affect: Mood normal.          ------------------------------------------------------------------------------------------------------------------------------------------    Medical Decision Making: This is a 53-year-old male presented to the emergency department and extremis related to abdominal " pain.  His vital signs are normal for tachycardia.  On examination he has a firm, and tender, distended abdomen with guarding.  He was diagnosed with pneumoperitoneum based on his CT scan at the outside hospital.  General surgery was emergently consulted.  He had already been given Zosyn.  Will be taken emergently to the operating room for repair of bowel perforation related to small bowel obstruction.      Diagnoses as of 02/07/24 0650   Perforated bowel (CMS/HCC)       Discussed with: General surgery      Rm Tellez MD  Emergency Medicine Attending       Rm Tellez MD  02/08/24 [id number]

## 2024-02-07 NOTE — SIGNIFICANT EVENT
Post-op check.     Pt is POD 0 from re-exploration, washout, temporary abdominal closure with abthera wound vac.     Remains intubated and sedated   Decreasing pressors to levo 0.04  Making adequate urine  Vac with 600 ml thin serosang output   Abdomen soft, mildly edematous     Lactate normalized; pt less acidotic   Blood cultures from admission show bacteremia - Streptococcus viridans; TTE not showing vegetations     Assessment and plan  - Pt is being resuscitated and will likely return to OR for washout and possible further gastrectomy and bowel resection 2/8 vs 2/9     Dylon Webster MD   Pager [phone]

## 2024-02-08 ENCOUNTER — APPOINTMENT (OUTPATIENT)
Dept: RADIOLOGY | Facility: HOSPITAL | Age: 54
DRG: 853 | End: 2024-02-08
Payer: COMMERCIAL

## 2024-02-08 LAB
ALBUMIN SERPL BCP-MCNC: 2.2 G/DL (ref 3.4–5)
ALBUMIN SERPL BCP-MCNC: 2.4 G/DL (ref 3.4–5)
ANION GAP BLDA CALCULATED.4IONS-SCNC: 6 MMO/L (ref 10–25)
ANION GAP BLDA CALCULATED.4IONS-SCNC: 9 MMO/L (ref 10–25)
ANION GAP SERPL CALC-SCNC: 11 MMOL/L (ref 10–20)
ANION GAP SERPL CALC-SCNC: 11 MMOL/L (ref 10–20)
APTT PPP: 30 SECONDS (ref 27–38)
BASE EXCESS BLDA CALC-SCNC: -0.5 MMOL/L (ref -2–3)
BASE EXCESS BLDA CALC-SCNC: 0.9 MMOL/L (ref -2–3)
BODY TEMPERATURE: 37 DEGREES CELSIUS
BODY TEMPERATURE: 37 DEGREES CELSIUS
BUN SERPL-MCNC: 20 MG/DL (ref 6–23)
BUN SERPL-MCNC: 21 MG/DL (ref 6–23)
CA-I BLD-SCNC: 1.06 MMOL/L (ref 1.1–1.33)
CA-I BLD-SCNC: 1.08 MMOL/L (ref 1.1–1.33)
CA-I BLD-SCNC: 1.11 MMOL/L (ref 1.1–1.33)
CA-I BLDA-SCNC: 1.11 MMOL/L (ref 1.1–1.33)
CA-I BLDA-SCNC: 1.14 MMOL/L (ref 1.1–1.33)
CALCIUM SERPL-MCNC: 7.3 MG/DL (ref 8.6–10.6)
CALCIUM SERPL-MCNC: 7.6 MG/DL (ref 8.6–10.6)
CHLORIDE BLDA-SCNC: 103 MMOL/L (ref 98–107)
CHLORIDE BLDA-SCNC: 103 MMOL/L (ref 98–107)
CHLORIDE SERPL-SCNC: 104 MMOL/L (ref 98–107)
CHLORIDE SERPL-SCNC: 104 MMOL/L (ref 98–107)
CO2 SERPL-SCNC: 26 MMOL/L (ref 21–32)
CO2 SERPL-SCNC: 27 MMOL/L (ref 21–32)
CREAT SERPL-MCNC: 1.12 MG/DL (ref 0.5–1.3)
CREAT SERPL-MCNC: 1.22 MG/DL (ref 0.5–1.3)
EGFRCR SERPLBLD CKD-EPI 2021: 70 ML/MIN/1.73M*2
EGFRCR SERPLBLD CKD-EPI 2021: 78 ML/MIN/1.73M*2
ERYTHROCYTE [DISTWIDTH] IN BLOOD BY AUTOMATED COUNT: 14 % (ref 11.5–14.5)
ERYTHROCYTE [DISTWIDTH] IN BLOOD BY AUTOMATED COUNT: 14 % (ref 11.5–14.5)
GLUCOSE BLD MANUAL STRIP-MCNC: 113 MG/DL (ref 74–99)
GLUCOSE BLD MANUAL STRIP-MCNC: 122 MG/DL (ref 74–99)
GLUCOSE BLD MANUAL STRIP-MCNC: 139 MG/DL (ref 74–99)
GLUCOSE BLD MANUAL STRIP-MCNC: 140 MG/DL (ref 74–99)
GLUCOSE BLD MANUAL STRIP-MCNC: 87 MG/DL (ref 74–99)
GLUCOSE BLD MANUAL STRIP-MCNC: 89 MG/DL (ref 74–99)
GLUCOSE BLD MANUAL STRIP-MCNC: 89 MG/DL (ref 74–99)
GLUCOSE BLDA-MCNC: 117 MG/DL (ref 74–99)
GLUCOSE BLDA-MCNC: 133 MG/DL (ref 74–99)
GLUCOSE SERPL-MCNC: 112 MG/DL (ref 74–99)
GLUCOSE SERPL-MCNC: 129 MG/DL (ref 74–99)
HCO3 BLDA-SCNC: 25.4 MMOL/L (ref 22–26)
HCO3 BLDA-SCNC: 27.4 MMOL/L (ref 22–26)
HCT VFR BLD AUTO: 27.6 % (ref 41–52)
HCT VFR BLD AUTO: 31.1 % (ref 41–52)
HCT VFR BLD EST: 31 % (ref 41–52)
HCT VFR BLD EST: 32 % (ref 41–52)
HGB BLD-MCNC: 10.5 G/DL (ref 13.5–17.5)
HGB BLD-MCNC: 9.2 G/DL (ref 13.5–17.5)
HGB BLDA-MCNC: 10.3 G/DL (ref 13.5–17.5)
HGB BLDA-MCNC: 10.7 G/DL (ref 13.5–17.5)
INHALED O2 CONCENTRATION: 30 %
INHALED O2 CONCENTRATION: 30 %
INR PPP: 1.3 (ref 0.9–1.1)
LACTATE BLDA-SCNC: 1.2 MMOL/L (ref 0.4–2)
LACTATE BLDA-SCNC: 1.5 MMOL/L (ref 0.4–2)
MAGNESIUM SERPL-MCNC: 2.08 MG/DL (ref 1.6–2.4)
MAGNESIUM SERPL-MCNC: 2.1 MG/DL (ref 1.6–2.4)
MAGNESIUM SERPL-MCNC: 2.18 MG/DL (ref 1.6–2.4)
MCH RBC QN AUTO: 27.6 PG (ref 26–34)
MCH RBC QN AUTO: 28.3 PG (ref 26–34)
MCHC RBC AUTO-ENTMCNC: 33.3 G/DL (ref 32–36)
MCHC RBC AUTO-ENTMCNC: 33.8 G/DL (ref 32–36)
MCV RBC AUTO: 83 FL (ref 80–100)
MCV RBC AUTO: 84 FL (ref 80–100)
NRBC BLD-RTO: 0 /100 WBCS (ref 0–0)
NRBC BLD-RTO: 0 /100 WBCS (ref 0–0)
OXYHGB MFR BLDA: 95.2 % (ref 94–98)
OXYHGB MFR BLDA: 96.5 % (ref 94–98)
PCO2 BLDA: 46 MM HG (ref 38–42)
PCO2 BLDA: 52 MM HG (ref 38–42)
PH BLDA: 7.33 PH (ref 7.38–7.42)
PH BLDA: 7.35 PH (ref 7.38–7.42)
PHOSPHATE SERPL-MCNC: 2.7 MG/DL (ref 2.5–4.9)
PHOSPHATE SERPL-MCNC: 2.8 MG/DL (ref 2.5–4.9)
PLATELET # BLD AUTO: 179 X10*3/UL (ref 150–450)
PLATELET # BLD AUTO: 198 X10*3/UL (ref 150–450)
PO2 BLDA: 74 MM HG (ref 85–95)
PO2 BLDA: 89 MM HG (ref 85–95)
POTASSIUM BLDA-SCNC: 4.8 MMOL/L (ref 3.5–5.3)
POTASSIUM BLDA-SCNC: 5.2 MMOL/L (ref 3.5–5.3)
POTASSIUM SERPL-SCNC: 4.8 MMOL/L (ref 3.5–5.3)
POTASSIUM SERPL-SCNC: 5.1 MMOL/L (ref 3.5–5.3)
PROTHROMBIN TIME: 15.1 SECONDS (ref 9.8–12.8)
RBC # BLD AUTO: 3.33 X10*6/UL (ref 4.5–5.9)
RBC # BLD AUTO: 3.71 X10*6/UL (ref 4.5–5.9)
SAO2 % BLDA: 98 % (ref 94–100)
SAO2 % BLDA: 98 % (ref 94–100)
SODIUM BLDA-SCNC: 132 MMOL/L (ref 136–145)
SODIUM BLDA-SCNC: 132 MMOL/L (ref 136–145)
SODIUM SERPL-SCNC: 136 MMOL/L (ref 136–145)
SODIUM SERPL-SCNC: 137 MMOL/L (ref 136–145)
WBC # BLD AUTO: 11.5 X10*3/UL (ref 4.4–11.3)
WBC # BLD AUTO: 9.1 X10*3/UL (ref 4.4–11.3)

## 2024-02-08 PROCEDURE — 2500000004 HC RX 250 GENERAL PHARMACY W/ HCPCS (ALT 636 FOR OP/ED)

## 2024-02-08 PROCEDURE — 84132 ASSAY OF SERUM POTASSIUM: CPT

## 2024-02-08 PROCEDURE — 37799 UNLISTED PX VASCULAR SURGERY: CPT

## 2024-02-08 PROCEDURE — 94003 VENT MGMT INPAT SUBQ DAY: CPT

## 2024-02-08 PROCEDURE — 71045 X-RAY EXAM CHEST 1 VIEW: CPT

## 2024-02-08 PROCEDURE — 82947 ASSAY GLUCOSE BLOOD QUANT: CPT

## 2024-02-08 PROCEDURE — 2020000001 HC ICU ROOM DAILY

## 2024-02-08 PROCEDURE — 99291 CRITICAL CARE FIRST HOUR: CPT | Performed by: SURGERY

## 2024-02-08 PROCEDURE — 36415 COLL VENOUS BLD VENIPUNCTURE: CPT

## 2024-02-08 PROCEDURE — 85610 PROTHROMBIN TIME: CPT

## 2024-02-08 PROCEDURE — 82330 ASSAY OF CALCIUM: CPT

## 2024-02-08 PROCEDURE — 83735 ASSAY OF MAGNESIUM: CPT

## 2024-02-08 PROCEDURE — 2500000004 HC RX 250 GENERAL PHARMACY W/ HCPCS (ALT 636 FOR OP/ED): Performed by: STUDENT IN AN ORGANIZED HEALTH CARE EDUCATION/TRAINING PROGRAM

## 2024-02-08 PROCEDURE — 87040 BLOOD CULTURE FOR BACTERIA: CPT

## 2024-02-08 PROCEDURE — 71045 X-RAY EXAM CHEST 1 VIEW: CPT | Performed by: RADIOLOGY

## 2024-02-08 PROCEDURE — 85027 COMPLETE CBC AUTOMATED: CPT

## 2024-02-08 RX ORDER — NOREPINEPHRINE BITARTRATE/D5W 8 MG/250ML
0-3.3 PLASTIC BAG, INJECTION (ML) INTRAVENOUS CONTINUOUS
Status: DISCONTINUED | OUTPATIENT
Start: 2024-02-08 | End: 2024-02-09

## 2024-02-08 RX ADMIN — SODIUM CHLORIDE, SODIUM LACTATE, POTASSIUM CHLORIDE, AND CALCIUM CHLORIDE 1000 ML: 600; 310; 30; 20 INJECTION, SOLUTION INTRAVENOUS at 09:15

## 2024-02-08 RX ADMIN — PIPERACILLIN SODIUM AND TAZOBACTAM SODIUM 3.38 G: 3; .375 INJECTION, SOLUTION INTRAVENOUS at 04:54

## 2024-02-08 RX ADMIN — HEPARIN SODIUM 5000 UNITS: 5000 INJECTION INTRAVENOUS; SUBCUTANEOUS at 16:42

## 2024-02-08 RX ADMIN — Medication 125 MCG/HR: at 16:43

## 2024-02-08 RX ADMIN — HEPARIN SODIUM 5000 UNITS: 5000 INJECTION INTRAVENOUS; SUBCUTANEOUS at 08:08

## 2024-02-08 RX ADMIN — Medication 150 MCG/HR: at 22:33

## 2024-02-08 RX ADMIN — HYDROCORTISONE SODIUM SUCCINATE 50 MG: 100 INJECTION, POWDER, FOR SOLUTION INTRAMUSCULAR; INTRAVENOUS at 08:08

## 2024-02-08 RX ADMIN — HYDROCORTISONE SODIUM SUCCINATE 50 MG: 100 INJECTION, POWDER, FOR SOLUTION INTRAMUSCULAR; INTRAVENOUS at 04:54

## 2024-02-08 RX ADMIN — VANCOMYCIN HYDROCHLORIDE 1000 MG: 1 INJECTION, SOLUTION INTRAVENOUS at 10:00

## 2024-02-08 RX ADMIN — FLUCONAZOLE 400 MG: 2 INJECTION, SOLUTION INTRAVENOUS at 11:50

## 2024-02-08 RX ADMIN — FAMOTIDINE 20 MG: 10 INJECTION INTRAVENOUS at 08:08

## 2024-02-08 RX ADMIN — HYDROCORTISONE SODIUM SUCCINATE 50 MG: 100 INJECTION, POWDER, FOR SOLUTION INTRAMUSCULAR; INTRAVENOUS at 16:42

## 2024-02-08 RX ADMIN — PROPOFOL 25 MCG/KG/MIN: 10 INJECTION, EMULSION INTRAVENOUS at 22:33

## 2024-02-08 RX ADMIN — SODIUM CHLORIDE, POTASSIUM CHLORIDE, SODIUM LACTATE AND CALCIUM CHLORIDE 125 ML/HR: 600; 310; 30; 20 INJECTION, SOLUTION INTRAVENOUS at 11:49

## 2024-02-08 RX ADMIN — FAMOTIDINE 20 MG: 10 INJECTION INTRAVENOUS at 21:16

## 2024-02-08 RX ADMIN — Medication 125 MCG/HR: at 01:41

## 2024-02-08 RX ADMIN — PIPERACILLIN SODIUM AND TAZOBACTAM SODIUM 3.38 G: 3; .375 INJECTION, SOLUTION INTRAVENOUS at 11:48

## 2024-02-08 RX ADMIN — PIPERACILLIN SODIUM AND TAZOBACTAM SODIUM 3.38 G: 3; .375 INJECTION, SOLUTION INTRAVENOUS at 00:08

## 2024-02-08 RX ADMIN — PROPOFOL 20 MCG/KG/MIN: 10 INJECTION, EMULSION INTRAVENOUS at 13:24

## 2024-02-08 RX ADMIN — HEPARIN SODIUM 5000 UNITS: 5000 INJECTION INTRAVENOUS; SUBCUTANEOUS at 01:05

## 2024-02-08 RX ADMIN — PIPERACILLIN SODIUM AND TAZOBACTAM SODIUM 3.38 G: 3; .375 INJECTION, SOLUTION INTRAVENOUS at 22:33

## 2024-02-08 RX ADMIN — CALCIUM GLUCONATE 1 G: 20 INJECTION, SOLUTION INTRAVENOUS at 01:05

## 2024-02-08 RX ADMIN — HYDROCORTISONE SODIUM SUCCINATE 50 MG: 100 INJECTION, POWDER, FOR SOLUTION INTRAMUSCULAR; INTRAVENOUS at 21:52

## 2024-02-08 RX ADMIN — PROPOFOL 20 MCG/KG/MIN: 10 INJECTION, EMULSION INTRAVENOUS at 05:54

## 2024-02-08 RX ADMIN — VANCOMYCIN HYDROCHLORIDE 1000 MG: 1 INJECTION, SOLUTION INTRAVENOUS at 21:16

## 2024-02-08 RX ADMIN — PIPERACILLIN SODIUM AND TAZOBACTAM SODIUM 3.38 G: 3; .375 INJECTION, SOLUTION INTRAVENOUS at 16:42

## 2024-02-08 RX ADMIN — Medication 125 MCG/HR: at 08:58

## 2024-02-08 ASSESSMENT — ENCOUNTER SYMPTOMS
COLOR CHANGE: 0
ABDOMINAL DISTENTION: 1
FEVER: 0
HEMATURIA: 0
SHORTNESS OF BREATH: 0
DYSURIA: 0
CHILLS: 0
SORE THROAT: 0
ARTHRALGIAS: 0
VOMITING: 0
ABDOMINAL PAIN: 1
PALPITATIONS: 0
EYE PAIN: 0
BACK PAIN: 0
COUGH: 0
SEIZURES: 0

## 2024-02-08 ASSESSMENT — PAIN - FUNCTIONAL ASSESSMENT

## 2024-02-08 ASSESSMENT — PAIN SCALES - GENERAL
PAINLEVEL_OUTOF10: 0 - NO PAIN

## 2024-02-08 NOTE — PROGRESS NOTES
[facility]  TRAUMA ICU - PROGRESS NOTE    Patient Name: Trevor Yarbrough  MRN: [id number]  Admit Date: 206  : 1970  AGE: 53 y.o.   GENDER: male    ==============================================================================  TODAY'S ASSESSMENT AND PLAN OF CARE:  Trevor Yarbrough is a 53 y.o. male with a history of insulinoma and prior fundoplication for hiatal hernia who was transferred from OSH after presenting with abdominal pain and findings of pneumoperitoneum on imaging one day after starting octreotide for management of insulinoma. Patient was taken to the OR on  with ACS and found to have a gastric perforation along the lesser curvature as well as a small bowel volvulus. He underwent an ex lap and partial gastrectomy, and was ultimately left in discontinuity with an Abthera in place. Per ACS, proximal stomach is questionably viable. Patient is critically ill in septic shock.    OR Course:   - ex lap, partial gastrectomy, Abthera placement   - ex lap, washout, replacement of Abthera    NEURO/PAIN/SEDATION:   #Pain  #Sedation  - Fentanyl, prop gtt    RESPIRATORY:  #Intubated  - Daily SBT  - Will remain intubated until RTOR    CARDIOVASC:  #Hypotension 2/2 septic shock  - On levophed, vaso off postoperatively  - Will wean pressors as tolerated with SBP >100 (per cuff) as threshold  - TTE done on , r/o vegetations no vegetations seen     GI:  #Hx insulinoma  #Hx fundoplication  #Gastric perforation s/p partial gastrectomy, open abdomen  #SBO 2/2 volvulus  - NPO  - NGT to LIWS  - Follow up pathology  - S/p washout and replacement of vac on   - Pending RTOR for closure    FEN/:   - Garnett in place, strict I's and O's --> UOP 0.4 ml/kg/hr, will bolus 1L LR  - mIVF increased: LR @ 125 ml/hr  - Replete lytes prn     HEMATOLOGIC:   - Daily CBC    ENDOCRINE:  #Hyperinsulinemic hypoglycemia, insulinoma  - q4h POCT  - Initiated on stress dose steroids on  2/7    MUSCULOSKELETAL/SKIN:   - PT/OT when able    INFECTIOUS DISEASE:   - Daily CBC  - Fluconazole, zosyn (2/6-) for anticipated 4 day course  - Vanco (2/7-)  - Bcx positive for strep viridans, will order repeat cx as this was from OSH inflammatory markers decreasing BP improved weaning pressors    GI PROPHYLAXIS: H2 antagonist    DVT PROPHYLAXIS: SQH, SCDs    LINES:  - [place] (2/6-)  - L subclavian central line (2/6-)  - [place] (2/6-)  - NGT (2/6-)  - PIV    DISPOSITION: [facility]    Seen and discussed with Dr. Kasper.    Marie Aden MD  PGY-2 General Surgery  TSICU [id number]  ==============================================================================  CHIEF COMPLAINT / OVERNIGHT EVENTS / HPI:   Patient stable overnight, on 0.05 levo.    MEDICAL HISTORY / ROS:  Admission history and ROS reviewed. Pertinent changes as follows: Unable to obtain given patient condition.    PHYSICAL EXAM:  Heart Rate:  []   Temp:  [36.2 °C (97.2 °F)-37 °C (98.6 °F)]   Resp:  [7-22]   BP: (103-135)/(58-74)   SpO2:  [93 %-99 %]     General: critically ill in ICU bed  HEENT: ETT in place, NGT to LIWS with dark sanguinous output in cannister  Resp: intubated, ventilated on SIMV  CV: NSR on monitor, on pressors  Abd: soft, distended, Abthera in place to suction with ss output in cannister  : [place] with clear yellow urine in bag  MSK: moves all extremities  Ext: no LE edema  Neuro: follows commands    IMAGING SUMMARY:   CXR 2/8: Improved pulmonary edema, pending final read  TTE:   Left ventricular systolic function is normal with a 65-70% estimated ejection fraction.  No obvious vegetations or significant regurgitant lesions to suggest endocarditis, however the TV was not well seen. If high clinical suspicion for endocarditis consider ANGI to further assess.    LABS:  Results from last 7 days   Lab Units 02/08/24  0014 02/07/24  1731 02/07/24  1243 02/06/24  1111 02/06/24  0136   WBC AUTO x10*3/uL 11.5* 9.6 7.4   < > 10.2   HEMOGLOBIN  g/dL 10.5* 10.7* 10.9*   < > 15.4   HEMATOCRIT % 31.1* 31.8* 32.8*   < > 44.6   PLATELETS AUTO x10*3/uL 198 180 162   < > 236   NEUTROS PCT AUTO %  --   --   --   --  40.5   LYMPHS PCT AUTO %  --   --   --   --  45.8   MONOS PCT AUTO %  --   --   --   --  9.3   EOS PCT AUTO %  --   --   --   --  3.7    < > = values in this interval not displayed.       Results from last 7 days   Lab Units 02/08/24  0014 02/07/24  1243 02/07/24  0013   APTT seconds 30 32 30   INR  1.3* 1.7* 1.5*       Results from last 7 days   Lab Units 02/08/24  0014 02/07/24  1731 02/07/24  1243 02/06/24  1111 02/06/24  0136   SODIUM mmol/L 136 136 135*   < > 142   POTASSIUM mmol/L 5.1 4.6 4.4   < > 3.4   CHLORIDE mmol/L 104 104 104   < > 103   CO2 mmol/L 26 25 26   < > 25   BUN mg/dL 20 21 23   < > 19   CREATININE mg/dL 1.12 1.14 1.04   < > 1.20   CALCIUM mg/dL 7.3* 7.4* 7.4*   < > 9.7   PROTEIN TOTAL g/dL  --   --   --   --  7.6   BILIRUBIN TOTAL mg/dL  --   --   --   --  0.3   ALK PHOS U/L  --   --   --   --  73   ALT U/L  --   --   --   --  36   AST U/L  --   --   --   --  36   GLUCOSE mg/dL 129* 128* 190*   < > 124*    < > = values in this interval not displayed.       Results from last 7 days   Lab Units 02/06/24  0136   BILIRUBIN TOTAL mg/dL 0.3       Results from last 7 days   Lab Units 02/08/24  0556 02/08/24  0013 02/07/24  1728   POCT PH, ARTERIAL pH 7.33* 7.35* 7.34*   POCT PCO2, ARTERIAL mm Hg 52* 46* 47*   POCT PO2, ARTERIAL mm Hg 89 74* 77*   POCT HCO3 CALCULATED, ARTERIAL mmol/L 27.4* 25.4 25.4   POCT BASE EXCESS, ARTERIAL mmol/L 0.9 -0.5 -0.7       I have reviewed all medications, laboratory results, and imaging pertinent for today's encounter.     Seen discussed on rounds all family questions answered, for repeat RTOR in AM continue to wean pressors.  .        This critically ill patient continues to be at-risk for clinically significant deterioration / failure due to the above mentioned dysfunctional, unstable organ systems.  I  have personally identified and managed all complex critical care issues to prevent aforementioned clinical deterioration.  Critical care time is spent at bedside and/or the immediate area and has included, but is not limited to, the review of diagnostic tests, labs, radiographs, serial assessments of hemodynamics, respiratory status, ventilatory management, and family updates.  Time spent in procedures and teaching are reported separately.     CRITICAL CARE TIME: 35 minutes   Will Kasper MD

## 2024-02-08 NOTE — PROGRESS NOTES
Spiritual Care Visit    Clinical Encounter Type  Visited With: Patient not available  Routine Visit: Introduction  Continue Visiting: Yes  Crisis Visit: Critical care  Referral From: Family  Referral To:          Values/Beliefs  Cultural Requests During Hospitalization: none noted  Spiritual Requests During Hospitalization: support and comfort    Initial spiritual care visit with patient. Family not present at time of visit. Family shared with CLS that spiritual care and support by  will be welcome. RN shared patients spouse is present in the morning until about 1:00 when she goes and picks up their children.  left her card and will visit again tomorrow.

## 2024-02-08 NOTE — ANESTHESIA POSTPROCEDURE EVALUATION
Patient: Trevor Yarbrough    Procedure Summary       Date: 02/06/24 Room / Location: [facility] OR 11 / [facility] OR    Anesthesia Start: 0708 Anesthesia Stop: 1055    Procedure: Exploration Laparotomy, DEVOLVULIZED BOWEL, SUBTOTAL GASTRECTOMY (Abdomen) Diagnosis:       Pneumoperitoneum      (Pneumoperitoneum [K66.8])    Surgeons: Aniket Tai MD Responsible Provider: Freddy Murillo DO    Anesthesia Type: general ASA Status: 2 - Emergent            Anesthesia Type: general    Vitals Value Taken Time   /67 02/07/24 2000   Temp 36.9 °C (98.4 °F) 02/07/24 1600   Pulse 94 02/07/24 2011   Resp 13 02/07/24 2011   SpO2 95 % 02/07/24 2011   Vitals shown include unvalidated device data.    Anesthesia Post Evaluation    Patient location during evaluation: ICU  Patient participation: complete - patient cannot participate  Level of consciousness: sedated  Pain management: adequate  Airway patency: patent  Cardiovascular status: acceptable  Respiratory status: acceptable  Hydration status: acceptable  Postoperative Nausea and Vomiting: none  Comments: Transported to icu sedated and intubated        There were no known notable events for this encounter.

## 2024-02-08 NOTE — PROGRESS NOTES
"Vancomycin Dosing by Pharmacy- FOLLOW UP    Trevor Yarbrough is a 53 y.o. year old male who Pharmacy has been consulted for vancomycin dosing for Abdominal infection. Based on the patient's indication and renal status this patient is being dosed based on a goal AUC of 400-600.     Renal function is currently stable.    Current vancomycin dose: 1,000 mg given every 12 hours    Estimated vancomycin AUC on current dose: 549 mg/L.hr     Visit Vitals  /58 (BP Location: Right arm, Patient Position: Lying)   Pulse 87   Temp 37 °C (98.6 °F)   Resp 16        Lab Results   Component Value Date    CREATININE 1.12 02/08/2024    CREATININE 1.14 02/07/2024    CREATININE 1.04 02/07/2024    CREATININE 1.17 02/07/2024        Patient weight is No results found for: \"PTWEIGHT\"    No results found for: \"CULTURE\"     I/O last 3 completed shifts:  In: 89355.1 (138.4 mL/kg) [I.V.:8117.3 (92.7 mL/kg); IV Piggyback:3995.8]  Out: 4322 (49.4 mL/kg) [Urine:1962 (0.6 mL/kg/hr); Emesis/NG output:100; Drains:2050; Blood:210]  Weight: 87.5 kg     Lab Results   Component Value Date    PATIENTTEMP 37.0 02/08/2024    PATIENTTEMP 37.0 02/08/2024    PATIENTTEMP 37.0 02/07/2024        Assessment/Plan    Within goal AUC range. Continue current vancomycin regimen.    This dosing regimen is predicted by [employer] to result in the following pharmacokinetic parameters:    Loading dose: N/A  Regimen: 1000 mg IV every 12 hours.  Start time: 09:18 on 02/08/2024  Exposure target: AUC24 (range)400-600 mg/L.hr   AUC24,ss: 549 mg/L.hr  Probability of AUC24 > 400: 88 %  Ctrough,ss: 16.5 mg/L  Probability of Ctrough,ss > 20: 29 %  Probability of nephrotoxicity (Lodise CAROLANN 2009): 12 %    The next level will be obtained on 2/10 with AM labs. May be obtained sooner if clinically indicated.   Will continue to monitor renal function daily while on vancomycin and order serum creatinine at least every 48 hours if not already ordered.  Follow for continued vancomycin " needs, clinical response, and signs/symptoms of toxicity.       Justine Maldonado, PharmD

## 2024-02-08 NOTE — PROGRESS NOTES
[facility]  ACUTE CARE SURGERY - PROGRESS NOTE    Patient Name: Trevor Yarbrough  MRN: [id number]  Admit Date: 206  : 1970  AGE: 53 y.o.   GENDER: male  ==============================================================================  TODAY'S ASSESSMENT AND PLAN OF CARE:  Trevor Yarbrough is a 53 y.o. male with a history of insulinoma and prior fundoplication for hiatal hernia who was transferred from OSH after presenting with abdominal pain and findings of pneumoperitoneum on imaging one day after starting octreotide for management of insulinoma. Patient was taken to the OR on  with ACS and found to have a gastric perforation along the lesser curvature as well as a small bowel volvulus. He underwent an ex lap and partial gastrectomy, and was ultimately left in discontinuity with an Abthera in place. Proximal stomach was questionably viable. Patient returned to OR  for wash out. Plan to return to OR in coming days for reconstruction and closure.    - Plan for return to OR for reconstruction and closure in next 24-48 hours  - Monitor Abthera output  - Appreciate excellent ICU care    Patient seen and discussed with attending physician, Dr. Zaire Cornell MD  General Surgery  [id number]    ==============================================================================  CHIEF COMPLAINT / EVENTS LAST 24HRS / HPI:  Patient continues to be critically ill requiring pressor support. Per nursing is following commands.    MEDICAL HISTORY / ROS:   Admission history and ROS reviewed.     PHYSICAL EXAM:  Heart Rate:  []   Temp:  [36.2 °C (97.2 °F)-37 °C (98.6 °F)]   Resp:  [7-22]   BP: (103-135)/(58-74)   SpO2:  [93 %-99 %]   Physical Exam  Constitutional:       Appearance: He is ill-appearing.   HENT:      Head: Normocephalic and atraumatic.   Cardiovascular:      Rate and Rhythm: Normal rate.   Pulmonary:      Comments: Intubated  Abdominal:      Palpations: Abdomen is  soft.      Tenderness: There is abdominal tenderness.      Comments: Abthera in place holding good suction with approximately 250cc serosanguinous output   Genitourinary:     Comments: Garnett in place with light yellow urine  Musculoskeletal:      Comments: Moves all 4   Skin:     General: Skin is warm and dry.   Neurological:      Comments: Follows commands       LABS:  Results for orders placed or performed during the hospital encounter of 02/06/24 (from the past 24 hour(s))   CALCIUM, IONIZED   Result Value Ref Range    POCT Calcium, Ionized 1.07 (L) 1.1 - 1.33 mmol/L   CBC   Result Value Ref Range    WBC 12.5 (H) 4.4 - 11.3 x10*3/uL    nRBC 0.0 0.0 - 0.0 /100 WBCs    RBC 4.03 (L) 4.50 - 5.90 x10*6/uL    Hemoglobin 11.1 (L) 13.5 - 17.5 g/dL    Hematocrit 33.4 (L) 41.0 - 52.0 %    MCV 83 80 - 100 fL    MCH 27.5 26.0 - 34.0 pg    MCHC 33.2 32.0 - 36.0 g/dL    RDW 13.5 11.5 - 14.5 %    Platelets 223 150 - 450 x10*3/uL   Magnesium   Result Value Ref Range    Magnesium 2.12 1.60 - 2.40 mg/dL   Renal function panel   Result Value Ref Range    Glucose 203 (H) 74 - 99 mg/dL    Sodium 136 136 - 145 mmol/L    Potassium 4.4 3.5 - 5.3 mmol/L    Chloride 104 98 - 107 mmol/L    Bicarbonate 24 21 - 32 mmol/L    Anion Gap 12 10 - 20 mmol/L    Urea Nitrogen 23 6 - 23 mg/dL    Creatinine 1.17 0.50 - 1.30 mg/dL    eGFR 74 >60 mL/min/1.73m*2    Calcium 7.5 (L) 8.6 - 10.6 mg/dL    Phosphorus 2.8 2.5 - 4.9 mg/dL    Albumin 2.4 (L) 3.4 - 5.0 g/dL   BLOOD GAS ARTERIAL FULL PANEL   Result Value Ref Range    POCT pH, Arterial 7.31 (L) 7.38 - 7.42 pH    POCT pCO2, Arterial 46 (H) 38 - 42 mm Hg    POCT pO2, Arterial 73 (L) 85 - 95 mm Hg    POCT SO2, Arterial 96 94 - 100 %    POCT Oxy Hemoglobin, Arterial 93.7 (L) 94.0 - 98.0 %    POCT Hematocrit Calculated, Arterial 36.0 (L) 41.0 - 52.0 %    POCT Sodium, Arterial 131 (L) 136 - 145 mmol/L    POCT Potassium, Arterial 4.4 3.5 - 5.3 mmol/L    POCT Chloride, Arterial 101 98 - 107 mmol/L    POCT  Ionized Calcium, Arterial 1.13 1.10 - 1.33 mmol/L    POCT Glucose, Arterial 207 (H) 74 - 99 mg/dL    POCT Lactate, Arterial 2.8 (H) 0.4 - 2.0 mmol/L    POCT Base Excess, Arterial -3.2 (L) -2.0 - 3.0 mmol/L    POCT HCO3 Calculated, Arterial 23.2 22.0 - 26.0 mmol/L    POCT Hemoglobin, Arterial 11.9 (L) 13.5 - 17.5 g/dL    POCT Anion Gap, Arterial 11 10 - 25 mmo/L    Patient Temperature 37.0 degrees Celsius    FiO2 30 %   BLOOD GAS ARTERIAL FULL PANEL   Result Value Ref Range    POCT pH, Arterial 7.31 (L) 7.38 - 7.42 pH    POCT pCO2, Arterial 47 (H) 38 - 42 mm Hg    POCT pO2, Arterial 74 (L) 85 - 95 mm Hg    POCT SO2, Arterial 97 94 - 100 %    POCT Oxy Hemoglobin, Arterial 94.4 94.0 - 98.0 %    POCT Hematocrit Calculated, Arterial 35.0 (L) 41.0 - 52.0 %    POCT Sodium, Arterial 132 (L) 136 - 145 mmol/L    POCT Potassium, Arterial 4.4 3.5 - 5.3 mmol/L    POCT Chloride, Arterial 102 98 - 107 mmol/L    POCT Ionized Calcium, Arterial 1.11 1.10 - 1.33 mmol/L    POCT Glucose, Arterial 200 (H) 74 - 99 mg/dL    POCT Lactate, Arterial 2.5 (H) 0.4 - 2.0 mmol/L    POCT Base Excess, Arterial -2.8 (L) -2.0 - 3.0 mmol/L    POCT HCO3 Calculated, Arterial 23.7 22.0 - 26.0 mmol/L    POCT Hemoglobin, Arterial 11.6 (L) 13.5 - 17.5 g/dL    POCT Anion Gap, Arterial 11 10 - 25 mmo/L    Patient Temperature 37.0 degrees Celsius    FiO2 30 %   CALCIUM, IONIZED   Result Value Ref Range    POCT Calcium, Ionized 1.11 1.1 - 1.33 mmol/L   CBC   Result Value Ref Range    WBC 7.4 4.4 - 11.3 x10*3/uL    nRBC 0.0 0.0 - 0.0 /100 WBCs    RBC 3.95 (L) 4.50 - 5.90 x10*6/uL    Hemoglobin 10.9 (L) 13.5 - 17.5 g/dL    Hematocrit 32.8 (L) 41.0 - 52.0 %    MCV 83 80 - 100 fL    MCH 27.6 26.0 - 34.0 pg    MCHC 33.2 32.0 - 36.0 g/dL    RDW 13.5 11.5 - 14.5 %    Platelets 162 150 - 450 x10*3/uL   Coagulation Screen   Result Value Ref Range    Protime 19.4 (H) 9.8 - 12.8 seconds    INR 1.7 (H) 0.9 - 1.1    aPTT 32 27 - 38 seconds   Magnesium   Result Value Ref  Range    Magnesium 2.06 1.60 - 2.40 mg/dL   Renal function panel   Result Value Ref Range    Glucose 190 (H) 74 - 99 mg/dL    Sodium 135 (L) 136 - 145 mmol/L    Potassium 4.4 3.5 - 5.3 mmol/L    Chloride 104 98 - 107 mmol/L    Bicarbonate 26 21 - 32 mmol/L    Anion Gap 9 (L) 10 - 20 mmol/L    Urea Nitrogen 23 6 - 23 mg/dL    Creatinine 1.04 0.50 - 1.30 mg/dL    eGFR 85 >60 mL/min/1.73m*2    Calcium 7.4 (L) 8.6 - 10.6 mg/dL    Phosphorus 3.1 2.5 - 4.9 mg/dL    Albumin 2.3 (L) 3.4 - 5.0 g/dL   BLOOD GAS ARTERIAL FULL PANEL   Result Value Ref Range    POCT pH, Arterial 7.28 (L) 7.38 - 7.42 pH    POCT pCO2, Arterial 50 (H) 38 - 42 mm Hg    POCT pO2, Arterial 73 (L) 85 - 95 mm Hg    POCT SO2, Arterial 96 94 - 100 %    POCT Oxy Hemoglobin, Arterial 93.5 (L) 94.0 - 98.0 %    POCT Hematocrit Calculated, Arterial 34.0 (L) 41.0 - 52.0 %    POCT Sodium, Arterial 131 (L) 136 - 145 mmol/L    POCT Potassium, Arterial 4.3 3.5 - 5.3 mmol/L    POCT Chloride, Arterial 102 98 - 107 mmol/L    POCT Ionized Calcium, Arterial 1.12 1.10 - 1.33 mmol/L    POCT Glucose, Arterial 189 (H) 74 - 99 mg/dL    POCT Lactate, Arterial 1.8 0.4 - 2.0 mmol/L    POCT Base Excess, Arterial -3.5 (L) -2.0 - 3.0 mmol/L    POCT HCO3 Calculated, Arterial 23.5 22.0 - 26.0 mmol/L    POCT Hemoglobin, Arterial 11.4 (L) 13.5 - 17.5 g/dL    POCT Anion Gap, Arterial 10 10 - 25 mmo/L    Patient Temperature 37.0 degrees Celsius    FiO2 30 %   POCT GLUCOSE   Result Value Ref Range    POCT Glucose 190 (H) 74 - 99 mg/dL   Transthoracic Echo (TTE) Complete   Result Value Ref Range    AV pk jillian 1.54 m/s    LVOT diam 2.30 cm    LV biplane EF 74 %    MV avg E/e' ratio 8.47     MV E/A ratio 1.03     LA vol index A/L 14.6 ml/m2    Tricuspid annular plane systolic excursion 2.3 cm    RV free wall pk S' 19.40 cm/s    LVIDd 5.00 cm    Aortic Valve Area by Continuity of Peak Velocity 3.16 cm2    AV pk grad 9.5 mmHg    LV A4C EF 72.3    POCT GLUCOSE   Result Value Ref Range    POCT  Glucose 129 (H) 74 - 99 mg/dL   BLOOD GAS ARTERIAL FULL PANEL   Result Value Ref Range    POCT pH, Arterial 7.34 (L) 7.38 - 7.42 pH    POCT pCO2, Arterial 47 (H) 38 - 42 mm Hg    POCT pO2, Arterial 77 (L) 85 - 95 mm Hg    POCT SO2, Arterial 97 94 - 100 %    POCT Oxy Hemoglobin, Arterial 95.2 94.0 - 98.0 %    POCT Hematocrit Calculated, Arterial 34.0 (L) 41.0 - 52.0 %    POCT Sodium, Arterial 131 (L) 136 - 145 mmol/L    POCT Potassium, Arterial 4.6 3.5 - 5.3 mmol/L    POCT Chloride, Arterial 102 98 - 107 mmol/L    POCT Ionized Calcium, Arterial 1.12 1.10 - 1.33 mmol/L    POCT Glucose, Arterial 135 (H) 74 - 99 mg/dL    POCT Lactate, Arterial 1.3 0.4 - 2.0 mmol/L    POCT Base Excess, Arterial -0.7 -2.0 - 3.0 mmol/L    POCT HCO3 Calculated, Arterial 25.4 22.0 - 26.0 mmol/L    POCT Hemoglobin, Arterial 11.3 (L) 13.5 - 17.5 g/dL    POCT Anion Gap, Arterial 8 (L) 10 - 25 mmo/L    Patient Temperature 37.0 degrees Celsius    FiO2 30 %   CALCIUM, IONIZED   Result Value Ref Range    POCT Calcium, Ionized 1.11 1.1 - 1.33 mmol/L   CBC   Result Value Ref Range    WBC 9.6 4.4 - 11.3 x10*3/uL    nRBC 0.0 0.0 - 0.0 /100 WBCs    RBC 3.84 (L) 4.50 - 5.90 x10*6/uL    Hemoglobin 10.7 (L) 13.5 - 17.5 g/dL    Hematocrit 31.8 (L) 41.0 - 52.0 %    MCV 83 80 - 100 fL    MCH 27.9 26.0 - 34.0 pg    MCHC 33.6 32.0 - 36.0 g/dL    RDW 13.7 11.5 - 14.5 %    Platelets 180 150 - 450 x10*3/uL   Magnesium   Result Value Ref Range    Magnesium 2.06 1.60 - 2.40 mg/dL   Renal function panel   Result Value Ref Range    Glucose 128 (H) 74 - 99 mg/dL    Sodium 136 136 - 145 mmol/L    Potassium 4.6 3.5 - 5.3 mmol/L    Chloride 104 98 - 107 mmol/L    Bicarbonate 25 21 - 32 mmol/L    Anion Gap 12 10 - 20 mmol/L    Urea Nitrogen 21 6 - 23 mg/dL    Creatinine 1.14 0.50 - 1.30 mg/dL    eGFR 76 >60 mL/min/1.73m*2    Calcium 7.4 (L) 8.6 - 10.6 mg/dL    Phosphorus 3.2 2.5 - 4.9 mg/dL    Albumin 2.3 (L) 3.4 - 5.0 g/dL   POCT GLUCOSE   Result Value Ref Range    POCT  Glucose 138 (H) 74 - 99 mg/dL   POCT GLUCOSE   Result Value Ref Range    POCT Glucose 139 (H) 74 - 99 mg/dL   BLOOD GAS ARTERIAL FULL PANEL   Result Value Ref Range    POCT pH, Arterial 7.35 (L) 7.38 - 7.42 pH    POCT pCO2, Arterial 46 (H) 38 - 42 mm Hg    POCT pO2, Arterial 74 (L) 85 - 95 mm Hg    POCT SO2, Arterial 98 94 - 100 %    POCT Oxy Hemoglobin, Arterial 95.2 94.0 - 98.0 %    POCT Hematocrit Calculated, Arterial 32.0 (L) 41.0 - 52.0 %    POCT Sodium, Arterial 132 (L) 136 - 145 mmol/L    POCT Potassium, Arterial 5.2 3.5 - 5.3 mmol/L    POCT Chloride, Arterial 103 98 - 107 mmol/L    POCT Ionized Calcium, Arterial 1.11 1.10 - 1.33 mmol/L    POCT Glucose, Arterial 133 (H) 74 - 99 mg/dL    POCT Lactate, Arterial 1.5 0.4 - 2.0 mmol/L    POCT Base Excess, Arterial -0.5 -2.0 - 3.0 mmol/L    POCT HCO3 Calculated, Arterial 25.4 22.0 - 26.0 mmol/L    POCT Hemoglobin, Arterial 10.7 (L) 13.5 - 17.5 g/dL    POCT Anion Gap, Arterial 9 (L) 10 - 25 mmo/L    Patient Temperature 37.0 degrees Celsius    FiO2 30 %   CBC   Result Value Ref Range    WBC 11.5 (H) 4.4 - 11.3 x10*3/uL    nRBC 0.0 0.0 - 0.0 /100 WBCs    RBC 3.71 (L) 4.50 - 5.90 x10*6/uL    Hemoglobin 10.5 (L) 13.5 - 17.5 g/dL    Hematocrit 31.1 (L) 41.0 - 52.0 %    MCV 84 80 - 100 fL    MCH 28.3 26.0 - 34.0 pg    MCHC 33.8 32.0 - 36.0 g/dL    RDW 14.0 11.5 - 14.5 %    Platelets 198 150 - 450 x10*3/uL   Renal function panel   Result Value Ref Range    Glucose 129 (H) 74 - 99 mg/dL    Sodium 136 136 - 145 mmol/L    Potassium 5.1 3.5 - 5.3 mmol/L    Chloride 104 98 - 107 mmol/L    Bicarbonate 26 21 - 32 mmol/L    Anion Gap 11 10 - 20 mmol/L    Urea Nitrogen 20 6 - 23 mg/dL    Creatinine 1.12 0.50 - 1.30 mg/dL    eGFR 78 >60 mL/min/1.73m*2    Calcium 7.3 (L) 8.6 - 10.6 mg/dL    Phosphorus 2.8 2.5 - 4.9 mg/dL    Albumin 2.4 (L) 3.4 - 5.0 g/dL   Magnesium   Result Value Ref Range    Magnesium 2.08 1.60 - 2.40 mg/dL   Coagulation Screen   Result Value Ref Range    Protime  15.1 (H) 9.8 - 12.8 seconds    INR 1.3 (H) 0.9 - 1.1    aPTT 30 27 - 38 seconds   CALCIUM, IONIZED   Result Value Ref Range    POCT Calcium, Ionized 1.06 (L) 1.1 - 1.33 mmol/L   POCT GLUCOSE   Result Value Ref Range    POCT Glucose 122 (H) 74 - 99 mg/dL   CBC   Result Value Ref Range    WBC 9.1 4.4 - 11.3 x10*3/uL    nRBC 0.0 0.0 - 0.0 /100 WBCs    RBC 3.33 (L) 4.50 - 5.90 x10*6/uL    Hemoglobin 9.2 (L) 13.5 - 17.5 g/dL    Hematocrit 27.6 (L) 41.0 - 52.0 %    MCV 83 80 - 100 fL    MCH 27.6 26.0 - 34.0 pg    MCHC 33.3 32.0 - 36.0 g/dL    RDW 14.0 11.5 - 14.5 %    Platelets 179 150 - 450 x10*3/uL   Renal function panel   Result Value Ref Range    Glucose 112 (H) 74 - 99 mg/dL    Sodium 137 136 - 145 mmol/L    Potassium 4.8 3.5 - 5.3 mmol/L    Chloride 104 98 - 107 mmol/L    Bicarbonate 27 21 - 32 mmol/L    Anion Gap 11 10 - 20 mmol/L    Urea Nitrogen 21 6 - 23 mg/dL    Creatinine 1.22 0.50 - 1.30 mg/dL    eGFR 70 >60 mL/min/1.73m*2    Calcium 7.6 (L) 8.6 - 10.6 mg/dL    Phosphorus 2.7 2.5 - 4.9 mg/dL    Albumin 2.2 (L) 3.4 - 5.0 g/dL   Magnesium   Result Value Ref Range    Magnesium 2.18 1.60 - 2.40 mg/dL   CALCIUM, IONIZED   Result Value Ref Range    POCT Calcium, Ionized 1.08 (L) 1.1 - 1.33 mmol/L   BLOOD GAS ARTERIAL FULL PANEL   Result Value Ref Range    POCT pH, Arterial 7.33 (L) 7.38 - 7.42 pH    POCT pCO2, Arterial 52 (H) 38 - 42 mm Hg    POCT pO2, Arterial 89 85 - 95 mm Hg    POCT SO2, Arterial 98 94 - 100 %    POCT Oxy Hemoglobin, Arterial 96.5 94.0 - 98.0 %    POCT Hematocrit Calculated, Arterial 31.0 (L) 41.0 - 52.0 %    POCT Sodium, Arterial 132 (L) 136 - 145 mmol/L    POCT Potassium, Arterial 4.8 3.5 - 5.3 mmol/L    POCT Chloride, Arterial 103 98 - 107 mmol/L    POCT Ionized Calcium, Arterial 1.14 1.10 - 1.33 mmol/L    POCT Glucose, Arterial 117 (H) 74 - 99 mg/dL    POCT Lactate, Arterial 1.2 0.4 - 2.0 mmol/L    POCT Base Excess, Arterial 0.9 -2.0 - 3.0 mmol/L    POCT HCO3 Calculated, Arterial 27.4 (H)  22.0 - 26.0 mmol/L    POCT Hemoglobin, Arterial 10.3 (L) 13.5 - 17.5 g/dL    POCT Anion Gap, Arterial 6 (L) 10 - 25 mmo/L    Patient Temperature 37.0 degrees Celsius    FiO2 30 %   POCT GLUCOSE   Result Value Ref Range    POCT Glucose 113 (H) 74 - 99 mg/dL     MEDICATIONS:  Current Facility-Administered Medications   Medication Dose Route Frequency Provider Last Rate Last Admin    calcium gluconate in NS IV 1 g  1 g intravenous q6h PRN Marie Aden MD   Stopped at 02/08/24 0135    calcium gluconate in NS IV 2 g  2 g intravenous q6h PRN Marie Aden MD        dextrose 10 % in water (D10W) infusion  0.3 g/kg/hr intravenous Once PRN Marie Aden MD        dextrose 50 % injection 25 g  25 g intravenous q15 min PRN Marie Aden MD        famotidine PF (Pepcid) injection 20 mg  20 mg intravenous BID Marie Aden MD   20 mg at 02/08/24 0808    fentanyl (Sublimaze) 1000 mcg in sodium chloride 0.9% 100 mL (10 mcg/mL) infusion (premix)   mcg/hr intravenous Continuous Marie Aden MD 12.5 mL/hr at 02/08/24 0700 125 mcg/hr at 02/08/24 0700    fluconazole in NaCl (iso-osm) (Diflucan) IVPB 400 mg  400 mg intravenous q24h Marie Aden MD   400 mg at 02/07/24 1130    glucagon (Glucagen) injection 1 mg  1 mg intramuscular q15 min PRN Marie Aden MD        heparin (porcine) injection 5,000 Units  5,000 Units subcutaneous q8h Marie Aden MD   5,000 Units at 02/08/24 0808    hydrocortisone sod succ (PF) (Solu-CORTEF) injection 50 mg  50 mg intravenous q6h Marie Aden MD   50 mg at 02/08/24 0808    insulin lispro (HumaLOG) injection 0-5 Units  0-5 Units subcutaneous TID with meals Marie Aden MD   1 Units at 02/07/24 1319    lactated Ringer's infusion  125 mL/hr intravenous Continuous Marie Aden  mL/hr at 02/08/24 0700 125 mL/hr at 02/08/24 0700    magnesium sulfate IV 2 g  2 g intravenous q6h PRN Marie Aden MD   Stopped at 02/07/24 0429    magnesium sulfate IV 4 g  4 g intravenous q6h PRN Marie Aden MD   Stopped at 02/06/24  1745    norepinephrine (Levophed) 8 mg in dextrose 5% 250 mL (0.032 mg/mL) infusion (premix)  0-3.3 mcg/kg/min intravenous Continuous Marie Aden MD 4.81 mL/hr at 02/08/24 0700 0.03 mcg/kg/min at 02/08/24 0700    oxygen (O2) therapy   inhalation Continuous PRN - O2/gases Marie Aden MD        piperacillin-tazobactam-dextrose (Zosyn) IV 3.375 g  3.375 g intravenous q6h Marie Aden MD   Stopped at 02/08/24 0524    potassium chloride 20 mEq in 100 mL IV premix  20 mEq intravenous q6h PRN Marie Aden MD        propofol (Diprivan) infusion  5-20 mcg/kg/min intravenous Continuous Marie Aden MD 10.02 mL/hr at 02/08/24 0700 20 mcg/kg/min at 02/08/24 0700    vancomycin (Vancocin) 1,000 mg in dextrose 5% water 200 mL  1,000 mg intravenous q12h Marie Aden MD   Stopped at 02/07/24 2218    vasopressin (Vasostrict) 0.2 unit/mL infusion  0.03 Units/min intravenous Continuous Marie Aden MD   Stopped at 02/07/24 1157       IMAGING SUMMARY:  (summary of new imaging findings, not a copy of dictation)      I have reviewed all laboratory and imaging results ordered/pertinent for today's encounter.

## 2024-02-09 ENCOUNTER — APPOINTMENT (OUTPATIENT)
Dept: RADIOLOGY | Facility: HOSPITAL | Age: 54
DRG: 853 | End: 2024-02-09
Payer: COMMERCIAL

## 2024-02-09 ENCOUNTER — ANESTHESIA (OUTPATIENT)
Dept: OPERATING ROOM | Facility: HOSPITAL | Age: 54
DRG: 853 | End: 2024-02-09
Payer: COMMERCIAL

## 2024-02-09 ENCOUNTER — ANESTHESIA EVENT (OUTPATIENT)
Dept: OPERATING ROOM | Facility: HOSPITAL | Age: 54
DRG: 853 | End: 2024-02-09
Payer: COMMERCIAL

## 2024-02-09 LAB
ABO GROUP (TYPE) IN BLOOD: NORMAL
ALBUMIN SERPL BCP-MCNC: 2.1 G/DL (ref 3.4–5)
ALBUMIN SERPL BCP-MCNC: 2.1 G/DL (ref 3.4–5)
ALP SERPL-CCNC: 46 U/L (ref 33–120)
ALT SERPL W P-5'-P-CCNC: 469 U/L (ref 10–52)
ANION GAP BLDA CALCULATED.4IONS-SCNC: 5 MMO/L (ref 10–25)
ANION GAP BLDA CALCULATED.4IONS-SCNC: 5 MMO/L (ref 10–25)
ANION GAP BLDA CALCULATED.4IONS-SCNC: 6 MMO/L (ref 10–25)
ANION GAP BLDA CALCULATED.4IONS-SCNC: 7 MMO/L (ref 10–25)
ANION GAP SERPL CALC-SCNC: 12 MMOL/L (ref 10–20)
ANION GAP SERPL CALC-SCNC: 7 MMOL/L (ref 10–20)
ANTIBODY SCREEN: NORMAL
APTT PPP: 25 SECONDS (ref 27–38)
APTT PPP: 27 SECONDS (ref 27–38)
AST SERPL W P-5'-P-CCNC: 419 U/L (ref 9–39)
BASE EXCESS BLDA CALC-SCNC: 3.3 MMOL/L (ref -2–3)
BASE EXCESS BLDA CALC-SCNC: 3.5 MMOL/L (ref -2–3)
BASE EXCESS BLDA CALC-SCNC: 4.6 MMOL/L (ref -2–3)
BASE EXCESS BLDA CALC-SCNC: 5.6 MMOL/L (ref -2–3)
BILIRUB SERPL-MCNC: 0.4 MG/DL (ref 0–1.2)
BODY TEMPERATURE: 37 DEGREES CELSIUS
BUN SERPL-MCNC: 19 MG/DL (ref 6–23)
BUN SERPL-MCNC: 19 MG/DL (ref 6–23)
CA-I BLD-SCNC: 1.1 MMOL/L (ref 1.1–1.33)
CA-I BLD-SCNC: 1.15 MMOL/L (ref 1.1–1.33)
CA-I BLDA-SCNC: 1.1 MMOL/L (ref 1.1–1.33)
CA-I BLDA-SCNC: 1.11 MMOL/L (ref 1.1–1.33)
CA-I BLDA-SCNC: 1.11 MMOL/L (ref 1.1–1.33)
CA-I BLDA-SCNC: 1.15 MMOL/L (ref 1.1–1.33)
CALCIUM SERPL-MCNC: 7.3 MG/DL (ref 8.6–10.6)
CALCIUM SERPL-MCNC: 7.5 MG/DL (ref 8.6–10.6)
CHLORIDE BLDA-SCNC: 103 MMOL/L (ref 98–107)
CHLORIDE BLDA-SCNC: 105 MMOL/L (ref 98–107)
CHLORIDE BLDA-SCNC: 105 MMOL/L (ref 98–107)
CHLORIDE BLDA-SCNC: 106 MMOL/L (ref 98–107)
CHLORIDE SERPL-SCNC: 103 MMOL/L (ref 98–107)
CHLORIDE SERPL-SCNC: 105 MMOL/L (ref 98–107)
CO2 SERPL-SCNC: 29 MMOL/L (ref 21–32)
CO2 SERPL-SCNC: 30 MMOL/L (ref 21–32)
CREAT SERPL-MCNC: 0.86 MG/DL (ref 0.5–1.3)
CREAT SERPL-MCNC: 0.95 MG/DL (ref 0.5–1.3)
EGFRCR SERPLBLD CKD-EPI 2021: >90 ML/MIN/1.73M*2
EGFRCR SERPLBLD CKD-EPI 2021: >90 ML/MIN/1.73M*2
ERYTHROCYTE [DISTWIDTH] IN BLOOD BY AUTOMATED COUNT: 14.3 % (ref 11.5–14.5)
ERYTHROCYTE [DISTWIDTH] IN BLOOD BY AUTOMATED COUNT: 14.4 % (ref 11.5–14.5)
GLUCOSE BLD MANUAL STRIP-MCNC: 101 MG/DL (ref 74–99)
GLUCOSE BLD MANUAL STRIP-MCNC: 109 MG/DL (ref 74–99)
GLUCOSE BLD MANUAL STRIP-MCNC: 76 MG/DL (ref 74–99)
GLUCOSE BLD MANUAL STRIP-MCNC: 79 MG/DL (ref 74–99)
GLUCOSE BLD MANUAL STRIP-MCNC: 85 MG/DL (ref 74–99)
GLUCOSE BLD MANUAL STRIP-MCNC: 86 MG/DL (ref 74–99)
GLUCOSE BLD MANUAL STRIP-MCNC: 90 MG/DL (ref 74–99)
GLUCOSE BLDA-MCNC: 91 MG/DL (ref 74–99)
GLUCOSE BLDA-MCNC: 92 MG/DL (ref 74–99)
GLUCOSE BLDA-MCNC: 98 MG/DL (ref 74–99)
GLUCOSE BLDA-MCNC: 98 MG/DL (ref 74–99)
GLUCOSE SERPL-MCNC: 92 MG/DL (ref 74–99)
GLUCOSE SERPL-MCNC: 92 MG/DL (ref 74–99)
HCO3 BLDA-SCNC: 27.8 MMOL/L (ref 22–26)
HCO3 BLDA-SCNC: 29.2 MMOL/L (ref 22–26)
HCO3 BLDA-SCNC: 29.8 MMOL/L (ref 22–26)
HCO3 BLDA-SCNC: 29.8 MMOL/L (ref 22–26)
HCT VFR BLD AUTO: 23.1 % (ref 41–52)
HCT VFR BLD AUTO: 23.9 % (ref 41–52)
HCT VFR BLD EST: 26 % (ref 41–52)
HCT VFR BLD EST: 31 % (ref 41–52)
HGB BLD-MCNC: 7.8 G/DL (ref 13.5–17.5)
HGB BLD-MCNC: 7.9 G/DL (ref 13.5–17.5)
HGB BLDA-MCNC: 10.4 G/DL (ref 13.5–17.5)
HGB BLDA-MCNC: 8.5 G/DL (ref 13.5–17.5)
HGB BLDA-MCNC: 8.6 G/DL (ref 13.5–17.5)
HGB BLDA-MCNC: 8.8 G/DL (ref 13.5–17.5)
INHALED O2 CONCENTRATION: 100 %
INHALED O2 CONCENTRATION: 30 %
INHALED O2 CONCENTRATION: 30 %
INHALED O2 CONCENTRATION: 40 %
INR PPP: 1 (ref 0.9–1.1)
INR PPP: 1 (ref 0.9–1.1)
LACTATE BLDA-SCNC: 0.6 MMOL/L (ref 0.4–2)
LACTATE BLDA-SCNC: 0.7 MMOL/L (ref 0.4–2)
LACTATE BLDA-SCNC: 0.8 MMOL/L (ref 0.4–2)
LACTATE BLDA-SCNC: 0.8 MMOL/L (ref 0.4–2)
MAGNESIUM SERPL-MCNC: 1.91 MG/DL (ref 1.6–2.4)
MAGNESIUM SERPL-MCNC: 2.13 MG/DL (ref 1.6–2.4)
MCH RBC QN AUTO: 27.7 PG (ref 26–34)
MCH RBC QN AUTO: 28.8 PG (ref 26–34)
MCHC RBC AUTO-ENTMCNC: 33.1 G/DL (ref 32–36)
MCHC RBC AUTO-ENTMCNC: 33.8 G/DL (ref 32–36)
MCV RBC AUTO: 84 FL (ref 80–100)
MCV RBC AUTO: 85 FL (ref 80–100)
NRBC BLD-RTO: 0 /100 WBCS (ref 0–0)
NRBC BLD-RTO: 0 /100 WBCS (ref 0–0)
OXYHGB MFR BLDA: 91.9 % (ref 94–98)
OXYHGB MFR BLDA: 94.6 % (ref 94–98)
OXYHGB MFR BLDA: 96.2 % (ref 94–98)
OXYHGB MFR BLDA: 96.9 % (ref 94–98)
PCO2 BLDA: 40 MM HG (ref 38–42)
PCO2 BLDA: 41 MM HG (ref 38–42)
PCO2 BLDA: 43 MM HG (ref 38–42)
PCO2 BLDA: 54 MM HG (ref 38–42)
PH BLDA: 7.35 PH (ref 7.38–7.42)
PH BLDA: 7.44 PH (ref 7.38–7.42)
PH BLDA: 7.45 PH (ref 7.38–7.42)
PH BLDA: 7.47 PH (ref 7.38–7.42)
PHOSPHATE SERPL-MCNC: 2.8 MG/DL (ref 2.5–4.9)
PLATELET # BLD AUTO: 138 X10*3/UL (ref 150–450)
PLATELET # BLD AUTO: 169 X10*3/UL (ref 150–450)
PO2 BLDA: 118 MM HG (ref 85–95)
PO2 BLDA: 63 MM HG (ref 85–95)
PO2 BLDA: 71 MM HG (ref 85–95)
PO2 BLDA: 95 MM HG (ref 85–95)
POTASSIUM BLDA-SCNC: 3.7 MMOL/L (ref 3.5–5.3)
POTASSIUM BLDA-SCNC: 4.3 MMOL/L (ref 3.5–5.3)
POTASSIUM SERPL-SCNC: 3.9 MMOL/L (ref 3.5–5.3)
POTASSIUM SERPL-SCNC: 4.2 MMOL/L (ref 3.5–5.3)
PROT SERPL-MCNC: 4 G/DL (ref 6.4–8.2)
PROTHROMBIN TIME: 11.5 SECONDS (ref 9.8–12.8)
PROTHROMBIN TIME: 11.7 SECONDS (ref 9.8–12.8)
RBC # BLD AUTO: 2.71 X10*6/UL (ref 4.5–5.9)
RBC # BLD AUTO: 2.85 X10*6/UL (ref 4.5–5.9)
RH FACTOR (ANTIGEN D): NORMAL
SAO2 % BLDA: 95 % (ref 94–100)
SAO2 % BLDA: 97 % (ref 94–100)
SAO2 % BLDA: 98 % (ref 94–100)
SAO2 % BLDA: 99 % (ref 94–100)
SODIUM BLDA-SCNC: 135 MMOL/L (ref 136–145)
SODIUM BLDA-SCNC: 135 MMOL/L (ref 136–145)
SODIUM BLDA-SCNC: 136 MMOL/L (ref 136–145)
SODIUM BLDA-SCNC: 136 MMOL/L (ref 136–145)
SODIUM SERPL-SCNC: 138 MMOL/L (ref 136–145)
SODIUM SERPL-SCNC: 140 MMOL/L (ref 136–145)
WBC # BLD AUTO: 11 X10*3/UL (ref 4.4–11.3)
WBC # BLD AUTO: 8.1 X10*3/UL (ref 4.4–11.3)

## 2024-02-09 PROCEDURE — 99291 CRITICAL CARE FIRST HOUR: CPT | Performed by: SURGERY

## 2024-02-09 PROCEDURE — 2500000004 HC RX 250 GENERAL PHARMACY W/ HCPCS (ALT 636 FOR OP/ED)

## 2024-02-09 PROCEDURE — 84132 ASSAY OF SERUM POTASSIUM: CPT | Performed by: SURGERY

## 2024-02-09 PROCEDURE — 37799 UNLISTED PX VASCULAR SURGERY: CPT | Performed by: STUDENT IN AN ORGANIZED HEALTH CARE EDUCATION/TRAINING PROGRAM

## 2024-02-09 PROCEDURE — 3600000003 HC OR TIME - INITIAL BASE CHARGE - PROCEDURE LEVEL THREE: Performed by: SURGERY

## 2024-02-09 PROCEDURE — 2720000007 HC OR 272 NO HCPCS: Performed by: SURGERY

## 2024-02-09 PROCEDURE — A43632 PR REMV STOMACH,PART,DISTAL,GASTROJEJUN: Performed by: STUDENT IN AN ORGANIZED HEALTH CARE EDUCATION/TRAINING PROGRAM

## 2024-02-09 PROCEDURE — 85027 COMPLETE CBC AUTOMATED: CPT

## 2024-02-09 PROCEDURE — C9113 INJ PANTOPRAZOLE SODIUM, VIA: HCPCS

## 2024-02-09 PROCEDURE — A43632 PR REMV STOMACH,PART,DISTAL,GASTROJEJUN

## 2024-02-09 PROCEDURE — 84132 ASSAY OF SERUM POTASSIUM: CPT

## 2024-02-09 PROCEDURE — 2020000001 HC ICU ROOM DAILY

## 2024-02-09 PROCEDURE — 71045 X-RAY EXAM CHEST 1 VIEW: CPT | Performed by: RADIOLOGY

## 2024-02-09 PROCEDURE — 88307 TISSUE EXAM BY PATHOLOGIST: CPT | Mod: TC,SUR | Performed by: SURGERY

## 2024-02-09 PROCEDURE — 2500000004 HC RX 250 GENERAL PHARMACY W/ HCPCS (ALT 636 FOR OP/ED): Performed by: STUDENT IN AN ORGANIZED HEALTH CARE EDUCATION/TRAINING PROGRAM

## 2024-02-09 PROCEDURE — 86920 COMPATIBILITY TEST SPIN: CPT

## 2024-02-09 PROCEDURE — 83735 ASSAY OF MAGNESIUM: CPT

## 2024-02-09 PROCEDURE — 88307 TISSUE EXAM BY PATHOLOGIST: CPT | Performed by: STUDENT IN AN ORGANIZED HEALTH CARE EDUCATION/TRAINING PROGRAM

## 2024-02-09 PROCEDURE — 2500000005 HC RX 250 GENERAL PHARMACY W/O HCPCS

## 2024-02-09 PROCEDURE — 74018 RADEX ABDOMEN 1 VIEW: CPT | Performed by: RADIOLOGY

## 2024-02-09 PROCEDURE — 86901 BLOOD TYPING SEROLOGIC RH(D): CPT | Performed by: STUDENT IN AN ORGANIZED HEALTH CARE EDUCATION/TRAINING PROGRAM

## 2024-02-09 PROCEDURE — A4217 STERILE WATER/SALINE, 500 ML: HCPCS | Performed by: SURGERY

## 2024-02-09 PROCEDURE — 43632 REMOVAL OF STOMACH PARTIAL: CPT | Performed by: SURGERY

## 2024-02-09 PROCEDURE — 94003 VENT MGMT INPAT SUBQ DAY: CPT

## 2024-02-09 PROCEDURE — 82947 ASSAY GLUCOSE BLOOD QUANT: CPT

## 2024-02-09 PROCEDURE — 82330 ASSAY OF CALCIUM: CPT

## 2024-02-09 PROCEDURE — 85610 PROTHROMBIN TIME: CPT

## 2024-02-09 PROCEDURE — 43241 EGD TUBE/CATH INSERTION: CPT | Performed by: SURGERY

## 2024-02-09 PROCEDURE — 2500000004 HC RX 250 GENERAL PHARMACY W/ HCPCS (ALT 636 FOR OP/ED): Performed by: SURGERY

## 2024-02-09 PROCEDURE — 3700000002 HC GENERAL ANESTHESIA TIME - EACH INCREMENTAL 1 MINUTE: Performed by: SURGERY

## 2024-02-09 PROCEDURE — 3700000001 HC GENERAL ANESTHESIA TIME - INITIAL BASE CHARGE: Performed by: SURGERY

## 2024-02-09 PROCEDURE — 71045 X-RAY EXAM CHEST 1 VIEW: CPT

## 2024-02-09 PROCEDURE — 37799 UNLISTED PX VASCULAR SURGERY: CPT

## 2024-02-09 PROCEDURE — 3600000008 HC OR TIME - EACH INCREMENTAL 1 MINUTE - PROCEDURE LEVEL THREE: Performed by: SURGERY

## 2024-02-09 PROCEDURE — 74018 RADEX ABDOMEN 1 VIEW: CPT

## 2024-02-09 RX ORDER — SODIUM CHLORIDE 0.9 G/100ML
IRRIGANT IRRIGATION AS NEEDED
Status: DISCONTINUED | OUTPATIENT
Start: 2024-02-09 | End: 2024-02-09 | Stop reason: HOSPADM

## 2024-02-09 RX ORDER — FENTANYL CITRATE 50 UG/ML
INJECTION, SOLUTION INTRAMUSCULAR; INTRAVENOUS AS NEEDED
Status: DISCONTINUED | OUTPATIENT
Start: 2024-02-09 | End: 2024-02-09

## 2024-02-09 RX ORDER — ROCURONIUM BROMIDE 10 MG/ML
INJECTION, SOLUTION INTRAVENOUS AS NEEDED
Status: DISCONTINUED | OUTPATIENT
Start: 2024-02-09 | End: 2024-02-09

## 2024-02-09 RX ORDER — PANTOPRAZOLE SODIUM 40 MG/10ML
40 INJECTION, POWDER, LYOPHILIZED, FOR SOLUTION INTRAVENOUS 2 TIMES DAILY
Status: DISCONTINUED | OUTPATIENT
Start: 2024-02-09 | End: 2024-02-26 | Stop reason: HOSPADM

## 2024-02-09 RX ORDER — MIDAZOLAM HYDROCHLORIDE 1 MG/ML
INJECTION INTRAMUSCULAR; INTRAVENOUS AS NEEDED
Status: DISCONTINUED | OUTPATIENT
Start: 2024-02-09 | End: 2024-02-09

## 2024-02-09 RX ADMIN — SODIUM CHLORIDE, POTASSIUM CHLORIDE, SODIUM LACTATE AND CALCIUM CHLORIDE: 600; 310; 30; 20 INJECTION, SOLUTION INTRAVENOUS at 09:08

## 2024-02-09 RX ADMIN — VANCOMYCIN HYDROCHLORIDE 1000 MG: 1 INJECTION, SOLUTION INTRAVENOUS at 23:10

## 2024-02-09 RX ADMIN — MIDAZOLAM HYDROCHLORIDE 2 MG: 1 INJECTION, SOLUTION INTRAMUSCULAR; INTRAVENOUS at 09:13

## 2024-02-09 RX ADMIN — PROPOFOL 35 MCG/KG/MIN: 10 INJECTION, EMULSION INTRAVENOUS at 23:15

## 2024-02-09 RX ADMIN — ROCURONIUM BROMIDE 20 MG: 10 INJECTION, SOLUTION INTRAVENOUS at 10:49

## 2024-02-09 RX ADMIN — Medication 150 MCG/HR: at 04:59

## 2024-02-09 RX ADMIN — HYDROCORTISONE SODIUM SUCCINATE 100 MG: 100 INJECTION, POWDER, FOR SOLUTION INTRAMUSCULAR; INTRAVENOUS at 09:06

## 2024-02-09 RX ADMIN — FLUCONAZOLE 400 MG: 2 INJECTION, SOLUTION INTRAVENOUS at 11:16

## 2024-02-09 RX ADMIN — HEPARIN SODIUM 5000 UNITS: 5000 INJECTION INTRAVENOUS; SUBCUTANEOUS at 01:55

## 2024-02-09 RX ADMIN — ROCURONIUM BROMIDE 50 MG: 10 INJECTION, SOLUTION INTRAVENOUS at 09:17

## 2024-02-09 RX ADMIN — PROPOFOL 30 MCG/KG/MIN: 10 INJECTION, EMULSION INTRAVENOUS at 17:37

## 2024-02-09 RX ADMIN — SODIUM CHLORIDE, POTASSIUM CHLORIDE, SODIUM LACTATE AND CALCIUM CHLORIDE 125 ML/HR: 600; 310; 30; 20 INJECTION, SOLUTION INTRAVENOUS at 23:15

## 2024-02-09 RX ADMIN — PROPOFOL 25 MCG/KG/MIN: 10 INJECTION, EMULSION INTRAVENOUS at 05:14

## 2024-02-09 RX ADMIN — ROCURONIUM BROMIDE 20 MG: 10 INJECTION, SOLUTION INTRAVENOUS at 11:55

## 2024-02-09 RX ADMIN — VANCOMYCIN HYDROCHLORIDE 1000 MG: 1 INJECTION, SOLUTION INTRAVENOUS at 09:04

## 2024-02-09 RX ADMIN — PIPERACILLIN SODIUM AND TAZOBACTAM SODIUM 3.38 G: 3; .375 INJECTION, SOLUTION INTRAVENOUS at 17:39

## 2024-02-09 RX ADMIN — FENTANYL CITRATE 50 MCG: 50 INJECTION, SOLUTION INTRAMUSCULAR; INTRAVENOUS at 11:56

## 2024-02-09 RX ADMIN — HYDROCORTISONE SODIUM SUCCINATE 50 MG: 100 INJECTION, POWDER, FOR SOLUTION INTRAMUSCULAR; INTRAVENOUS at 04:46

## 2024-02-09 RX ADMIN — HEPARIN SODIUM 5000 UNITS: 5000 INJECTION INTRAVENOUS; SUBCUTANEOUS at 17:39

## 2024-02-09 RX ADMIN — Medication 175 MCG/HR: at 21:19

## 2024-02-09 RX ADMIN — ROCURONIUM BROMIDE 20 MG: 10 INJECTION, SOLUTION INTRAVENOUS at 11:35

## 2024-02-09 RX ADMIN — HEPARIN SODIUM 5000 UNITS: 5000 INJECTION INTRAVENOUS; SUBCUTANEOUS at 09:55

## 2024-02-09 RX ADMIN — PIPERACILLIN SODIUM AND TAZOBACTAM SODIUM 3.38 G: 3; .375 INJECTION, SOLUTION INTRAVENOUS at 04:46

## 2024-02-09 RX ADMIN — ROCURONIUM BROMIDE 20 MG: 10 INJECTION, SOLUTION INTRAVENOUS at 13:37

## 2024-02-09 RX ADMIN — PANTOPRAZOLE SODIUM 40 MG: 40 INJECTION, POWDER, FOR SOLUTION INTRAVENOUS at 20:38

## 2024-02-09 RX ADMIN — FENTANYL CITRATE 50 MCG: 50 INJECTION, SOLUTION INTRAMUSCULAR; INTRAVENOUS at 14:19

## 2024-02-09 RX ADMIN — Medication 150 MCG/HR: at 15:42

## 2024-02-09 ASSESSMENT — PAIN SCALES - GENERAL
PAINLEVEL_OUTOF10: 0 - NO PAIN
PAINLEVEL_OUTOF10: 0 - NO PAIN
PAINLEVEL_OUTOF10: 1

## 2024-02-09 ASSESSMENT — PAIN - FUNCTIONAL ASSESSMENT
PAIN_FUNCTIONAL_ASSESSMENT: CPOT (CRITICAL CARE PAIN OBSERVATION TOOL)

## 2024-02-09 NOTE — ANESTHESIA POSTPROCEDURE EVALUATION
Patient: Trevor Yarbrough    Procedure Summary       Date: 02/09/24 Room / Location: [facility] OR 11 / Virtual [facility] OR    Anesthesia Start: 0849 Anesthesia Stop: 1510    Procedure: Reopening of Exploration Laparotomy, Completion of Enteroectomy, Proximal gastric resection, Partial Omentectomy, Billroth II anastamosis, x2 Clint Drains placed and Subcutaneous wound vacc, A-Line placement and EGD and core-maggi (Abdomen) Diagnosis:       Perforated bowel (CMS/HCC)      (Perforated bowel (CMS/HCC) [K63.1])    Surgeons: Rianna Flynn DO Responsible Provider: Kan Campos DO    Anesthesia Type: general ASA Status: 3            Anesthesia Type: general    Vitals Value Taken Time   /75 02/09/24 1545   Temp 37.2 °C (99 °F) 02/09/24 1505   Pulse 98 02/09/24 1639   Resp 15 02/09/24 1639   SpO2 95 % 02/09/24 1639   Vitals shown include unvalidated device data.    Anesthesia Post Evaluation    Patient location during evaluation: ICU  Patient participation: complete - patient cannot participate  Level of consciousness: sleepy but conscious  Pain management: adequate  Multimodal analgesia pain management approach  Airway patency: patent  Two or more strategies used to mitigate risk of obstructive sleep apnea  Cardiovascular status: acceptable  Respiratory status: acceptable and intubated  Hydration status: acceptable  Postoperative Nausea and Vomiting: none        There were no known notable events for this encounter.

## 2024-02-09 NOTE — BRIEF OP NOTE
Date: 2024 - 2024  OR Location: [facility] OR    Name: Trevor Yarbrough, : 1970, Age: 53 y.o., MRN: [id number], Sex: male    Diagnosis  Pre-op Diagnosis     * Perforated bowel (CMS/HCC) [K63.1] Post-op Diagnosis     * Perforated bowel (CMS/HCC) [K63.1]     Procedures  Re-exploratory Laparotomy, Completion of antrectomy, Proximal gastric resection, Partial Omentectomy, Billroth II anastamosis, x2 Clint Drains placed and Subcutaneous wound vacc, A-Line placement and EGD      Surgeons      * Rianna Flynn - Primary    Resident/Fellow/Other Assistant:  Surgeon(s) and Role:     * Dylon Webster MD - Resident - Assisting     * Nik Quiroz MD - Resident - Assisting    Procedure Summary  Anesthesia: General  ASA: III  Anesthesia Staff: Anesthesiologist: Kan Campos DO  C-AA: PHYLLIS López; PHYLLIS [place]  Estimated Blood Loss: 100mL  Intra-op Medications:   Administrations occurring from 0815 to 1155 on 24:   Medication Name Total Dose   fentanyl (Sublimaze) 1000 mcg in sodium chloride 0.9% 100 mL (10 mcg/mL) infusion (premix) 30 mcg   fluconazole in NaCl (iso-osm) (Diflucan) IVPB 400 mg 400 mg   heparin (porcine) injection 5,000 Units 5,000 Units   lactated Ringer's infusion 347.92 mL   propofol (Diprivan) infusion Cannot be calculated   vancomycin (Vancocin) 1,000 mg in dextrose 5% water 200 mL 200 mL              Anesthesia Record               Intraprocedure I/O Totals          Intake    Fentanyl Drip 0.00 mL    The total shown is the total volume documented since Anesthesia Start was filed.    Propofol Drip 0.00 mL    The total shown is the total volume documented since Anesthesia Start was filed.    lactated Ringer's infusion 1000.00 mL    vancomycin (Vancocin) 1,000 mg in dextrose 5% water 200 mL 200.00 mL    Total Intake 1200 mL       Output    Urine 490 mL    Est. Blood Loss 100 mL    Total Output 590 mL       Net    Net Volume 610 mL          Specimen:   ID Type  Source Tests Collected by Time   1 : Antrum Tissue STOMACH ANTRUM BIOPSY SURGICAL PATHOLOGY EXAM Rianna Flynn, DO 2/9/2024 1015   2 : wedge of stomach Tissue STOMACH GASTRECTOMY SURGICAL PATHOLOGY EXAM Rianna Flynn, DO 2/9/2024 1100   3 : Omentum Tissue SOFT TISSUE RESECTION SURGICAL PATHOLOGY EXAM Rianna Flynn, DO 2/9/2024 1126        Staff:   Circulator: Salima Keys RN  Relief Circulator: Nikia Brantley RN  Relief Scrub: Ashlyn Baez RN  Scrub Person: Yessenia Doyle RN          Findings: Ischemic segment at the staple line of the stomach with small opening, stomach resected to healthy appearing tissue. B2 reconstruction 40cm from LOT. Completion antrectomy with oversewing of D1.     Complications:  None; patient tolerated the procedure well.     Disposition: ICU - intubated and critically ill.  Condition: stable  Specimens Collected:   ID Type Source Tests Collected by Time   1 : Antrum Tissue STOMACH ANTRUM BIOPSY SURGICAL PATHOLOGY EXAM Rianna Flynn, DO 2/9/2024 1015   2 : wedge of stomach Tissue STOMACH GASTRECTOMY SURGICAL PATHOLOGY EXAM Rianna Flynn, DO 2/9/2024 1100   3 : Omentum Tissue SOFT TISSUE RESECTION SURGICAL PATHOLOGY EXAM Rianna Flynn, DO 2/9/2024 1126     Attending Attestation: I was present and scrubbed for the entire procedure.    Rianna Flynn  Phone Number: [phone]

## 2024-02-09 NOTE — PROGRESS NOTES
PLAN: 53 y.o. male with a history of insulinoma and prior fundoplication for hiatal hernia who was transferred from OSH after presenting with abdominal pain and findings of pneumoperitoneum on imaging one day after starting octreotide for management of insulinoma. Patient was taken to the OR on 2/6 with ACS and found to have a gastric perforation along the lesser curvature as well as a small bowel volvulus. He underwent an ex lap and partial gastrectomy, and was ultimately left in discontinuity with an Abthera in place. Proximal stomach was questionably viable. Patient returned to OR 2/7 for wash out. RTOR today for exploration, wash out and closure.   TSICU post-op for ventilation requirement, serial exams with symptom management, wound care, skin, line, ICU tlc.     PAYOR:  Employee     DISPO: TBD    SUPPORT/CONTACT: Divina [phone]  Met with sp to complete assessment. Confirmed demographics on file. IPTA, active with PCP, verbalized prescription coverage with no barrier to co-pays. Dispo needs pending remaining hospital course. Will follow to assist.

## 2024-02-09 NOTE — PROGRESS NOTES
[facility]  TRAUMA ICU - PROGRESS NOTE    Patient Name: Trevor Yarbrough  MRN: [id number]  Admit Date: 206  : 1970  AGE: 53 y.o.   GENDER: male    ==============================================================================  TODAY'S ASSESSMENT AND PLAN OF CARE:  Trevor Yarbrough is a 53 y.o. male with a history of insulinoma and prior fundoplication for hiatal hernia who was transferred from OSH after presenting with abdominal pain and findings of pneumoperitoneum on imaging one day after starting octreotide for management of insulinoma. Patient was taken to the OR on  with ACS and found to have a gastric perforation along the lesser curvature as well as a small bowel volvulus. He underwent an ex lap and partial gastrectomy, and was ultimately left in discontinuity with an Abthera in place. Per ACS, proximal stomach is questionably viable. Patient is critically ill in septic shock.    OR Course:   - ex lap, partial gastrectomy, Abthera placement   - ex lap, washout, replacement of Abthera    NEURO/PAIN/SEDATION:   #Pain  #Sedation  - Fentanyl, prop gtt    RESPIRATORY:  #Intubated  - Daily SBT  - Will remain intubated until RTOR discussed with OR staff currently ventilator synchronized well tolerated ideally if intestinal continuity restored and abdomen closed can proceed with WTE in an expeditious fashion    CARDIOVASC:  #Hypotension /2 septic shock  - Off pressors as of   - Goal: SBP >100 (per cuff)  - TTE done on , no vegetations seen    GI:  #Hx insulinoma  #Hx fundoplication  #Gastric perforation s/p partial gastrectomy, open abdomen  #SBO 2/ volvulus  - NPO  - NGT to LIWS  - Follow up pathology  - S/p washout and replacement of vac on , pending RTOR today    FEN/:   - Garnett in place, strict I's and O's --> UOP 0.8 ml/kg/hr  - LR @ 125 ml/hr  - Replete lytes prn     HEMATOLOGIC:   - Daily CBC    ENDOCRINE:  #Hyperinsulinemic hypoglycemia,  insulinoma  - Initiated on stress dose steroids on 2/7  - SSI    MUSCULOSKELETAL/SKIN:   - PT/OT when able    INFECTIOUS DISEASE:   - Daily CBC  - Fluconazole, zosyn (2/6-) for anticipated 4 day course  - Vanco (2/7-)  - Initial bcx (2/6) positive for strep viridans, repeat cx pending    GI PROPHYLAXIS: H2 antagonist    DVT PROPHYLAXIS: SQH, SCDs    LINES:  - [place] (2/6-)  - L subclavian central line (2/6-)  - [place] (2/6-)  - NGT (2/6-)  - PIV    DISPOSITION: TSICU    Seen and discussed with Dr. Kasper.    Marie Aden MD  PGY-2 General Surgery  TSICU [id number]  ==============================================================================  CHIEF COMPLAINT / OVERNIGHT EVENTS / HPI:   Patient stable overnight, has remained off of pressors.    MEDICAL HISTORY / ROS:  Admission history and ROS reviewed. Pertinent changes as follows: Unable to obtain given patient condition.    PHYSICAL EXAM:  Heart Rate:  []   Temp:  [36.3 °C (97.3 °F)-37.4 °C (99.3 °F)]   Resp:  [8-15]   BP: (104-131)/(55-75)   SpO2:  [93 %-100 %]     General: critically ill in ICU bed  HEENT: ETT in place, NGT to LIWS with ss output in cannister  Resp: intubated, ventilated on SIMV  CV: NSR on monitor, off pressors  Abd: soft, distended, Abthera in place to suction with ss output in cannister  : [place] with clear yellow urine in bag  MSK: moves all extremities  Ext: no LE edema  Neuro: follows commands    IMAGING SUMMARY:   CXR 2/9: stable    LABS:  Results from last 7 days   Lab Units 02/09/24  0116 02/08/24  0555 02/08/24  0014 02/06/24  1111 02/06/24  0136   WBC AUTO x10*3/uL 8.1 9.1 11.5*   < > 10.2   HEMOGLOBIN g/dL 7.8* 9.2* 10.5*   < > 15.4   HEMATOCRIT % 23.1* 27.6* 31.1*   < > 44.6   PLATELETS AUTO x10*3/uL 138* 179 198   < > 236   NEUTROS PCT AUTO %  --   --   --   --  40.5   LYMPHS PCT AUTO %  --   --   --   --  45.8   MONOS PCT AUTO %  --   --   --   --  9.3   EOS PCT AUTO %  --   --   --   --  3.7    < > = values in this interval not  displayed.       Results from last 7 days   Lab Units 02/09/24  0116 02/08/24  0014 02/07/24  1243   APTT seconds 27 30 32   INR  1.0 1.3* 1.7*       Results from last 7 days   Lab Units 02/09/24  0116 02/08/24  0555 02/08/24  0014 02/06/24  1111 02/06/24  0136   SODIUM mmol/L 138 137 136   < > 142   POTASSIUM mmol/L 4.2 4.8 5.1   < > 3.4   CHLORIDE mmol/L 105 104 104   < > 103   CO2 mmol/L 30 27 26   < > 25   BUN mg/dL 19 21 20   < > 19   CREATININE mg/dL 0.95 1.22 1.12   < > 1.20   CALCIUM mg/dL 7.5* 7.6* 7.3*   < > 9.7   PROTEIN TOTAL g/dL  --   --   --   --  7.6   BILIRUBIN TOTAL mg/dL  --   --   --   --  0.3   ALK PHOS U/L  --   --   --   --  73   ALT U/L  --   --   --   --  36   AST U/L  --   --   --   --  36   GLUCOSE mg/dL 92 112* 129*   < > 124*    < > = values in this interval not displayed.       Results from last 7 days   Lab Units 02/06/24  0136   BILIRUBIN TOTAL mg/dL 0.3       Results from last 7 days   Lab Units 02/09/24  0116 02/08/24  0556 02/08/24  0013   POCT PH, ARTERIAL pH 7.35* 7.33* 7.35*   POCT PCO2, ARTERIAL mm Hg 54* 52* 46*   POCT PO2, ARTERIAL mm Hg 95 89 74*   POCT HCO3 CALCULATED, ARTERIAL mmol/L 29.8* 27.4* 25.4   POCT BASE EXCESS, ARTERIAL mmol/L 3.3* 0.9 -0.5       I have reviewed all medications, laboratory results, and imaging pertinent for today's encounter.       See my annotated bold italic notes above discussed with OR staff currently ventilator synchronized well tolerated ideally if intestinal continuity restored and abdomen closed can proceed with WTE in an expeditious fashion   Discussed with wife and mother in law at bedside     This critically ill patient continues to be at-risk for clinically significant deterioration / failure due to the above mentioned dysfunctional, unstable organ systems.  I have personally identified and managed all complex critical care issues to prevent aforementioned clinical deterioration.  Critical care time is spent at bedside and/or the  immediate area and has included, but is not limited to, the review of diagnostic tests, labs, radiographs, serial assessments of hemodynamics, respiratory status, ventilatory management, and family updates.  Time spent in procedures and teaching are reported separately.     CRITICAL CARE TIME:  35minutes    Will Kasper MD

## 2024-02-09 NOTE — ANESTHESIA PREPROCEDURE EVALUATION
Patient: Trevor Yarbrough    Procedure Information       Anesthesia Start Date/Time: 02/09/24 0849    Procedures:       Exploration Laparotomy (Abdomen)      Gastrectomy      Resection Small Intestine      Closure Surgical Wound Abdomen (Abdomen)    Location: [facility] OR  / [facility] OR    Surgeons: Rianna Flynn, DO            Relevant Problems   Anesthesia (within normal limits)      Cardiovascular   (+) Hyperlipidemia      GI   (+) Gastroesophageal reflux disease      Neuro/Psych   (+) History of migraine headaches      Hematology   (+) Anemia       Clinical information reviewed:    Allergies  Meds               NPO Detail:  NPO/Void Status  Date of Last Liquid: 02/08/24  Date of Last Solid: 02/08/24         Physical Exam    Airway  TM distance: >3 FB     Cardiovascular - normal exam     Dental    Pulmonary    Abdominal   (+) obese             Anesthesia Plan    History of general anesthesia?: yes  History of complications of general anesthesia?: no    ASA 3     general     inhalational induction   Anesthetic plan and risks discussed with spouse.    Plan discussed with attending and CAA.

## 2024-02-09 NOTE — PROGRESS NOTES
Occupational Therapy    Communication Note    Missed Visit: Yes  Missed Visit Reason:  (Pt discussed during ID rounds with medical team. Pt going to OR today, OT evaluation held.)      02/09/24 at 8:43 AM   Purvi Arredondo, OT   Rehab Office: [phone]

## 2024-02-10 ENCOUNTER — APPOINTMENT (OUTPATIENT)
Dept: RADIOLOGY | Facility: HOSPITAL | Age: 54
DRG: 853 | End: 2024-02-10
Payer: COMMERCIAL

## 2024-02-10 LAB
ALBUMIN SERPL BCP-MCNC: 2.1 G/DL (ref 3.4–5)
ANION GAP SERPL CALC-SCNC: 13 MMOL/L (ref 10–20)
BACTERIA BLD AEROBE CULT: ABNORMAL
BACTERIA BLD CULT: ABNORMAL
BACTERIA BLD CULT: NORMAL
BUN SERPL-MCNC: 18 MG/DL (ref 6–23)
CA-I BLD-SCNC: 1.07 MMOL/L (ref 1.1–1.33)
CALCIUM SERPL-MCNC: 7 MG/DL (ref 8.6–10.6)
CHLORIDE SERPL-SCNC: 106 MMOL/L (ref 98–107)
CO2 SERPL-SCNC: 27 MMOL/L (ref 21–32)
CREAT SERPL-MCNC: 0.87 MG/DL (ref 0.5–1.3)
EGFRCR SERPLBLD CKD-EPI 2021: >90 ML/MIN/1.73M*2
ERYTHROCYTE [DISTWIDTH] IN BLOOD BY AUTOMATED COUNT: 14.6 % (ref 11.5–14.5)
GLUCOSE BLD MANUAL STRIP-MCNC: 107 MG/DL (ref 74–99)
GLUCOSE BLD MANUAL STRIP-MCNC: 108 MG/DL (ref 74–99)
GLUCOSE BLD MANUAL STRIP-MCNC: 108 MG/DL (ref 74–99)
GLUCOSE BLD MANUAL STRIP-MCNC: 122 MG/DL (ref 74–99)
GLUCOSE BLD MANUAL STRIP-MCNC: 97 MG/DL (ref 74–99)
GLUCOSE BLD MANUAL STRIP-MCNC: 99 MG/DL (ref 74–99)
GLUCOSE SERPL-MCNC: 99 MG/DL (ref 74–99)
GRAM STN SPEC: ABNORMAL
HCT VFR BLD AUTO: 24.6 % (ref 41–52)
HGB BLD-MCNC: 8.3 G/DL (ref 13.5–17.5)
MAGNESIUM SERPL-MCNC: 1.9 MG/DL (ref 1.6–2.4)
MCH RBC QN AUTO: 28 PG (ref 26–34)
MCHC RBC AUTO-ENTMCNC: 33.7 G/DL (ref 32–36)
MCV RBC AUTO: 83 FL (ref 80–100)
NRBC BLD-RTO: 0.2 /100 WBCS (ref 0–0)
PHOSPHATE SERPL-MCNC: 1.9 MG/DL (ref 2.5–4.9)
PLATELET # BLD AUTO: 204 X10*3/UL (ref 150–450)
POTASSIUM SERPL-SCNC: 3.6 MMOL/L (ref 3.5–5.3)
RBC # BLD AUTO: 2.96 X10*6/UL (ref 4.5–5.9)
SODIUM SERPL-SCNC: 142 MMOL/L (ref 136–145)
VANCOMYCIN SERPL-MCNC: 11.7 UG/ML (ref 5–20)
WBC # BLD AUTO: 12.5 X10*3/UL (ref 4.4–11.3)

## 2024-02-10 PROCEDURE — 99291 CRITICAL CARE FIRST HOUR: CPT | Performed by: SURGERY

## 2024-02-10 PROCEDURE — 2500000004 HC RX 250 GENERAL PHARMACY W/ HCPCS (ALT 636 FOR OP/ED)

## 2024-02-10 PROCEDURE — 82330 ASSAY OF CALCIUM: CPT

## 2024-02-10 PROCEDURE — 85027 COMPLETE CBC AUTOMATED: CPT

## 2024-02-10 PROCEDURE — 82947 ASSAY GLUCOSE BLOOD QUANT: CPT

## 2024-02-10 PROCEDURE — 37799 UNLISTED PX VASCULAR SURGERY: CPT

## 2024-02-10 PROCEDURE — 2500000004 HC RX 250 GENERAL PHARMACY W/ HCPCS (ALT 636 FOR OP/ED): Performed by: NURSE PRACTITIONER

## 2024-02-10 PROCEDURE — 2500000004 HC RX 250 GENERAL PHARMACY W/ HCPCS (ALT 636 FOR OP/ED): Performed by: STUDENT IN AN ORGANIZED HEALTH CARE EDUCATION/TRAINING PROGRAM

## 2024-02-10 PROCEDURE — 83735 ASSAY OF MAGNESIUM: CPT

## 2024-02-10 PROCEDURE — 2500000001 HC RX 250 WO HCPCS SELF ADMINISTERED DRUGS (ALT 637 FOR MEDICARE OP): Performed by: STUDENT IN AN ORGANIZED HEALTH CARE EDUCATION/TRAINING PROGRAM

## 2024-02-10 PROCEDURE — C9113 INJ PANTOPRAZOLE SODIUM, VIA: HCPCS

## 2024-02-10 PROCEDURE — 2500000005 HC RX 250 GENERAL PHARMACY W/O HCPCS: Performed by: NURSE PRACTITIONER

## 2024-02-10 PROCEDURE — 71045 X-RAY EXAM CHEST 1 VIEW: CPT | Performed by: RADIOLOGY

## 2024-02-10 PROCEDURE — 2020000001 HC ICU ROOM DAILY

## 2024-02-10 PROCEDURE — 94003 VENT MGMT INPAT SUBQ DAY: CPT

## 2024-02-10 PROCEDURE — 80202 ASSAY OF VANCOMYCIN: CPT

## 2024-02-10 PROCEDURE — 80069 RENAL FUNCTION PANEL: CPT

## 2024-02-10 PROCEDURE — 71045 X-RAY EXAM CHEST 1 VIEW: CPT

## 2024-02-10 RX ORDER — POTASSIUM CHLORIDE 29.8 MG/ML
40 INJECTION INTRAVENOUS ONCE
Status: COMPLETED | OUTPATIENT
Start: 2024-02-10 | End: 2024-02-10

## 2024-02-10 RX ORDER — POLYETHYLENE GLYCOL 3350 17 G/17G
17 POWDER, FOR SOLUTION ORAL DAILY
Status: DISCONTINUED | OUTPATIENT
Start: 2024-02-10 | End: 2024-02-11

## 2024-02-10 RX ORDER — DEXMEDETOMIDINE HYDROCHLORIDE 4 UG/ML
INJECTION, SOLUTION INTRAVENOUS
Status: COMPLETED
Start: 2024-02-10 | End: 2024-02-10

## 2024-02-10 RX ORDER — HYDROMORPHONE HYDROCHLORIDE 1 MG/ML
0.2 INJECTION, SOLUTION INTRAMUSCULAR; INTRAVENOUS; SUBCUTANEOUS EVERY 2 HOUR PRN
Status: DISCONTINUED | OUTPATIENT
Start: 2024-02-10 | End: 2024-02-11

## 2024-02-10 RX ORDER — DEXMEDETOMIDINE HYDROCHLORIDE 4 UG/ML
.5-1.5 INJECTION, SOLUTION INTRAVENOUS CONTINUOUS
Status: DISCONTINUED | OUTPATIENT
Start: 2024-02-10 | End: 2024-02-12

## 2024-02-10 RX ORDER — HYDROMORPHONE HYDROCHLORIDE 1 MG/ML
0.4 INJECTION, SOLUTION INTRAMUSCULAR; INTRAVENOUS; SUBCUTANEOUS EVERY 4 HOURS PRN
Status: DISCONTINUED | OUTPATIENT
Start: 2024-02-10 | End: 2024-02-12

## 2024-02-10 RX ORDER — ACETAMINOPHEN 160 MG/5ML
650 SOLUTION ORAL EVERY 6 HOURS PRN
Status: DISCONTINUED | OUTPATIENT
Start: 2024-02-10 | End: 2024-02-11

## 2024-02-10 RX ADMIN — ACETAMINOPHEN 650 MG: 650 SOLUTION ORAL at 20:44

## 2024-02-10 RX ADMIN — PIPERACILLIN SODIUM AND TAZOBACTAM SODIUM 3.38 G: 3; .375 INJECTION, SOLUTION INTRAVENOUS at 05:29

## 2024-02-10 RX ADMIN — POTASSIUM PHOSPHATE, MONOBASIC POTASSIUM PHOSPHATE, DIBASIC 21 MMOL: 224; 236 INJECTION, SOLUTION, CONCENTRATE INTRAVENOUS at 20:43

## 2024-02-10 RX ADMIN — PROPOFOL 30 MCG/KG/MIN: 10 INJECTION, EMULSION INTRAVENOUS at 05:28

## 2024-02-10 RX ADMIN — VANCOMYCIN HYDROCHLORIDE 1500 MG: 500 INJECTION, POWDER, LYOPHILIZED, FOR SOLUTION INTRAVENOUS at 09:47

## 2024-02-10 RX ADMIN — MAGNESIUM SULFATE HEPTAHYDRATE 2 G: 40 INJECTION, SOLUTION INTRAVENOUS at 05:30

## 2024-02-10 RX ADMIN — Medication 175 MCG/HR: at 03:07

## 2024-02-10 RX ADMIN — DEXMEDETOMIDINE HYDROCHLORIDE 0.5 MCG/KG/HR: 400 INJECTION INTRAVENOUS at 15:49

## 2024-02-10 RX ADMIN — Medication 175 MCG/HR: at 07:17

## 2024-02-10 RX ADMIN — CALCIUM GLUCONATE 1 G: 20 INJECTION, SOLUTION INTRAVENOUS at 03:43

## 2024-02-10 RX ADMIN — PANTOPRAZOLE SODIUM 40 MG: 40 INJECTION, POWDER, FOR SOLUTION INTRAVENOUS at 08:45

## 2024-02-10 RX ADMIN — PIPERACILLIN SODIUM AND TAZOBACTAM SODIUM 3.38 G: 3; .375 INJECTION, SOLUTION INTRAVENOUS at 00:23

## 2024-02-10 RX ADMIN — HEPARIN SODIUM 5000 UNITS: 5000 INJECTION INTRAVENOUS; SUBCUTANEOUS at 08:45

## 2024-02-10 RX ADMIN — HEPARIN SODIUM 5000 UNITS: 5000 INJECTION INTRAVENOUS; SUBCUTANEOUS at 17:57

## 2024-02-10 RX ADMIN — POTASSIUM CHLORIDE 40 MEQ: 29.8 INJECTION, SOLUTION INTRAVENOUS at 05:29

## 2024-02-10 RX ADMIN — SODIUM CHLORIDE, POTASSIUM CHLORIDE, SODIUM LACTATE AND CALCIUM CHLORIDE 500 ML: 600; 310; 30; 20 INJECTION, SOLUTION INTRAVENOUS at 22:30

## 2024-02-10 RX ADMIN — DEXMEDETOMIDINE HYDROCHLORIDE 0.5 MCG/KG/HR: 400 INJECTION INTRAVENOUS at 09:54

## 2024-02-10 RX ADMIN — PANTOPRAZOLE SODIUM 40 MG: 40 INJECTION, POWDER, FOR SOLUTION INTRAVENOUS at 20:44

## 2024-02-10 RX ADMIN — SODIUM CHLORIDE, POTASSIUM CHLORIDE, SODIUM LACTATE AND CALCIUM CHLORIDE 500 ML: 600; 310; 30; 20 INJECTION, SOLUTION INTRAVENOUS at 07:29

## 2024-02-10 RX ADMIN — HEPARIN SODIUM 5000 UNITS: 5000 INJECTION INTRAVENOUS; SUBCUTANEOUS at 01:25

## 2024-02-10 RX ADMIN — DEXMEDETOMIDINE HYDROCHLORIDE 0.5 MCG/KG/HR: 400 INJECTION INTRAVENOUS at 23:44

## 2024-02-10 RX ADMIN — PIPERACILLIN SODIUM AND TAZOBACTAM SODIUM 3.38 G: 3; .375 INJECTION, SOLUTION INTRAVENOUS at 11:49

## 2024-02-10 ASSESSMENT — PAIN - FUNCTIONAL ASSESSMENT

## 2024-02-10 NOTE — CONSULTS
Vancomycin Dosing by Pharmacy- Cessation of Therapy    Consult to pharmacy for vancomycin dosing has been discontinued by the prescriber, pharmacy will sign off at this time.    Please call pharmacy if there are further questions or re-enter a consult if vancomycin is resumed.     Tha Webber, [facility]

## 2024-02-10 NOTE — PROGRESS NOTES
Pharmacy to Dose Vancomycin:  Trevor Yarbrough is a 53 y.o. male ordered pharmacy to dose vancomycin for  abdominal infection . Today is day 5 of therapy.  Goal: -600mg/L*hr  Results from last 7 days   Lab Units 02/10/24  0301 02/09/24  1530 02/09/24  0116   BUN mg/dL 18 19 19   CREATININE mg/dL 0.87 0.86 0.95   WBC AUTO x10*3/uL 12.5* 11.0 8.1   VANCOMYCIN RM ug/mL 11.7  --   --       Blood Culture   Date/Time Value Ref Range Status   02/08/2024 11:19 AM No growth at 1 day  Preliminary     Gram Stain   Date/Time Value Ref Range Status   02/06/2024 04:15 AM Gram positive cocci, pairs and chains (AA)  Preliminary     Comment:     Anaerobic Bottle Positive      Renal function remains stable.  Patient's current regimen is predicted to achieve AUC24,ss of 276.    Plan:  Increase to 1.5GQ12H.  The above dosing regimen is predicted by [employer] to result in the following pharmacokinetic parameters:    Follow-up level ordered for 2/12 AM unless clinically indicated sooner.  Pharmacy will continue daily vancomycin monitoring. Please contact the pharmacy at extension [phone] with any questions.    Thank you,  Tha Webber RPh

## 2024-02-10 NOTE — SIGNIFICANT EVENT
Postoperative Check Note    Subjective  Trevor Yarbrough is a 53 y.o. male who is now POD0 s/p Re-exploratory Laparotomy, Completion of antrectomy, Proximal gastric resection, Partial Omentectomy, Billroth II anastamosis, x2 Clint Drains placed and Subcutaneous wound vacc, A-Line placement and EGD. Postoperatively, patient is recovering in the ICU intubated and sedated.  Objective  Vitals:  Visit Vitals  /80   Pulse (!) 113   Temp 37.2 °C (99 °F) (Temporal)   Resp 19       Physical Exam:  GEN: Critically ill in ICU  HEENT: Atraumatic. Sclera anicteric. Moist mucous membranes.  RESP: Breathing non-labored, equal chest rise. On  ventilator .  CV: Regular rate, normotensive  GI: Abdomen soft, nondistended, nontender. Wound vac in place. Both drains ss  : Garnett in place with clear yellow urine.  MSK: No gross deformities. Moves all extremities spontaneously.  NEURO: Alert and oriented x3. No focal deficits.  PSYCH: Appropriate mood and affect.  SKIN: No rashes or lesions.    Most recent labs:            7.9     11.0>-----<169              23.9     140  103  19                  ----------------<92     3.9  29  0.86          Mg 1.91           Assessment  Trevor Yarbrough is a 53 y.o. male who is now POD0 s/p Re-exploratory Laparotomy, Completion of antrectomy, Proximal gastric resection, Partial Omentectomy, Billroth II anastamosis, x2 Clint Drains placed and Subcutaneous wound vacc, A-Line placement and EGD.  Patient is in stable condition, progressing appropriately through postoperative course. The plan is as follows:    - Will continue to optimize pain management, current pain regimen fentanyl per ICU  - NPO with enteral feeding  -  LR @ 125 mL/hr  - SQH  - Vanco/Zosyn/Fluc  - PPI  - ISS  - Appreciate SICU care     Will update day team in AM regarding exam. Will revisit patient on an as-needed basis.    Constanza Rod MD  PGY-1 General Surgery  Acute Care Surgery [phone]

## 2024-02-10 NOTE — CARE PLAN
The patient's goals for the shift include Unable to state patient intubated    The clinical goals for the shift include Remain hemodynamically stable.  Problem: Safety  Goal: Patient will be injury free during hospitalization  Outcome: Progressing  Goal: I will remain free of falls  Outcome: Progressing       Over the shift, the patient made progress toward the following goals. Barriers to progression include intubation and inability to follow commands.

## 2024-02-10 NOTE — CONSULTS
"Nutrition Initial Assessment:   Nutrition Assessment    Reason for Assessment: Tube feeding recommendations    Patient is a 53 y.o. male transferred from OSH after presenting with abdominal pain and findings of pneumoperitoneum on imaging one day after starting octreotide for management of insulinoma.      OR Course:  2/6 - ex lap, partial gastrectomy, Abthera placement  2/7 - ex lap, washout, replacement of Abthera  2/8 - ex lap, completion antrectomy, proximal gastric resection, billroth II, placement of 2 chandan drains    PMH: insulinoma and prior fundoplication for hiatal hernia     Currently intubated (CPAP trials for possible extubation today), NGT to LIWS, receiving Pivot 1.5 @ 10ml/hr via small-bore feeding tube. Note: abd x-ray read notes possible ileus.     Nutrition History:  Energy Intake: Good > 75 %  Food and Nutrient History: Pt's wife reported pt had a good appetite PTA. Mentioned his eating habits had changed following the fundoplication several years ago - eats smaller, more frequent meals, but this has not changed since that procedure. Further stated he had a \"hearty meal\" the Monday before coming into the hospital. Also mentioned he is very active - likes to run.  Vitamin/Herbal Supplement Use: used to take Emergen-C, has not taken it in a few months.  Food Allergies/Intolerances:  None  Oral Problems: None       Anthropometrics:  Height: 180.3 cm (5' 10.98\")   Weight: 87.5 kg (193 lb)   BMI (Calculated): 26.93  IBW/kg (Dietitian Calculated): 78.2 kg  Percent of IBW: 112 %       Weight History:   Wt Readings from Last 10 Encounters:   02/07/24 87.5 kg (193 lb)   02/06/24 83.5 kg (184 lb 1.4 oz)   12/15/23 83.5 kg (184 lb)   11/20/23 79.4 kg (175 lb)   10/13/23 79.4 kg (175 lb)   03/31/23 83 kg (183 lb)   10/03/22 83 kg (183 lb)   05/09/22 82.6 kg (182 lb)   12/16/21 86.7 kg (191 lb 2.2 oz)   09/07/21 83 kg (183 lb)     Weight Change %:  Weight History / % Weight Change: Pt's wife reported his UBW " is ~180-185# (81.8-84.1kg). Noted no recent loss or gain  Significant Weight Loss: No    Nutrition Focused Physical Exam Findings:  Subcutaneous Fat Loss:   Orbital Fat Pads: Well nourshed (slightly bulging fat pads)  Triceps: Well nourished (ample fat tissue)  Muscle Wasting:  Temporalis: Well nourished (well-defined muscle)  Pectoralis (Clavicular Region): Well nourished (clavicle not visible)  Deltoid/Trapezius: Well nourished (rounded appearance at arm, shoulder, neck)  Interosseous: Well nourished (muscle bulges)  Quadriceps: Well nourished (well developed, well rounded)  Gastrocnemius: Well nourished (well developed bulbous muscle)  Physical Findings:  Hair: Negative  Nails: Negative    Nutrition Significant Labs:  CBC Trend:   Results from last 7 days   Lab Units 02/10/24  0301 02/09/24  1530 02/09/24  0116 02/08/24  0555   WBC AUTO x10*3/uL 12.5* 11.0 8.1 9.1   RBC AUTO x10*6/uL 2.96* 2.85* 2.71* 3.33*   HEMOGLOBIN g/dL 8.3* 7.9* 7.8* 9.2*   HEMATOCRIT % 24.6* 23.9* 23.1* 27.6*   MCV fL 83 84 85 83   PLATELETS AUTO x10*3/uL 204 169 138* 179   BMP Trend:   Results from last 7 days   Lab Units 02/10/24  0301 02/09/24  1530 02/09/24  0116 02/08/24  0555   GLUCOSE mg/dL 99 92 92 112*   CALCIUM mg/dL 7.0* 7.3* 7.5* 7.6*   SODIUM mmol/L 142 140 138 137   POTASSIUM mmol/L 3.6 3.9 4.2 4.8   CO2 mmol/L 27 29 30 27   CHLORIDE mmol/L 106 103 105 104   BUN mg/dL 18 19 19 21   CREATININE mg/dL 0.87 0.86 0.95 1.22   BG POCT trend:   Results from last 7 days   Lab Units 02/10/24  1151 02/10/24  0755 02/10/24  0403 02/09/24  2316 02/09/24  1932   POCT GLUCOSE mg/dL 108* 99 107* 85 79   Renal Lab Trend:   Results from last 7 days   Lab Units 02/10/24  0301 02/09/24  1530 02/09/24  0116 02/08/24  0555   POTASSIUM mmol/L 3.6 3.9 4.2 4.8   PHOSPHORUS mg/dL 1.9*  --  2.8 2.7   SODIUM mmol/L 142 140 138 137   MAGNESIUM mg/dL 1.90 1.91 2.13 2.18   EGFR mL/min/1.73m*2 >90 >90 >90 70   BUN mg/dL 18 19 19 21   CREATININE mg/dL 0.87  0.86 0.95 1.22         Nutrition Specific Medications:  Scheduled medications  heparin (porcine), 5,000 Units, subcutaneous, q8h  insulin lispro, 0-5 Units, subcutaneous, TID with meals  pantoprazole, 40 mg, intravenous, BID  [Held by provider] polyethylene glycol, 17 g, oral, Daily      Continuous medications  dexmedeTOMIDine, 0.5-1.5 mcg/kg/hr, Last Rate: 0.781 mcg/kg/hr (02/10/24 1200)  fentaNYL,  mcg/hr, Last Rate: Stopped (02/10/24 1100)  lactated Ringer's, 75 mL/hr, Last Rate: 75 mL/hr (02/10/24 0750)  propofol, 5-40 mcg/kg/min, Last Rate: Stopped (02/10/24 0929)      Dietary Orders (From admission, onward)       Start     Ordered    02/09/24 1446  Enteral feeding with NPO Pivot 1.5; ND (nasoduodenal tube); 10  Diet effective now        Question Answer Comment   Tube feeding formula: Pivot 1.5    Feeding route: ND (nasoduodenal tube)    Tube feeding bolus frequency: 10        02/09/24 1445         Estimated Needs:   Total Energy Estimated Needs (kCal):  (intubated: 1800; extubated: 5770-9181)  Method for Estimating Needs: intubated: [place]; extubated: kcal/kg per IBW  Total Protein Estimated Needs (g): 120 g  Method for Estimating Needs: ~1.5g/kg per IBW  Total Fluid Estimated Needs (mL):  (per team)        Nutrition Diagnosis   Malnutrition Diagnosis  Patient has Malnutrition Diagnosis: No    Nutrition Diagnosis  Patient has Nutrition Diagnosis: Yes  Diagnosis Status (1): New  Nutrition Diagnosis 1: Increased nutrient needs  Related to (1): increased metabolic demand  As Evidenced by (1): s/p multiple ax lap. washout, partial gastrectomy       Nutrition Interventions/Recommendations   If remains cleared to utilize GI tract, please provide the following if remains intubated:   Continue to increase Pivot 1.5 by 10mls every 8hrs to reach goal of 45ml/hr + 1 packet Pro Stat AWC.   Once pt is successfully extubated: Pivot 1.5 goal is 55ml/hr (no Pro Stat AWC required)  Start at a trickle and slowly  advance by 10mls every 8hrs if he had not previously been receiving goal rate.        Nutrition Prescription:  Individualized Nutrition Prescription Provided for : High protein, immune-modulating TF formula to provide 100% of needs.         Nutrition Interventions:   Interventions: Enteral intake  Goal: Pivot 1.5 @ 45ml/hr + 1 Pro Stat AWC = 1720kcals, 119g pro; Pivot 1.5 @ 55ml/hr = 1980kcals, 124g pro,       Nutrition Monitoring and Evaluation   Food/Nutrient Related History Monitoring  Monitoring and Evaluation Plan: Enteral and parenteral nutrition intake    Body Composition/Growth/Weight History  Monitoring and Evaluation Plan: Weight    Biochemical Data, Medical Tests and Procedures  Monitoring and Evaluation Plan: Electrolyte/renal panel, Glucose/endocrine profile            Time Spent/Follow-up Reminder:   Time Spent (min): 60 minutes  Last Date of Nutrition Visit: 02/10/24  Nutrition Follow-Up Needed?: Dietitian to reassess per policy  Follow up Comment: Intubated/Extubated TF recs

## 2024-02-10 NOTE — PROGRESS NOTES
[facility]  TRAUMA ICU - PROGRESS NOTE    Patient Name: Trevor Yarbrough  MRN: [id number]  Admit Date: 206  : 1970  AGE: 53 y.o.   GENDER: male    ==============================================================================  TODAY'S ASSESSMENT AND PLAN OF CARE:  Trevor Yarbrough is a 53 y.o. male with a history of insulinoma and prior fundoplication for hiatal hernia who was transferred from OSH after presenting with abdominal pain and findings of pneumoperitoneum on imaging one day after starting octreotide for management of insulinoma. Patient was taken to the OR on  with ACS and found to have a gastric perforation along the lesser curvature as well as a small bowel volvulus. He underwent an ex lap and partial gastrectomy, and was ultimately left in discontinuity with an Abthera in place. Continuity has since been restored as of  and patient is no longer in septic shock.     OR Course:   - ex lap, partial gastrectomy, Abthera placement   - ex lap, washout, replacement of Abthera   - ex lap, completion antrectomy, proximal gastric resection, billroth II, placement of 2 chandan drains    NEURO/PAIN/SEDATION:   #Pain  #Sedation  - Fentanyl  - Transitioned from propofol to precedex for sedation opens eyes to name does not focus on examiner nor follow directions reduced both fentanyl as well as propofol     RESPIRATORY:  #Intubated  - Daily SBT, will aim to wean to extubate today    CARDIOVASC:  #Hypotension /2 septic shock  - Off pressors as of   - Goal: SBP >100 (per cuff)  - TTE done on , no vegetations seen    GI:  #Hx insulinoma  #Hx fundoplication  #Gastric perforation s/p billroth II  #SBO 2/2 volvulus  - NPO  - NGT to LIWS  - Trickle TF through dobhoff --> awaiting nutrition recs  - Follow up pathology    FEN/:   - Garnett in place, strict I's and O's --> UOP 0.8 ml/kg/hr  - LR decreased to 75 ml/hr while still on trickle feeds  - Replete lytes  prn     HEMATOLOGIC:   - Daily CBC    ENDOCRINE:  #Hyperinsulinemic hypoglycemia, insulinoma  - Stress dose steroids discontinued on 2/9 given resolution of shock  - SSI    MUSCULOSKELETAL/SKIN:   - PT/OT when able    INFECTIOUS DISEASE:   - Daily CBC  - Fluconazole, zosyn (2/6-10) for anticipated 4 day course  - Vanco (2/7-2/10)  - Initial bcx (2/6) positive for strep viridans, repeat cx with NG at 1 day    GI PROPHYLAXIS: PPI    DVT PROPHYLAXIS: SQH, SCDs    LINES:  - Melisa (2/6-)  - L subclavian central line (2/6-)  - Carlton (2/6-)  - NGT (2/6-)  - Dobhoff (2/9-)  - Drains x2 (2/9-)  - PIV    DISPOSITION: TSICU    Seen and discussed with Dr. Kasper.    Marie Aden MD  PGY-2 General Surgery  TSICU [id number]  ==============================================================================  CHIEF COMPLAINT / OVERNIGHT EVENTS / HPI:   Patient agitated overnight, required more sedation. Tolerating trickle TF. Remains off pressors.    MEDICAL HISTORY / ROS:  Admission history and ROS reviewed. Pertinent changes as follows: Unable to obtain given patient condition.    PHYSICAL EXAM:  Heart Rate:  []   Temp:  [37.2 °C (99 °F)-38 °C (100.4 °F)]   Resp:  [10-28]   BP: (106-153)/(61-80)   SpO2:  [95 %-100 %]     General: agitated   HEENT: ETT in place, NGT to LIWS   Resp: intubated, ventilated on CPAP  CV: sinus tachycardia on monitor  Abd: soft, distended, wound vac in place to suction, drains x 2 with ss output  : carlton with clear yellow urine in bag  MSK: moves all extremities  Ext: no LE edema  Neuro: moves spontaneously, not following commands    IMAGING SUMMARY:   CXR 2/10: stable    I have reviewed all medications, laboratory results, and imaging pertinent for today's encounter.     Discussed with wife and family at bedside. Remains sedated intubated symmetric chests expansion I cannot elicit consistent following of commands has been tolerating a SBT noted switch from propofol to precedex , RRR ABD post op changes  on trickle tube feeds  Plan WTE once MS clears hold fentanyl.    LABS:  CMP:  Results from last 7 days   Lab Units 02/10/24  0301 02/09/24  1530 02/09/24  0116 02/08/24  1646 02/08/24  0555 02/08/24  0014 02/07/24  1731 02/07/24  1243 02/07/24  0914 02/07/24  0428 02/07/24  0013 02/06/24  1111 02/06/24  0136   SODIUM mmol/L 142 140 138  --  137 136 136 135* 136 137 137   < > 142   POTASSIUM mmol/L 3.6 3.9 4.2  --  4.8 5.1 4.6 4.4 4.4 4.2 5.5*   < > 3.4   CHLORIDE mmol/L 106 103 105  --  104 104 104 104 104 106 106   < > 103   CO2 mmol/L 27 29 30  --  27 26 25 26 24 23 24   < > 25   ANION GAP mmol/L 13 12 7*  --  11 11 12 9* 12 12 13   < > 14   BUN mg/dL 18 19 19  --  21 20 21 23 23 22 20   < > 19   CREATININE mg/dL 0.87 0.86 0.95  --  1.22 1.12 1.14 1.04 1.17 1.20 1.15   < > 1.20   EGFR mL/min/1.73m*2 >90 >90 >90  --  70 78 76 85 74 72 76   < > 72   MAGNESIUM mg/dL 1.90 1.91 2.13 2.10 2.18 2.08 2.06 2.06 2.12 2.52* 1.88   < >  --    ALBUMIN g/dL 2.1* 2.1* 2.1*  --  2.2* 2.4* 2.3* 2.3* 2.4* 2.4* 2.6*   < > 4.3   ALT U/L  --  469*  --   --   --   --   --   --   --   --   --   --  36   AST U/L  --  419*  --   --   --   --   --   --   --   --   --   --  36   BILIRUBIN TOTAL mg/dL  --  0.4  --   --   --   --   --   --   --   --   --   --  0.3   LIPASE U/L  --   --   --   --   --   --   --   --   --   --   --   --  383*    < > = values in this interval not displayed.     CBC:  Results from last 7 days   Lab Units 02/10/24  0301 02/09/24  1530 02/09/24  0116 02/08/24  0555 02/08/24  0014 02/07/24  1731 02/07/24  1243 02/07/24  0914   WBC AUTO x10*3/uL 12.5* 11.0 8.1 9.1 11.5* 9.6 7.4 12.5*   HEMOGLOBIN g/dL 8.3* 7.9* 7.8* 9.2* 10.5* 10.7* 10.9* 11.1*   HEMATOCRIT % 24.6* 23.9* 23.1* 27.6* 31.1* 31.8* 32.8* 33.4*   PLATELETS AUTO x10*3/uL 204 169 138* 179 198 180 162 223   MCV fL 83 84 85 83 84 83 83 83     COAG:   Results from last 7 days   Lab Units 02/09/24  1530 02/09/24  0116 02/08/24  0014 02/07/24  1243  02/07/24  0013 02/06/24  1921 02/06/24  1111   INR  1.0 1.0 1.3* 1.7* 1.5* 1.2* 1.3*     ABO:   ABO TYPE   Date Value Ref Range Status   02/09/2024 B  Final           This critically ill patient continues to be at-risk for clinically significant deterioration / failure due to the above mentioned dysfunctional, unstable organ systems.  I have personally identified and managed all complex critical care issues to prevent aforementioned clinical deterioration.  Critical care time is spent at bedside and/or the immediate area and has included, but is not limited to, the review of diagnostic tests, labs, radiographs, serial assessments of hemodynamics, respiratory status, ventilatory management, and family updates.  Time spent in procedures and teaching are reported separately.     CRITICAL CARE TIME: 55 minutes     Will Kasper MD

## 2024-02-10 NOTE — OP NOTE
Reopening of Exploration Laparotomy, Completion of Enteroectomy, Proximal gastric resection, Partial Omentectomy, Billroth II anastamosis, x2 Clint Drains placed and Subcutaneous wound vacc, A-Line placement and EGD and core-maggi Operative Note     Date: 2024  OR Location: [facility] OR    Name: Trevor Yarbrough, : 1970, Age: 53 y.o., MRN: [id number], Sex: male    Diagnosis  Preop diagnosis: Gastric perforation Post op Diagnosis: gastric perforation     Procedures  Reopening recent laparotomy  Completion antrectomy  Proximal gastric resection  Partial omentectomy  Billroth 2 anastamosis  Clint drain x2   Esophagogastroscopy  R radial arterial line placement  Nasogastric tube and Dobhoff placement under endoscopic guidance  Primary fascial closure  Subcutaneous wound VAC    Surgeons      * Rianna Flynn - Primary    Resident/Fellow/Other Assistant:  Surgeon(s) and Role:     * Dylon Webster MD - Resident - Assisting     * Nik Quiroz MD - Resident - Assisting    Procedure Summary  Anesthesia: General  ASA: III  Anesthesia Staff: Anesthesiologist: Kan Campos DO  C-AA: PHYLLIS López; PHYLLIS [place]  Estimated Blood Loss: 100 mL  Intra-op Medications:   Administrations occurring from 0815 to 1155 on 24:   Medication Name Total Dose   fentanyl (Sublimaze) 1000 mcg in sodium chloride 0.9% 100 mL (10 mcg/mL) infusion (premix) 30 mcg   heparin (porcine) injection 5,000 Units 5,000 Units   lactated Ringer's infusion 347.92 mL   propofol (Diprivan) infusion Cannot be calculated   fluconazole in NaCl (iso-osm) (Diflucan) IVPB 400 mg 400 mg   vancomycin (Vancocin) 1,000 mg in dextrose 5% water 200 mL 200 mL              Anesthesia Record               Intraprocedure I/O Totals          Intake    Fentanyl Drip 0.00 mL    The total shown is the total volume documented since Anesthesia Start was filed.    Propofol Drip 0.00 mL    The total shown is the total volume documented since  Anesthesia Start was filed.    lactated Ringer's infusion 1300.00 mL    fluconazole in NaCl (iso-osm) (Diflucan) IVPB 400 mg 200.00 mL    vancomycin (Vancocin) 1,000 mg in dextrose 5% water 200 mL 200.00 mL    Total Intake 1700 mL       Output    Urine 490 mL    Est. Blood Loss 100 mL    Total Output 590 mL       Net    Net Volume 1110 mL          Specimen:   ID Type Source Tests Collected by Time   1 : Antrum Tissue SOFT TISSUE RESECTION SURGICAL PATHOLOGY EXAM Rianna ROSS Rina, DO 2/9/2024 1015   2 : wedge of stomach Tissue STOMACH GASTRECTOMY SURGICAL PATHOLOGY EXAM Rianna ROSS Rina, DO 2/9/2024 1100   3 : Omentum Tissue OMENTUM RESECTION SURGICAL PATHOLOGY EXAM Rianna ROSS Rina, DO 2/9/2024 1126        Staff:   Circulator: Salima Keys RN  Relief Circulator: Nikia Brantley RN  Relief Scrub: Ashlyn Baez RN  Scrub Person: Yessenia Doyle RN         Drains and/or Catheters:   Closed/Suction Drain Left Abdomen Bulb (Active)   Site Description Healing 02/10/24 0800   Dressing Status Clean;Dry 02/10/24 0800   Drainage Appearance Serosanguineous 02/10/24 0800   Status To bulb suction 02/10/24 0800   Output (mL) 0 mL 02/10/24 0800       Closed/Suction Drain Inferior Abdomen Bulb (Active)   Site Description Healing 02/10/24 0800   Dressing Status Clean;Dry 02/10/24 0800   Drainage Appearance Serosanguineous 02/10/24 0800   Status To bulb suction 02/10/24 0800   Output (mL) 0 mL 02/10/24 0800       NG/OG/Feeding Tube Gastric Left nostril (Active)   Tube Status Unclamped;Low intermittent suction 02/10/24 0800   Placement Verification Measurements 02/10/24 0800   Distal Tube Measurement 68 cm 02/10/24 0800   Site Assessment Clean;Dry;Intact 02/10/24 0800   Drainage Appearance Brown;Green 02/10/24 0800   NG/OG Interventions Air injected into blue air vent port 02/10/24 0800   Tube Securement Taped to nostril center 02/10/24 0800   Intake (mL) 0 mL 02/09/24 1850   Output (mL) 50 mL 02/10/24  0800       NG/OG/Feeding Tube Other (Comment) (Active)   Tube Status Continuous tube feeding 02/10/24 0800   Placement Verification X-ray 02/10/24 0800   Site Assessment Clean;Dry;Intact 02/10/24 0800   Tube Securement Nasal bridle 02/10/24 0800   Tube Feeding Frequency Continuous 02/10/24 0800   Tube Feeding Pivot 1.5 02/10/24 0800   Tube Feeding Strength Full strength 02/10/24 0800   Intake (mL) 10 mL 02/10/24 0800       Urethral Catheter (Active)   Site Assessment Clean;Skin intact 02/10/24 0800   Collection Container Standard drainage bag 02/10/24 0800   Securement Method Securing device (Describe) 02/10/24 0800   Reason for Continuing Urinary Catheterization accurate hourly measurement of urine volume in a critically ill patient that cannot be assessed by other volumes and urine collection strategies 02/10/24 0800   Output (mL) 100 mL 02/10/24 1200           Findings: ischemia of lateral stomach    Indications: Trevor Yarbrough is an 53 y.o. male who returns to the OR with an open abdomen s/p exploration for gastric perforation.        Procedure Details: The patient was brought to the operating room suite and put in the supine position on the operating room table.  He was properly identified at this time.  SCDs were applied, safety straps were applied, pressure points were padded.  He had a pre-existing Garnett catheter.  General anesthesia was induced.  The outer portion of the wound VAC was removed his abdomen was prepped and draped in the usual sterile fashion.    A critical pause occurred and all were in agreement.    The wound VAC was removed and the abdomen was inspected.  While this was occurring, esophagogastroscopy was being performed.  There are areas of ischemia noted within the mucosa of the stomach.  These correlated to ischemic appearing areas on the serosa of the stomach.  The EG junction was noted to be healthy.  The remaining antrum of the stomach was dissected free.  A small amount of bleeding  was encountered at the head of the pancreas.  This was controlled with suture ligation and a piece of Surgicel was laid over the area.  This was checked periodically throughout the operation was found to be hemostatic.  The pylorus was then transected with a TA stapler.  The antrum was passed off the specimen.    Attention was then paid to the proximal stomach.  The staple line was opened and the lateral portion of the stomach was noted to be ischemic both with the mucosa and externally on the serosa.  This was resected with a green load BERNARDINO stapler and passed off the specimen.    There was a portion of his omentum that was frankly necrotic.  This was resected using LigaSure and again passed off the specimen.    A handsewn, double layer, end to side gastrojejunostomy was performed using interrupted silk and running Vicryl sutures.  During the creation of the gastrojejunostomy, an NG tube and a Dobbhoff tube were placed.  Anesthesia had difficulty getting the NG tube to pass.  The EGD was then advanced and used to guide the nasogastric tube and the Dobbhoff tube into the stomach.  The nasogastric tube was eventually placed just across the anastomosis into the proximal limb and the Dobbhoff was placed distally and the efferent limb. Once the anastomosis was completed, a leak test was performed and there was no leak noted.    2 Clint drains were brought in through the left abdominal wall.  The inferior drain is placed above the pancreatic body and head.  The superiormost drain is placed over the anastomosis.  The abdomen was then copiously irrigated and running looped PDS sutures were used to reapproximate the fascia.  The abdomen was cleaned and dried and a subcutaneous wound VAC was placed.    Anesthesia noted that the patient's left radial arterial line had not been functioning appropriately throughout the case.  A right radial arterial line was placed in a Seldinger fashion with minimal blood loss.  This was  sutured in place a sterile dressing was applied and a good tracing was achieved.    A x-ray of the abdomen was obtained prior to closure per protocol.  There were no retained sponges seen per report of Dr. Arnold from radiology.  The patient remained intubated and was transported back to the intensive care unit in stable condition.  I discussed the operative findings and procedure with his wife postoperatively.  I also discussed the potential for necrosis of the stomach with need for revision to an esophagojejunostomy and the high likelihood of abscess formation due to the gross amount of contamination he had in his abdomen on presentation.      Complications:  None; patient tolerated the procedure well.    Disposition: PACU - hemodynamically stable. To ICU, not PACU  Condition: stable     Attending Attestation: I was present and scrubbed for the entire procedure.    Rianna Flynn  Phone Number: [phone]

## 2024-02-10 NOTE — PROGRESS NOTES
[facility]  ACUTE CARE SURGERY - PROGRESS NOTE    Patient Name: Trevor Yarbrough  MRN: [id number]  Admit Date: 206  : 1970  AGE: 53 y.o.   GENDER: male  ==============================================================================  TODAY'S ASSESSMENT AND PLAN OF CARE:  Pt is a 54yo M with hx of presumed insulinoma who started octreotide and then felt abdominal pain a few hours later, found to have pneumoperitoneum with peritonitis.     24: exlap, subtotal gastrectomy  : washout  : antrectomy, Billroth 2 anatomy; fascia closed, skin open with wound vac placed    PLAN  - agree with moving to extubation per ICU  - advance TF; no crushed meds through dobhoff - only liquids  - IV PPI BID  - fluc/zosyn x 4 days for intra-abdominal infection; source control ; okay to DC vancomycin  - dvt ppx  - appreciate excellent care per ICU    Discussed with Attending, Dr. Wolfe.     Dylon Webster MD   Pager [phone]     ==============================================================================  CHIEF COMPLAINT / EVENTS LAST 24HRS / HPI:  ICU working on agitation and extubation, started precedex  Dobhoff beyond G-J anastomosis with trickle tube feeds    MEDICAL HISTORY / ROS:   Admission history and ROS reviewed. Pertinent changes as follows:  See above    PHYSICAL EXAM:  Heart Rate:  []   Temp:  [36 °C (96.8 °F)-38 °C (100.4 °F)]   Resp:  [12-29]   BP: (109-171)/(60-84)   SpO2:  [95 %-100 %]   Physical Exam    Physical Exam     Constitutional- intubated, sedated  Cards- regular rate, no pressors  Resp- nonlabored breathing on positive pressure ventilation  Abdomen- soft, edematous, mildly distended; vac holding suction; Superior drain serosang, inferior drain with murky/serosang fluid  Extremities- BURKETT  Skin- warm, dry   Neuro- not following commands but BURKETT spontaneously  Tubes/lines- NG, dobhoff, abdominal drains x2, vac, carlton, arterial line    IMAGING SUMMARY:   (summary of new imaging findings, not a copy of dictation)  See above    LABS:  Results from last 7 days   Lab Units 02/10/24  0301 02/09/24  1530 02/09/24  0116 02/06/24  1111 02/06/24  0136   WBC AUTO x10*3/uL 12.5* 11.0 8.1   < > 10.2   HEMOGLOBIN g/dL 8.3* 7.9* 7.8*   < > 15.4   HEMATOCRIT % 24.6* 23.9* 23.1*   < > 44.6   PLATELETS AUTO x10*3/uL 204 169 138*   < > 236   NEUTROS PCT AUTO %  --   --   --   --  40.5   LYMPHS PCT AUTO %  --   --   --   --  45.8   MONOS PCT AUTO %  --   --   --   --  9.3   EOS PCT AUTO %  --   --   --   --  3.7    < > = values in this interval not displayed.     Results from last 7 days   Lab Units 02/09/24 1530 02/09/24 0116 02/08/24  0014   APTT seconds 25* 27 30   INR  1.0 1.0 1.3*     Results from last 7 days   Lab Units 02/10/24  0301 02/09/24  1530 02/09/24  0116 02/06/24  1111 02/06/24  0136   SODIUM mmol/L 142 140 138   < > 142   POTASSIUM mmol/L 3.6 3.9 4.2   < > 3.4   CHLORIDE mmol/L 106 103 105   < > 103   CO2 mmol/L 27 29 30   < > 25   BUN mg/dL 18 19 19   < > 19   CREATININE mg/dL 0.87 0.86 0.95   < > 1.20   CALCIUM mg/dL 7.0* 7.3* 7.5*   < > 9.7   PROTEIN TOTAL g/dL  --  4.0*  --   --  7.6   BILIRUBIN TOTAL mg/dL  --  0.4  --   --  0.3   ALK PHOS U/L  --  46  --   --  73   ALT U/L  --  469*  --   --  36   AST U/L  --  419*  --   --  36   GLUCOSE mg/dL 99 92 92   < > 124*    < > = values in this interval not displayed.     Results from last 7 days   Lab Units 02/09/24 1530 02/06/24  0136   BILIRUBIN TOTAL mg/dL 0.4 0.3     Results from last 7 days   Lab Units 02/09/24  2115 02/09/24  1814 02/09/24  1428   POCT PH, ARTERIAL pH 7.44* 7.47* 7.45*   POCT PCO2, ARTERIAL mm Hg 43* 41 40   POCT PO2, ARTERIAL mm Hg 118* 63* 71*   POCT HCO3 CALCULATED, ARTERIAL mmol/L 29.2* 29.8* 27.8*   POCT BASE EXCESS, ARTERIAL mmol/L 4.6* 5.6* 3.5*       I have reviewed all medications, laboratory results, and imaging pertinent for today's encounter.

## 2024-02-11 ENCOUNTER — APPOINTMENT (OUTPATIENT)
Dept: RADIOLOGY | Facility: HOSPITAL | Age: 54
DRG: 853 | End: 2024-02-11
Payer: COMMERCIAL

## 2024-02-11 LAB
ALBUMIN SERPL BCP-MCNC: 1.9 G/DL (ref 3.4–5)
ANION GAP BLDA CALCULATED.4IONS-SCNC: 3 MMO/L (ref 10–25)
ANION GAP SERPL CALC-SCNC: 11 MMOL/L (ref 10–20)
BASE EXCESS BLDA CALC-SCNC: 4.9 MMOL/L (ref -2–3)
BLOOD EXPIRATION DATE: NORMAL
BODY TEMPERATURE: 37 DEGREES CELSIUS
BUN SERPL-MCNC: 18 MG/DL (ref 6–23)
CA-I BLD-SCNC: 1.11 MMOL/L (ref 1.1–1.33)
CA-I BLDA-SCNC: 1.11 MMOL/L (ref 1.1–1.33)
CALCIUM SERPL-MCNC: 7 MG/DL (ref 8.6–10.6)
CHLORIDE BLDA-SCNC: 102 MMOL/L (ref 98–107)
CHLORIDE SERPL-SCNC: 105 MMOL/L (ref 98–107)
CO2 SERPL-SCNC: 27 MMOL/L (ref 21–32)
CREAT SERPL-MCNC: 0.79 MG/DL (ref 0.5–1.3)
DISPENSE STATUS: NORMAL
EGFRCR SERPLBLD CKD-EPI 2021: >90 ML/MIN/1.73M*2
ERYTHROCYTE [DISTWIDTH] IN BLOOD BY AUTOMATED COUNT: 14.1 % (ref 11.5–14.5)
ERYTHROCYTE [DISTWIDTH] IN BLOOD BY AUTOMATED COUNT: 14.2 % (ref 11.5–14.5)
ERYTHROCYTE [DISTWIDTH] IN BLOOD BY AUTOMATED COUNT: 14.4 % (ref 11.5–14.5)
GLUCOSE BLD MANUAL STRIP-MCNC: 107 MG/DL (ref 74–99)
GLUCOSE BLD MANUAL STRIP-MCNC: 115 MG/DL (ref 74–99)
GLUCOSE BLD MANUAL STRIP-MCNC: 119 MG/DL (ref 74–99)
GLUCOSE BLD MANUAL STRIP-MCNC: 126 MG/DL (ref 74–99)
GLUCOSE BLD MANUAL STRIP-MCNC: 127 MG/DL (ref 74–99)
GLUCOSE BLDA-MCNC: 141 MG/DL (ref 74–99)
GLUCOSE SERPL-MCNC: 134 MG/DL (ref 74–99)
HCO3 BLDA-SCNC: 29.2 MMOL/L (ref 22–26)
HCT VFR BLD AUTO: 18.4 % (ref 41–52)
HCT VFR BLD AUTO: 19 % (ref 41–52)
HCT VFR BLD AUTO: 21.2 % (ref 41–52)
HCT VFR BLD EST: 44 % (ref 41–52)
HGB BLD-MCNC: 5.9 G/DL (ref 13.5–17.5)
HGB BLD-MCNC: 6.3 G/DL (ref 13.5–17.5)
HGB BLD-MCNC: 7.2 G/DL (ref 13.5–17.5)
HGB BLDA-MCNC: 14.5 G/DL (ref 13.5–17.5)
INHALED O2 CONCENTRATION: 40 %
LACTATE BLDA-SCNC: 0.9 MMOL/L (ref 0.4–2)
MAGNESIUM SERPL-MCNC: 1.99 MG/DL (ref 1.6–2.4)
MCH RBC QN AUTO: 26.7 PG (ref 26–34)
MCH RBC QN AUTO: 27.5 PG (ref 26–34)
MCH RBC QN AUTO: 27.8 PG (ref 26–34)
MCHC RBC AUTO-ENTMCNC: 32.1 G/DL (ref 32–36)
MCHC RBC AUTO-ENTMCNC: 33.2 G/DL (ref 32–36)
MCHC RBC AUTO-ENTMCNC: 34 G/DL (ref 32–36)
MCV RBC AUTO: 82 FL (ref 80–100)
MCV RBC AUTO: 83 FL (ref 80–100)
MCV RBC AUTO: 83 FL (ref 80–100)
NRBC BLD-RTO: 0 /100 WBCS (ref 0–0)
OXYHGB MFR BLDA: 97.7 % (ref 94–98)
PCO2 BLDA: 41 MM HG (ref 38–42)
PH BLDA: 7.46 PH (ref 7.38–7.42)
PHOSPHATE SERPL-MCNC: 3.2 MG/DL (ref 2.5–4.9)
PLATELET # BLD AUTO: 158 X10*3/UL (ref 150–450)
PLATELET # BLD AUTO: 160 X10*3/UL (ref 150–450)
PLATELET # BLD AUTO: 170 X10*3/UL (ref 150–450)
PO2 BLDA: 139 MM HG (ref 85–95)
POTASSIUM BLDA-SCNC: 3.6 MMOL/L (ref 3.5–5.3)
POTASSIUM SERPL-SCNC: 3.7 MMOL/L (ref 3.5–5.3)
PRODUCT BLOOD TYPE: 7300
PRODUCT CODE: NORMAL
RBC # BLD AUTO: 2.21 X10*6/UL (ref 4.5–5.9)
RBC # BLD AUTO: 2.29 X10*6/UL (ref 4.5–5.9)
RBC # BLD AUTO: 2.59 X10*6/UL (ref 4.5–5.9)
SAO2 % BLDA: 100 % (ref 94–100)
SODIUM BLDA-SCNC: 131 MMOL/L (ref 136–145)
SODIUM SERPL-SCNC: 139 MMOL/L (ref 136–145)
UNIT ABO: NORMAL
UNIT NUMBER: NORMAL
UNIT RH: NORMAL
UNIT VOLUME: 350
WBC # BLD AUTO: 6.2 X10*3/UL (ref 4.4–11.3)
WBC # BLD AUTO: 6.3 X10*3/UL (ref 4.4–11.3)
WBC # BLD AUTO: 7.2 X10*3/UL (ref 4.4–11.3)
XM INTEP: NORMAL

## 2024-02-11 PROCEDURE — 84132 ASSAY OF SERUM POTASSIUM: CPT

## 2024-02-11 PROCEDURE — C9113 INJ PANTOPRAZOLE SODIUM, VIA: HCPCS

## 2024-02-11 PROCEDURE — 2500000001 HC RX 250 WO HCPCS SELF ADMINISTERED DRUGS (ALT 637 FOR MEDICARE OP): Performed by: NURSE PRACTITIONER

## 2024-02-11 PROCEDURE — 37799 UNLISTED PX VASCULAR SURGERY: CPT | Performed by: STUDENT IN AN ORGANIZED HEALTH CARE EDUCATION/TRAINING PROGRAM

## 2024-02-11 PROCEDURE — 82330 ASSAY OF CALCIUM: CPT

## 2024-02-11 PROCEDURE — P9016 RBC LEUKOCYTES REDUCED: HCPCS

## 2024-02-11 PROCEDURE — 83735 ASSAY OF MAGNESIUM: CPT

## 2024-02-11 PROCEDURE — 2500000001 HC RX 250 WO HCPCS SELF ADMINISTERED DRUGS (ALT 637 FOR MEDICARE OP)

## 2024-02-11 PROCEDURE — 94003 VENT MGMT INPAT SUBQ DAY: CPT

## 2024-02-11 PROCEDURE — 71045 X-RAY EXAM CHEST 1 VIEW: CPT

## 2024-02-11 PROCEDURE — 85027 COMPLETE CBC AUTOMATED: CPT

## 2024-02-11 PROCEDURE — 82947 ASSAY GLUCOSE BLOOD QUANT: CPT

## 2024-02-11 PROCEDURE — 2020000001 HC ICU ROOM DAILY

## 2024-02-11 PROCEDURE — 2500000004 HC RX 250 GENERAL PHARMACY W/ HCPCS (ALT 636 FOR OP/ED)

## 2024-02-11 PROCEDURE — 85027 COMPLETE CBC AUTOMATED: CPT | Performed by: STUDENT IN AN ORGANIZED HEALTH CARE EDUCATION/TRAINING PROGRAM

## 2024-02-11 PROCEDURE — 99291 CRITICAL CARE FIRST HOUR: CPT | Performed by: SURGERY

## 2024-02-11 PROCEDURE — 36430 TRANSFUSION BLD/BLD COMPNT: CPT

## 2024-02-11 PROCEDURE — 37799 UNLISTED PX VASCULAR SURGERY: CPT

## 2024-02-11 PROCEDURE — 2500000004 HC RX 250 GENERAL PHARMACY W/ HCPCS (ALT 636 FOR OP/ED): Performed by: NURSE PRACTITIONER

## 2024-02-11 PROCEDURE — 2500000004 HC RX 250 GENERAL PHARMACY W/ HCPCS (ALT 636 FOR OP/ED): Performed by: STUDENT IN AN ORGANIZED HEALTH CARE EDUCATION/TRAINING PROGRAM

## 2024-02-11 PROCEDURE — 71045 X-RAY EXAM CHEST 1 VIEW: CPT | Performed by: RADIOLOGY

## 2024-02-11 RX ORDER — OXYCODONE HCL 5 MG/5 ML
10 SOLUTION, ORAL ORAL EVERY 6 HOURS PRN
Status: DISCONTINUED | OUTPATIENT
Start: 2024-02-11 | End: 2024-02-12

## 2024-02-11 RX ORDER — OXYCODONE HCL 5 MG/5 ML
5 SOLUTION, ORAL ORAL EVERY 6 HOURS PRN
Status: DISCONTINUED | OUTPATIENT
Start: 2024-02-11 | End: 2024-02-12

## 2024-02-11 RX ORDER — ACETAMINOPHEN 160 MG/5ML
650 SOLUTION ORAL EVERY 6 HOURS PRN
Status: DISCONTINUED | OUTPATIENT
Start: 2024-02-11 | End: 2024-02-12

## 2024-02-11 RX ORDER — METHOCARBAMOL 500 MG/1
500 TABLET, FILM COATED ORAL EVERY 6 HOURS SCHEDULED
Status: DISCONTINUED | OUTPATIENT
Start: 2024-02-11 | End: 2024-02-13

## 2024-02-11 RX ADMIN — PANTOPRAZOLE SODIUM 40 MG: 40 INJECTION, POWDER, FOR SOLUTION INTRAVENOUS at 20:39

## 2024-02-11 RX ADMIN — POTASSIUM CHLORIDE 20 MEQ: 14.9 INJECTION, SOLUTION INTRAVENOUS at 06:47

## 2024-02-11 RX ADMIN — HYDROMORPHONE HYDROCHLORIDE 0.4 MG: 1 INJECTION, SOLUTION INTRAMUSCULAR; INTRAVENOUS; SUBCUTANEOUS at 01:25

## 2024-02-11 RX ADMIN — SODIUM CHLORIDE, POTASSIUM CHLORIDE, SODIUM LACTATE AND CALCIUM CHLORIDE 75 ML/HR: 600; 310; 30; 20 INJECTION, SOLUTION INTRAVENOUS at 05:50

## 2024-02-11 RX ADMIN — PANTOPRAZOLE SODIUM 40 MG: 40 INJECTION, POWDER, FOR SOLUTION INTRAVENOUS at 09:50

## 2024-02-11 RX ADMIN — HEPARIN SODIUM 5000 UNITS: 5000 INJECTION INTRAVENOUS; SUBCUTANEOUS at 01:25

## 2024-02-11 RX ADMIN — DEXMEDETOMIDINE HYDROCHLORIDE 0.8 MCG/KG/HR: 400 INJECTION INTRAVENOUS at 05:50

## 2024-02-11 RX ADMIN — HEPARIN SODIUM 5000 UNITS: 5000 INJECTION INTRAVENOUS; SUBCUTANEOUS at 09:50

## 2024-02-11 RX ADMIN — HEPARIN SODIUM 5000 UNITS: 5000 INJECTION INTRAVENOUS; SUBCUTANEOUS at 18:18

## 2024-02-11 RX ADMIN — OXYCODONE HYDROCHLORIDE 10 MG: 5 SOLUTION ORAL at 21:54

## 2024-02-11 RX ADMIN — OXYCODONE HYDROCHLORIDE 10 MG: 5 SOLUTION ORAL at 10:41

## 2024-02-11 RX ADMIN — HYDROMORPHONE HYDROCHLORIDE 0.2 MG: 1 INJECTION, SOLUTION INTRAMUSCULAR; INTRAVENOUS; SUBCUTANEOUS at 02:48

## 2024-02-11 RX ADMIN — ACETAMINOPHEN 650 MG: 650 SOLUTION ORAL at 20:39

## 2024-02-11 RX ADMIN — METHOCARBAMOL 500 MG: 500 TABLET ORAL at 20:39

## 2024-02-11 ASSESSMENT — PAIN - FUNCTIONAL ASSESSMENT
PAIN_FUNCTIONAL_ASSESSMENT: CPOT (CRITICAL CARE PAIN OBSERVATION TOOL)
PAIN_FUNCTIONAL_ASSESSMENT: 0-10
PAIN_FUNCTIONAL_ASSESSMENT: CPOT (CRITICAL CARE PAIN OBSERVATION TOOL)
PAIN_FUNCTIONAL_ASSESSMENT: 0-10
PAIN_FUNCTIONAL_ASSESSMENT: CPOT (CRITICAL CARE PAIN OBSERVATION TOOL)

## 2024-02-11 ASSESSMENT — PAIN SCALES - GENERAL
PAINLEVEL_OUTOF10: 0 - NO PAIN
PAINLEVEL_OUTOF10: 7
PAINLEVEL_OUTOF10: 3
PAINLEVEL_OUTOF10: 3

## 2024-02-11 NOTE — PROGRESS NOTES
[facility]  TRAUMA ICU - PROGRESS NOTE    Patient Name: Trevor Yarbrough  MRN: [id number]  Admit Date: 206  : 1970  AGE: 53 y.o.   GENDER: male    ==============================================================================  TODAY'S ASSESSMENT AND PLAN OF CARE:  Trevor Yarbrough is a 53 y.o. male with a history of insulinoma and prior fundoplication for hiatal hernia who was transferred from OSH after presenting with abdominal pain and findings of pneumoperitoneum on imaging one day after starting octreotide for management of insulinoma. Patient was taken to the OR on  with ACS and found to have a gastric perforation along the lesser curvature as well as a small bowel volvulus. He underwent an ex lap and partial gastrectomy, and was ultimately left in discontinuity with an Abthera in place. Continuity has since been restored as of  and patient is no longer in septic shock.     OR Course:   - ex lap, partial gastrectomy, Abthera placement   - ex lap, washout, replacement of Abthera   - ex lap, completion antrectomy, proximal gastric resection, billroth II, placement of 2 chandan drains    NEURO/PAIN/SEDATION:   #Pain  #Sedation  - Fentanyl, precedex discontinued  - Prn liquid oxycodone 5/10 for moderate/severe pain, dilaudid for breakthrough per CPOT    RESPIRATORY:  #Intubated  - Daily SBT, will wean to extubate today    CARDIOVASC:  #Hypotension 2/2 septic shock  - Off pressors as of   - Goal: SBP >100 (per cuff)  - TTE done on , no vegetations seen    GI:  #Hx insulinoma  #Hx fundoplication  #Gastric perforation s/p billroth II  #SBO 2/2 volvulus  - NPO  - NGT to LIWS  - Advance TF to goal per nutrition recs  - Follow up pathology    FEN/:   - Garnett in place, strict I's and O's --> UOP 1.09 ml/kg/hr  - mIVF discontinued  - Replete lytes prn     HEMATOLOGIC:   - Daily CBC    ENDOCRINE:  #Hyperinsulinemic hypoglycemia, insulinoma  - Stress dose steroids  discontinued on 2/9 given resolution of shock  - SSI    MUSCULOSKELETAL/SKIN:   - PT/OT when able    INFECTIOUS DISEASE:   - Daily CBC  - Completed course of fluconazole, zosyn, and vanco  - Initial bcx (2/6) positive for strep viridans, repeat cx negative    GI PROPHYLAXIS: PPI    DVT PROPHYLAXIS: SQH, SCDs    LINES:  - Wilton (2/6-)  - L subclavian central line (2/6-)  - Carlton (2/6-)  - NGT (2/6-)  - Dobhoff (2/9-)  - Drains x2 (2/9-)  - PIV    DISPOSITION: TSICU    Seen and discussed with Dr. Kasper.    Marie Aden MD  PGY-2 General Surgery  TSICU [id number]  ==============================================================================  CHIEF COMPLAINT / OVERNIGHT EVENTS / HPI:   Unable to extubate patient yesterday due to poor mental status. Patient moving spontaneously but not following commands. Overnight, he remained hemodynamically stable. Required 1u pRBC for Hb of 5.9/6.3 on recheck.     MEDICAL HISTORY / ROS:  Admission history and ROS reviewed. Pertinent changes as follows: Unable to obtain given patient condition.    PHYSICAL EXAM:  Heart Rate:  []   Temp:  [36 °C (96.8 °F)-38.4 °C (101.1 °F)]   Resp:  [11-29]   BP: ()/(49-84)   SpO2:  [96 %-100 %]     General: agitated   HEENT: ETT in place, NGT to LIWS   Resp: intubated, ventilated on CPAP  CV: sinus tachycardia on monitor  Abd: soft, distended, wound vac in place to suction, drains x 2 with ss output  : carlton with clear yellow urine in bag  MSK: moves all extremities  Ext: no LE edema  Neuro: moves spontaneously, not following commands    IMAGING SUMMARY:   CXR 2/11: bibasilar atelectasis, stable from day prior    LABS:              6.3     6.3>-----<160              19.0   139  105  18                  ----------------<134     3.7  27  0.79          Ca 7.0 Phos 3.2 Mg 1.99         AlkPhos 46 tBili 0.4         Extubated on rounds depressed mental status all sedation off responding to name follows simple commands symmetric  chest expansion RR ABD post op changes avoid oversedation hold further fentanyl    To remain in ICU   Will Kasper MD

## 2024-02-11 NOTE — CARE PLAN
Problem: Pain  Goal: My pain/discomfort is manageable  Outcome: Progressing     Problem: Safety  Goal: Patient will be injury free during hospitalization  Outcome: Progressing  Goal: I will remain free of falls  Outcome: Progressing     Problem: Daily Care  Goal: Daily care needs are met  Outcome: Progressing     Problem: Psychosocial Needs  Goal: Demonstrates ability to cope with hospitalization/illness  Outcome: Progressing  Goal: Collaborate with me, my family, and caregiver to identify my specific goals  Outcome: Progressing

## 2024-02-12 ENCOUNTER — APPOINTMENT (OUTPATIENT)
Dept: RADIOLOGY | Facility: HOSPITAL | Age: 54
DRG: 853 | End: 2024-02-12
Payer: COMMERCIAL

## 2024-02-12 LAB
ALBUMIN SERPL BCP-MCNC: 2.2 G/DL (ref 3.4–5)
ANION GAP SERPL CALC-SCNC: 12 MMOL/L (ref 10–20)
BACTERIA BLD CULT: NORMAL
BLOOD EXPIRATION DATE: NORMAL
BUN SERPL-MCNC: 22 MG/DL (ref 6–23)
CA-I BLD-SCNC: 1.07 MMOL/L (ref 1.1–1.33)
CALCIUM SERPL-MCNC: 7.3 MG/DL (ref 8.6–10.6)
CHLORIDE SERPL-SCNC: 109 MMOL/L (ref 98–107)
CO2 SERPL-SCNC: 26 MMOL/L (ref 21–32)
CREAT SERPL-MCNC: 0.72 MG/DL (ref 0.5–1.3)
DISPENSE STATUS: NORMAL
EGFRCR SERPLBLD CKD-EPI 2021: >90 ML/MIN/1.73M*2
ERYTHROCYTE [DISTWIDTH] IN BLOOD BY AUTOMATED COUNT: 14.7 % (ref 11.5–14.5)
ERYTHROCYTE [DISTWIDTH] IN BLOOD BY AUTOMATED COUNT: 14.8 % (ref 11.5–14.5)
GLUCOSE BLD MANUAL STRIP-MCNC: 104 MG/DL (ref 74–99)
GLUCOSE BLD MANUAL STRIP-MCNC: 107 MG/DL (ref 74–99)
GLUCOSE BLD MANUAL STRIP-MCNC: 117 MG/DL (ref 74–99)
GLUCOSE BLD MANUAL STRIP-MCNC: 57 MG/DL (ref 74–99)
GLUCOSE BLD MANUAL STRIP-MCNC: 78 MG/DL (ref 74–99)
GLUCOSE BLD MANUAL STRIP-MCNC: 81 MG/DL (ref 74–99)
GLUCOSE BLD MANUAL STRIP-MCNC: 90 MG/DL (ref 74–99)
GLUCOSE BLD MANUAL STRIP-MCNC: 97 MG/DL (ref 74–99)
GLUCOSE SERPL-MCNC: 115 MG/DL (ref 74–99)
HCT VFR BLD AUTO: 21.6 % (ref 41–52)
HCT VFR BLD AUTO: 23.1 % (ref 41–52)
HGB BLD-MCNC: 7.3 G/DL (ref 13.5–17.5)
HGB BLD-MCNC: 7.4 G/DL (ref 13.5–17.5)
MAGNESIUM SERPL-MCNC: 1.96 MG/DL (ref 1.6–2.4)
MCH RBC QN AUTO: 26.7 PG (ref 26–34)
MCH RBC QN AUTO: 28.2 PG (ref 26–34)
MCHC RBC AUTO-ENTMCNC: 32 G/DL (ref 32–36)
MCHC RBC AUTO-ENTMCNC: 33.8 G/DL (ref 32–36)
MCV RBC AUTO: 83 FL (ref 80–100)
MCV RBC AUTO: 83 FL (ref 80–100)
NRBC BLD-RTO: 0 /100 WBCS (ref 0–0)
NRBC BLD-RTO: 0.2 /100 WBCS (ref 0–0)
PHOSPHATE SERPL-MCNC: 2.2 MG/DL (ref 2.5–4.9)
PLATELET # BLD AUTO: 207 X10*3/UL (ref 150–450)
PLATELET # BLD AUTO: 239 X10*3/UL (ref 150–450)
POTASSIUM SERPL-SCNC: 3.4 MMOL/L (ref 3.5–5.3)
PRODUCT BLOOD TYPE: 7300
PRODUCT CODE: NORMAL
RBC # BLD AUTO: 2.59 X10*6/UL (ref 4.5–5.9)
RBC # BLD AUTO: 2.77 X10*6/UL (ref 4.5–5.9)
SODIUM SERPL-SCNC: 144 MMOL/L (ref 136–145)
UNIT ABO: NORMAL
UNIT NUMBER: NORMAL
UNIT RH: NORMAL
UNIT VOLUME: 350
WBC # BLD AUTO: 10 X10*3/UL (ref 4.4–11.3)
WBC # BLD AUTO: 12.7 X10*3/UL (ref 4.4–11.3)
XM INTEP: NORMAL

## 2024-02-12 PROCEDURE — 82947 ASSAY GLUCOSE BLOOD QUANT: CPT

## 2024-02-12 PROCEDURE — 99291 CRITICAL CARE FIRST HOUR: CPT | Performed by: STUDENT IN AN ORGANIZED HEALTH CARE EDUCATION/TRAINING PROGRAM

## 2024-02-12 PROCEDURE — 2500000004 HC RX 250 GENERAL PHARMACY W/ HCPCS (ALT 636 FOR OP/ED)

## 2024-02-12 PROCEDURE — 83735 ASSAY OF MAGNESIUM: CPT

## 2024-02-12 PROCEDURE — 37799 UNLISTED PX VASCULAR SURGERY: CPT

## 2024-02-12 PROCEDURE — 84100 ASSAY OF PHOSPHORUS: CPT

## 2024-02-12 PROCEDURE — 2500000004 HC RX 250 GENERAL PHARMACY W/ HCPCS (ALT 636 FOR OP/ED): Performed by: STUDENT IN AN ORGANIZED HEALTH CARE EDUCATION/TRAINING PROGRAM

## 2024-02-12 PROCEDURE — 71045 X-RAY EXAM CHEST 1 VIEW: CPT

## 2024-02-12 PROCEDURE — 71045 X-RAY EXAM CHEST 1 VIEW: CPT | Performed by: RADIOLOGY

## 2024-02-12 PROCEDURE — 82330 ASSAY OF CALCIUM: CPT

## 2024-02-12 PROCEDURE — 1200000002 HC GENERAL ROOM WITH TELEMETRY DAILY

## 2024-02-12 PROCEDURE — 97162 PT EVAL MOD COMPLEX 30 MIN: CPT | Mod: GP | Performed by: PHYSICAL THERAPIST

## 2024-02-12 PROCEDURE — 2500000001 HC RX 250 WO HCPCS SELF ADMINISTERED DRUGS (ALT 637 FOR MEDICARE OP)

## 2024-02-12 PROCEDURE — C9113 INJ PANTOPRAZOLE SODIUM, VIA: HCPCS

## 2024-02-12 PROCEDURE — 85027 COMPLETE CBC AUTOMATED: CPT

## 2024-02-12 PROCEDURE — P9016 RBC LEUKOCYTES REDUCED: HCPCS

## 2024-02-12 PROCEDURE — 2500000001 HC RX 250 WO HCPCS SELF ADMINISTERED DRUGS (ALT 637 FOR MEDICARE OP): Performed by: NURSE PRACTITIONER

## 2024-02-12 PROCEDURE — 2500000004 HC RX 250 GENERAL PHARMACY W/ HCPCS (ALT 636 FOR OP/ED): Performed by: NURSE PRACTITIONER

## 2024-02-12 PROCEDURE — 36430 TRANSFUSION BLD/BLD COMPNT: CPT

## 2024-02-12 RX ORDER — ACETAMINOPHEN 160 MG/5ML
650 SOLUTION ORAL EVERY 6 HOURS
Status: DISCONTINUED | OUTPATIENT
Start: 2024-02-12 | End: 2024-02-13

## 2024-02-12 RX ORDER — SODIUM CHLORIDE, SODIUM LACTATE, POTASSIUM CHLORIDE, CALCIUM CHLORIDE 600; 310; 30; 20 MG/100ML; MG/100ML; MG/100ML; MG/100ML
75 INJECTION, SOLUTION INTRAVENOUS CONTINUOUS
Status: DISCONTINUED | OUTPATIENT
Start: 2024-02-12 | End: 2024-02-13

## 2024-02-12 RX ORDER — HYDROMORPHONE HYDROCHLORIDE 1 MG/ML
0.5 INJECTION, SOLUTION INTRAMUSCULAR; INTRAVENOUS; SUBCUTANEOUS ONCE
Status: COMPLETED | OUTPATIENT
Start: 2024-02-12 | End: 2024-02-12

## 2024-02-12 RX ORDER — OXYCODONE HYDROCHLORIDE 5 MG/1
5 CAPSULE ORAL EVERY 4 HOURS PRN
Status: DISCONTINUED | OUTPATIENT
Start: 2024-02-12 | End: 2024-02-12

## 2024-02-12 RX ORDER — CALCIUM GLUCONATE 20 MG/ML
1 INJECTION, SOLUTION INTRAVENOUS EVERY 4 HOURS
Status: DISCONTINUED | OUTPATIENT
Start: 2024-02-12 | End: 2024-02-12

## 2024-02-12 RX ORDER — ENOXAPARIN SODIUM 100 MG/ML
40 INJECTION SUBCUTANEOUS EVERY 24 HOURS
Status: DISCONTINUED | OUTPATIENT
Start: 2024-02-12 | End: 2024-02-26 | Stop reason: HOSPADM

## 2024-02-12 RX ORDER — OXYCODONE HCL 5 MG/5 ML
5 SOLUTION, ORAL ORAL EVERY 4 HOURS PRN
Status: DISCONTINUED | OUTPATIENT
Start: 2024-02-12 | End: 2024-02-19

## 2024-02-12 RX ORDER — OXYCODONE HYDROCHLORIDE 5 MG/1
10 CAPSULE ORAL EVERY 4 HOURS PRN
Status: DISCONTINUED | OUTPATIENT
Start: 2024-02-12 | End: 2024-02-12

## 2024-02-12 RX ORDER — PETROLATUM 420 MG/G
OINTMENT TOPICAL
Status: DISCONTINUED | OUTPATIENT
Start: 2024-02-12 | End: 2024-02-26 | Stop reason: HOSPADM

## 2024-02-12 RX ORDER — METHOCARBAMOL 100 MG/ML
1000 INJECTION, SOLUTION INTRAMUSCULAR; INTRAVENOUS ONCE
Status: COMPLETED | OUTPATIENT
Start: 2024-02-12 | End: 2024-02-12

## 2024-02-12 RX ORDER — POTASSIUM CHLORIDE 1.5 G/1.58G
40 POWDER, FOR SOLUTION ORAL ONCE
Status: COMPLETED | OUTPATIENT
Start: 2024-02-12 | End: 2024-02-12

## 2024-02-12 RX ORDER — CALCIUM GLUCONATE 20 MG/ML
1 INJECTION, SOLUTION INTRAVENOUS EVERY 4 HOURS
Status: DISPENSED | OUTPATIENT
Start: 2024-02-12 | End: 2024-02-12

## 2024-02-12 RX ORDER — HYDROMORPHONE HYDROCHLORIDE 1 MG/ML
INJECTION, SOLUTION INTRAMUSCULAR; INTRAVENOUS; SUBCUTANEOUS
Status: COMPLETED
Start: 2024-02-12 | End: 2024-02-12

## 2024-02-12 RX ORDER — HYDROMORPHONE HYDROCHLORIDE 1 MG/ML
0.6 INJECTION, SOLUTION INTRAMUSCULAR; INTRAVENOUS; SUBCUTANEOUS ONCE
Status: COMPLETED | OUTPATIENT
Start: 2024-02-12 | End: 2024-02-12

## 2024-02-12 RX ORDER — ENOXAPARIN SODIUM 100 MG/ML
40 INJECTION SUBCUTANEOUS EVERY 24 HOURS
Status: DISCONTINUED | OUTPATIENT
Start: 2024-02-12 | End: 2024-02-12

## 2024-02-12 RX ORDER — OXYCODONE HCL 5 MG/5 ML
10 SOLUTION, ORAL ORAL EVERY 4 HOURS PRN
Status: DISCONTINUED | OUTPATIENT
Start: 2024-02-12 | End: 2024-02-19

## 2024-02-12 RX ORDER — HYDROMORPHONE HYDROCHLORIDE 1 MG/ML
0.4 INJECTION, SOLUTION INTRAMUSCULAR; INTRAVENOUS; SUBCUTANEOUS EVERY 4 HOURS PRN
Status: DISCONTINUED | OUTPATIENT
Start: 2024-02-12 | End: 2024-02-26 | Stop reason: HOSPADM

## 2024-02-12 RX ADMIN — HEPARIN SODIUM 5000 UNITS: 5000 INJECTION INTRAVENOUS; SUBCUTANEOUS at 08:32

## 2024-02-12 RX ADMIN — HYDROMORPHONE HYDROCHLORIDE 0.5 MG: 1 INJECTION, SOLUTION INTRAMUSCULAR; INTRAVENOUS; SUBCUTANEOUS at 23:31

## 2024-02-12 RX ADMIN — POTASSIUM CHLORIDE 40 MEQ: 1.5 POWDER, FOR SOLUTION ORAL at 06:04

## 2024-02-12 RX ADMIN — OXYCODONE HYDROCHLORIDE 10 MG: 5 SOLUTION ORAL at 12:41

## 2024-02-12 RX ADMIN — METHOCARBAMOL 1000 MG: 100 INJECTION INTRAMUSCULAR; INTRAVENOUS at 23:30

## 2024-02-12 RX ADMIN — ACETAMINOPHEN 650 MG: 650 SOLUTION ORAL at 09:23

## 2024-02-12 RX ADMIN — DEXTROSE MONOHYDRATE 0.3 G/KG/HR: 100 INJECTION, SOLUTION INTRAVENOUS at 20:47

## 2024-02-12 RX ADMIN — METHOCARBAMOL 500 MG: 500 TABLET ORAL at 01:38

## 2024-02-12 RX ADMIN — ACETAMINOPHEN 650 MG: 650 SOLUTION ORAL at 14:45

## 2024-02-12 RX ADMIN — METHOCARBAMOL 500 MG: 500 TABLET ORAL at 08:32

## 2024-02-12 RX ADMIN — OXYCODONE HYDROCHLORIDE 10 MG: 5 SOLUTION ORAL at 16:36

## 2024-02-12 RX ADMIN — SODIUM CHLORIDE, POTASSIUM CHLORIDE, SODIUM LACTATE AND CALCIUM CHLORIDE 75 ML/HR: 600; 310; 30; 20 INJECTION, SOLUTION INTRAVENOUS at 12:01

## 2024-02-12 RX ADMIN — HYDROMORPHONE HYDROCHLORIDE 0.4 MG: 1 INJECTION, SOLUTION INTRAMUSCULAR; INTRAVENOUS; SUBCUTANEOUS at 20:37

## 2024-02-12 RX ADMIN — HYDROMORPHONE HYDROCHLORIDE 0.6 MG: 1 INJECTION, SOLUTION INTRAMUSCULAR; INTRAVENOUS; SUBCUTANEOUS at 11:28

## 2024-02-12 RX ADMIN — CALCIUM GLUCONATE 1 G: 20 INJECTION, SOLUTION INTRAVENOUS at 06:04

## 2024-02-12 RX ADMIN — PANTOPRAZOLE SODIUM 40 MG: 40 INJECTION, POWDER, FOR SOLUTION INTRAVENOUS at 22:49

## 2024-02-12 RX ADMIN — HYDROMORPHONE HYDROCHLORIDE 0.4 MG: 1 INJECTION, SOLUTION INTRAMUSCULAR; INTRAVENOUS; SUBCUTANEOUS at 08:36

## 2024-02-12 RX ADMIN — HEPARIN SODIUM 5000 UNITS: 5000 INJECTION INTRAVENOUS; SUBCUTANEOUS at 01:38

## 2024-02-12 RX ADMIN — HYDROMORPHONE HYDROCHLORIDE 0.4 MG: 1 INJECTION, SOLUTION INTRAMUSCULAR; INTRAVENOUS; SUBCUTANEOUS at 00:08

## 2024-02-12 RX ADMIN — ENOXAPARIN SODIUM 40 MG: 100 INJECTION SUBCUTANEOUS at 22:50

## 2024-02-12 RX ADMIN — ACETAMINOPHEN 650 MG: 650 SOLUTION ORAL at 22:50

## 2024-02-12 RX ADMIN — PANTOPRAZOLE SODIUM 40 MG: 40 INJECTION, POWDER, FOR SOLUTION INTRAVENOUS at 08:32

## 2024-02-12 RX ADMIN — OXYCODONE HYDROCHLORIDE 10 MG: 5 SOLUTION ORAL at 02:30

## 2024-02-12 RX ADMIN — METHOCARBAMOL 500 MG: 500 TABLET ORAL at 13:22

## 2024-02-12 RX ADMIN — OXYCODONE HYDROCHLORIDE 10 MG: 5 SOLUTION ORAL at 06:51

## 2024-02-12 ASSESSMENT — COGNITIVE AND FUNCTIONAL STATUS - GENERAL
WALKING IN HOSPITAL ROOM: A LOT
MOVING FROM LYING ON BACK TO SITTING ON SIDE OF FLAT BED WITH BEDRAILS: A LITTLE
CLIMB 3 TO 5 STEPS WITH RAILING: TOTAL
MOBILITY SCORE: 12
MOVING TO AND FROM BED TO CHAIR: A LOT
STANDING UP FROM CHAIR USING ARMS: A LOT
TURNING FROM BACK TO SIDE WHILE IN FLAT BAD: A LOT

## 2024-02-12 ASSESSMENT — PAIN SCALES - GENERAL
PAINLEVEL_OUTOF10: 7
PAINLEVEL_OUTOF10: 5 - MODERATE PAIN
PAINLEVEL_OUTOF10: 8
PAINLEVEL_OUTOF10: 7
PAINLEVEL_OUTOF10: 7

## 2024-02-12 ASSESSMENT — ACTIVITIES OF DAILY LIVING (ADL): ADL_ASSISTANCE: INDEPENDENT

## 2024-02-12 NOTE — PROGRESS NOTES
[facility], [place]  TRAUMA ICU - PROGRESS NOTE    Patient Name: Trevor Yarbrough  MRN: [id number]  Admit Date: 206  : 1970  AGE: 53 y.o.   GENDER: male    ==============================================================================  TODAY'S ASSESSMENT AND PLAN OF CARE:  Trevor Yarbrough is a 53 y.o. male with a history of insulinoma and prior fundoplication for hiatal hernia s/p multiple OR as noted below. Now extubated, pain moderately controlled.     OR Course:   - ex lap, partial gastrectomy, Abthera placement   - ex lap, washout, replacement of Abthera   - ex lap, completion antrectomy, proximal gastric resection, billroth II, placement of 2 chandan drains    NEURO/PAIN/SEDATION:   - Prn liquid oxycodone /10 for moderate/severe pain, dilaudid for breakthrough per CPOT    RESPIRATORY:  - Extubated   - Maintain SpO2 > 92%    CARDIOVASC:  - Off pressors as of   - Goal: SBP >100 (per cuff)  - TTE done on , no vegetations seen    GI:  - Hx insulinoma, Hx fundoplication  - Gastric perforation s/p billroth II  - SBO  volvulus  - NPO  - NGT to LIWS  - TF @ 45 ml/hr  - Follow up pathology    FEN/:   - Garnett in place, strict I's and O's --> UOP 1.19 ml/kg/hr  - Replete lytes prn     HEMATOLOGIC:   - Daily CBC    ENDOCRINE:  - Hyperinsulinemic hypoglycemia, insulinoma  - Stress dose steroids discontinued on  given resolution of shock  - SSI    MUSCULOSKELETAL/SKIN:   - PT/OT when able    INFECTIOUS DISEASE:   - Daily CBC  - Completed course of fluconazole, zosyn, and vanco  - Initial bcx () positive for strep viridans, repeat cx negative    GI PROPHYLAXIS: PPI    DVT PROPHYLAXIS: SQH, SCDs    LINES:  - [place] (-)  - L subclavian central line (-)  - Garnett (-)  - NGT (-)  - Dobhoff (-)  - Drains x2 (-)  - PIV    DISPOSITION: TSICU    Seen and discussed with Dr. Ksaper.    Marie Aden MD  PGY-2 General Surgery  TSICU  [id number]  ==============================================================================  CHIEF COMPLAINT / OVERNIGHT EVENTS / HPI:   Unable to extubate patient yesterday due to poor mental status. Patient moving spontaneously but not following commands. Overnight, he remained hemodynamically stable. Required 1u pRBC for Hb of 5.9/6.3 on recheck.     MEDICAL HISTORY / ROS:  Admission history and ROS reviewed. Pertinent changes as follows: Unable to obtain given patient condition.    PHYSICAL EXAM:  Heart Rate:  [77-99]   Temp:  [35.9 °C (96.7 °F)-38 °C (100.4 °F)]   Resp:  [15-26]   BP: (120-166)/(64-88)   SpO2:  [88 %-100 %]     General: agitated   HEENT: ETT in place, NGT to LIWS   Resp: intubated, ventilated on CPAP  CV: sinus tachycardia on monitor  Abd: soft, distended, wound vac in place to suction, drains x 2 with ss output  : carlton with clear yellow urine in bag  MSK: moves all extremities  Ext: no LE edema  Neuro: moves spontaneously, not following commands    IMAGING SUMMARY:   CXR 2/11: bibasilar atelectasis, stable from day prior    LABS:              7.3     10.0>-----<207              21.6   144  109  22                  ----------------<115     3.4  26  0.72          Ca 7.3 Phos 2.2 Mg 1.96         AlkPhos 46 tBili 0.4         Extubated on rounds depressed mental status all sedation off responding to name follows simple commands symmetric chest expansion RR ABD post op changes avoid oversedation hold further fentanyl    To remain in ICU   Jason Coles MD

## 2024-02-12 NOTE — PROGRESS NOTES
Physical Therapy    Physical Therapy Evaluation    Patient Name: Trevor Yarbrough  MRN: [id number]  Today's Date: 2/12/2024   Time Calculation  Start Time: 1447  Stop Time: 1510  Time Calculation (min): 23 min    Assessment/Plan   PT Assessment  PT Assessment Results: Decreased strength, Decreased endurance, Impaired balance, Decreased mobility  Rehab Prognosis: Excellent  Evaluation/Treatment Tolerance: Patient tolerated treatment well  Medical Staff Made Aware: Yes  End of Session Communication: Bedside nurse  End of Session Patient Position: Up in chair, Alarm on  IP OR SWING BED PT PLAN  Inpatient or Swing Bed: Inpatient  PT Plan  Treatment/Interventions: Bed mobility, Transfer training, Gait training, Stair training, Neuromuscular re-education, Balance training, Strengthening, Range of motion, Endurance training, Therapeutic exercise, Therapeutic activity, Home exercise program, Postural re-education  PT Plan: Skilled PT  PT Frequency: 4 times per week  PT Discharge Recommendations: High intensity level of continued care  Equipment Recommended upon Discharge: Wheeled walker  PT Recommended Transfer Status: Assist x2  PT - OK to Discharge: Yes      Subjective   General Visit Information:  General  Reason for Referral: Pneumoperitoneum with peritonitis s/p 2/6/24: exlap, subtotal gastrectomy  2/7: washout  2/9: antrectomy, Billroth 2 anatomy; fascia closed, skin open with wound vac placed  Past Medical History Relevant to Rehab: insulinoma and prior fundoplication for hiatal hernia  Prior to Session Communication: Bedside nurse  Patient Position Received: Bed, 3 rail up  General Comment: Pt alert and agreeable. Appearing highly restless in bed however did not report any discomfort when asked (Pt with ivri mancia NG, chele, 4L NC, x2 ROSEMARIE drains, ext cath)  Home Living:  Home Living  Type of Home: House  Lives With:  (Pt reports he lives with his wife, father and 2 kids ages 4 and 5)  Home Adaptive Equipment:  None  Home Layout: Multi-level  Home Access:  (Pt reports 1-2 steps with rail to enter home)  Bathroom Shower/Tub:  (Has walk in and tub shower present)  Bathroom Equipment: Grab bars in shower (shower chair)  Prior Level of Function:  Prior Function Per Pt/Caregiver Report  Level of [place]: Independent with homemaking with ambulation  ADL Assistance: Independent  Homemaking Assistance: Independent  Ambulatory Assistance: Independent  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 Stated November for month)  Following Commands: Follows one step commands with repetition  Cognition Comments: Restless but not agitated    General Assessments:  Activity Tolerance  Endurance: Endurance does not limit participation in activity  Early Mobility/Exercise Safety Screen: Proceed with mobilization - No exclusion criteria met    Sensation  Light Touch: No apparent deficits    Strength  Strength Comments: Bilat LEs grossly >/= 3+/5    Postural Control  Head Control: WFL    Static Sitting Balance  Static Sitting-Comment/Number of Minutes: CGA  Dynamic Sitting Balance  Dynamic Sitting-Comments: CGA    Static Standing Balance  Static Standing-Comment/Number of Minutes: Mod A x2  Dynamic Standing Balance  Dynamic Standing-Comments: Mod A x2  Functional Assessments:     Bed Mobility  Bed Mobility: Yes  Bed Mobility 1  Bed Mobility 1: Supine to sitting  Level of Assistance 1: Moderate assistance  Bed Mobility Comments 1: HOB 30  deg, cues for log roll. Draw sheet used    Transfers  Transfer: Yes  Transfer 1  Technique 1: Sit to stand, Stand to sit  Transfer Device 1:  (bilat arm in arm assist)  Transfer Level of Assistance 1: Moderate assistance (x2)  Trials/Comments 1: Cues for upright posture required 2/2 soft knee position    Ambulation/Gait Training  Ambulation/Gait Training Performed: Yes  Ambulation/Gait Training 1  Surface 1: Level tile  Device 1:  (bilat arm in arm assist)  Assistance 1: Moderate assistance (x2)  Comments/Distance (ft) 1: x3 ft shuffling steps bed to chair with cues for direction of stepping and for knee ext to prevent buckling  Outcome Measures:     [facility] Basic Mobility  Turning from your back to your side while in a flat bed without using bedrails: A little  Moving from lying on your back to sitting on the side of a flat bed without using bedrails: A lot  Moving to and from bed to chair (including a wheelchair): A lot  Standing up from a chair using your arms (e.g. wheelchair or bedside chair): A lot  To walk in hospital room: A lot  Climbing 3-5 steps with railing: Total  Basic Mobility - Total Score: 12  FSS-ICU  Ambulation: Walks <50 feet with any assistance x1 or walks any distance with assistance x2 people  Rolling: Minimal assistance (performs 75% or more of task)  Sitting: Minimal assistance (performs 75% or more of task)  Transfer Sit-to-Stand: Maximal assistance (performs 25% - 49% of task)  Transfer Supine-to-Sit: Moderate assistance (performs 50 - 74% of task)  Total Score: 14    ICU Mobility Screen  Early Mobility/Exercise Safety Screen: Proceed with mobilization - No exclusion criteria met  E = Exercise and Early Mobility  Early Mobility/Exercise Safety Screen: Proceed with mobilization - No exclusion criteria met     Pediatric E = Exercise and Early Mobility  Early Mobility/Exercise Safety Screen: Proceed with mobilization - No exclusion criteria met     Encounter Problems       Encounter Problems  (Active)       Balance       STG - Maintains dynamic standing balance with upper extremity support on LRAD with SBA       Start:  02/12/24    Expected End:  03/04/24               Mobility       STG - Patient will ambulate x100 ft with LRAD and SBA       Start:  02/12/24    Expected End:  03/04/24            Pt will perform 5x STS in </= 12 sec       Start:  02/12/24    Expected End:  03/04/24               Pain - Adult          Transfers       STG - Patient will perform bed mobility with SBA       Start:  02/12/24    Expected End:  03/04/24            STG - Patient will transfer sit to and from stand with SBA using LRAD       Start:  02/12/24    Expected End:  03/04/24                   Education Documentation  Precautions, taught by Jo Milligan PT at 2/12/2024  4:08 PM.  Learner: Patient  Readiness: Acceptance  Method: Explanation  Response: Verbalizes Understanding, Needs Reinforcement    Body Mechanics, taught by Jo Milligan PT at 2/12/2024  4:08 PM.  Learner: Patient  Readiness: Acceptance  Method: Explanation  Response: Verbalizes Understanding, Needs Reinforcement    Mobility Training, taught by Jo Milligan PT at 2/12/2024  4:08 PM.  Learner: Patient  Readiness: Acceptance  Method: Explanation  Response: Verbalizes Understanding, Needs Reinforcement    Education Comments  No comments found.    Jo Milligan PT, DPT

## 2024-02-12 NOTE — PROGRESS NOTES
[facility]  TRAUMA ICU - PROGRESS NOTE    Patient Name: Trevor Yarbrough  MRN: [id number]  Admit Date: 206  : 1970  AGE: 53 y.o.   GENDER: male    ==============================================================================  TODAY'S ASSESSMENT AND PLAN OF CARE:  Trevor Yarbrough is a 53 y.o. male with a history of insulinoma and prior fundoplication for hiatal hernia s/p multiple OR as noted below. Now extubated, pain moderately controlled.     OR Course:   - ex lap, partial gastrectomy, Abthera placement   - ex lap, washout, replacement of Abthera   - ex lap, completion antrectomy, proximal gastric resection, billroth II, placement of 2 chandan drains    NEURO/PAIN/SEDATION:   - Scheduled Tylenol  - Prn liquid oxycodone 5/10 for moderate/severe pain, dilaudid for breakthrough per CPOT  - Robaxin 500 mg Q6HR    RESPIRATORY:  - Extubated   - Maintain SpO2 > 92%    CARDIOVASC:  - Off pressors as of   - Goal: SBP >100 (per cuff)  - TTE done on , no vegetations seen  - Remove Arterial Line    GI:  - Hx insulinoma, Hx fundoplication  - Gastric perforation s/p billroth II  - SBO / volvulus  - NPO  - NGT to LIWS  - TF @ 45 ml/hr  - Follow up pathology  - UGI on POD#5 -     FEN/:   - Carlton in place, strict I's and O's --> UOP 1.19 ml/kg/hr  - Remove carlton and TOV  - Replete lytes prn     HEMATOLOGIC:   - Daily CBC  - Received 1 unit pRBCs for hgb 6.3 - post transfusion hgb 7.3  - Repeat CBC at 1300 to follow hgb    ENDOCRINE:  - Hyperinsulinemic hypoglycemia, insulinoma  - Stress dose steroids discontinued on  given resolution of shock  - SSI    MUSCULOSKELETAL/SKIN:   - PT/OT     INFECTIOUS DISEASE:   - Daily CBC  - Completed course of fluconazole, zosyn, and vanco  - Initial bcx () positive for strep viridans, repeat cx negative    GI PROPHYLAXIS: PPI    DVT PROPHYLAXIS: SCDs and Lovenox    LINES:  - [place] (-)  - L subclavian central line  (2/6-12)  - Garnett (2/6-12)  - NGT (2/6-)  - Dobhoff (2/9-)  - Drains x2 (2/9-)  - PIV    DISPOSITION: Floor status in afternoon    Jason Coles MD  PGY 1 General Surgery  ==============================================================================  CHIEF COMPLAINT / OVERNIGHT EVENTS / HPI:   Extubated 2/11 and received 1 unit pRBCs on 2/11 with AM hgb of 7.3. Pain moderately controlled    MEDICAL HISTORY / ROS:  Admission history and ROS reviewed. Pertinent changes as follows: Unable to obtain given patient condition.    PHYSICAL EXAM:  Heart Rate:  [77-99]   Temp:  [35.9 °C (96.7 °F)-38 °C (100.4 °F)]   Resp:  [15-26]   BP: (120-166)/(64-88)   SpO2:  [88 %-100 %]     General: NAD, awake and alert, complaining of pain  HEENT: Normocephalic, atraumatic, Dobhoff and NGT in place  Neuro: A&O x 3, grossly intact  Cardio: Regular rate and rhythm. Left subclavian line in place  Abdomen: Soft, mild distention, tenderness to palpation without rebound or guarding. Wound vac in place with no output. 2 Clint drains in place with serosanguinous output  : Garnett in place draining clear yellow urine. To be removed.   MSK: BURKETT, normal ROM  Skin: Warm and dry, no new lesions or rashes   Psych: Appropriate mood and behavior      LABS:              7.3     10.0>-----<207              21.6   144  109  22                  ----------------<115     3.4  26  0.72          Ca 7.3 Phos 2.2 Mg 1.96         AlkPhos 46 tBili 0.4           Jason Coles MD

## 2024-02-12 NOTE — PROGRESS NOTES
[facility]  ACUTE CARE SURGERY - PROGRESS NOTE    Patient Name: Trevor Yarbrough  MRN: [id number]  Admit Date: 206  : 1970  AGE: 53 y.o.   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      24: exlap, subtotal gastrectomy  : washout  : antrectomy, Billroth 2 anatomy; fascia closed, skin open with wound vac placed     PLAN  - agree with moving to extubation per ICU  - advance TF; no crushed meds through dobhoff - only liquids  - IV PPI BID  - fluc/zosyn x 4 days for intra-abdominal infection; source control ; okay to DC vancomycin  - dvt ppx  - appreciate excellent care per ICU     Discussed with Attending, Dr. Tai.      Dylon Webster MD   Pager [phone]     ==============================================================================  CHIEF COMPLAINT / EVENTS LAST 24HRS / HPI:  1 RBC overnight for Hb 6.3    MEDICAL HISTORY / ROS:   Admission history and ROS reviewed. Pertinent changes as follows:  N/A    PHYSICAL EXAM:  Heart Rate:  [66-95]   Temp:  [36.6 °C (97.9 °F)-37.8 °C (100 °F)]   Resp:  [11-26]   BP: ()/(49-84)   SpO2:  [90 %-100 %]   Physical Exam    Physical Exam     Constitutional- intubated, sedated  Cards- regular rate, no pressors  Resp- nonlabored breathing on positive pressure ventilation  Abdomen- soft, edematous, mildly distended; vac holding suction; Superior drain serosang, inferior drain with murky/serosang fluid  Extremities- BURKETT  Skin- warm, dry   Neuro- not following commands but BURKETT spontaneously  Tubes/lines- NG, dobhoff, abdominal drains x2, vac, carlton, arterial line      IMAGING SUMMARY:  (summary of new imaging findings, not a copy of dictation)  N/A    LABS:  Results from last 7  days   Lab Units 02/11/24  0421 02/11/24  0238 02/11/24  0000 02/06/24  1111 02/06/24  0136   WBC AUTO x10*3/uL 6.3 6.2 7.2   < > 10.2   HEMOGLOBIN g/dL 6.3* 5.9* 7.2*   < > 15.4   HEMATOCRIT % 19.0* 18.4* 21.2*   < > 44.6   PLATELETS AUTO x10*3/uL 160 158 170   < > 236   NEUTROS PCT AUTO %  --   --   --   --  40.5   LYMPHS PCT AUTO %  --   --   --   --  45.8   MONOS PCT AUTO %  --   --   --   --  9.3   EOS PCT AUTO %  --   --   --   --  3.7    < > = values in this interval not displayed.     Results from last 7 days   Lab Units 02/09/24  1530 02/09/24  0116 02/08/24  0014   APTT seconds 25* 27 30   INR  1.0 1.0 1.3*     Results from last 7 days   Lab Units 02/11/24  0238 02/10/24  0301 02/09/24  1530 02/06/24  1111 02/06/24  0136   SODIUM mmol/L 139 142 140   < > 142   POTASSIUM mmol/L 3.7 3.6 3.9   < > 3.4   CHLORIDE mmol/L 105 106 103   < > 103   CO2 mmol/L 27 27 29   < > 25   BUN mg/dL 18 18 19   < > 19   CREATININE mg/dL 0.79 0.87 0.86   < > 1.20   CALCIUM mg/dL 7.0* 7.0* 7.3*   < > 9.7   PROTEIN TOTAL g/dL  --   --  4.0*  --  7.6   BILIRUBIN TOTAL mg/dL  --   --  0.4  --  0.3   ALK PHOS U/L  --   --  46  --  73   ALT U/L  --   --  469*  --  36   AST U/L  --   --  419*  --  36   GLUCOSE mg/dL 134* 99 92   < > 124*    < > = values in this interval not displayed.     Results from last 7 days   Lab Units 02/09/24  1530 02/06/24  0136   BILIRUBIN TOTAL mg/dL 0.4 0.3     Results from last 7 days   Lab Units 02/11/24  0239 02/09/24 2115 02/09/24  1814   POCT PH, ARTERIAL pH 7.46* 7.44* 7.47*   POCT PCO2, ARTERIAL mm Hg 41 43* 41   POCT PO2, ARTERIAL mm Hg 139* 118* 63*   POCT HCO3 CALCULATED, ARTERIAL mmol/L 29.2* 29.2* 29.8*   POCT BASE EXCESS, ARTERIAL mmol/L 4.9* 4.6* 5.6*       I have reviewed all medications, laboratory results, and imaging pertinent for today's encounter.

## 2024-02-12 NOTE — PROGRESS NOTES
PLAN: Extubated- continue serial exams with symptom management, trend labs, NG to LIWS, TF, strict I/O's, PT.OT, skin, line, ICU tlc> anticipate RNF this afternoon.     PAYOR:  Employee      DISPO: TBD     SUPPORT/CONTACT: Div[employer] [phone]

## 2024-02-12 NOTE — CARE PLAN
The patient's goals for the shift include Unable to state patient intubated    The clinical goals for the shift include patient will maintain oxygen saturation greater than 92%    Over the shift, the patient did not make progress toward the following goals. Barriers to progression include dropping oxygen saturation due to restlessness. Recommendations to address these barriers include maintaining a calm environment.      Problem: Pain  Goal: My pain/discomfort is manageable  Outcome: Progressing     Problem: Safety  Goal: Patient will be injury free during hospitalization  Outcome: Progressing  Goal: I will remain free of falls  Outcome: Progressing     Problem: Daily Care  Goal: Daily care needs are met  Outcome: Progressing     Problem: Psychosocial Needs  Goal: Demonstrates ability to cope with hospitalization/illness  Outcome: Progressing  Goal: Collaborate with me, my family, and caregiver to identify my specific goals  Outcome: Progressing     Problem: Discharge Barriers  Goal: My discharge needs are met  Outcome: Progressing     Problem: Skin  Goal: Decreased wound size/increased tissue granulation at next dressing change  Outcome: Progressing  Flowsheets (Taken 2/12/2024 0639)  Decreased wound size/increased tissue granulation at next dressing change:   Promote sleep for wound healing   Protective dressings over bony prominences  Goal: Participates in plan/prevention/treatment measures  Outcome: Progressing  Flowsheets (Taken 2/12/2024 0639)  Participates in plan/prevention/treatment measures:   Discuss with provider PT/OT consult   Elevate heels  Goal: Prevent/manage excess moisture  Outcome: Progressing  Flowsheets (Taken 2/12/2024 0639)  Prevent/manage excess moisture:   Cleanse incontinence/protect with barrier cream   Moisturize dry skin  Goal: Prevent/minimize sheer/friction injuries  Outcome: Progressing  Flowsheets (Taken 2/12/2024 0639)  Prevent/minimize sheer/friction injuries:   HOB 30 degrees or  less   Turn/reposition every 2 hours/use positioning/transfer devices   Utilize specialty bed per algorithm  Goal: Promote/optimize nutrition  Outcome: Progressing  Flowsheets (Taken 2/12/2024 0639)  Promote/optimize nutrition:   Assist with feeding   Monitor/record intake including meals  Goal: Promote skin healing  Outcome: Progressing  Flowsheets (Taken 2/12/2024 0639)  Promote skin healing:   Assess skin/pad under line(s)/device(s)   Protective dressings over bony prominences   Turn/reposition every 2 hours/use positioning/transfer devices     Problem: Fall/Injury  Goal: Not fall by end of shift  Outcome: Progressing  Goal: Be free from injury by end of the shift  Outcome: Progressing  Goal: Verbalize understanding of personal risk factors for fall in the hospital  Outcome: Progressing  Goal: Verbalize understanding of risk factor reduction measures to prevent injury from fall in the home  Outcome: Progressing  Goal: Use assistive devices by end of the shift  Outcome: Progressing  Goal: Pace activities to prevent fatigue by end of the shift  Outcome: Progressing     Problem: Safety - Medical Restraint  Goal: Remains free of injury from restraints (Restraint for Interference with Medical Device)  Outcome: Progressing  Goal: Free from restraint(s) (Restraint for Interference with Medical Device)  Outcome: Progressing     Problem: Pain - Adult  Goal: Verbalizes/displays adequate comfort level or baseline comfort level  Outcome: Progressing     Problem: Safety - Adult  Goal: Free from fall injury  Outcome: Progressing     Problem: Discharge Planning  Goal: Discharge to home or other facility with appropriate resources  Outcome: Progressing     Problem: Chronic Conditions and Co-morbidities  Goal: Patient's chronic conditions and co-morbidity symptoms are monitored and maintained or improved  Outcome: Progressing     Problem: Knowledge Deficit  Goal: Patient/family/caregiver demonstrates understanding of disease  process, treatment plan, medications, and discharge instructions  Outcome: Progressing     Problem: Mechanical Ventilation  Goal: Patient Will Maintain Patent Airway  Outcome: Progressing  Goal: Oral health is maintained or improved  Outcome: Progressing  Goal: Tracheostomy will be managed safely  Outcome: Progressing  Goal: ET tube will be managed safely  Outcome: Progressing  Goal: Ability to express needs and understand communication  Outcome: Progressing  Goal: Mobility/activity is maintained at optimum level for patient  Outcome: Progressing     Problem: Pain  Goal: Takes deep breaths with improved pain control throughout the shift  Outcome: Progressing  Goal: Turns in bed with improved pain control throughout the shift  Outcome: Progressing  Goal: Walks with improved pain control throughout the shift  Outcome: Progressing  Goal: Performs ADL's with improved pain control throughout shift  Outcome: Progressing  Goal: Participates in PT with improved pain control throughout the shift  Outcome: Progressing  Goal: Free from opioid side effects throughout the shift  Outcome: Progressing  Goal: Free from acute confusion related to pain meds throughout the shift  Outcome: Progressing

## 2024-02-12 NOTE — PROGRESS NOTES
[facility]  ACUTE CARE SURGERY - PROGRESS NOTE    Patient Name: Trevor Yarbrough  MRN: [id number]  Admit Date: 206  : 1970  AGE: 53 y.o.   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      24: exlap, subtotal gastrectomy  : washout  : antrectomy, Billroth 2 anatomy; fascia closed, skin open with wound vac placed     PLAN  - PRN Oxycodone for Pain with IV Dilaudid for breakthrough   - Continuous tele monitoring  - Wean oxygen supplementation as tolerated  - OK to increase tube feeds to goal as tolerated  - No crushed  meds through dobhoff- only liquids   - IV PPI BID  - Maintain NGT to LIWS  - Strict I&Os, monitor drains output  - Will plan on wound vac dressing change today   - ON POD #5 will obtain Upper GI study  - Discontinue carlton catheter today   - completed fluc/zosyn x 4 days for intra-abdominal infection  - Anemic to 6.3 yesterday- given 1 unit of PRBC and incremented to 7.3   - Obtain afternoon CBC   - dvt ppx- Lovenox   - appreciate excellent care per ICU     Patient seen and discussed with Attending Dr. John Flores [facility]  Acute Care Surgery  Pager [phone]     ==============================================================================  CHIEF COMPLAINT / EVENTS LAST 24HRS / HPI:  Extubated . Endorsing significant abdominal discomfort this AM and awaiting pain meds. Denies nausea, no BM   MEDICAL HISTORY / ROS:   Admission history and ROS reviewed. Pertinent changes as follows:  N/A    PHYSICAL EXAM:  Heart Rate:  []   Temp:  [35.9 °C (96.7 °F)-38 °C (100.4 °F)]   Resp:  [15-26]   BP: (119-166)/()   SpO2:  [62 %-100 %]   Physical Exam    Physical Exam     Constitutional- lying in  bed, appears uncomfortable   Cards- regular rate on monitor  Resp- nonlabored breathing on NC  Abdomen- soft, edematous, mildly distended; vac holding suction; Superior drain serosang, inferior drain with murky/serosang fluid. NGT in place to LIWS suction. Dobbhoff in place with tube feeds running   : carlton in place with dark yellow output   Extremities- BURKETT  Skin- warm, dry   Neuro- following commands, answers questions appropriately       IMAGING SUMMARY:  (summary of new imaging findings, not a copy of dictation)  N/A    LABS:  Results from last 7 days   Lab Units 02/12/24  0426 02/11/24  0421 02/11/24  0238 02/06/24  1111 02/06/24  0136   WBC AUTO x10*3/uL 10.0 6.3 6.2   < > 10.2   HEMOGLOBIN g/dL 7.3* 6.3* 5.9*   < > 15.4   HEMATOCRIT % 21.6* 19.0* 18.4*   < > 44.6   PLATELETS AUTO x10*3/uL 207 160 158   < > 236   NEUTROS PCT AUTO %  --   --   --   --  40.5   LYMPHS PCT AUTO %  --   --   --   --  45.8   MONOS PCT AUTO %  --   --   --   --  9.3   EOS PCT AUTO %  --   --   --   --  3.7    < > = values in this interval not displayed.       Results from last 7 days   Lab Units 02/09/24  1530 02/09/24  0116 02/08/24  0014   APTT seconds 25* 27 30   INR  1.0 1.0 1.3*       Results from last 7 days   Lab Units 02/12/24  0426 02/11/24  0238 02/10/24  0301 02/09/24  1530 02/06/24  1111 02/06/24  0136   SODIUM mmol/L 144 139 142 140   < > 142   POTASSIUM mmol/L 3.4* 3.7 3.6 3.9   < > 3.4   CHLORIDE mmol/L 109* 105 106 103   < > 103   CO2 mmol/L 26 27 27 29   < > 25   BUN mg/dL 22 18 18 19   < > 19   CREATININE mg/dL 0.72 0.79 0.87 0.86   < > 1.20   CALCIUM mg/dL 7.3* 7.0* 7.0* 7.3*   < > 9.7   PROTEIN TOTAL g/dL  --   --   --  4.0*  --  7.6   BILIRUBIN TOTAL mg/dL  --   --   --  0.4  --  0.3   ALK PHOS U/L  --   --   --  46  --  73   ALT U/L  --   --   --  469*  --  36   AST U/L  --   --   --  419*  --  36   GLUCOSE mg/dL 115* 134* 99 92   < > 124*    < > = values in this interval not displayed.       Results from last  7 days   Lab Units 02/09/24  1530 02/06/24  0136   BILIRUBIN TOTAL mg/dL 0.4 0.3       Results from last 7 days   Lab Units 02/11/24  0239 02/09/24  2115 02/09/24  1814   POCT PH, ARTERIAL pH 7.46* 7.44* 7.47*   POCT PCO2, ARTERIAL mm Hg 41 43* 41   POCT PO2, ARTERIAL mm Hg 139* 118* 63*   POCT HCO3 CALCULATED, ARTERIAL mmol/L 29.2* 29.2* 29.8*   POCT BASE EXCESS, ARTERIAL mmol/L 4.9* 4.6* 5.6*         I have reviewed all medications, laboratory results, and imaging pertinent for today's encounter.

## 2024-02-12 NOTE — CARE PLAN
The patient's goals for the shift include   Problem: Pain  Goal: My pain/discomfort is manageable  Outcome: Progressing     Problem: Safety - Medical Restraint  Goal: Remains free of injury from restraints (Restraint for Interference with Medical Device)  Outcome: Progressing  Goal: Free from restraint(s) (Restraint for Interference with Medical Device)  Outcome: Progressing       The clinical goals for the shift include Remain hemodynamically stable    Over the shift, the patient made progress toward the following goals.

## 2024-02-13 ENCOUNTER — APPOINTMENT (OUTPATIENT)
Dept: RADIOLOGY | Facility: HOSPITAL | Age: 54
DRG: 853 | End: 2024-02-13
Payer: COMMERCIAL

## 2024-02-13 LAB
ALBUMIN SERPL BCP-MCNC: 2.4 G/DL (ref 3.4–5)
ALBUMIN SERPL BCP-MCNC: 2.4 G/DL (ref 3.4–5)
ALP SERPL-CCNC: 83 U/L (ref 33–120)
ALT SERPL W P-5'-P-CCNC: 195 U/L (ref 10–52)
AMYLASE FLD-CCNC: <10 U/L
AMYLASE SERPL-CCNC: 28 U/L (ref 29–103)
ANION GAP BLDV CALCULATED.4IONS-SCNC: 9 MMOL/L (ref 10–25)
ANION GAP SERPL CALC-SCNC: 12 MMOL/L (ref 10–20)
ANION GAP SERPL CALC-SCNC: 15 MMOL/L (ref 10–20)
AST SERPL W P-5'-P-CCNC: 67 U/L (ref 9–39)
ATRIAL RATE: 72 BPM
BASE EXCESS BLDV CALC-SCNC: 1.8 MMOL/L (ref -2–3)
BILIRUB SERPL-MCNC: 1 MG/DL (ref 0–1.2)
BODY TEMPERATURE: 37 DEGREES CELSIUS
BUN SERPL-MCNC: 13 MG/DL (ref 6–23)
BUN SERPL-MCNC: 15 MG/DL (ref 6–23)
CA-I BLDV-SCNC: 1.1 MMOL/L (ref 1.1–1.33)
CALCIUM SERPL-MCNC: 7.4 MG/DL (ref 8.6–10.6)
CALCIUM SERPL-MCNC: 8 MG/DL (ref 8.6–10.6)
CHLORIDE BLDV-SCNC: 106 MMOL/L (ref 98–107)
CHLORIDE SERPL-SCNC: 103 MMOL/L (ref 98–107)
CHLORIDE SERPL-SCNC: 104 MMOL/L (ref 98–107)
CO2 SERPL-SCNC: 24 MMOL/L (ref 21–32)
CO2 SERPL-SCNC: 26 MMOL/L (ref 21–32)
CREAT SERPL-MCNC: 0.62 MG/DL (ref 0.5–1.3)
CREAT SERPL-MCNC: 0.75 MG/DL (ref 0.5–1.3)
EGFRCR SERPLBLD CKD-EPI 2021: >90 ML/MIN/1.73M*2
EGFRCR SERPLBLD CKD-EPI 2021: >90 ML/MIN/1.73M*2
ERYTHROCYTE [DISTWIDTH] IN BLOOD BY AUTOMATED COUNT: 14.4 % (ref 11.5–14.5)
ERYTHROCYTE [DISTWIDTH] IN BLOOD BY AUTOMATED COUNT: 14.6 % (ref 11.5–14.5)
GLUCOSE BLD MANUAL STRIP-MCNC: 102 MG/DL (ref 74–99)
GLUCOSE BLD MANUAL STRIP-MCNC: 70 MG/DL (ref 74–99)
GLUCOSE BLD MANUAL STRIP-MCNC: 75 MG/DL (ref 74–99)
GLUCOSE BLD MANUAL STRIP-MCNC: 77 MG/DL (ref 74–99)
GLUCOSE BLD MANUAL STRIP-MCNC: 82 MG/DL (ref 74–99)
GLUCOSE BLD MANUAL STRIP-MCNC: 85 MG/DL (ref 74–99)
GLUCOSE BLD MANUAL STRIP-MCNC: 99 MG/DL (ref 74–99)
GLUCOSE BLDV-MCNC: 94 MG/DL (ref 74–99)
GLUCOSE SERPL-MCNC: 80 MG/DL (ref 74–99)
GLUCOSE SERPL-MCNC: 83 MG/DL (ref 74–99)
HCO3 BLDV-SCNC: 25 MMOL/L (ref 22–26)
HCT VFR BLD AUTO: 22.6 % (ref 41–52)
HCT VFR BLD AUTO: 23.5 % (ref 41–52)
HCT VFR BLD EST: 22 % (ref 41–52)
HGB BLD-MCNC: 7.9 G/DL (ref 13.5–17.5)
HGB BLD-MCNC: 8.3 G/DL (ref 13.5–17.5)
HGB BLDV-MCNC: 7.4 G/DL (ref 13.5–17.5)
INHALED O2 CONCENTRATION: 32 %
LACTATE BLDV-SCNC: 1 MMOL/L (ref 0.4–2)
MAGNESIUM SERPL-MCNC: 1.83 MG/DL (ref 1.6–2.4)
MAGNESIUM SERPL-MCNC: 2.02 MG/DL (ref 1.6–2.4)
MCH RBC QN AUTO: 27.6 PG (ref 26–34)
MCH RBC QN AUTO: 28 PG (ref 26–34)
MCHC RBC AUTO-ENTMCNC: 35 G/DL (ref 32–36)
MCHC RBC AUTO-ENTMCNC: 35.3 G/DL (ref 32–36)
MCV RBC AUTO: 79 FL (ref 80–100)
MCV RBC AUTO: 79 FL (ref 80–100)
NRBC BLD-RTO: 0 /100 WBCS (ref 0–0)
NRBC BLD-RTO: 0 /100 WBCS (ref 0–0)
OXYHGB MFR BLDV: 65.9 % (ref 45–75)
P AXIS: 43 DEGREES
P OFFSET: 184 MS
P ONSET: 137 MS
PCO2 BLDV: 32 MM HG (ref 41–51)
PH BLDV: 7.5 PH (ref 7.33–7.43)
PHOSPHATE SERPL-MCNC: 2.7 MG/DL (ref 2.5–4.9)
PHOSPHATE SERPL-MCNC: 3.2 MG/DL (ref 2.5–4.9)
PLATELET # BLD AUTO: 247 X10*3/UL (ref 150–450)
PLATELET # BLD AUTO: 275 X10*3/UL (ref 150–450)
PO2 BLDV: 44 MM HG (ref 35–45)
POTASSIUM BLDV-SCNC: 3.6 MMOL/L (ref 3.5–5.3)
POTASSIUM SERPL-SCNC: 3.4 MMOL/L (ref 3.5–5.3)
POTASSIUM SERPL-SCNC: 3.5 MMOL/L (ref 3.5–5.3)
PR INTERVAL: 174 MS
PROT SERPL-MCNC: 4.7 G/DL (ref 6.4–8.2)
Q ONSET: 224 MS
QRS COUNT: 11 BEATS
QRS DURATION: 78 MS
QT INTERVAL: 366 MS
QTC CALCULATION(BAZETT): 400 MS
QTC FREDERICIA: 389 MS
R AXIS: 55 DEGREES
RBC # BLD AUTO: 2.86 X10*6/UL (ref 4.5–5.9)
RBC # BLD AUTO: 2.96 X10*6/UL (ref 4.5–5.9)
SAO2 % BLDV: 68 % (ref 45–75)
SODIUM BLDV-SCNC: 136 MMOL/L (ref 136–145)
SODIUM SERPL-SCNC: 138 MMOL/L (ref 136–145)
SODIUM SERPL-SCNC: 139 MMOL/L (ref 136–145)
T AXIS: 76 DEGREES
T OFFSET: 407 MS
VENTRICULAR RATE: 72 BPM
WBC # BLD AUTO: 14.7 X10*3/UL (ref 4.4–11.3)
WBC # BLD AUTO: 15.1 X10*3/UL (ref 4.4–11.3)

## 2024-02-13 PROCEDURE — 71045 X-RAY EXAM CHEST 1 VIEW: CPT | Performed by: RADIOLOGY

## 2024-02-13 PROCEDURE — 71045 X-RAY EXAM CHEST 1 VIEW: CPT

## 2024-02-13 PROCEDURE — 80069 RENAL FUNCTION PANEL: CPT | Mod: CCI

## 2024-02-13 PROCEDURE — 84132 ASSAY OF SERUM POTASSIUM: CPT | Performed by: NURSE PRACTITIONER

## 2024-02-13 PROCEDURE — 85027 COMPLETE CBC AUTOMATED: CPT

## 2024-02-13 PROCEDURE — 2500000001 HC RX 250 WO HCPCS SELF ADMINISTERED DRUGS (ALT 637 FOR MEDICARE OP)

## 2024-02-13 PROCEDURE — 74177 CT ABD & PELVIS W/CONTRAST: CPT

## 2024-02-13 PROCEDURE — 2500000004 HC RX 250 GENERAL PHARMACY W/ HCPCS (ALT 636 FOR OP/ED)

## 2024-02-13 PROCEDURE — 36415 COLL VENOUS BLD VENIPUNCTURE: CPT

## 2024-02-13 PROCEDURE — 87040 BLOOD CULTURE FOR BACTERIA: CPT

## 2024-02-13 PROCEDURE — 82947 ASSAY GLUCOSE BLOOD QUANT: CPT

## 2024-02-13 PROCEDURE — 82150 ASSAY OF AMYLASE: CPT | Performed by: NURSE PRACTITIONER

## 2024-02-13 PROCEDURE — 74177 CT ABD & PELVIS W/CONTRAST: CPT | Performed by: RADIOLOGY

## 2024-02-13 PROCEDURE — 80053 COMPREHEN METABOLIC PANEL: CPT

## 2024-02-13 PROCEDURE — 1200000002 HC GENERAL ROOM WITH TELEMETRY DAILY

## 2024-02-13 PROCEDURE — 83735 ASSAY OF MAGNESIUM: CPT | Performed by: NURSE PRACTITIONER

## 2024-02-13 PROCEDURE — 97530 THERAPEUTIC ACTIVITIES: CPT | Mod: GP

## 2024-02-13 PROCEDURE — 84132 ASSAY OF SERUM POTASSIUM: CPT

## 2024-02-13 PROCEDURE — C9113 INJ PANTOPRAZOLE SODIUM, VIA: HCPCS

## 2024-02-13 PROCEDURE — 83735 ASSAY OF MAGNESIUM: CPT

## 2024-02-13 PROCEDURE — 85027 COMPLETE CBC AUTOMATED: CPT | Performed by: NURSE PRACTITIONER

## 2024-02-13 PROCEDURE — 2550000001 HC RX 255 CONTRASTS: Performed by: SURGERY

## 2024-02-13 RX ORDER — DEXTROSE, SODIUM CHLORIDE, SODIUM LACTATE, POTASSIUM CHLORIDE, AND CALCIUM CHLORIDE 5; .6; .31; .03; .02 G/100ML; G/100ML; G/100ML; G/100ML; G/100ML
75 INJECTION, SOLUTION INTRAVENOUS CONTINUOUS
Status: DISCONTINUED | OUTPATIENT
Start: 2024-02-13 | End: 2024-02-17

## 2024-02-13 RX ORDER — ACETAMINOPHEN 10 MG/ML
1000 INJECTION, SOLUTION INTRAVENOUS EVERY 6 HOURS SCHEDULED
Status: COMPLETED | OUTPATIENT
Start: 2024-02-13 | End: 2024-02-14

## 2024-02-13 RX ORDER — HYDROMORPHONE HYDROCHLORIDE 1 MG/ML
0.2 INJECTION, SOLUTION INTRAMUSCULAR; INTRAVENOUS; SUBCUTANEOUS ONCE
Status: DISCONTINUED | OUTPATIENT
Start: 2024-02-13 | End: 2024-02-13

## 2024-02-13 RX ORDER — HYDROMORPHONE HYDROCHLORIDE 1 MG/ML
0.4 INJECTION, SOLUTION INTRAMUSCULAR; INTRAVENOUS; SUBCUTANEOUS ONCE
Status: COMPLETED | OUTPATIENT
Start: 2024-02-13 | End: 2024-02-13

## 2024-02-13 RX ORDER — METHOCARBAMOL 100 MG/ML
1000 INJECTION, SOLUTION INTRAMUSCULAR; INTRAVENOUS EVERY 8 HOURS
Status: COMPLETED | OUTPATIENT
Start: 2024-02-13 | End: 2024-02-15

## 2024-02-13 RX ADMIN — METHOCARBAMOL 1000 MG: 100 INJECTION, SOLUTION INTRAMUSCULAR; INTRAVENOUS at 04:58

## 2024-02-13 RX ADMIN — PANTOPRAZOLE SODIUM 40 MG: 40 INJECTION, POWDER, FOR SOLUTION INTRAVENOUS at 21:32

## 2024-02-13 RX ADMIN — METHOCARBAMOL 1000 MG: 100 INJECTION, SOLUTION INTRAMUSCULAR; INTRAVENOUS at 12:13

## 2024-02-13 RX ADMIN — OXYCODONE HYDROCHLORIDE 5 MG: 5 SOLUTION ORAL at 18:14

## 2024-02-13 RX ADMIN — ACETAMINOPHEN 1000 MG: 1000 INJECTION INTRAVENOUS at 18:10

## 2024-02-13 RX ADMIN — PANTOPRAZOLE SODIUM 40 MG: 40 INJECTION, POWDER, FOR SOLUTION INTRAVENOUS at 08:01

## 2024-02-13 RX ADMIN — ACETAMINOPHEN 1000 MG: 1000 INJECTION INTRAVENOUS at 06:39

## 2024-02-13 RX ADMIN — ACETAMINOPHEN 1000 MG: 1000 INJECTION INTRAVENOUS at 12:15

## 2024-02-13 RX ADMIN — SODIUM CHLORIDE, SODIUM LACTATE, POTASSIUM CHLORIDE, CALCIUM CHLORIDE AND DEXTROSE MONOHYDRATE 75 ML/HR: 5; 600; 310; 30; 20 INJECTION, SOLUTION INTRAVENOUS at 03:14

## 2024-02-13 RX ADMIN — OXYCODONE HYDROCHLORIDE 10 MG: 5 SOLUTION ORAL at 08:24

## 2024-02-13 RX ADMIN — ENOXAPARIN SODIUM 40 MG: 100 INJECTION SUBCUTANEOUS at 21:32

## 2024-02-13 RX ADMIN — IOHEXOL 75 ML: 350 INJECTION, SOLUTION INTRAVENOUS at 04:17

## 2024-02-13 RX ADMIN — METHOCARBAMOL 1000 MG: 100 INJECTION, SOLUTION INTRAMUSCULAR; INTRAVENOUS at 21:32

## 2024-02-13 RX ADMIN — HYDROMORPHONE HYDROCHLORIDE 0.4 MG: 1 INJECTION, SOLUTION INTRAMUSCULAR; INTRAVENOUS; SUBCUTANEOUS at 03:06

## 2024-02-13 RX ADMIN — PIPERACILLIN SODIUM AND TAZOBACTAM SODIUM 3.38 G: 3; .375 INJECTION, SOLUTION INTRAVENOUS at 21:32

## 2024-02-13 ASSESSMENT — PAIN DESCRIPTION - DESCRIPTORS
DESCRIPTORS: DISCOMFORT
DESCRIPTORS: DISCOMFORT

## 2024-02-13 ASSESSMENT — COGNITIVE AND FUNCTIONAL STATUS - GENERAL
MOVING TO AND FROM BED TO CHAIR: A LOT
PERSONAL GROOMING: A LITTLE
MOVING FROM LYING ON BACK TO SITTING ON SIDE OF FLAT BED WITH BEDRAILS: A LITTLE
PERSONAL GROOMING: A LITTLE
TURNING FROM BACK TO SIDE WHILE IN FLAT BAD: A LITTLE
MOVING FROM LYING ON BACK TO SITTING ON SIDE OF FLAT BED WITH BEDRAILS: A LITTLE
WALKING IN HOSPITAL ROOM: TOTAL
TOILETING: A LITTLE
MOVING TO AND FROM BED TO CHAIR: TOTAL
MOBILITY SCORE: 12
DRESSING REGULAR LOWER BODY CLOTHING: A LOT
CLIMB 3 TO 5 STEPS WITH RAILING: TOTAL
TURNING FROM BACK TO SIDE WHILE IN FLAT BAD: A LITTLE
STANDING UP FROM CHAIR USING ARMS: A LOT
CLIMB 3 TO 5 STEPS WITH RAILING: TOTAL
WALKING IN HOSPITAL ROOM: A LOT
CLIMB 3 TO 5 STEPS WITH RAILING: TOTAL
EATING MEALS: A LOT
MOVING FROM LYING ON BACK TO SITTING ON SIDE OF FLAT BED WITH BEDRAILS: A LITTLE
MOBILITY SCORE: 13
MOVING TO AND FROM BED TO CHAIR: A LOT
MOBILITY SCORE: 9
STANDING UP FROM CHAIR USING ARMS: A LOT
STANDING UP FROM CHAIR USING ARMS: TOTAL
DRESSING REGULAR UPPER BODY CLOTHING: A LITTLE
DAILY ACTIVITIY SCORE: 17
DAILY ACTIVITIY SCORE: 15
DRESSING REGULAR LOWER BODY CLOTHING: A LOT
HELP NEEDED FOR BATHING: A LITTLE
TURNING FROM BACK TO SIDE WHILE IN FLAT BAD: A LOT
HELP NEEDED FOR BATHING: A LOT
DRESSING REGULAR UPPER BODY CLOTHING: A LITTLE
WALKING IN HOSPITAL ROOM: TOTAL
TOILETING: A LITTLE
EATING MEALS: A LITTLE

## 2024-02-13 ASSESSMENT — PAIN SCALES - GENERAL
PAINLEVEL_OUTOF10: 7
PAINLEVEL_OUTOF10: 5 - MODERATE PAIN
PAINLEVEL_OUTOF10: 3
PAINLEVEL_OUTOF10: 7
PAINLEVEL_OUTOF10: 4

## 2024-02-13 ASSESSMENT — PAIN - FUNCTIONAL ASSESSMENT
PAIN_FUNCTIONAL_ASSESSMENT: 0-10

## 2024-02-13 ASSESSMENT — PAIN SCALES - WONG BAKER: WONGBAKER_NUMERICALRESPONSE: HURTS EVEN MORE

## 2024-02-13 NOTE — CARE PLAN
Problem: Pain  Goal: My pain/discomfort is manageable  Outcome: Progressing     Problem: Pain  Goal: My pain/discomfort is manageable  Outcome: Progressing     Problem: Daily Care  Goal: Daily care needs are met  Outcome: Progressing   The patient's goals for the shift include pain control  The clinical goals for the shift include safety    Over the shift, the patient did not make progress toward the following goals.

## 2024-02-13 NOTE — PROGRESS NOTES
Physical Therapy    Physical Therapy Treatment    Patient Name: Trevor Yarbrough  MRN: [id number]  Today's Date: 2/13/2024  Time Calculation  Start Time: 1032  Stop Time: 1058  Time Calculation (min): 26 min       Assessment/Plan   PT Assessment  End of Session Communication: Bedside nurse  Assessment Comment: Pt presents with dec strength, balance, & endurance with inc pain this session.  Pt remains appropriate for continued skilled therapy to address these deficits and improve safety and indep.  End of Session Patient Position: Up in chair (spouse at bedside)  PT Plan  Inpatient/Swing Bed or Outpatient: Inpatient  PT Plan  Treatment/Interventions: Bed mobility, Transfer training, Gait training, Stair training, Neuromuscular re-education, Balance training, Strengthening, Range of motion, Endurance training, Therapeutic exercise, Therapeutic activity, Home exercise program, Postural re-education  PT Plan: Skilled PT  PT Frequency: 4 times per week  PT Discharge Recommendations: High intensity level of continued care  Equipment Recommended upon Discharge: Wheeled walker  PT Recommended Transfer Status: Assist x2  PT - OK to Discharge: Yes      General Visit Information:   PT  Visit  PT Received On: 02/13/24  Response to Previous Treatment: Patient with no complaints from previous session.  General  Family/Caregiver Present: Yes  Caregiver Feedback: supportive spouse at bedside  Prior to Session Communication: Bedside nurse (RN also communicated with team prior to mobility)  Patient Position Received: Bed, 3 rail up  General Comment: Pt sitting up in bed, cooperative, & motivated to participate in therapy.    Subjective   Precautions:  Precautions  Medical Precautions: Abdominal precautions, Fall precautions  Post-Surgical Precautions: Abdominal surgery precautions       Objective   Pain:  Pain Assessment  Pain Assessment: 0-10  Pain Score: 3  Pain Type: Surgical pain, Acute pain  Pain Location: Abdomen  Pain  Interventions: Repositioned  Cognition:  Cognition  Orientation Level: Oriented X4  Postural Control:  Postural Control  Postural Control: Within Functional Limits  Static Sitting Balance  Static Sitting-Balance Support: Bilateral upper extremity supported, Feet supported  Static Sitting-Level of Assistance: Close supervision  Static Standing Balance  Static Standing-Balance Support: Bilateral upper extremity supported  Static Standing-Level of Assistance: Moderate assistance (x2)     Activity Tolerance:  Activity Tolerance  Endurance: Endurance does not limit participation in activity  Treatments:  Therapeutic Exercise  Therapeutic Exercise Performed: Yes  Therapeutic Exercise Activity 1: BLE x10- cueing for breathing while completing- DF/PF, LAQ with, HF         Bed Mobility  Bed Mobility: Yes  Bed Mobility 1  Bed Mobility 1: Supine to sitting  Level of Assistance 1: Moderate assistance  Bed Mobility Comments 1: via modified logroll with HOB elevated    Ambulation/Gait Training  Ambulation/Gait Training Performed: Yes  Ambulation/Gait Training 1  Surface 1: Level tile  Device 1: No device  Assistance 1: Arm in arm assistance, Moderate assistance (x2)  Quality of Gait 1: Decreased step length, Shuffling gait, Soft knee(s)  Comments/Distance (ft) 1: 2' to chair  Transfers  Transfer: Yes  Transfer 1  Technique 1: Sit to stand, Stand to sit  Transfer Level of Assistance 1: Moderate assistance, +2    Stairs  Stairs: No    Outcome Measures:  [facility] Basic Mobility  Turning from your back to your side while in a flat bed without using bedrails: A little  Moving from lying on your back to sitting on the side of a flat bed without using bedrails: A lot  Moving to and from bed to chair (including a wheelchair): Total  Standing up from a chair using your arms (e.g. wheelchair or bedside chair): Total  To walk in hospital room: Total  Climbing 3-5 steps with railing: Total  Basic Mobility - Total Score: 9    Education  Documentation  Precautions, taught by Елена Loaiza, PT at 2/13/2024  2:08 PM.  Learner: Significant Other, Patient  Readiness: Acceptance  Method: Explanation  Response: Verbalizes Understanding    Mobility Training, taught by Елена Loaiza, PT at 2/13/2024  2:08 PM.  Learner: Significant Other, Patient  Readiness: Acceptance  Method: Explanation  Response: Verbalizes Understanding    Education Comments  No comments found.        OP EDUCATION:       Encounter Problems       Encounter Problems (Active)       Balance       STG - Maintains dynamic standing balance with upper extremity support on LRAD with SBA (Progressing)       Start:  02/12/24    Expected End:  03/04/24               Mobility       STG - Patient will ambulate x100 ft with LRAD and SBA (Progressing)       Start:  02/12/24    Expected End:  03/04/24            Pt will perform 5x STS in </= 12 sec (Progressing)       Start:  02/12/24    Expected End:  03/04/24               Pain - Adult          Transfers       STG - Patient will perform bed mobility with SBA (Progressing)       Start:  02/12/24    Expected End:  03/04/24            STG - Patient will transfer sit to and from stand with SBA using LRAD (Progressing)       Start:  02/12/24    Expected End:  03/04/24 02/13/24 at 2:09 PM - Елена Loaiza, PT

## 2024-02-13 NOTE — SIGNIFICANT EVENT
Rapid called by for Temp of 100.8 F and abdominal pain. Patient seen and examined at bedside He is not tachycardic, hypertensive and abdomen is soft, distended, and tender in the epigastric area. Superior drain serosanguinous, inferior drain murky tan fluid. NGT draining bilious content.      Visit Vitals  /76 (BP Location: Right arm)   Pulse 94   Temp 38.2 °C (100.8 °F) (Temporal)   Resp 18               7.9     15.1>-----<247              22.6   139  104  15                  ----------------<83     3.4  26  0.75          Ca 7.4 Phos 2.7 Mg 1.83        AST 67 AlkPhos 83 tBili 1.0          Plan  -CTAP with PO and IV contrast  -IV tylenol and robaxin  -CXR  -VBG  -CBC, CMP  -Blood cultures x2  -NGT advanced and KUB to follow up placement  -extra 0.4 dilaudid for pain    Constanza Rod MD   General Surgery PGY-1  Acute Care Surgery [id number]     =========================================    SENIOR RESIDENT COMMENT    Febrile to 100.8 and complaining of pain out of proportion to exam. Feels cold and anxious. Abd soft but distended and diffusely tender to palpation. Wound vac maintaining suction with minimal output. Inferior drain with gray murky output. Has a rising WBC. Rapid Response called.    - NGT slightly advanced  - CT A/P with IV and contrast through the NGT to evaluate B2 anastomosis  - IV Tylenol; repeat IV dilaudid push  - f/u repeat labs, including BCx x2    D/w Dr. Cintia Reyes MD  General Surgery PGY4  ACS [id number]

## 2024-02-13 NOTE — NURSING NOTE
Rapid called at 0228  for increase in abdominal pain, elevated temp an elevated WBC. /71 97 93% , 100.1 18.Wife at bedside had concern and requested rapid called. MD arrived to unit labs obtain, Ng tube reposition and advanced further. Pt medicated see MAR. Retake VS 99.6, 153/71, 89, 95, 20. Stat Chest Xray, KUB, CT ordered. Pt voiced that he is feeling better after interventions.

## 2024-02-13 NOTE — SIGNIFICANT EVENT
RAPID RESPONSE TEAM    Upon arrival to floor, pt moaning in pain.  Per RN, pt has been febrile and team was notified.  Due to labs and fever, pt's wife requested that a rapid be paged.  Dr. Rod ([facility]) was present at bedside.  Stat labs, blood cultures x 2 and VBG full panel obtained.   NGT noted to be out to 38 cm.  Per chart, tube was supposed to be at 58  cm.  After review of chart, it was decided to advance tube back to original site.  KUB ordered.  CT scan of abd and pelvis also ordered.  Pt medicated for pain.  After pain medication and advancement of tube, pt stated he felt better.  Wife and pt were updated at bedside.      VBG results:     Latest Reference Range & Units 02/13/24 02:54   POCT pH, Venous 7.33 - 7.43 pH 7.50 (H)   POCT pCO2, Venous 41 - 51 mm Hg 32 (L)   POCT pO2, Venous 35 - 45 mm Hg 44   POCT SO2, Venous 45 - 75 % 68   POCT Oxy Hemoglobin, Venous 45.0 - 75.0 % 65.9   POCT Hematocrit Calculated, Venous 41.0 - 52.0 % 22.0 (L)   POCT Sodium, Venous 136 - 145 mmol/L 136   POCT Potassium, Venous 3.5 - 5.3 mmol/L 3.6   POCT Chloride, Venous 98 - 107 mmol/L 106   POCT Ionized Calicum, Venous 1.10 - 1.33 mmol/L 1.10   POCT Glucose, Venous 74 - 99 mg/dL 94   POCT Lactate, Venous 0.4 - 2.0 mmol/L 1.0   POCT Base Excess, Venous -2.0 - 3.0 mmol/L 1.8   POCT HCO3 Calculated, Venous 22.0 - 26.0 mmol/L 25.0   POCT Hemoglobin, Venous 13.5 - 17.5 g/dL 7.4 (L)   POCT Anion Gap, Venous 10.0 - 25.0 mmol/L 9.0 (L)   FiO2 % 32   Patient Temperature degrees Celsius 37.0         Vitals:    02/12/24 1700 02/12/24 1800 02/12/24 1932 02/12/24 2252   BP: 142/79 159/85 148/78 160/76   BP Location:   Right arm Right arm   Patient Position:   Lying    Pulse: 81 91 90 94   Resp: 15 16 17 18   Temp:   36.6 °C (97.9 °F) 38.2 °C (100.8 °F)   TempSrc:   Temporal Temporal   SpO2: 99% 96% 96% 92%   Weight:       Height:          Spoke to Geovanna Hill RN and updated regarding plan of care.  Team did not want to start antibiotics at  this time and wanted to wait for ct scan results.  Staff advised to call for any concerns or vital signs.     Peter Joyner RN

## 2024-02-13 NOTE — PROGRESS NOTES
Discharge Planning Note:      Trevor Yarbrough is a 53 y.o. male on day 7 of admission presenting with Pneumoperitoneum.      Therapy recommends moderate level of care post discharge. Took a Skilled nursing list into the room with patient and family to review for choices if they decide for post discharge.                  Shawnee Larose RN TCXC

## 2024-02-13 NOTE — PROGRESS NOTES
[facility]  ACUTE CARE SURGERY - PROGRESS NOTE    Patient Name: Trevor Yarbrough  MRN: [id number]  Admit Date: 206  : 1970  AGE: 53 y.o.   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      24: exlap, subtotal gastrectomy  : washout  : antrectomy, Billroth 2 anatomy; fascia closed, skin open with wound vac placed     Neuro:   - PRN Oxycodone for Pain with IV Dilaudid for breakthrough   - Scheduled Robaxin     Card:  - Continuous tele monitoring  - vital signs [id number]     Resp:   - Wean oxygen supplementation as tolerated  - Encourage IS  - OOB ambulate, PT/OT    GI:   - Hold tube feeds for now   - No crushed  meds through dobhoff- only liquids   - IV PPI BID  - Wound Vac dressing changes Monday and Thursday's   - Maintain NGT to LIWS  - ON POD #5 will obtain Upper GI study  - Monitor drains output; sent for Amylase this AM and <10, Serum Amylase 28.     :  - Strict I&Os, monitor drains output  - mIVFs   - Replete lytes as indicated     Heme: acute postop anemia  - Given 1 unit of PRBC   - Trend H/H daily     ID:  - completed fluc/zosyn x 4 days for intra-abdominal infection  CT A/P obtained overnight due to low grade temp, abdominal pain and increasing leukocytosis- no definite leak, adjacent fluid collections around pancreas, thickening of anastomosis   - follow up blood cultures  - Continue to trend WBC     DVT Proph:   - SCDs, Lovenox     Dispo: continue RNF      Patient seen and discussed with Attending Dr. John Flores APRN-[facility]  Acute Care Surgery  Pager [phone]     ==============================================================================  CHIEF COMPLAINT / EVENTS LAST 24HRS / HPI:  Rapid  response called overnight for temp to 100.8 and abdominal pain. Full workup obtained including CT A/P. Patient endorsing abdominal pain across abdomen. Denies nausea. Passing flatus and had a BM   MEDICAL HISTORY / ROS:   Admission history and ROS reviewed. Pertinent changes as follows:  N/A    PHYSICAL EXAM:  Heart Rate:  []   Temp:  [36.4 °C (97.6 °F)-38.2 °C (100.8 °F)]   Resp:  [15-25]   BP: (123-166)/(69-91)   SpO2:  [92 %-99 %]   Physical Exam    Physical Exam     Constitutional- lying in bed, appears uncomfortable   Cards- regular rate on monitor  Resp- nonlabored breathing on NC  Abdomen- soft, edematous, mildly distended; vac holding suction; Superior drain serosang, inferior drain with murky/serosang fluid. NGT in place to LIWS suction- bilious output. Dobbhoff in place   Extremities- BURKETT  Skin- warm, dry   Neuro- A&Ox3, appropriate mood and behavior       IMAGING SUMMARY:  (summary of new imaging findings, not a copy of dictation)  CT A/P 2/13:  Impression:    1. Postsurgical change of partial gastrectomy with Billroth 2  anastomosis. There is no james extraluminal contrast to suggest a  definite leak. However, a subtle tract of hyperdensity extending  along the undersurface of the small bowel loop at the anastomosis  could reflect a subtle leak.  2. Several perigastric and peripancreatic fluid collections without  well-defined walls may be postoperative in nature, however given the  above finding a leak is not excluded.  3. Mural thickening of the remnant stomach and the anastomotic small  bowel loop which may be due to postoperative edema or reactive  inflammatory change.  4. Edematous appearance of the pancreatic head with adjacent fluid  collections, likely postoperative in etiology. However, groove  pancreatitis may have a similar appearance and correlation with  lipase is recommended for further evaluation.  5. Linear hypodensity within hepatic segment 2, which is new when  compared to prior  exam and may be postsurgical.  6. New small bilateral pleural effusions.  7. Significantly improved small volume pneumoperitoneum when compared  to prior exam with small volume abdominopelvic ascites predominantly  layering within the perihepatic and perisplenic regions.  8. Additional findings as detailed above.        LABS:  Results from last 7 days   Lab Units 02/13/24  2301 02/12/24  1237 02/12/24  0426   WBC AUTO x10*3/uL 15.1* 12.7* 10.0   HEMOGLOBIN g/dL 7.9* 7.4* 7.3*   HEMATOCRIT % 22.6* 23.1* 21.6*   PLATELETS AUTO x10*3/uL 247 239 207       Results from last 7 days   Lab Units 02/09/24  1530 02/09/24  0116 02/08/24  0014   APTT seconds 25* 27 30   INR  1.0 1.0 1.3*       Results from last 7 days   Lab Units 02/13/24 2301 02/12/24  0426 02/11/24  0238 02/10/24  0301 02/09/24  1530   SODIUM mmol/L 139 144 139   < > 140   POTASSIUM mmol/L 3.4* 3.4* 3.7   < > 3.9   CHLORIDE mmol/L 104 109* 105   < > 103   CO2 mmol/L 26 26 27   < > 29   BUN mg/dL 15 22 18   < > 19   CREATININE mg/dL 0.75 0.72 0.79   < > 0.86   CALCIUM mg/dL 7.4* 7.3* 7.0*   < > 7.3*   PROTEIN TOTAL g/dL 4.7*  --   --   --  4.0*   BILIRUBIN TOTAL mg/dL 1.0  --   --   --  0.4   ALK PHOS U/L 83  --   --   --  46   ALT U/L 195*  --   --   --  469*   AST U/L 67*  --   --   --  419*   GLUCOSE mg/dL 83 115* 134*   < > 92    < > = values in this interval not displayed.       Results from last 7 days   Lab Units 02/13/24 2301 02/09/24  1530   BILIRUBIN TOTAL mg/dL 1.0 0.4       Results from last 7 days   Lab Units 02/11/24  0239 02/09/24  2115 02/09/24  1814   POCT PH, ARTERIAL pH 7.46* 7.44* 7.47*   POCT PCO2, ARTERIAL mm Hg 41 43* 41   POCT PO2, ARTERIAL mm Hg 139* 118* 63*   POCT HCO3 CALCULATED, ARTERIAL mmol/L 29.2* 29.2* 29.8*   POCT BASE EXCESS, ARTERIAL mmol/L 4.9* 4.6* 5.6*         I have reviewed all medications, laboratory results, and imaging pertinent for today's encounter.

## 2024-02-14 ENCOUNTER — APPOINTMENT (OUTPATIENT)
Dept: RADIOLOGY | Facility: HOSPITAL | Age: 54
DRG: 853 | End: 2024-02-14
Payer: COMMERCIAL

## 2024-02-14 LAB
ALBUMIN SERPL BCP-MCNC: 2.3 G/DL (ref 3.4–5)
AMYLASE FLD-CCNC: >6000 U/L
ANION GAP SERPL CALC-SCNC: 13 MMOL/L (ref 10–20)
BASOPHILS # BLD AUTO: 0.03 X10*3/UL (ref 0–0.1)
BASOPHILS NFR BLD AUTO: 0.2 %
BILIRUB FLD-MCNC: 5.1 MG/DL
BUN SERPL-MCNC: 13 MG/DL (ref 6–23)
CALCIUM SERPL-MCNC: 7.8 MG/DL (ref 8.6–10.6)
CHLORIDE SERPL-SCNC: 102 MMOL/L (ref 98–107)
CO2 SERPL-SCNC: 24 MMOL/L (ref 21–32)
CREAT SERPL-MCNC: 0.77 MG/DL (ref 0.5–1.3)
EGFRCR SERPLBLD CKD-EPI 2021: >90 ML/MIN/1.73M*2
EOSINOPHIL # BLD AUTO: 0.15 X10*3/UL (ref 0–0.7)
EOSINOPHIL NFR BLD AUTO: 1.1 %
ERYTHROCYTE [DISTWIDTH] IN BLOOD BY AUTOMATED COUNT: 14.9 % (ref 11.5–14.5)
GLUCOSE BLD MANUAL STRIP-MCNC: 104 MG/DL (ref 74–99)
GLUCOSE BLD MANUAL STRIP-MCNC: 110 MG/DL (ref 74–99)
GLUCOSE BLD MANUAL STRIP-MCNC: 86 MG/DL (ref 74–99)
GLUCOSE BLD MANUAL STRIP-MCNC: 96 MG/DL (ref 74–99)
GLUCOSE BLD MANUAL STRIP-MCNC: 98 MG/DL (ref 74–99)
GLUCOSE BLD MANUAL STRIP-MCNC: 98 MG/DL (ref 74–99)
GLUCOSE SERPL-MCNC: 102 MG/DL (ref 74–99)
HCT VFR BLD AUTO: 25.5 % (ref 41–52)
HGB BLD-MCNC: 8.7 G/DL (ref 13.5–17.5)
IMM GRANULOCYTES # BLD AUTO: 0.24 X10*3/UL (ref 0–0.7)
IMM GRANULOCYTES NFR BLD AUTO: 1.8 % (ref 0–0.9)
LAB AP ASR DISCLAIMER: NORMAL
LABORATORY COMMENT REPORT: NORMAL
LABORATORY COMMENT REPORT: NORMAL
LYMPHOCYTES # BLD AUTO: 0.79 X10*3/UL (ref 1.2–4.8)
LYMPHOCYTES NFR BLD AUTO: 5.9 %
MAGNESIUM SERPL-MCNC: 1.82 MG/DL (ref 1.6–2.4)
MCH RBC QN AUTO: 28.7 PG (ref 26–34)
MCHC RBC AUTO-ENTMCNC: 34.1 G/DL (ref 32–36)
MCV RBC AUTO: 84 FL (ref 80–100)
MONOCYTES # BLD AUTO: 0.81 X10*3/UL (ref 0.1–1)
MONOCYTES NFR BLD AUTO: 6 %
NEUTROPHILS # BLD AUTO: 11.42 X10*3/UL (ref 1.2–7.7)
NEUTROPHILS NFR BLD AUTO: 85 %
NRBC BLD-RTO: 0 /100 WBCS (ref 0–0)
PATH REPORT.COMMENTS IMP SPEC: NORMAL
PATH REPORT.COMMENTS IMP SPEC: NORMAL
PATH REPORT.FINAL DX SPEC: NORMAL
PATH REPORT.FINAL DX SPEC: NORMAL
PATH REPORT.GROSS SPEC: NORMAL
PATH REPORT.GROSS SPEC: NORMAL
PATH REPORT.RELEVANT HX SPEC: NORMAL
PATH REPORT.RELEVANT HX SPEC: NORMAL
PATH REPORT.TOTAL CANCER: NORMAL
PATH REPORT.TOTAL CANCER: NORMAL
PHOSPHATE SERPL-MCNC: 3.3 MG/DL (ref 2.5–4.9)
PLATELET # BLD AUTO: 302 X10*3/UL (ref 150–450)
POTASSIUM SERPL-SCNC: 3.4 MMOL/L (ref 3.5–5.3)
RBC # BLD AUTO: 3.03 X10*6/UL (ref 4.5–5.9)
SODIUM SERPL-SCNC: 136 MMOL/L (ref 136–145)
WBC # BLD AUTO: 13.4 X10*3/UL (ref 4.4–11.3)

## 2024-02-14 PROCEDURE — 1200000002 HC GENERAL ROOM WITH TELEMETRY DAILY

## 2024-02-14 PROCEDURE — 97535 SELF CARE MNGMENT TRAINING: CPT | Mod: GO

## 2024-02-14 PROCEDURE — 82150 ASSAY OF AMYLASE: CPT | Performed by: NURSE PRACTITIONER

## 2024-02-14 PROCEDURE — 74240 X-RAY XM UPR GI TRC 1CNTRST: CPT | Performed by: RADIOLOGY

## 2024-02-14 PROCEDURE — 71045 X-RAY EXAM CHEST 1 VIEW: CPT

## 2024-02-14 PROCEDURE — 71045 X-RAY EXAM CHEST 1 VIEW: CPT | Performed by: RADIOLOGY

## 2024-02-14 PROCEDURE — 82947 ASSAY GLUCOSE BLOOD QUANT: CPT

## 2024-02-14 PROCEDURE — 2500000004 HC RX 250 GENERAL PHARMACY W/ HCPCS (ALT 636 FOR OP/ED): Performed by: NURSE PRACTITIONER

## 2024-02-14 PROCEDURE — 74240 X-RAY XM UPR GI TRC 1CNTRST: CPT

## 2024-02-14 PROCEDURE — 2500000004 HC RX 250 GENERAL PHARMACY W/ HCPCS (ALT 636 FOR OP/ED)

## 2024-02-14 PROCEDURE — 97116 GAIT TRAINING THERAPY: CPT | Mod: GP,CQ

## 2024-02-14 PROCEDURE — 97166 OT EVAL MOD COMPLEX 45 MIN: CPT | Mod: GO

## 2024-02-14 PROCEDURE — 2500000001 HC RX 250 WO HCPCS SELF ADMINISTERED DRUGS (ALT 637 FOR MEDICARE OP)

## 2024-02-14 PROCEDURE — 82247 BILIRUBIN TOTAL: CPT

## 2024-02-14 PROCEDURE — 80069 RENAL FUNCTION PANEL: CPT

## 2024-02-14 PROCEDURE — 2550000001 HC RX 255 CONTRASTS: Performed by: SURGERY

## 2024-02-14 PROCEDURE — C9113 INJ PANTOPRAZOLE SODIUM, VIA: HCPCS

## 2024-02-14 PROCEDURE — 97530 THERAPEUTIC ACTIVITIES: CPT | Mod: GO

## 2024-02-14 PROCEDURE — 83735 ASSAY OF MAGNESIUM: CPT | Performed by: PODIATRIST

## 2024-02-14 PROCEDURE — 85025 COMPLETE CBC W/AUTO DIFF WBC: CPT

## 2024-02-14 PROCEDURE — 97530 THERAPEUTIC ACTIVITIES: CPT | Mod: GP,CQ

## 2024-02-14 PROCEDURE — 36415 COLL VENOUS BLD VENIPUNCTURE: CPT | Performed by: PODIATRIST

## 2024-02-14 RX ORDER — MAGNESIUM SULFATE HEPTAHYDRATE 40 MG/ML
2 INJECTION, SOLUTION INTRAVENOUS ONCE
Status: COMPLETED | OUTPATIENT
Start: 2024-02-14 | End: 2024-02-14

## 2024-02-14 RX ORDER — POTASSIUM CHLORIDE 14.9 MG/ML
20 INJECTION INTRAVENOUS
Status: COMPLETED | OUTPATIENT
Start: 2024-02-14 | End: 2024-02-14

## 2024-02-14 RX ADMIN — PIPERACILLIN SODIUM AND TAZOBACTAM SODIUM 3.38 G: 3; .375 INJECTION, SOLUTION INTRAVENOUS at 20:59

## 2024-02-14 RX ADMIN — ACETAMINOPHEN 1000 MG: 1000 INJECTION INTRAVENOUS at 01:00

## 2024-02-14 RX ADMIN — POTASSIUM CHLORIDE 20 MEQ: 14.9 INJECTION, SOLUTION INTRAVENOUS at 11:35

## 2024-02-14 RX ADMIN — METHOCARBAMOL 1000 MG: 100 INJECTION, SOLUTION INTRAMUSCULAR; INTRAVENOUS at 11:49

## 2024-02-14 RX ADMIN — OXYCODONE HYDROCHLORIDE 10 MG: 5 SOLUTION ORAL at 01:08

## 2024-02-14 RX ADMIN — MAGNESIUM SULFATE HEPTAHYDRATE 2 G: 40 INJECTION, SOLUTION INTRAVENOUS at 11:32

## 2024-02-14 RX ADMIN — OXYCODONE HYDROCHLORIDE 10 MG: 5 SOLUTION ORAL at 14:38

## 2024-02-14 RX ADMIN — PANTOPRAZOLE SODIUM 40 MG: 40 INJECTION, POWDER, FOR SOLUTION INTRAVENOUS at 08:41

## 2024-02-14 RX ADMIN — POTASSIUM CHLORIDE 20 MEQ: 14.9 INJECTION, SOLUTION INTRAVENOUS at 13:38

## 2024-02-14 RX ADMIN — PIPERACILLIN SODIUM AND TAZOBACTAM SODIUM 3.38 G: 3; .375 INJECTION, SOLUTION INTRAVENOUS at 02:47

## 2024-02-14 RX ADMIN — BENZOCAINE AND MENTHOL 2 LOZENGE: 15; 3.6 LOZENGE ORAL at 13:40

## 2024-02-14 RX ADMIN — METHOCARBAMOL 1000 MG: 100 INJECTION, SOLUTION INTRAMUSCULAR; INTRAVENOUS at 03:51

## 2024-02-14 RX ADMIN — DIATRIZOATE MEGLUMINE AND DIATRIZOATE SODIUM 120 ML: 660; 100 LIQUID ORAL; RECTAL at 11:00

## 2024-02-14 RX ADMIN — ENOXAPARIN SODIUM 40 MG: 100 INJECTION SUBCUTANEOUS at 20:59

## 2024-02-14 RX ADMIN — PIPERACILLIN SODIUM AND TAZOBACTAM SODIUM 3.38 G: 3; .375 INJECTION, SOLUTION INTRAVENOUS at 15:39

## 2024-02-14 RX ADMIN — BENZOCAINE AND MENTHOL 2 LOZENGE: 15; 3.6 LOZENGE ORAL at 01:21

## 2024-02-14 RX ADMIN — PANTOPRAZOLE SODIUM 40 MG: 40 INJECTION, POWDER, FOR SOLUTION INTRAVENOUS at 20:59

## 2024-02-14 RX ADMIN — OXYCODONE HYDROCHLORIDE 5 MG: 5 SOLUTION ORAL at 08:41

## 2024-02-14 RX ADMIN — PIPERACILLIN SODIUM AND TAZOBACTAM SODIUM 3.38 G: 3; .375 INJECTION, SOLUTION INTRAVENOUS at 08:41

## 2024-02-14 RX ADMIN — METHOCARBAMOL 1000 MG: 100 INJECTION, SOLUTION INTRAMUSCULAR; INTRAVENOUS at 20:58

## 2024-02-14 ASSESSMENT — COGNITIVE AND FUNCTIONAL STATUS - GENERAL
MOVING FROM LYING ON BACK TO SITTING ON SIDE OF FLAT BED WITH BEDRAILS: A LITTLE
DRESSING REGULAR LOWER BODY CLOTHING: A LOT
MOVING FROM LYING ON BACK TO SITTING ON SIDE OF FLAT BED WITH BEDRAILS: A LITTLE
STANDING UP FROM CHAIR USING ARMS: A LOT
DRESSING REGULAR LOWER BODY CLOTHING: A LOT
STANDING UP FROM CHAIR USING ARMS: A LITTLE
MOVING FROM LYING ON BACK TO SITTING ON SIDE OF FLAT BED WITH BEDRAILS: A LITTLE
MOVING FROM LYING ON BACK TO SITTING ON SIDE OF FLAT BED WITH BEDRAILS: A LITTLE
MOBILITY SCORE: 12
PERSONAL GROOMING: A LOT
TOILETING: A LITTLE
EATING MEALS: A LITTLE
MOVING FROM LYING ON BACK TO SITTING ON SIDE OF FLAT BED WITH BEDRAILS: A LITTLE
WALKING IN HOSPITAL ROOM: A LITTLE
WALKING IN HOSPITAL ROOM: TOTAL
CLIMB 3 TO 5 STEPS WITH RAILING: TOTAL
MOVING TO AND FROM BED TO CHAIR: A LOT
STANDING UP FROM CHAIR USING ARMS: A LOT
DRESSING REGULAR LOWER BODY CLOTHING: A LOT
STANDING UP FROM CHAIR USING ARMS: A LOT
WALKING IN HOSPITAL ROOM: TOTAL
DAILY ACTIVITIY SCORE: 15
MOVING TO AND FROM BED TO CHAIR: A LOT
TOILETING: A LOT
CLIMB 3 TO 5 STEPS WITH RAILING: TOTAL
DRESSING REGULAR UPPER BODY CLOTHING: A LOT
EATING MEALS: A LITTLE
DRESSING REGULAR UPPER BODY CLOTHING: A LOT
STANDING UP FROM CHAIR USING ARMS: A LOT
DRESSING REGULAR LOWER BODY CLOTHING: A LOT
PERSONAL GROOMING: A LITTLE
MOVING TO AND FROM BED TO CHAIR: A LOT
WALKING IN HOSPITAL ROOM: TOTAL
PERSONAL GROOMING: A LOT
WALKING IN HOSPITAL ROOM: TOTAL
MOBILITY SCORE: 12
TOILETING: A LOT
HELP NEEDED FOR BATHING: A LITTLE
CLIMB 3 TO 5 STEPS WITH RAILING: TOTAL
DAILY ACTIVITIY SCORE: 15
TURNING FROM BACK TO SIDE WHILE IN FLAT BAD: A LITTLE
MOVING TO AND FROM BED TO CHAIR: A LOT
STANDING UP FROM CHAIR USING ARMS: A LOT
TURNING FROM BACK TO SIDE WHILE IN FLAT BAD: A LITTLE
TURNING FROM BACK TO SIDE WHILE IN FLAT BAD: A LITTLE
MOVING TO AND FROM BED TO CHAIR: A LOT
MOVING FROM LYING ON BACK TO SITTING ON SIDE OF FLAT BED WITH BEDRAILS: A LITTLE
DAILY ACTIVITIY SCORE: 15
HELP NEEDED FOR BATHING: A LITTLE
TURNING FROM BACK TO SIDE WHILE IN FLAT BAD: A LITTLE
DRESSING REGULAR UPPER BODY CLOTHING: A LOT
MOBILITY SCORE: 16
CLIMB 3 TO 5 STEPS WITH RAILING: TOTAL
EATING MEALS: A LITTLE
WALKING IN HOSPITAL ROOM: TOTAL
DAILY ACTIVITIY SCORE: 15
CLIMB 3 TO 5 STEPS WITH RAILING: TOTAL
WALKING IN HOSPITAL ROOM: TOTAL
TOILETING: A LITTLE
TOILETING: A LITTLE
CLIMB 3 TO 5 STEPS WITH RAILING: TOTAL
TOILETING: A LITTLE
EATING MEALS: A LITTLE
DRESSING REGULAR LOWER BODY CLOTHING: A LOT
MOVING TO AND FROM BED TO CHAIR: A LOT
DRESSING REGULAR LOWER BODY CLOTHING: A LOT
PERSONAL GROOMING: A LITTLE
HELP NEEDED FOR BATHING: A LITTLE
DRESSING REGULAR LOWER BODY CLOTHING: A LOT
HELP NEEDED FOR BATHING: A LOT
TOILETING: A LITTLE
DRESSING REGULAR LOWER BODY CLOTHING: A LOT
TOILETING: A LITTLE
EATING MEALS: A LITTLE
DRESSING REGULAR UPPER BODY CLOTHING: A LOT
WALKING IN HOSPITAL ROOM: A LITTLE
EATING MEALS: A LITTLE
TURNING FROM BACK TO SIDE WHILE IN FLAT BAD: A LITTLE
DAILY ACTIVITIY SCORE: 15
HELP NEEDED FOR BATHING: A LITTLE
PERSONAL GROOMING: A LOT
MOBILITY SCORE: 12
PERSONAL GROOMING: A LITTLE
MOVING FROM LYING ON BACK TO SITTING ON SIDE OF FLAT BED WITH BEDRAILS: A LITTLE
MOBILITY SCORE: 12
TURNING FROM BACK TO SIDE WHILE IN FLAT BAD: A LITTLE
TURNING FROM BACK TO SIDE WHILE IN FLAT BAD: A LITTLE
PERSONAL GROOMING: A LOT
HELP NEEDED FOR BATHING: A LITTLE
STANDING UP FROM CHAIR USING ARMS: A LOT
EATING MEALS: A LITTLE
DAILY ACTIVITIY SCORE: 14
DRESSING REGULAR UPPER BODY CLOTHING: A LOT
CLIMB 3 TO 5 STEPS WITH RAILING: TOTAL
MOBILITY SCORE: 12
EATING MEALS: A LITTLE
MOVING TO AND FROM BED TO CHAIR: A LITTLE
DRESSING REGULAR UPPER BODY CLOTHING: A LOT
DRESSING REGULAR UPPER BODY CLOTHING: A LOT
PERSONAL GROOMING: A LOT
DRESSING REGULAR UPPER BODY CLOTHING: A LOT
CLIMB 3 TO 5 STEPS WITH RAILING: TOTAL

## 2024-02-14 ASSESSMENT — PAIN SCALES - GENERAL
PAINLEVEL_OUTOF10: 7
PAINLEVEL_OUTOF10: 5 - MODERATE PAIN
PAINLEVEL_OUTOF10: 0 - NO PAIN
PAINLEVEL_OUTOF10: 6

## 2024-02-14 ASSESSMENT — PAIN - FUNCTIONAL ASSESSMENT
PAIN_FUNCTIONAL_ASSESSMENT: 0-10
PAIN_FUNCTIONAL_ASSESSMENT: 0-10

## 2024-02-14 ASSESSMENT — ACTIVITIES OF DAILY LIVING (ADL)
HOME_MANAGEMENT_TIME_ENTRY: 20
BATHING_ASSISTANCE: MODERATE

## 2024-02-14 ASSESSMENT — PAIN DESCRIPTION - DESCRIPTORS: DESCRIPTORS: ACHING

## 2024-02-14 ASSESSMENT — PAIN DESCRIPTION - LOCATION: LOCATION: ABDOMEN

## 2024-02-14 NOTE — PROGRESS NOTES
Physical Therapy    Physical Therapy Treatment    Patient Name: Trevor Yarbrough  MRN: [id number]  Today's Date: 2/14/2024  Time Calculation  Start Time: 1721  Stop Time: 1754  Time Calculation (min): 33 min       Assessment/Plan   PT Assessment  PT Assessment Results: Decreased strength, Decreased endurance, Impaired balance, Decreased mobility  End of Session Communication: Bedside nurse  Assessment Comment: Pt demonstrated increased ambulation distances and improved independence with functional transfers this date.  End of Session Patient Position: Bed, 3 rail up, Alarm off, not on at start of session  PT Plan  Inpatient/Swing Bed or Outpatient: Inpatient  PT Plan  Treatment/Interventions: Bed mobility, Transfer training, Gait training, Stair training, Neuromuscular re-education, Balance training, Strengthening, Range of motion, Endurance training, Therapeutic exercise, Therapeutic activity, Home exercise program, Postural re-education  PT Plan: Skilled PT  PT Frequency: 4 times per week  PT Discharge Recommendations: High intensity level of continued care  Equipment Recommended upon Discharge: Wheeled walker  PT Recommended Transfer Status: Assist x2  PT - OK to Discharge: Yes      General Visit Information:   PT  Visit  PT Received On: 02/14/24  General  Family/Caregiver Present: No  Prior to Session Communication: Bedside nurse  Patient Position Received: Bed, 3 rail up, Alarm off, not on at start of session    Subjective   Precautions:  Precautions  Medical Precautions: Abdominal precautions  Post-Surgical Precautions: Abdominal surgery precautions  Precautions Comment: Pt supine in bed upon arrival. Pt pleasant and motivated for therapy. Pt stated he is feeling much better today than he was yesterday.  Vital Signs:  Vital Signs  Heart Rate: 89  Heart Rate Source: Monitor    Objective   Pain:     Cognition:  Cognition  Overall Cognitive Status: Within Functional Limits    Activity Tolerance:  Activity  Tolerance  Endurance: Endurance does not limit participation in activity  Treatments:  Therapeutic Exercise  Therapeutic Exercise Performed: Yes  Therapeutic Exercise Activity 1: seated B LE AROM LAQ, heel raises, hip flexion/adduction, toe raises x15    Therapeutic Activity  Therapeutic Activity Performed: Yes  Therapeutic Activity 1: Pt performed bed mobility, transfers, and gait training.  Therapeutic Activity 2: Pt completed STS x5 reps with SBA using FWW. Pt requires extended time to complete activity.    Bed Mobility  Bed Mobility: Yes  Bed Mobility 1  Bed Mobility 1: Supine to sitting  Level of Assistance 1: Minimum assistance  Bed Mobility Comments 1: HOB elevated, cues for log roll and proper hand placement.  Bed Mobility 2  Bed Mobility  2: Sitting to supine  Level of Assistance 2: Moderate assistance  Bed Mobility Comments 2: Cues for log roll, assist with B LE.    Ambulation/Gait Training  Ambulation/Gait Training Performed: Yes  Ambulation/Gait Training 1  Surface 1: Level tile  Device 1: Rolling walker  Assistance 1: Contact guard  Quality of Gait 1: Decreased step length  Comments/Distance (ft) 1: 35' Pt requires increased time.  Transfers  Transfer: Yes  Transfer 1  Transfer From 1: Bed to  Transfer to 1: Stand  Technique 1: Sit to stand  Transfer Device 1: Walker  Transfer Level of Assistance 1: Close supervision  Trials/Comments 1: CGA initially and progressed to SBA.  Transfers 2  Transfer From 2: Stand to  Transfer to 2: Bed  Technique 2: Stand to sit  Transfer Device 2: Walker  Transfer Level of Assistance 2: Close supervision    Outcome Measures:  [facility] Basic Mobility  Turning from your back to your side while in a flat bed without using bedrails: A little  Moving from lying on your back to sitting on the side of a flat bed without using bedrails: A little  Moving to and from bed to chair (including a wheelchair): A little  Standing up from a chair using your arms (e.g. wheelchair or bedside  chair): A little  To walk in hospital room: A little  Climbing 3-5 steps with railing: Total  Basic Mobility - Total Score: 16    Education Documentation  Precautions, taught by Lauren Foy PTA at 2/14/2024  6:07 PM.  Learner: Patient  Readiness: Eager  Method: Explanation  Response: Verbalizes Understanding    Mobility Training, taught by Lauren Foy PTA at 2/14/2024  6:07 PM.  Learner: Patient  Readiness: Eager  Method: Explanation  Response: Verbalizes Understanding    Education Comments  No comments found.        OP EDUCATION:       Encounter Problems       Encounter Problems (Active)       Balance       STG - Maintains dynamic standing balance with upper extremity support on LRAD with SBA (Progressing)       Start:  02/12/24    Expected End:  03/04/24               Mobility       STG - Patient will ambulate x100 ft with LRAD and SBA (Progressing)       Start:  02/12/24    Expected End:  03/04/24            Pt will perform 5x STS in </= 12 sec (Progressing)       Start:  02/12/24    Expected End:  03/04/24               Pain - Adult          Transfers       STG - Patient will perform bed mobility with SBA (Progressing)       Start:  02/12/24    Expected End:  03/04/24            STG - Patient will transfer sit to and from stand with SBA using LRAD (Met)       Start:  02/12/24    Expected End:  03/04/24    Resolved:  02/14/24

## 2024-02-14 NOTE — PROGRESS NOTES
[facility]  ACUTE CARE SURGERY - PROGRESS NOTE    Patient Name: Trevor Yarbrough  MRN: [id number]  Admit Date: 206  : 1970  AGE: 53 y.o.   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      24: exlap, subtotal gastrectomy  : washout  : antrectomy, Billroth 2 anatomy; fascia closed, skin open with wound vac placed     Neuro:   - PRN Oxycodone for Pain with IV Dilaudid for breakthrough   - Scheduled Robaxin     Card:  - Continuous tele monitoring  - vital signs [id number]     Resp:   - Encourage IS  - OOB ambulate, PT/OT    GI:   - Hold tube feeds for now   - No crushed meds through dobhoff- only liquids   - IV PPI BID  - Wound Vac dressing changes Monday and    - Maintain NGT to LIWS  - Upper GI study today   - Monitor drains output; inferior drain output becoming more gastric in appearance       -> Amylase sent : drain <10, Serum Amylase 28.     :  - Strict I&Os, monitor drains output  - mIVFs   - Replete lytes as indicated: K 3.4, Mg 1.8    Heme: acute postop anemia  - Given 1 unit of PRBC   - Trend H/H daily     ID:  - completed fluc/zosyn x 4 days for intra-abdominal infection  CT A/P obtained  due to low grade temp, abdominal pain and increasing leukocytosis- no definite leak, adjacent fluid collections around pancreas, thickening of anastomosis   - follow up blood cultures- NGTD x1 day   - Restarted on Zosyn   - Continue to trend WBC     DVT Proph:   - SCDs, Lovenox     Dispo: continue RNF      Patient seen and discussed with Attending Dr. John SOLORIO-CNP  Acute Care Surgery  Pager [phone]      ==============================================================================  CHIEF COMPLAINT / EVENTS LAST 24HRS / HPI:  No acute events overnight, appears more comfortable this AM. Pain more controlled. Denies nausea. No bowel movement yesterday.   MEDICAL HISTORY / ROS:   Admission history and ROS reviewed. Pertinent changes as follows:  N/A    PHYSICAL EXAM:  Heart Rate:  [79-86]   Temp:  [36.1 °C (97 °F)-36.6 °C (97.9 °F)]   Resp:  [16-20]   BP: (125-160)/(64-89)   SpO2:  [91 %-96 %]   Physical Exam    Physical Exam     Constitutional- lying in bed, appears more comfortable   Cards- regular rate on monitor  Resp- nonlabored breathing on RA  Abdomen- soft, edematous, mildly distended; vac holding suction; Superior drain serosang, inferior drain with dark gastric output;  NGT in place to LIWS suction- bilious output. Dobbhoff in place   Extremities- BURKETT  Skin- warm, dry   Neuro- A&Ox3, appropriate mood and behavior       IMAGING SUMMARY:  (summary of new imaging findings, not a copy of dictation)  CT A/P 2/13:  Impression:    1. Postsurgical change of partial gastrectomy with Billroth 2  anastomosis. There is no james extraluminal contrast to suggest a  definite leak. However, a subtle tract of hyperdensity extending  along the undersurface of the small bowel loop at the anastomosis  could reflect a subtle leak.  2. Several perigastric and peripancreatic fluid collections without  well-defined walls may be postoperative in nature, however given the  above finding a leak is not excluded.  3. Mural thickening of the remnant stomach and the anastomotic small  bowel loop which may be due to postoperative edema or reactive  inflammatory change.  4. Edematous appearance of the pancreatic head with adjacent fluid  collections, likely postoperative in etiology. However, groove  pancreatitis may have a similar appearance and correlation with  lipase is recommended for further evaluation.  5. Linear hypodensity  within hepatic segment 2, which is new when  compared to prior exam and may be postsurgical.  6. New small bilateral pleural effusions.  7. Significantly improved small volume pneumoperitoneum when compared  to prior exam with small volume abdominopelvic ascites predominantly  layering within the perihepatic and perisplenic regions.  8. Additional findings as detailed above.        LABS:  Results from last 7 days   Lab Units 02/14/24  0609 02/13/24  2301 02/13/24  1307   WBC AUTO x10*3/uL 13.4* 15.1* 14.7*   HEMOGLOBIN g/dL 8.7* 7.9* 8.3*   HEMATOCRIT % 25.5* 22.6* 23.5*   PLATELETS AUTO x10*3/uL 302 247 275   NEUTROS PCT AUTO % 85.0  --   --    LYMPHS PCT AUTO % 5.9  --   --    MONOS PCT AUTO % 6.0  --   --    EOS PCT AUTO % 1.1  --   --        Results from last 7 days   Lab Units 02/09/24  1530 02/09/24  0116 02/08/24  0014   APTT seconds 25* 27 30   INR  1.0 1.0 1.3*       Results from last 7 days   Lab Units 02/14/24  0609 02/13/24  2301 02/13/24  1307 02/10/24  0301 02/09/24  1530   SODIUM mmol/L 136 139 138   < > 140   POTASSIUM mmol/L 3.4* 3.4* 3.5   < > 3.9   CHLORIDE mmol/L 102 104 103   < > 103   CO2 mmol/L 24 26 24   < > 29   BUN mg/dL 13 15 13   < > 19   CREATININE mg/dL 0.77 0.75 0.62   < > 0.86   CALCIUM mg/dL 7.8* 7.4* 8.0*   < > 7.3*   PROTEIN TOTAL g/dL  --  4.7*  --   --  4.0*   BILIRUBIN TOTAL mg/dL  --  1.0  --   --  0.4   ALK PHOS U/L  --  83  --   --  46   ALT U/L  --  195*  --   --  469*   AST U/L  --  67*  --   --  419*   GLUCOSE mg/dL 102* 83 80   < > 92    < > = values in this interval not displayed.       Results from last 7 days   Lab Units 02/13/24  2301 02/09/24  1530   BILIRUBIN TOTAL mg/dL 1.0 0.4       Results from last 7 days   Lab Units 02/11/24  0239 02/09/24  2115 02/09/24  1814   POCT PH, ARTERIAL pH 7.46* 7.44* 7.47*   POCT PCO2, ARTERIAL mm Hg 41 43* 41   POCT PO2, ARTERIAL mm Hg 139* 118* 63*   POCT HCO3 CALCULATED, ARTERIAL mmol/L 29.2* 29.2* 29.8*   POCT BASE EXCESS,  ARTERIAL mmol/L 4.9* 4.6* 5.6*         I have reviewed all medications, laboratory results, and imaging pertinent for today's encounter.

## 2024-02-14 NOTE — PROGRESS NOTES
Occupational Therapy    Evaluation/Treatment    Patient Name: Trevor Yarbrough  MRN: [id number]  : 1970  Today's Date: 24  Time Calculation  Start Time: 1128  Stop Time: 1236  Time Calculation (min): 68 min       Assessment:  OT Assessment: Patient demo improvements in cognition. Patient requiring increased time for fine motor coordination tasks. Patient with slight dizziness when initially sitting at EOB; subsiding and progressing to standing and then demo functional mobility from EOB <>toilet with PEGGY with use of WW. Recommend HIGH intensity OT services when medically appropriate. Continue acute care OT services; patient may progress to low intensity services pending in house progress.  Prognosis: Excellent  Barriers to Discharge: None  Evaluation/Treatment Tolerance: Patient tolerated treatment well  Medical Staff Made Aware: Yes  End of Session Communication: Bedside nurse  End of Session Patient Position: Bed, 3 rail up, Alarm off, not on at start of session  OT Assessment Results: Decreased ADL status, Decreased upper extremity strength, Decreased endurance, Decreased functional mobility, Decreased fine motor control  Prognosis: Excellent  Barriers to Discharge: None  Evaluation/Treatment Tolerance: Patient tolerated treatment well  Medical Staff Made Aware: Yes  Strengths: Ability to acquire knowledge, Attitude of self, Support of Caregivers  Barriers to Participation: Comorbidities  Plan:  Treatment Interventions: ADL retraining, Visual perceptual retraining, Functional transfer training, UE strengthening/ROM, Endurance training, Patient/family training, Equipment evaluation/education, Cognitive reorientation, UE splinting, Neuromuscular reeducation, Fine motor coordination activities, Compensatory technique education  OT Frequency: 4 times per week  OT Discharge Recommendations: High intensity level of continued care  OT Recommended Transfer Status: Assist of 1  OT - OK to Discharge: Yes (when  medically appropriate)  Treatment Interventions: ADL retraining, Visual perceptual retraining, Functional transfer training, UE strengthening/ROM, Endurance training, Patient/family training, Equipment evaluation/education, Cognitive reorientation, UE splinting, Neuromuscular reeducation, Fine motor coordination activities, Compensatory technique education    Subjective   Current Problem:  1. Perforated bowel (CMS/HCC)  Case Request Operating Room: Exploration Laparotomy, Gastrectomy, Resection Small Intestine, Closure Surgical Wound Abdomen    Case Request Operating Room: Exploration Laparotomy, Gastrectomy, Resection Small Intestine, Closure Surgical Wound Abdomen    Surgical Pathology Exam    Surgical Pathology Exam      2. Pneumoperitoneum  Case Request Operating Room: Exploration Laparotomy    Case Request Operating Room: Exploration Laparotomy    Surgical Pathology Exam    Surgical Pathology Exam    Case Request Operating Room: Re-opening Exploration Laparotomy, abdominal washout, possible abdominal closure, possible ostomy.    Case Request Operating Room: Re-opening Exploration Laparotomy, abdominal washout, possible abdominal closure, possible ostomy.      3. Bacteremia  Transthoracic Echo (TTE) Complete    Transthoracic Echo (TTE) Complete        General:   OT Received On: 02/14/24  General  Reason for Referral: 52 y/o M presenting with Pneumoperitoneum with peritonitis s/p 2/6/24: exlap, subtotal gastrectomy  2/7: washout  2/9: antrectomy, Billroth 2 anatomy; fascia closed, skin open with wound vac placed  Past Medical History Relevant to Rehab: insulinoma and prior fundoplication for hiatal hernia  Family/Caregiver Present: Yes  Caregiver Feedback: supportive spouse and father at bedside  Prior to Session Communication: Bedside nurse  Patient Position Received: Bed, 3 rail up, Alarm off, not on at start of session  General Comment: Patient returning from off unit testing. Per RN, pt eager to participate  in therapy at this time. Patient goal to get to bathroom to use toilet. Communication with RN; RN assisting with NGT management and IV line management. (NGT to suction at start and end of session. Abdominal wound vac in place. External catheter in place at start of session; removed and set up for use of urinal instead at end of session.)  Precautions:  Medical Precautions: Abdominal precautions, Fall precautions  Post-Surgical Precautions: Abdominal surgery precautions     Pain:  Pain Assessment  Pain Assessment: 0-10  Pain Score: 5 - Moderate pain  Pain Type: Surgical pain  Pain Location: Abdomen  Pain Descriptors: Aching  Pain Frequency: Constant/continuous  Pain Onset: Ongoing  Pain Interventions: Repositioned, Distraction, Emotional support  Response to Interventions: tolerated OT    Objective   Cognition:  Overall Cognitive Status: Within Functional Limits  Orientation Level: Oriented X4  Following Commands: Follows all commands and directions without difficulty  Cognition Comments: Patient and pt spouse reporting patient cognition significantly improving over last 24 hours. Patient emotional regarding increased insight into current medical status and CLOF.  Cognition Test Scores  Cognition Tests: Cognition Test Performed  SBT Test Score: Patient scoring 2/28 on Short Blessed Test indicating normal cognition.        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 Pt. wife reporting he ran one week  before admission.  Hand Dominance: Right       ADL:  Eating Assistance: Stand by (npo at this time)  Eating Deficit: Supervision/safety  Grooming Assistance: Minimal  Grooming Deficit: Steadying, Supervision/safety (standing at sink)  Bathing Assistance: Moderate  UE Dressing Assistance: Moderate  LE Dressing Assistance: Maximal  Toileting Assistance with Device: Moderate  Activities of Daily Living:      Grooming  Grooming Comments: Sitting at EOB for oral care. Progressing to standing for washing hands at sink. CGA for balance.         Toileting  Toileting Comments: MOD A overall. Patient assisting with pericare; OT completing for thoroughness.       Bed Mobility/Transfers: Bed Mobility  Bed Mobility: Yes  Bed Mobility 1  Bed Mobility 1: Supine to sitting, Sitting to supine  Level of Assistance 1: Moderate assistance  Bed Mobility Comments 1: Cues for sequencing and body positioning. Supine to sit assist with trunk and sit to supine assist with LEs.    Transfers  Transfer: Yes  Transfer 1  Transfer From 1: Sit to, Stand to  Transfer to 1: Sit, Stand  Technique 1: Sit to stand, Stand to sit  Transfer Device 1: Walker  Transfer Level of Assistance 1: Minimum assistance  Trials/Comments 1: Cues for sequencing and body positioning.    Toilet Transfers  Toilet Transfer From: Bed  Toilet Transfer Type: To and from  Toilet Transfer to: Standard toilet (with grab bar)  Toilet Transfer Technique: Ambulating  Toilet Transfers: Minimal assistance  Toilet Transfers Comments: Cues for sequencing and body positioning.    Ambulation/Gait Training:  Ambulation/Gait Training  Ambulation/Gait Training Performed: Yes  Ambulation/Gait Training 1  Comments/Distance (ft) 1: Side step x 4 to R and 4 to L then seated rest break. Use of WW and completing with MIN A. Progressing to functional mobility from EOB<>toilet with MIN A with WW. RN assisting with line management.  Sitting Balance:  Static Sitting Balance  Static  Sitting-Comment/Number of Minutes: CGA to SBA seated EOB and seated on toilet. Cues for relaxation with BUE support.  Standing Balance:  Static Standing Balance  Static Standing-Comment/Number of Minutes: MIN A  Dynamic Standing Balance  Dynamic Standing-Comments: MIN A           Vision:Vision - Basic Assessment  Current Vision: No visual deficits    Strength:  Strength Comments: BUEs at least 3+/5        Hand Function:  Hand Function  Gross Grasp: Functional  Coordination: Functional (increased time and attention needed)  Extremities: RUE   RUE : Within Functional Limits and LUE   LUE: Within Functional Limits      Outcome Measures: [facility] Daily Activity  Putting on and taking off regular lower body clothing: A lot  Bathing (including washing, rinsing, drying): A lot  Putting on and taking off regular upper body clothing: A lot  Toileting, which includes using toilet, bedpan or urinal: A lot  Taking care of personal grooming such as brushing teeth: A little  Eating Meals: A little  Daily Activity - Total Score: 14        Education Documentation  Body Mechanics, taught by Yazmin Paige OT at 2/14/2024  4:32 PM.  Learner: Significant Other, Patient  Readiness: Acceptance  Method: Explanation  Response: Verbalizes Understanding    Precautions, taught by Yzamin Paige OT at 2/14/2024  4:32 PM.  Learner: Significant Other, Patient  Readiness: Acceptance  Method: Explanation  Response: Verbalizes Understanding    ADL Training, taught by Yazmin Paige OT at 2/14/2024  4:32 PM.  Learner: Significant Other, Patient  Readiness: Acceptance  Method: Explanation  Response: Verbalizes Understanding    Education Comments  No comments found.      Goals:  Encounter Problems       Encounter Problems (Active)       ADLs       Patient will complete toileting with supervision assist and min cues.   (Progressing)       Start:  02/14/24    Expected End:  02/28/24            Patient will complete UB dressing with supervision  assist and min cues.   (Progressing)       Start:  02/14/24    Expected End:  02/28/24            Patient will complete LB dressing with MIN assist and min cues.   (Progressing)       Start:  02/14/24    Expected End:  02/28/24            Patient will complete grooming with supervision assist and min cues.   (Progressing)       Start:  02/14/24    Expected End:  02/28/24                     MOBILITY       Patient will complete bed mobility with supervision and min cues.    (Progressing)       Start:  02/14/24    Expected End:  02/28/24            Pt. Will demo household distance functional mobility with supervision using LRAD.   (Progressing)       Start:  02/14/24    Expected End:  02/28/24                 TRANSFERS       Pt. Will complete stand pivot transfer with supervision assist with min cues and using LRAD.   (Progressing)       Start:  02/14/24    Expected End:  02/28/24                 Yazmin Paige OT

## 2024-02-15 LAB
ALBUMIN SERPL BCP-MCNC: 2.3 G/DL (ref 3.4–5)
ALBUMIN SERPL BCP-MCNC: 2.4 G/DL (ref 3.4–5)
ALP SERPL-CCNC: 76 U/L (ref 33–120)
ALT SERPL W P-5'-P-CCNC: 87 U/L (ref 10–52)
ANION GAP SERPL CALC-SCNC: 13 MMOL/L (ref 10–20)
AST SERPL W P-5'-P-CCNC: 39 U/L (ref 9–39)
BILIRUB DIRECT SERPL-MCNC: 0.2 MG/DL (ref 0–0.3)
BILIRUB SERPL-MCNC: 0.7 MG/DL (ref 0–1.2)
BUN SERPL-MCNC: 13 MG/DL (ref 6–23)
CALCIUM SERPL-MCNC: 7.4 MG/DL (ref 8.6–10.6)
CHLORIDE SERPL-SCNC: 102 MMOL/L (ref 98–107)
CO2 SERPL-SCNC: 23 MMOL/L (ref 21–32)
CREAT SERPL-MCNC: 0.81 MG/DL (ref 0.5–1.3)
EGFRCR SERPLBLD CKD-EPI 2021: >90 ML/MIN/1.73M*2
ERYTHROCYTE [DISTWIDTH] IN BLOOD BY AUTOMATED COUNT: 15.1 % (ref 11.5–14.5)
GLUCOSE BLD MANUAL STRIP-MCNC: 105 MG/DL (ref 74–99)
GLUCOSE BLD MANUAL STRIP-MCNC: 106 MG/DL (ref 74–99)
GLUCOSE BLD MANUAL STRIP-MCNC: 81 MG/DL (ref 74–99)
GLUCOSE BLD MANUAL STRIP-MCNC: 85 MG/DL (ref 74–99)
GLUCOSE BLD MANUAL STRIP-MCNC: 94 MG/DL (ref 74–99)
GLUCOSE BLD MANUAL STRIP-MCNC: 97 MG/DL (ref 74–99)
GLUCOSE BLD MANUAL STRIP-MCNC: 98 MG/DL (ref 74–99)
GLUCOSE SERPL-MCNC: 91 MG/DL (ref 74–99)
HCT VFR BLD AUTO: 27.1 % (ref 41–52)
HGB BLD-MCNC: 8.6 G/DL (ref 13.5–17.5)
MAGNESIUM SERPL-MCNC: 1.93 MG/DL (ref 1.6–2.4)
MCH RBC QN AUTO: 27.5 PG (ref 26–34)
MCHC RBC AUTO-ENTMCNC: 31.7 G/DL (ref 32–36)
MCV RBC AUTO: 87 FL (ref 80–100)
NRBC BLD-RTO: 0 /100 WBCS (ref 0–0)
PHOSPHATE SERPL-MCNC: 3.6 MG/DL (ref 2.5–4.9)
PLATELET # BLD AUTO: 344 X10*3/UL (ref 150–450)
POTASSIUM SERPL-SCNC: 4 MMOL/L (ref 3.5–5.3)
PROT SERPL-MCNC: 5.2 G/DL (ref 6.4–8.2)
RBC # BLD AUTO: 3.13 X10*6/UL (ref 4.5–5.9)
SODIUM SERPL-SCNC: 134 MMOL/L (ref 136–145)
WBC # BLD AUTO: 11.6 X10*3/UL (ref 4.4–11.3)

## 2024-02-15 PROCEDURE — 2500000004 HC RX 250 GENERAL PHARMACY W/ HCPCS (ALT 636 FOR OP/ED)

## 2024-02-15 PROCEDURE — 1200000002 HC GENERAL ROOM WITH TELEMETRY DAILY

## 2024-02-15 PROCEDURE — 83735 ASSAY OF MAGNESIUM: CPT | Performed by: PODIATRIST

## 2024-02-15 PROCEDURE — 2500000004 HC RX 250 GENERAL PHARMACY W/ HCPCS (ALT 636 FOR OP/ED): Performed by: PHYSICIAN ASSISTANT

## 2024-02-15 PROCEDURE — 97110 THERAPEUTIC EXERCISES: CPT | Mod: GP | Performed by: PHYSICAL MEDICINE & REHABILITATION

## 2024-02-15 PROCEDURE — 82040 ASSAY OF SERUM ALBUMIN: CPT

## 2024-02-15 PROCEDURE — 2500000001 HC RX 250 WO HCPCS SELF ADMINISTERED DRUGS (ALT 637 FOR MEDICARE OP)

## 2024-02-15 PROCEDURE — 85027 COMPLETE CBC AUTOMATED: CPT

## 2024-02-15 PROCEDURE — 36415 COLL VENOUS BLD VENIPUNCTURE: CPT

## 2024-02-15 PROCEDURE — 82947 ASSAY GLUCOSE BLOOD QUANT: CPT

## 2024-02-15 PROCEDURE — C9113 INJ PANTOPRAZOLE SODIUM, VIA: HCPCS

## 2024-02-15 PROCEDURE — 84100 ASSAY OF PHOSPHORUS: CPT

## 2024-02-15 PROCEDURE — 97530 THERAPEUTIC ACTIVITIES: CPT | Mod: GP | Performed by: PHYSICAL MEDICINE & REHABILITATION

## 2024-02-15 RX ORDER — HYDROMORPHONE HYDROCHLORIDE 1 MG/ML
0.4 INJECTION, SOLUTION INTRAMUSCULAR; INTRAVENOUS; SUBCUTANEOUS ONCE
Status: DISCONTINUED | OUTPATIENT
Start: 2024-02-15 | End: 2024-02-26 | Stop reason: HOSPADM

## 2024-02-15 RX ORDER — MAGNESIUM SULFATE HEPTAHYDRATE 40 MG/ML
2 INJECTION, SOLUTION INTRAVENOUS ONCE
Status: COMPLETED | OUTPATIENT
Start: 2024-02-15 | End: 2024-02-15

## 2024-02-15 RX ADMIN — PANTOPRAZOLE SODIUM 40 MG: 40 INJECTION, POWDER, FOR SOLUTION INTRAVENOUS at 21:11

## 2024-02-15 RX ADMIN — ENOXAPARIN SODIUM 40 MG: 100 INJECTION SUBCUTANEOUS at 21:11

## 2024-02-15 RX ADMIN — MAGNESIUM SULFATE HEPTAHYDRATE 2 G: 40 INJECTION, SOLUTION INTRAVENOUS at 11:23

## 2024-02-15 RX ADMIN — PIPERACILLIN SODIUM AND TAZOBACTAM SODIUM 3.38 G: 3; .375 INJECTION, SOLUTION INTRAVENOUS at 14:33

## 2024-02-15 RX ADMIN — PIPERACILLIN SODIUM AND TAZOBACTAM SODIUM 3.38 G: 3; .375 INJECTION, SOLUTION INTRAVENOUS at 02:48

## 2024-02-15 RX ADMIN — BENZOCAINE AND MENTHOL 2 LOZENGE: 15; 3.6 LOZENGE ORAL at 12:54

## 2024-02-15 RX ADMIN — METHOCARBAMOL 1000 MG: 100 INJECTION, SOLUTION INTRAMUSCULAR; INTRAVENOUS at 03:05

## 2024-02-15 RX ADMIN — OXYCODONE HYDROCHLORIDE 5 MG: 5 SOLUTION ORAL at 21:13

## 2024-02-15 RX ADMIN — METHOCARBAMOL 1000 MG: 100 INJECTION, SOLUTION INTRAMUSCULAR; INTRAVENOUS at 21:12

## 2024-02-15 RX ADMIN — METHOCARBAMOL 1000 MG: 100 INJECTION, SOLUTION INTRAMUSCULAR; INTRAVENOUS at 11:23

## 2024-02-15 RX ADMIN — HYDROMORPHONE HYDROCHLORIDE 0.4 MG: 1 INJECTION, SOLUTION INTRAMUSCULAR; INTRAVENOUS; SUBCUTANEOUS at 13:36

## 2024-02-15 RX ADMIN — PIPERACILLIN SODIUM AND TAZOBACTAM SODIUM 3.38 G: 3; .375 INJECTION, SOLUTION INTRAVENOUS at 09:21

## 2024-02-15 RX ADMIN — OXYCODONE HYDROCHLORIDE 10 MG: 5 SOLUTION ORAL at 07:34

## 2024-02-15 RX ADMIN — OXYCODONE HYDROCHLORIDE 10 MG: 5 SOLUTION ORAL at 14:15

## 2024-02-15 RX ADMIN — PANTOPRAZOLE SODIUM 40 MG: 40 INJECTION, POWDER, FOR SOLUTION INTRAVENOUS at 07:53

## 2024-02-15 RX ADMIN — PIPERACILLIN SODIUM AND TAZOBACTAM SODIUM 3.38 G: 3; .375 INJECTION, SOLUTION INTRAVENOUS at 21:13

## 2024-02-15 ASSESSMENT — COGNITIVE AND FUNCTIONAL STATUS - GENERAL
TURNING FROM BACK TO SIDE WHILE IN FLAT BAD: A LITTLE
DAILY ACTIVITIY SCORE: 18
MOVING FROM LYING ON BACK TO SITTING ON SIDE OF FLAT BED WITH BEDRAILS: A LITTLE
WALKING IN HOSPITAL ROOM: A LITTLE
EATING MEALS: A LITTLE
DRESSING REGULAR UPPER BODY CLOTHING: A LITTLE
TOILETING: A LITTLE
DRESSING REGULAR LOWER BODY CLOTHING: A LITTLE
WALKING IN HOSPITAL ROOM: A LITTLE
STANDING UP FROM CHAIR USING ARMS: A LITTLE
MOVING FROM LYING ON BACK TO SITTING ON SIDE OF FLAT BED WITH BEDRAILS: A LITTLE
CLIMB 3 TO 5 STEPS WITH RAILING: TOTAL
MOBILITY SCORE: 16
HELP NEEDED FOR BATHING: A LITTLE
PERSONAL GROOMING: A LITTLE
DAILY ACTIVITIY SCORE: 19
TOILETING: A LITTLE
PERSONAL GROOMING: A LITTLE
STANDING UP FROM CHAIR USING ARMS: A LITTLE
TURNING FROM BACK TO SIDE WHILE IN FLAT BAD: A LITTLE
CLIMB 3 TO 5 STEPS WITH RAILING: A LITTLE
MOVING TO AND FROM BED TO CHAIR: A LITTLE
WALKING IN HOSPITAL ROOM: A LITTLE
CLIMB 3 TO 5 STEPS WITH RAILING: A LITTLE
DRESSING REGULAR UPPER BODY CLOTHING: A LITTLE
DRESSING REGULAR LOWER BODY CLOTHING: A LITTLE
MOBILITY SCORE: 18
MOVING TO AND FROM BED TO CHAIR: A LITTLE
HELP NEEDED FOR BATHING: A LITTLE
MOBILITY SCORE: 22

## 2024-02-15 ASSESSMENT — PAIN SCALES - GENERAL
PAINLEVEL_OUTOF10: 2
PAINLEVEL_OUTOF10: 2
PAINLEVEL_OUTOF10: 7
PAINLEVEL_OUTOF10: 3
PAINLEVEL_OUTOF10: 7

## 2024-02-15 ASSESSMENT — PAIN - FUNCTIONAL ASSESSMENT
PAIN_FUNCTIONAL_ASSESSMENT: 0-10

## 2024-02-15 NOTE — PROGRESS NOTES
[facility]  ACUTE CARE SURGERY - PROGRESS NOTE    Patient Name: rTevor Yarbrough  MRN: [id number]  Admit Date: 206  : 1970  AGE: 53 y.o.   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      24: exlap, subtotal gastrectomy  : washout  : antrectomy, Billroth 2 anatomy; fascia closed, skin open with wound vac placed     Neuro:   - PRN Oxycodone for Pain with IV Dilaudid for breakthrough   - Scheduled Robaxin     Card:  - Continuous tele monitoring  - vital signs [id number]     Resp:   - Encourage IS  - OOB ambulate, PT/OT    GI:   - TF were ordered yesterday pm but were not started, to be started today  - No crushed meds through dobhoff- only liquids   - IV PPI BID  - Wound Vac dressing changes Monday and Thursday's - to be changed today  - Maintain NGT to LIWS  - Monitor drains output; inferior drain output more gastric in appearance       -> Amylase sent : drain <10, Serum Amylase 28.     :  - Strict I&Os, monitor drains output  - mIVFs   - Replete lytes as indicate    ENDO:  - hx of insulinoma- endocrine consult today    Heme: acute postop anemia  - Given 1 unit of PRBC   - Trend H/H daily     ID:  - completed fluc/zosyn x 4 days for intra-abdominal infection  CT A/P obtained  due to low grade temp, abdominal pain and increasing leukocytosis- no definite leak, adjacent fluid collections around pancreas, thickening of anastomosis   - follow up blood cultures- NGTD x1 day   - Restarted on Zosyn   - Continue to trend WBC     DVT Proph:   - SCDs, Lovenox     Dispo: continue RNF , plan for home with [facility] once medically ready for dc     Patient seen and discussed with Attending Dr. John Antunez,  ISH  Acute Care Surgery    ==============================================================================  CHIEF COMPLAINT / EVENTS LAST 24HRS / HPI:  No acute events overnight, doing well. Pain controlled. Denies any nausea    MEDICAL HISTORY / ROS:   Admission history and ROS reviewed. Pertinent changes as follows:  N/A    PHYSICAL EXAM:  Heart Rate:  [75-89]   Temp:  [36 °C (96.8 °F)-37.6 °C (99.7 °F)]   Resp:  [16-18]   BP: (134-151)/(70-75)   SpO2:  [92 %-97 %]   Physical Exam    Physical Exam     Constitutional- lying in bed, appears more comfortable   Cards- regular rate on monitor  Resp- nonlabored breathing on RA  Abdomen- soft, edematous, mildly distended; vac holding suction; Superior drain serosang, inferior drain with dark gastric output;  NGT in place to LIWS suction- bilious output. Dobbhoff in place   Extremities- BURKETT  Skin- warm, dry   Neuro- A&Ox3, appropriate mood and behavior       IMAGING SUMMARY:  (summary of new imaging findings, not a copy of dictation)  CT A/P 2/13:  Impression:    1. Postsurgical change of partial gastrectomy with Billroth 2  anastomosis. There is no james extraluminal contrast to suggest a  definite leak. However, a subtle tract of hyperdensity extending  along the undersurface of the small bowel loop at the anastomosis  could reflect a subtle leak.  2. Several perigastric and peripancreatic fluid collections without  well-defined walls may be postoperative in nature, however given the  above finding a leak is not excluded.  3. Mural thickening of the remnant stomach and the anastomotic small  bowel loop which may be due to postoperative edema or reactive  inflammatory change.  4. Edematous appearance of the pancreatic head with adjacent fluid  collections, likely postoperative in etiology. However, groove  pancreatitis may have a similar appearance and correlation with  lipase is recommended for further evaluation.  5. Linear hypodensity within hepatic segment 2, which  is new when  compared to prior exam and may be postsurgical.  6. New small bilateral pleural effusions.  7. Significantly improved small volume pneumoperitoneum when compared  to prior exam with small volume abdominopelvic ascites predominantly  layering within the perihepatic and perisplenic regions.  8. Additional findings as detailed above.        LABS:  Results from last 7 days   Lab Units 02/15/24  0513 02/14/24  0609 02/13/24  2301   WBC AUTO x10*3/uL 11.6* 13.4* 15.1*   HEMOGLOBIN g/dL 8.6* 8.7* 7.9*   HEMATOCRIT % 27.1* 25.5* 22.6*   PLATELETS AUTO x10*3/uL 344 302 247   NEUTROS PCT AUTO %  --  85.0  --    LYMPHS PCT AUTO %  --  5.9  --    MONOS PCT AUTO %  --  6.0  --    EOS PCT AUTO %  --  1.1  --      Results from last 7 days   Lab Units 02/09/24  1530 02/09/24  0116   APTT seconds 25* 27   INR  1.0 1.0     Results from last 7 days   Lab Units 02/15/24  0513 02/14/24  0609 02/13/24  2301 02/10/24  0301 02/09/24  1530   SODIUM mmol/L 134* 136 139   < > 140   POTASSIUM mmol/L 4.0 3.4* 3.4*   < > 3.9   CHLORIDE mmol/L 102 102 104   < > 103   CO2 mmol/L 23 24 26   < > 29   BUN mg/dL 13 13 15   < > 19   CREATININE mg/dL 0.81 0.77 0.75   < > 0.86   CALCIUM mg/dL 7.4* 7.8* 7.4*   < > 7.3*   PROTEIN TOTAL g/dL  --   --  4.7*  --  4.0*   BILIRUBIN TOTAL mg/dL  --   --  1.0  --  0.4   ALK PHOS U/L  --   --  83  --  46   ALT U/L  --   --  195*  --  469*   AST U/L  --   --  67*  --  419*   GLUCOSE mg/dL 91 102* 83   < > 92    < > = values in this interval not displayed.     Results from last 7 days   Lab Units 02/13/24 2301 02/09/24  1530   BILIRUBIN TOTAL mg/dL 1.0 0.4     Results from last 7 days   Lab Units 02/11/24  0239 02/09/24  2115 02/09/24  1814   POCT PH, ARTERIAL pH 7.46* 7.44* 7.47*   POCT PCO2, ARTERIAL mm Hg 41 43* 41   POCT PO2, ARTERIAL mm Hg 139* 118* 63*   POCT HCO3 CALCULATED, ARTERIAL mmol/L 29.2* 29.2* 29.8*   POCT BASE EXCESS, ARTERIAL mmol/L 4.9* 4.6* 5.6*       I have reviewed all  medications, laboratory results, and imaging pertinent for today's encounter.

## 2024-02-15 NOTE — CARE PLAN
The patient's goals for the shift include Unable to state patient intubated    The clinical goals for the shift include pain control

## 2024-02-15 NOTE — PROGRESS NOTES
Physical Therapy    Physical Therapy Treatment    Patient Name: Trevor Yarbrough  MRN: [id number]  Today's Date: 2/15/2024  Time Calculation  Start Time: 1052  Stop Time: 1130  Time Calculation (min): 38 min       Assessment/Plan   PT Assessment  End of Session Communication: Bedside nurse  End of Session Patient Position: Up in chair  PT Plan  Inpatient/Swing Bed or Outpatient: Inpatient  PT Plan  Treatment/Interventions: Bed mobility, Transfer training, Gait training, Stair training, Neuromuscular re-education, Balance training, Strengthening, Range of motion, Endurance training, Therapeutic exercise, Therapeutic activity, Home exercise program, Postural re-education  PT Plan: Skilled PT  PT Frequency: 4 times per week  PT Discharge Recommendations: High intensity level of continued care  Equipment Recommended upon Discharge: Wheeled walker  PT Recommended Transfer Status: Assist x1, Assistive device  PT - OK to Discharge: Yes      General Visit Information:   PT  Visit  PT Received On: 02/15/24  Response to Previous Treatment: Patient reporting fatigue but able to participate.  General  Family/Caregiver Present: Yes  Caregiver Feedback: friend and spouse supportive and encouraging to the patient throughout  Prior to Session Communication: Bedside nurse  Patient Position Received: Bed, 3 rail up  Preferred Learning Style: visual, verbal  General Comment: Patient pleasant and cooperative throughout session. Needs cues for safety and activity pacing throughout    Subjective   Precautions:  Precautions  Medical Precautions: Fall precautions, Abdominal precautions  Post-Surgical Precautions: Abdominal surgery precautions         Objective   Pain:  Pain Assessment  Pain Assessment: 0-10  Pain Score: 2  Pain Type: Surgical pain  Pain Location: Abdomen  Pain Interventions: Repositioned  Response to Interventions: Patient comfortable and end of session  Cognition:  Cognition  Orientation Level: Oriented  X4  Treatments:  Therapeutic Exercise  Therapeutic Exercise Performed: Yes  Therapeutic Exercise Activity 1: Patient performed the following ther-ex with min verbal cues for proper execution of movement (standing heel lifts x 10, standing marching in place 10x, seated LAQ x 15)    Bed Mobility  Bed Mobility: Yes  Bed Mobility 1  Bed Mobility 1: Supine to sitting (via log roll)  Level of Assistance 1: Contact guard, Moderate verbal cues, Minimal tactile cues  Bed Mobility Comments 1: cues for flexing bilateral knees and reaching across midline to initiate trunk rotation    Ambulation/Gait Training  Ambulation/Gait Training Performed: Yes  Ambulation/Gait Training 1  Surface 1: Level tile  Device 1: Rolling walker  Assistance 1: Minimum assistance, Minimal verbal cues, Minimal tactile cues  Quality of Gait 1: Narrow base of support, Soft knee(s), Foot sweep, Scissoring (intermittent scissoring)  Comments/Distance (ft) 1: Gaiut training emphasized maintaining COM in ABBEY of RW. Patient very slowed ankita and decreased heel to toe gait pattern. Cues for increasing stride width for stability. (75 ft, 15 ft, 15 ft)  Transfers  Transfer: Yes  Transfer 1  Transfer From 1: Sit to  Transfer to 1: Stand  Transfer Device 1: Walker  Transfer Level of Assistance 1: Contact guard, Minimal verbal cues, Moderate tactile cues  Trials/Comments 1: Slowed transition with prolonged anteiro weight shift. Cues for increased hip extension during 2nd phase of STS  Transfers 2  Transfer From 2: Stand to  Transfer to 2: Sit  Transfer Device 2: Walker  Transfer Level of Assistance 2: Contact guard, Minimal verbal cues  Trials/Comments 2: Cues for reaching back for arm rests to help control descent    Outcome Measures:  [facility] Basic Mobility  Turning from your back to your side while in a flat bed without using bedrails: A little  Moving from lying on your back to sitting on the side of a flat bed without using bedrails: A little  Moving to and  from bed to chair (including a wheelchair): A little  Standing up from a chair using your arms (e.g. wheelchair or bedside chair): A little  To walk in hospital room: A little  Climbing 3-5 steps with railing: Total  Basic Mobility - Total Score: 16    Education Documentation  No documentation found.  Education Comments  No comments found.        OP EDUCATION:       Encounter Problems       Encounter Problems (Active)       Balance       STG - Maintains dynamic standing balance with upper extremity support on LRAD with SBA (Progressing)       Start:  02/12/24    Expected End:  03/04/24               Mobility       STG - Patient will ambulate x100 ft with LRAD and SBA (Progressing)       Start:  02/12/24    Expected End:  03/04/24            Pt will perform 5x STS in </= 12 sec (Progressing)       Start:  02/12/24    Expected End:  03/04/24               Pain - Adult          Transfers       STG - Patient will perform bed mobility with SBA (Progressing)       Start:  02/12/24    Expected End:  03/04/24            STG - Patient will transfer sit to and from stand with SBA using LRAD (Met)       Start:  02/12/24    Expected End:  03/04/24    Resolved:  02/14/24

## 2024-02-15 NOTE — NURSING NOTE
1400 Tube feed increased to 20mL per hr   EMERGENCY DEPARTMENT HISTORY AND PHYSICAL EXAM      Date: 5/2/2020  Patient Name: Frank Mai    History of Presenting Illness     Chief Complaint   Patient presents with   Rodriguez Foreign Body Swallowed     Pt reprots she was eating banana bread and feels like something is moving around. Airway intact, handling secretions. History Provided By: Patient    HPI: Frank Mai, 79 y.o. female presents to the ED with cc of foreign body sensation. Patient states she was eating banana bread and a doughnut when she felt the objects get stuck in her throat. Stated it moved from side to side. She was not sure if this was related to her allergies. She states she has a lot of secretions. She denies pain at this time. She states the symptoms have resolved after receiving medications in the ER. She also denies shortness of breath, chest pain, nausea, fever, chills or cough. There are no other complaints, changes, or physical findings at this time. PCP: Keyla Simon MD    No current facility-administered medications on file prior to encounter. Current Outpatient Medications on File Prior to Encounter   Medication Sig Dispense Refill    sucralfate (Carafate) 100 mg/mL suspension Take 10 mL by mouth four (4) times daily as needed (abdominal discomfort) for up to 30 days. 414 mL 0    famotidine (PEPCID) 20 mg tablet Take 1 Tab by mouth two (2) times a day. Indications: gastroesophageal reflux disease 180 Tab 3    metoprolol succinate (TOPROL-XL) 50 mg XL tablet Take 1 Tab by mouth daily. 90 Tab 3    diclofenac (VOLTAREN) 1 % gel Apply 2 g to affected area four (4) times daily. (Patient taking differently: Apply 2 g to affected area as needed.) 100 g 3    sodium bicarbonate 650 mg tablet Take 650 mg by mouth two (2) times a day.  gabapentin (NEURONTIN) 100 mg capsule Take 2 Caps by mouth nightly.  Max Daily Amount: 200 mg. 60 Cap 2    polyethylene glycol (MIRALAX) 17 gram/dose powder Take 17 g by mouth as needed.  timolol (TIMOPTIC) 0.5 % ophthalmic solution Administer 1 Drop to left eye daily.  ACETAMINOPHEN (TYLENOL PO) Take 650 mg by mouth as needed. Indications: two tabs      latanoprost (XALATAN) 0.005 % ophthalmic solution Administer 1 Drop to both eyes nightly.  Aspirin, Buffered 81 mg tab Take 1 Tab by mouth daily. Past History     Past Medical History:  Past Medical History:   Diagnosis Date    AIN (acute interstitial nephritis)     Arthritis     CKD (chronic kidney disease) stage 3, GFR 30-59 ml/min ([facility])     GERD (gastroesophageal reflux disease)     Glaucoma     High cholesterol     Hypertension     Ill-defined condition     chronic cystitis       Past Surgical History:  Past Surgical History:   Procedure Laterality Date    COLONOSCOPY N/A 2020    COLONOSCOPY/EGD performed by Shereen Land MD at [facility] ENDOSCOPY    HX APPENDECTOMY      with hysterectomy    HX  SECTION      HX COLONOSCOPY      HX DILATION AND CURETTAGE      HX HYSTERECTOMY      HX RENAL BIOPSY Left     in office    HX TONSILLECTOMY      HX UROLOGICAL  2016    cauterization of bladder ulcers       Family History:  Family History   Problem Relation Age of Onset    Cancer Father     Cancer Brother     Breast Cancer Cousin     Arthritis-osteo Mother        Social History:  Social History     Tobacco Use    Smoking status: Former Smoker     Packs/day: 0.50     Years: 4.00     Pack years: 2.00     Last attempt to quit: 1997     Years since quittin.7    Smokeless tobacco: Former User   Substance Use Topics    Alcohol use: Yes     Comment: seldom    Drug use: Yes     Types: Prescription, OTC     Comment: never recreational substance       Allergies:   Allergies   Allergen Reactions    Aleve [Naproxen Sodium] Other (comments)     Renal issues    Ibuprofen Unknown (comments)    Lisinopril Other (comments)     Renal dysfunction    Omeprazole Other (comments)     Cause Kidney failure         Review of Systems   Review of Systems   Constitutional: Negative for chills and fever. HENT: Negative for congestion. Eyes: Negative. Respiratory: Negative for shortness of breath. Cardiovascular: Negative for chest pain. Gastrointestinal: Negative for abdominal pain. Endocrine: Negative for heat intolerance. Genitourinary: Negative for dysuria. Musculoskeletal: Negative for back pain. Skin: Negative for rash. Allergic/Immunologic: Negative for immunocompromised state. Neurological: Negative for dizziness. Hematological: Does not bruise/bleed easily. Psychiatric/Behavioral: Negative. All other systems reviewed and are negative. Physical Exam   Physical Exam  Vitals signs and nursing note reviewed. Constitutional:       General: She is not in acute distress. Appearance: She is well-developed. HENT:      Head: Normocephalic. Neck:      Musculoskeletal: Normal range of motion and neck supple. Cardiovascular:      Rate and Rhythm: Normal rate and regular rhythm. Heart sounds: Normal heart sounds. Pulmonary:      Effort: Pulmonary effort is normal.      Breath sounds: Normal breath sounds. Abdominal:      General: Bowel sounds are normal.      Palpations: Abdomen is soft. Tenderness: There is no abdominal tenderness. Musculoskeletal: Normal range of motion. Skin:     General: Skin is warm and dry. Neurological:      General: No focal deficit present. Mental Status: She is alert and oriented to person, place, and time. Psychiatric:         Mood and Affect: Mood normal.         Behavior: Behavior normal.         Diagnostic Study Results     Labs -   No results found for this or any previous visit (from the past 12 hour(s)). Radiologic Studies -   XR NECK SOFT TISSUE   Final Result   IMPRESSION:   No radiopaque foreign body.         CT Results  (Last 48 hours)    None        CXR Results  (Last 48 hours) None          Medical Decision Making   I am the first provider for this patient. I reviewed the vital signs, available nursing notes, past medical history, past surgical history, family history and social history. Vital Signs-Reviewed the patient's vital signs. Patient Vitals for the past 12 hrs:   Temp Pulse Resp BP SpO2   05/02/20 1712 98.1 °F (36.7 °C) 69 14 (!) 187/96 100 %         Records Reviewed: Nursing Notes, Old Medical Records, Previous Radiology Studies and Previous Laboratory Studies    Provider Notes (Medical Decision Making):   Foreign body sensation, impaction, reflux    ED Course:   Initial assessment performed. The patients presenting problems have been discussed, and they are in agreement with the care plan formulated and outlined with them. I have encouraged them to ask questions as they arise throughout their visit. Progress note:    Patient is feeling better. Her results were reviewed. She is advised to follow-up and return to ER if worse       Critical Care Time:   0    Disposition:  home    DISCHARGE PLAN:  1. Discharge Medication List as of 5/2/2020  7:30 PM      START taking these medications    Details   metoclopramide HCl (REGLAN) 5 mg tablet Take 1 Tab by mouth every six (6) hours as needed for Nausea for up to 10 days. , Normal, Disp-12 Tab, R-0         CONTINUE these medications which have NOT CHANGED    Details   sucralfate (Carafate) 100 mg/mL suspension Take 10 mL by mouth four (4) times daily as needed (abdominal discomfort) for up to 30 days. , Normal, Disp-414 mL, R-0      famotidine (PEPCID) 20 mg tablet Take 1 Tab by mouth two (2) times a day. Indications: gastroesophageal reflux disease, Normal, Disp-180 Tab, R-3      metoprolol succinate (TOPROL-XL) 50 mg XL tablet Take 1 Tab by mouth daily. , Normal, Disp-90 Tab, R-3      diclofenac (VOLTAREN) 1 % gel Apply 2 g to affected area four (4) times daily. , Normal, Disp-100 g, R-3      sodium bicarbonate 650 mg tablet Take 650 mg by mouth two (2) times a day., Historical Med      gabapentin (NEURONTIN) 100 mg capsule Take 2 Caps by mouth nightly. Max Daily Amount: 200 mg., No Print, Disp-60 Cap, R-2      polyethylene glycol (MIRALAX) 17 gram/dose powder Take 17 g by mouth as needed., Historical Med      timolol (TIMOPTIC) 0.5 % ophthalmic solution Administer 1 Drop to left eye daily. , Historical Med      ACETAMINOPHEN (TYLENOL PO) Take 650 mg by mouth as needed. Indications: two tabs, Historical Med      latanoprost (XALATAN) 0.005 % ophthalmic solution Administer 1 Drop to both eyes nightly., Historical Med      Aspirin, Buffered 81 mg tab Take 1 Tab by mouth daily. , Historical Med           2. Follow-up Information     Follow up With Specialties Details Why Contact Twanda Cockayne, MD Internal Medicine In 2 days As needed [facility]  [phone]      [facility] EMERGENCY DEPT Emergency Medicine  If symptoms worsen [address] [facility]  [address] [facility]  [phone]        3. Return to ED if worse     Diagnosis     Clinical Impression:   1. Swallowed foreign body, initial encounter        Attestations:    Beth Harada, MD    Please note that this dictation was completed with [employer], the computer voice recognition software. Quite often unanticipated grammatical, syntax, homophones, and other interpretive errors are inadvertently transcribed by the computer software. Please disregard these errors. Please excuse any errors that have escaped final proofreading. Thank you.

## 2024-02-15 NOTE — PROGRESS NOTES
Discharge Planning Note:      Trevor Yarbrough is a 53 y.o. male on day 9 of admission presenting with Pneumoperitoneum.    Spoke with the patients wife wants to take home post discharge with homecare. Preference [facility] informed will need wound vac nursing and therapy in agreement Says patient is up walking and not ready to medically leave until net week, and homecare will be fine. Informed the team to order wound vac to room prior to discharge.                 Shawnee Larose RN TCC

## 2024-02-16 LAB
ALBUMIN SERPL BCP-MCNC: 2.4 G/DL (ref 3.4–5)
ALP SERPL-CCNC: 90 U/L (ref 33–120)
ALT SERPL W P-5'-P-CCNC: 85 U/L (ref 10–52)
ANION GAP SERPL CALC-SCNC: 14 MMOL/L (ref 10–20)
AST SERPL W P-5'-P-CCNC: 46 U/L (ref 9–39)
BILIRUB SERPL-MCNC: 0.7 MG/DL (ref 0–1.2)
BUN SERPL-MCNC: 12 MG/DL (ref 6–23)
CALCIUM SERPL-MCNC: 7.6 MG/DL (ref 8.6–10.6)
CHLORIDE SERPL-SCNC: 101 MMOL/L (ref 98–107)
CO2 SERPL-SCNC: 22 MMOL/L (ref 21–32)
CREAT SERPL-MCNC: 0.78 MG/DL (ref 0.5–1.3)
EGFRCR SERPLBLD CKD-EPI 2021: >90 ML/MIN/1.73M*2
ERYTHROCYTE [DISTWIDTH] IN BLOOD BY AUTOMATED COUNT: 14.5 % (ref 11.5–14.5)
GLUCOSE BLD MANUAL STRIP-MCNC: 111 MG/DL (ref 74–99)
GLUCOSE BLD MANUAL STRIP-MCNC: 111 MG/DL (ref 74–99)
GLUCOSE BLD MANUAL STRIP-MCNC: 112 MG/DL (ref 74–99)
GLUCOSE BLD MANUAL STRIP-MCNC: 116 MG/DL (ref 74–99)
GLUCOSE BLD MANUAL STRIP-MCNC: 122 MG/DL (ref 74–99)
GLUCOSE SERPL-MCNC: 115 MG/DL (ref 74–99)
HCT VFR BLD AUTO: 26.7 % (ref 41–52)
HGB BLD-MCNC: 8.9 G/DL (ref 13.5–17.5)
MAGNESIUM SERPL-MCNC: 1.98 MG/DL (ref 1.6–2.4)
MCH RBC QN AUTO: 27.3 PG (ref 26–34)
MCHC RBC AUTO-ENTMCNC: 33.3 G/DL (ref 32–36)
MCV RBC AUTO: 82 FL (ref 80–100)
NRBC BLD-RTO: 0 /100 WBCS (ref 0–0)
PHOSPHATE SERPL-MCNC: 2.7 MG/DL (ref 2.5–4.9)
PLATELET # BLD AUTO: 419 X10*3/UL (ref 150–450)
POTASSIUM SERPL-SCNC: 3.6 MMOL/L (ref 3.5–5.3)
PROT SERPL-MCNC: 5.6 G/DL (ref 6.4–8.2)
RBC # BLD AUTO: 3.26 X10*6/UL (ref 4.5–5.9)
SODIUM SERPL-SCNC: 133 MMOL/L (ref 136–145)
WBC # BLD AUTO: 11.2 X10*3/UL (ref 4.4–11.3)

## 2024-02-16 PROCEDURE — 84100 ASSAY OF PHOSPHORUS: CPT | Performed by: PODIATRIST

## 2024-02-16 PROCEDURE — 97116 GAIT TRAINING THERAPY: CPT | Mod: GP | Performed by: PHYSICAL THERAPIST

## 2024-02-16 PROCEDURE — 99024 POSTOP FOLLOW-UP VISIT: CPT | Performed by: SURGERY

## 2024-02-16 PROCEDURE — C9113 INJ PANTOPRAZOLE SODIUM, VIA: HCPCS

## 2024-02-16 PROCEDURE — 2500000004 HC RX 250 GENERAL PHARMACY W/ HCPCS (ALT 636 FOR OP/ED)

## 2024-02-16 PROCEDURE — 99221 1ST HOSP IP/OBS SF/LOW 40: CPT | Performed by: STUDENT IN AN ORGANIZED HEALTH CARE EDUCATION/TRAINING PROGRAM

## 2024-02-16 PROCEDURE — 2500000001 HC RX 250 WO HCPCS SELF ADMINISTERED DRUGS (ALT 637 FOR MEDICARE OP)

## 2024-02-16 PROCEDURE — 1200000002 HC GENERAL ROOM WITH TELEMETRY DAILY

## 2024-02-16 PROCEDURE — 82947 ASSAY GLUCOSE BLOOD QUANT: CPT

## 2024-02-16 PROCEDURE — 85027 COMPLETE CBC AUTOMATED: CPT

## 2024-02-16 PROCEDURE — 83735 ASSAY OF MAGNESIUM: CPT | Performed by: PODIATRIST

## 2024-02-16 PROCEDURE — 97530 THERAPEUTIC ACTIVITIES: CPT | Mod: GP | Performed by: PHYSICAL THERAPIST

## 2024-02-16 PROCEDURE — 80053 COMPREHEN METABOLIC PANEL: CPT

## 2024-02-16 PROCEDURE — 2500000004 HC RX 250 GENERAL PHARMACY W/ HCPCS (ALT 636 FOR OP/ED): Performed by: PODIATRIST

## 2024-02-16 PROCEDURE — 36415 COLL VENOUS BLD VENIPUNCTURE: CPT

## 2024-02-16 RX ORDER — METHOCARBAMOL 100 MG/ML
1000 INJECTION, SOLUTION INTRAMUSCULAR; INTRAVENOUS ONCE
Status: DISCONTINUED | OUTPATIENT
Start: 2024-02-16 | End: 2024-02-16 | Stop reason: SDUPTHER

## 2024-02-16 RX ORDER — POTASSIUM CHLORIDE 14.9 MG/ML
20 INJECTION INTRAVENOUS
Status: COMPLETED | OUTPATIENT
Start: 2024-02-16 | End: 2024-02-16

## 2024-02-16 RX ORDER — METHOCARBAMOL 100 MG/ML
1000 INJECTION, SOLUTION INTRAMUSCULAR; INTRAVENOUS EVERY 8 HOURS
Status: DISCONTINUED | OUTPATIENT
Start: 2024-02-16 | End: 2024-02-26 | Stop reason: HOSPADM

## 2024-02-16 RX ORDER — METHOCARBAMOL 100 MG/ML
1000 INJECTION, SOLUTION INTRAMUSCULAR; INTRAVENOUS ONCE
Status: COMPLETED | OUTPATIENT
Start: 2024-02-16 | End: 2024-02-16

## 2024-02-16 RX ADMIN — PIPERACILLIN SODIUM AND TAZOBACTAM SODIUM 3.38 G: 3; .375 INJECTION, SOLUTION INTRAVENOUS at 08:30

## 2024-02-16 RX ADMIN — POTASSIUM CHLORIDE 20 MEQ: 14.9 INJECTION, SOLUTION INTRAVENOUS at 17:11

## 2024-02-16 RX ADMIN — METHOCARBAMOL 1000 MG: 100 INJECTION, SOLUTION INTRAMUSCULAR; INTRAVENOUS at 17:11

## 2024-02-16 RX ADMIN — POTASSIUM CHLORIDE 20 MEQ: 14.9 INJECTION, SOLUTION INTRAVENOUS at 14:43

## 2024-02-16 RX ADMIN — PIPERACILLIN SODIUM AND TAZOBACTAM SODIUM 3.38 G: 3; .375 INJECTION, SOLUTION INTRAVENOUS at 21:43

## 2024-02-16 RX ADMIN — OXYCODONE HYDROCHLORIDE 10 MG: 5 SOLUTION ORAL at 21:43

## 2024-02-16 RX ADMIN — OXYCODONE HYDROCHLORIDE 5 MG: 5 SOLUTION ORAL at 14:43

## 2024-02-16 RX ADMIN — HYDROMORPHONE HYDROCHLORIDE 0.4 MG: 1 INJECTION, SOLUTION INTRAMUSCULAR; INTRAVENOUS; SUBCUTANEOUS at 03:19

## 2024-02-16 RX ADMIN — ENOXAPARIN SODIUM 40 MG: 100 INJECTION SUBCUTANEOUS at 21:43

## 2024-02-16 RX ADMIN — HYDROMORPHONE HYDROCHLORIDE 0.4 MG: 1 INJECTION, SOLUTION INTRAMUSCULAR; INTRAVENOUS; SUBCUTANEOUS at 06:29

## 2024-02-16 RX ADMIN — PIPERACILLIN SODIUM AND TAZOBACTAM SODIUM 3.38 G: 3; .375 INJECTION, SOLUTION INTRAVENOUS at 17:36

## 2024-02-16 RX ADMIN — PANTOPRAZOLE SODIUM 40 MG: 40 INJECTION, POWDER, FOR SOLUTION INTRAVENOUS at 21:43

## 2024-02-16 RX ADMIN — PANTOPRAZOLE SODIUM 40 MG: 40 INJECTION, POWDER, FOR SOLUTION INTRAVENOUS at 08:30

## 2024-02-16 RX ADMIN — PIPERACILLIN SODIUM AND TAZOBACTAM SODIUM 3.38 G: 3; .375 INJECTION, SOLUTION INTRAVENOUS at 03:13

## 2024-02-16 RX ADMIN — BENZOCAINE AND MENTHOL 2 LOZENGE: 15; 3.6 LOZENGE ORAL at 08:19

## 2024-02-16 RX ADMIN — METHOCARBAMOL 1000 MG: 1000 INJECTION, SOLUTION INTRAMUSCULAR; INTRAVENOUS at 11:27

## 2024-02-16 ASSESSMENT — PAIN - FUNCTIONAL ASSESSMENT
PAIN_FUNCTIONAL_ASSESSMENT: 0-10

## 2024-02-16 ASSESSMENT — COGNITIVE AND FUNCTIONAL STATUS - GENERAL
CLIMB 3 TO 5 STEPS WITH RAILING: A LITTLE
MOBILITY SCORE: 18
TURNING FROM BACK TO SIDE WHILE IN FLAT BAD: A LITTLE
MOVING FROM LYING ON BACK TO SITTING ON SIDE OF FLAT BED WITH BEDRAILS: A LITTLE
WALKING IN HOSPITAL ROOM: A LITTLE
MOVING TO AND FROM BED TO CHAIR: A LITTLE
STANDING UP FROM CHAIR USING ARMS: A LITTLE

## 2024-02-16 ASSESSMENT — PAIN SCALES - GENERAL
PAINLEVEL_OUTOF10: 4
PAINLEVEL_OUTOF10: 7
PAINLEVEL_OUTOF10: 5 - MODERATE PAIN
PAINLEVEL_OUTOF10: 7
PAINLEVEL_OUTOF10: 0 - NO PAIN
PAINLEVEL_OUTOF10: 0 - NO PAIN

## 2024-02-16 NOTE — CONSULTS
"Reason For Consult  Hyperinsulinemic hypoglycemia.  Establish care and for home-going recs.    History Of Present Illness  Trevor Yarbrough is a 53 y.o. 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 Vega) who presented to the hospital on 2/6/2024 with an acute abdomen secondary to gastric perforation.    He underwent exploratory laparotomy with subtotal gastrectomy on 2/6/2024, a washout on 2/7/2024, antrectomy, Billroth II anatomy, wound VAC placement on 2/9/2024.      Endocrinology is consulted per family's request due to concern for hyperinsulinemic hypoglycemia to establish care and for home-going recs.    Of note, during this hospitalization patient has not had a true hypoglycemic event.  He had an isolated episode of point-of-care glucose of 57 while tube feeds were held.  Nonetheless, patient did receive significant amount of glucocorticoids, has been on continuous tube feeds throughout his stay as well as dextrose containing fluids when tube feeds were held (a response to the BG of 57) as below:    2/6: dexamethasone 4 mg, HC mg 60 x1   2/7:  HC 60 mg  X1 and HC 50 mg X3   2/8: HC 50 mg X4  2/9: HC 50 mg  X1,  mg X1   2/10: Started on TF pivot 1.5 @ 10 ml/hr titrated up to 45 ml/hr on 2/11.  Tube feeds were held on 2/12 but were later resumed at a rate of 10 ml/hr on 2/13.  They are currently running at 30 ml/hr    With regards of his hypoglycemic events history:  - Patient reports that it has been going on for the past 2 years (summer 2022) with symptoms of lightheadedness, and visual disturbances.  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  But does report waking up with headaches that resolve after eating.  Denies any episodes of tongue biting, shakes, or urinary incontinence while sleeping  - Denies any weight gain or loss  - Reports eating balanced meals with complex carbs and denies drinking any sugary beverages such as juice or soda.  Reports occasional EtOH use once or twice,  - Patient establish care with endocrinology Dr. Vega in 9/2023.  Labs, and CGM were ordered.  And patient was advised to obtain blood work when having episodes of hypoglycemia.  -Workup revealed HbA1c of 5.7, TSH of 1.9, cortisol of 13.5.  - Blood work from 10/11/2023 showed: Insulin 15, proinsulin 4.5, C-peptide 4, beta hydroxybutyrate is 0.06, negative sulfonylurea urine test on 10/13/2023  -Of note, patient reports on the day of obtaining the hypoglycemia workup blood work he ate at 11:00 and started experience symptoms at 1 PM.    -Patient had further workup with MRCP on 10/12/2023 which showed \"There is faint rounded asymmetric hyperenhancement posterior pancreatic body\". This was followed up by dotatate scan 10/17/2023 that was unremarkable (fulls report and results section). This was followed by an endoscopic ultrasound on 11/20/2023 which was also unremarkable (full report and results section).  -Patient was placed on diazoxide to treat his hypoglycemia however this was complicated by severe fluctuations in his blood sugar from hyperglycemia followed by significant hypoglycemia with episodes of palpitations, drenching sweats, lightheadedness and confusion along with GI side effects.  Doses was adjusted multiple times (decreased and increased) however he " continued to have symptoms  - Patient finally tried octreotide injection on 2/6/2024 but unfortunately had gastric perforation as mentioned above.     Past Medical History  He has a past medical history of Epigastric pain (05/09/2022), Other chest pain (01/31/2022), Personal history of other diseases of the digestive system (05/09/2022), Personal history of other diseases of the digestive system (01/27/2020), Personal history of other diseases of the digestive system (11/22/2019), and Personal history of other specified conditions (01/31/2022).    Surgical History  He has a past surgical history that includes Other surgical history (12/03/2018); Other surgical history (12/03/2018); Other surgical history (12/03/2018); Other surgical history (01/27/2020); Other surgical history (01/27/2020); and Other surgical history (01/27/2020).    Fundoplication 4 yrs ago for hiatal hernia and remote appendectomy      Social History  He reports that he has never smoked. He has never used smokeless tobacco. He reports that he does not currently use alcohol. He reports that he does not use drugs.    Family History  Family History   Problem Relation Name Age of Onset    Hypertension Mother      Lung cancer Mother      Coronary artery disease Father      Hyperlipidemia Father      Diabetes Brother      Other (polyp of large intestine) Brother      Colon cancer Father's Brother  60    Colon cancer Other uncle     Other (polyp of large intestine) Other uncle         Allergies  Patient has no known allergies.    Constitutional: NAD, well groomed. AOx3. Cooperative  Skin/Hair: Warm, dry skin.  HEENT: EOMI  Neck: Soft, supple.   Cardiovascular: normal HR.  Respiratory: no accessory muscle use.  Extremities: Preserved peripheral pulses, No peripheral edema.  Neuro: Moving all extremities spontaneously. CN's grossly intact.   Last Recorded Vitals  Blood pressure 132/75, pulse 74, temperature 36.9 °C (98.4 °F), resp. rate 18, height 1.803 m  "(5' 10.98\"), weight 87.5 kg (193 lb), SpO2 94 %.    Relevant Results  Results from last 7 days   Lab Units 02/16/24  0832 02/16/24  0638 02/16/24  0523 02/15/24  2351 02/15/24  1936 02/15/24  1654 02/15/24  0851 02/15/24  0513 02/14/24  0813 02/14/24  0609 02/14/24  0026 02/13/24  2301 02/13/24  1310 02/13/24  1307   POCT GLUCOSE mg/dL 116*  --  122* 105* 98 81   < >  --    < >  --    < >  --    < >  --    GLUCOSE mg/dL  --  115*  --   --   --   --   --  91  --  102*  --  83  --  80    < > = values in this interval not displayed.     Lab Results   Component Value Date    POCGLU 116 (H) 02/16/2024    POCGLU 122 (H) 02/16/2024    POCGLU 105 (H) 02/15/2024    POCGLU 98 02/15/2024    POCGLU 81 02/15/2024    POCGLU 85 02/15/2024    POCGLU 94 02/15/2024    POCGLU 97 02/15/2024    POCGLU 106 (H) 02/15/2024    POCGLU 98 02/14/2024    GLUCOSE 115 (H) 02/16/2024    GLUCOSE 91 02/15/2024    GLUCOSE 102 (H) 02/14/2024    GLUCOSE 83 02/13/2024    GLUCOSE 80 02/13/2024    GLUCOSE 115 (H) 02/12/2024    GLUCOSE 134 (H) 02/11/2024    GLUCOSE 99 02/10/2024    GLUCOSE 92 02/09/2024    GLUCOSE 92 02/09/2024    LABINSU 15 10/11/2023    PROINSULIN 4.5 10/11/2023    CPEPTIDE 4.0 (H) 10/11/2023    BHYDRXBUT 0.06 10/11/2023    HGBA1C 5.7 (A) 09/29/2023    TSH 1.98 09/29/2023    CORTISOL 13.5 09/29/2023     Endoscopic US 11/20/2023:  A large amount of retained food was found in the stomach and proximal duodenum  The esophagus, stomach and duodenum appeared normal. The endoscopic exam of the upper GI tract was otherwise grossly unremarkable, as viewed with the side viewing scope. History of Toupet Fundoplication .  The pancreatic parenchyma was visualized in the entire pancreas. The parenchyma was heterogeneous with hyperechoic foci and stranding and subtle lobularity. Findings are indeterminate for chronic pancreatitis. There was no evidence for acute inflammation or fat stranding.  There were no other signs of significant endosonographic " abnormalities noted in the entire pancreas.  No discrete masses, cysts or calcifications were seen in the entire pancreas. The pancreatic duct was well visualized from ampulla to tail, excluding pancreas divisum. The pancreatic duct was regular in contour and measured 2.5 mm at the major papilla, 1.7 mm at the genu and 1 mm in the body of the pancreas.   The bile duct appeared normal and had no dilation or stricture.  Visualized portions of the liver, spleen, left adrenal gland, left kidney and biliary system were normal on ultrasound examination.    No abnormal-appearing lymph nodes were identified in the abdomen.         NM PET CT DOTATATE;  10/31/2023 9:31 am  FINDINGS:  NECK:  No evidence of abnormal Ga-68 dotatate uptake is seen to suggest  metastasis.      CHEST:  No evidence of abnormal Ga-68 dotatate uptake is seen in the lungs.  No abnormal lymph nodes with Ga-68 dotatate uptake seen within  axillary, hilar or mediastinal lymph nodes.      ABDOMEN AND PELVIS:  No simvastatin receptor expression in the region of previously  described questionable pancreatic lesion. No evidence of abnormal  Ga-68 dotatate uptake is seen in the abdomen and pelvis. There are no  lymph nodes with abnormal Ga-68 dotatate uptake. Physiologic Ga-68  dotatate uptake is seen in the liver, spleen, kidneys, adrenals and  bowel.      MUSCULOSKELETAL:  No focus of abnormal focal Ga-68 dotatate uptake is seen to suggestmetastases.      IMPRESSION:  There is no evidence for focal increased simvastatin receptor  expression to indicate neoplastic disease. No simvastatin receptor  expression in the region of the previously described questionable  pancreatic lesion.      I personally reviewed the images/study and I agree with Marianela Smith DO's (radiology resident) findings as stated. This study  was interpreted at [facility],  [place], [place].      MACRO:  None     MRCP  10/13/2023:  FINDINGS:  LIVER:  Normal morphology. Normal enhancement. Hepatic parenchyma is isointense on T1 in and out of phase sequences. No abnormal enhancing masses. Mild scattered subcentimeter sharply marginated probably benign T2 hyperintensities most compatible with cysts.      BILE DUCTS:  No intrahepatic or extrahepatic bile duct dilatation is demonstrated.      GALLBLADDER:  Within normal limits.      PANCREAS:  Normal morphology. Assessment of the arterial phase enhancement is limited due to motion. There is faint rounded asymmetric hyperenhancement posterior pancreatic body series 13, image 31 without corresponding signal alteration remaining phases which is indeterminate although potentially compatible with insulinoma. No duct dilation.      SPLEEN:  Within normal limits.      ADRENAL GLANDS:  Within normal limits.      KIDNEYS:  Normal morphology without suspicious lesions. Tiny T2  hyperintensities bilateral kidneys coronal series 3, image 15 are  most compatible with cysts..      LYMPH NODES:  No lymphadenopathy.      ABDOMINAL VESSELS:  Mild atherosclerosis. Superior mesenteric vein, splenic vein, and  main, right and left portal vein are patent. Hepatic veins are  patent.  No significant collaterals or esophageal varices are present.      BOWEL:  Normal caliber without inflammatory change.      PERITONEUM/RETROPERITONEUM:  No ascites.      BONES AND LOWER THORAX:  No abnormally enhancing focal bony lesions are identified. The  visualized portion the lung bases are unremarkable.      IMPRESSION:  1.  Faint rounded hyperenhancement pancreatic body is indeterminate  and poorly assessed due to motion although potentially insulinoma.  Attention to this finding recommended on follow-up assessment.  2. Tiny scattered probably benign liver, and kidney cysts.    CT ABDOMEN AND PELVIS WO CONTRAST;  3/31/2023 2:04 pm  FINDINGS:  Please note that the evaluation of vessels, lymph nodes and organs is  limited  without intravenous contrast.     LOWER CHEST:  Unremarkable     ABDOMEN:     LIVER:  Tiny cyst high in the right liver unchanged from prior.     BILE DUCTS:  Normal caliber.     GALLBLADDER:  No calcified stones. No wall thickening.     PANCREAS:  Within normal limits.     SPLEEN:  Within normal limits.     ADRENAL GLANDS:  Bilateral adrenal glands appear normal.     KIDNEYS AND URETERS:  The kidneys are normal in size and unremarkable in appearance. No  radiopaque collecting system stones are evident. No  hydroureteronephrosis or nephroureterolithiasis is identified.     PELVIS:     BLADDER:  Within normal limits.     REPRODUCTIVE ORGANS:  Prostate is somewhat enlarged. Otherwise unremarkable.     BOWEL:  The stomach is unremarkable.  The small and large bowel are normal in  caliber and demonstrate no wall thickening.  The appendix is not  definitely visualized. There is however no pericecal stranding or  fluid.     VESSELS:  There is no aneurysmal dilatation of the abdominal aorta. The IVC  appears normal.     PERITONEUM/RETROPERITONEUM/LYMPH NODES:  No ascites or free air, no fluid collection.  No abdominopelvic  lymphadenopathy is present.     ABDOMINAL WALL:  Abdominal wall structures appear intact     BONES:  No suspicious osseous lesions are identified.     IMPRESSION:  1.  No acute abdominal or pelvic findings. No evidence for  nephrolithiasis or obstructive uropathy or ureter stone. See  discussion above.      CT Abdomen and Pelvis with IV Contrast; 02/06/2024 2:26 am  ICT of the abdomen and pelvis was performed.  Contiguous axial images  were obtained at 3 mm slice thickness through the abdomen and pelvis.   Coronal and sagittal reconstructions at 3 mm slice thickness were  performed.  Omnipaque 350, 75 mL was administered intravenously.    FINDINGS:  Heart size is normal. No pericardial effusion. Lingular and LEFT lower  lobe subsegmental atelectasis.  Abdomen:  There is a large amount of intra-abdominal  free air. There is marked  distention of the stomach. Distention of the small bowel. Apparent  transition point within the mid abdomen. Underlying etiology  uncertain. No discrete bowel wall pneumatosis is identified.  There are a few scattered colonic diverticulum. No findings of  diverticulitis.  The liver of normal size and contour. No intrahepatic ductal  dilatation is identified.  Gallbladder normal.  The pancreas, spleen  and adrenal glands are normal appearance.   No acute renal process.   No retroperitoneal adenopathy. No findings of abdominal aortic  aneurysm.   Pelvis:  CT examination of the pelvis shows no free fluid. Free air again  noted. No findings of adenopathy or mass. No inflammation. Small LEFT  inguinal hernia. This appears to contain a small amount of the  abdominal free air. No herniated bowel.  Skeleton:  No acute bony process identified. Chronic bilateral L5 pars  intraventricular as defects.  IMPRESSION:  Large amount of intra-abdominal free air. Mid small bowel obstruction.  There is marked distention of the fluid-filled stomach. The  perforation appears to be along the lesser curvature of the stomach  with adjacent gastric contents outside the gastric lumen.  Small LEFT inguinal hernia containing free air. No associated  herniated bowel.  Lingular and LEFT lower lobe subsegmental atelectasis.  There are a few scattered colonic diverticulum. No findings of  diverticulitis.  Assessment/Plan   Principal Problem:    Pneumoperitoneum  Active Problems:    Gastroesophageal reflux disease    Perforated bowel (CMS/HCC)    Anemia    Trevor Yarbrough is a 53 y.o. 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 Vega) who presented to the hospital on 2/6/2024 with an acute abdomen secondary to gastric perforation.    He underwent exploratory  "laparotomy with subtotal gastrectomy on 2/6/2024, a washout on 2/7/2024, antrectomy, Billroth II anatomy, wound VAC placement on 2/9/2024.      Endocrinology is consulted per family's request due to concern for hyperinsulinemic hypoglycemia to establish care and for home-going recs.    Of note, during this hospitalization patient has not had a true hypoglycemic event.  He had an isolated episode of point-of-care glucose of 57 while tube feeds were held.  Nonetheless, patient did receive significant amount of glucocorticoids, has been on continuous tube feeds throughout his stay as well as dextrose containing fluids when tube feeds were held (a response to the BG of 57) as below:    2/6: dexamethasone 4 mg, HC mg 60 x1   2/7:  HC 60 mg  X1 and HC 50 mg X3   2/8: HC 50 mg X4  2/9: HC 50 mg  X1,  mg X1   2/10: Started on TF pivot 1.5 @ 10 ml/hr titrated up to 45 ml/hr on 2/11.  Tube feeds were held on 2/12 but were later resumed at a rate of 10 ml/hr on 2/13.  They are currently running at 30 ml/hr    With regards of his hypoglycemic events history:  - Patient reports that it has been going on for the past 2 years (summer 2022) with symptoms of lightheadedness, and visual disturbances.  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  But does report waking up with headaches that resolve after eating.  Denies any episodes of tongue biting, shakes, or urinary incontinence while sleeping  - Denies any weight gain or " "loss  - Reports eating balanced meals with complex carbs and denies drinking any sugary beverages such as juice or soda.  Reports occasional EtOH use once or twice,  - Patient establish care with endocrinology Dr. Vega in 9/2023.  Labs, and CGM were ordered.  And patient was advised to obtain blood work when having episodes of hypoglycemia.  -Workup revealed HbA1c of 5.7, TSH of 1.9, cortisol of 13.5.  - Blood work from 10/11/2023 showed: Insulin 15, proinsulin 4.5, C-peptide 4, beta hydroxybutyrate is 0.06, negative sulfonylurea urine test on 10/13/2023  -Of note, patient reports on the day of obtaining the hypoglycemia workup blood work he ate at 11:00 and started experience symptoms at 1 PM.    -Patient had further workup with MRCP on 10/12/2023 which showed \"There is faint rounded asymmetric hyperenhancement posterior pancreatic body\". This was followed up by dotatate scan 10/17/2023 that was unremarkable (fulls report and results section). This was followed by an endoscopic ultrasound on 11/20/2023 which was also unremarkable (full report and results section).  -Patient was placed on diazoxide to treat his hypoglycemia however this was complicated by severe fluctuations in his blood sugar from hyperglycemia followed by significant hypoglycemia with episodes of palpitations, drenching sweats, lightheadedness and confusion along with GI side effects.  Doses was adjusted multiple times (decreased and increased) however he continued to have symptoms  - Patient finally tried octreotide injection on 2/6/2024 but unfortunately had gastric perforation as mentioned above.    Patient hypoglycemic episodes are driven by insulin overproduction however he does NOT appear to have localizing lesion per prior workup indicative of insulinoma.  Further workup is needed to determine the etiology, this workup will need to be obtained outpatient when patient is not acutely ill.  Patient is currently on tube feeds, therefore " recommend discontinuing dextrose containing IV fluid.  Please notify endocrinology regarding final enteral feeding plan as well as anticipated date of discharge which will inform our decision with regards of avoiding hypoglycemia at home  as well as arranging for follow-up outpatient.    Plan was communicated to the primary team    Tod Osei MD  Endocrinology, PGY 4  Pager [phone] or secure chat     Case seen, examined, and discussed with Dr. [place]

## 2024-02-16 NOTE — PROGRESS NOTES
[facility]  ACUTE CARE SURGERY - PROGRESS NOTE    Patient Name: Trevor Yarbrough  MRN: [id number]  Admit Date: 206  : 1970  AGE: 53 y.o.   GENDER: male  ==============================================================================  TODAY'S ASSESSMENT AND PLAN OF CARE:  Pt is a 54yo M with hx of presumed insulinoma who started octreotide and then felt abdominal pain a few hours later, found to have pneumoperitoneum with peritonitis.      24: exlap, subtotal gastrectomy  : washout  : antrectomy, Billroth 2 anatomy; fascia closed, skin open with wound vac placed     Plan:   Neuro:   - PRN Oxycodone for Pain with IV Dilaudid for breakthrough   - Scheduled Robaxin      Card:  - Continuous tele monitoring  - vital signs [id number]      Resp:   - Encourage IS  - OOB ambulate, PT/OT     GI:   - Increase TF to goal of 55 ml/hr   - No crushed meds through dobhoff- only liquids   - IV PPI BID  - Continue daily wet to dry dressing over abdominal incision   - Maintain NGT to low continuous suction   - Monitor drains output; inferior drain output more gastric in appearance       -> Amylase sent : drain <10, Serum Amylase 28.      :  - Strict I&Os, monitor drains output  - mIVFs   - Replete lytes as indicate     ENDO:  - hx of insulinoma- Endocrine consult today. Appreciate recs.      Heme: acute postop anemia  - Given 1 unit of PRBC   - Trend H/H daily      ID:  - completed fluc/zosyn x 4 days for intra-abdominal infection  CT A/P obtained  due to low grade temp, abdominal pain and increasing leukocytosis- no definite leak, adjacent fluid collections around pancreas, thickening of anastomosis   - follow up blood cultures - NGTD x3 day   - Restarted on Zosyn - .  - Continue to trend WBC      DVT Proph:   - SCDs, Lovenox      Dispo: continue RNF, plan for home with HHC once medically ready for dc     Patient discussed with Attending Dr. Lopez    Plan preliminary  until cosigned by attending physician.     Patricia Guzman MD   PGY1, [facility]   Acute Care Surgery Service   Pager: [phone]    ==============================================================================  CHIEF COMPLAINT / EVENTS LAST 24HRS / HPI:  No overnight events. He has minimal abdominal pain. Had a bowel movement.     MEDICAL HISTORY / ROS:   Admission history and ROS reviewed.       PHYSICAL EXAM:  Heart Rate:  [72-84]   Temp:  [35.8 °C (96.4 °F)-37.4 °C (99.3 °F)]   Resp:  [17-18]   BP: (122-151)/(68-78)   SpO2:  [92 %-100 %]       I/O past 24 hr  Intake/Output Summary (Last 24 hours) at 2/16/2024 1705  Last data filed at 2/16/2024 1557  Gross per 24 hour   Intake 702 ml   Output 3725.5 ml   Net -3023.5 ml      Vitals past 24h  Temp:  [35.8 °C (96.4 °F)-37.4 °C (99.3 °F)] 36.9 °C (98.4 °F)  Heart Rate:  [72-84] 78  Resp:  [17-18] 18  BP: (122-143)/(68-78) 142/68     Physical Exam  Constitutional- lying in bed, NAD  Cards- regular rate on monitor  Resp- nonlabored breathing on RA  Abdomen- soft, edematous, mildly distended; Midline incision with mostly granular wound base; small amount purulent drainage noted superior part of incision; Superior drain serosang, inferior drain with dark gastric output;  NGT in place to low continuous suction- bilious output. Dobbhoff in place   Extremities- BURKETT  Skin- warm, dry   Neuro- A&Ox3, appropriate mood and behavior     LABS:  Results for orders placed or performed during the hospital encounter of 02/06/24 (from the past 24 hour(s))   POCT GLUCOSE   Result Value Ref Range    POCT Glucose 81 74 - 99 mg/dL   POCT GLUCOSE   Result Value Ref Range    POCT Glucose 98 74 - 99 mg/dL   POCT GLUCOSE   Result Value Ref Range    POCT Glucose 105 (H) 74 - 99 mg/dL   POCT GLUCOSE   Result Value Ref Range    POCT Glucose 122 (H) 74 - 99 mg/dL   Magnesium   Result Value Ref Range    Magnesium 1.98 1.60 - 2.40 mg/dL   Comprehensive metabolic panel   Result Value Ref Range     Glucose 115 (H) 74 - 99 mg/dL    Sodium 133 (L) 136 - 145 mmol/L    Potassium 3.6 3.5 - 5.3 mmol/L    Chloride 101 98 - 107 mmol/L    Bicarbonate 22 21 - 32 mmol/L    Anion Gap 14 10 - 20 mmol/L    Urea Nitrogen 12 6 - 23 mg/dL    Creatinine 0.78 0.50 - 1.30 mg/dL    eGFR >90 >60 mL/min/1.73m*2    Calcium 7.6 (L) 8.6 - 10.6 mg/dL    Albumin 2.4 (L) 3.4 - 5.0 g/dL    Alkaline Phosphatase 90 33 - 120 U/L    Total Protein 5.6 (L) 6.4 - 8.2 g/dL    AST 46 (H) 9 - 39 U/L    Bilirubin, Total 0.7 0.0 - 1.2 mg/dL    ALT 85 (H) 10 - 52 U/L   Phosphorus   Result Value Ref Range    Phosphorus 2.7 2.5 - 4.9 mg/dL   CBC   Result Value Ref Range    WBC 11.2 4.4 - 11.3 x10*3/uL    nRBC 0.0 0.0 - 0.0 /100 WBCs    RBC 3.26 (L) 4.50 - 5.90 x10*6/uL    Hemoglobin 8.9 (L) 13.5 - 17.5 g/dL    Hematocrit 26.7 (L) 41.0 - 52.0 %    MCV 82 80 - 100 fL    MCH 27.3 26.0 - 34.0 pg    MCHC 33.3 32.0 - 36.0 g/dL    RDW 14.5 11.5 - 14.5 %    Platelets 419 150 - 450 x10*3/uL   POCT GLUCOSE   Result Value Ref Range    POCT Glucose 116 (H) 74 - 99 mg/dL   POCT GLUCOSE   Result Value Ref Range    POCT Glucose 112 (H) 74 - 99 mg/dL   POCT GLUCOSE   Result Value Ref Range    POCT Glucose 111 (H) 74 - 99 mg/dL     MEDICATIONS:  Current Facility-Administered Medications   Medication Dose Route Frequency Provider Last Rate Last Admin    benzocaine-menthol (Cepastat Sore Throat) 15-3.6 mg lozenge 2 lozenge  2 lozenge Mouth/Throat q2h PRN Constanza Rod MD   2 lozenge at 02/16/24 0819    dextrose 5 % and lactated Ringer's infusion  75 mL/hr intravenous Continuous Constanza Rod MD 75 mL/hr at 02/13/24 0621 75 mL/hr at 02/13/24 0621    dextrose 50 % injection 25 g  25 g intravenous q15 min PRN Constanza Rod MD        enoxaparin (Lovenox) syringe 40 mg  40 mg subcutaneous q24h Constanza Rod MD   40 mg at 02/15/24 2111    glucagon (Glucagen) injection 1 mg  1 mg intramuscular q15 min PRN Constanza Rod MD        HYDROmorphone (Dilaudid) injection 0.4 mg  0.4 mg  intravenous q4h PRN Constanza Rod MD   0.4 mg at 02/16/24 0629    HYDROmorphone (Dilaudid) injection 0.4 mg  0.4 mg intravenous Once Patricia Guzman MD        insulin lispro (HumaLOG) injection 0-5 Units  0-5 Units subcutaneous TID with meals Constanza Rod MD   1 Units at 02/07/24 1319    iohexol (OMNIPaque) 12 mg iodine/mL oral contrast 500 mL  500 mL oral Once in imaging Martin Reyes MD        oxyCODONE (Roxicodone) solution 10 mg  10 mg oral q4h PRN Constanza Rod MD   10 mg at 02/15/24 1415    oxyCODONE (Roxicodone) solution 5 mg  5 mg oral q4h PRN Constanza Rod MD   5 mg at 02/16/24 1443    oxygen (O2) therapy   inhalation Continuous PRN - O2/gases Constanza Rod MD        pantoprazole (ProtoNix) injection 40 mg  40 mg intravenous BID Constanza Rod MD   40 mg at 02/16/24 0830    phenoL (Chloraseptic) 1.4 % mouth/throat spray 1 spray  1 spray Mouth/Throat q2h PRN Contsanza Rod MD        piperacillin-tazobactam-dextrose (Zosyn) IV 3.375 g  3.375 g intravenous q6h Pat Cornell MD   Stopped at 02/16/24 0900    potassium chloride 20 mEq in 100 mL IV premix  20 mEq intravenous q2h Pat Cornell MD   Stopped at 02/16/24 1514    white petrolatum (Aquaphor) ointment   Topical q1h PRN Constanza Rod MD           IMAGING SUMMARY:  (summary of new imaging findings, not a copy of dictation)      I have reviewed all laboratory and imaging results ordered/pertinent for today's encounter.

## 2024-02-16 NOTE — PROGRESS NOTES
Physical Therapy    Physical Therapy Treatment    Patient Name: Trevor Yarbrough  MRN: [id number]  Today's Date: 2/16/2024  Time Calculation  Start Time: 1313  Stop Time: 1342  Time Calculation (min): 29 min       Assessment/Plan   PT Assessment  PT Assessment Results: Decreased strength, Decreased endurance, Impaired balance, Decreased mobility  Rehab Prognosis: Excellent  Evaluation/Treatment Tolerance: Patient tolerated treatment well  Barriers to Participation: Comorbidities  End of Session Communication: Bedside nurse  Assessment Comment: Pt demonstrated increased ambulation distances and improved independence with functional transfers this date.  End of Session Patient Position: Bed, 2 rail up  PT Plan  Inpatient/Swing Bed or Outpatient: Inpatient  PT Plan  Treatment/Interventions: Bed mobility, Transfer training, Gait training, Stair training, Neuromuscular re-education, Balance training, Strengthening, Range of motion, Endurance training, Therapeutic exercise, Therapeutic activity, Home exercise program, Postural re-education  PT Plan: Skilled PT  PT Frequency: 4 times per week  PT Discharge Recommendations: High intensity level of continued care  Equipment Recommended upon Discharge: Wheeled walker  PT Recommended Transfer Status: Assist x1  PT - OK to Discharge: Yes      General Visit Information:   PT  Visit  PT Received On: 02/16/24  Response to Previous Treatment: Patient reporting fatigue but able to participate.  General  Reason for Referral: 52 y/o M presenting with Pneumoperitoneum with peritonitis s/p 2/6/24: exlap, subtotal gastrectomy  2/7: washout  2/9: antrectomy, Billroth 2 anatomy; fascia closed, skin open with wound vac placed  Family/Caregiver Present: Yes  Caregiver Feedback: spouse present and supportive during session.  Prior to Session Communication: Bedside nurse  Patient Position Received: Bed, 3 rail up  Preferred Learning Style: visual, verbal  General Comment: patient cooperative and  pleasant    Subjective   Precautions:  Precautions  Medical Precautions: Fall precautions, Abdominal precautions  Post-Surgical Precautions: Abdominal surgery precautions  Precautions Comment: Pt supine in bed upon arrival. Pt pleasant and motivated for therapy. Pt stated he is feeling much better today than he was yesterday.  Vital Signs:  Vital Signs  Heart Rate: 83  Heart Rate Source: Monitor  SpO2: 100 %  BP: 122/69  BP Location: Left arm  BP Method: Automatic  Patient Position: Sitting    Objective   Pain:  Pain Assessment  Pain Assessment: 0-10  Pain Score: 0 - No pain (pt not rating but stated neck was sore and applied heat to shoulders at end of session.)  Cognition:  Cognition  Orientation Level: Oriented X4  Postural Control:  Static Sitting Balance  Static Sitting-Balance Support: Feet supported  Static Sitting-Level of Assistance: Close supervision  Static Standing Balance  Static Standing-Balance Support: Bilateral upper extremity supported  Static Standing-Level of Assistance: Minimum assistance  Static Standing-Comment/Number of Minutes: pt able to stand to remove gown with min assist    Activity Tolerance:  Activity Tolerance  Endurance: Tolerates 10 - 20 min exercise with multiple rests  Treatments:  Therapeutic Exercise  Therapeutic Exercise Performed: Yes  Therapeutic Exercise Activity 1: sitting: AP SAQ, marches, 1 x 10 each B ; B HS and gastroc stretches 3 x 10 seconds B each    Therapeutic Activity  Therapeutic Activity Performed: Yes  Therapeutic Activity 1: pt performed gait, bed mobiltiy and transfers  Therapeutic Activity 2: shoulder rolls forward and backward 1 x 5 each , B trap stretches, 2 x 10 seconds    Bed Mobility  Bed Mobility: Yes  Bed Mobility 1  Bed Mobility 1: Supine to sitting, Sitting to supine  Level of Assistance 1: Contact guard  Bed Mobility Comments 1: pt performed log roll without cues    Ambulation/Gait Training  Ambulation/Gait Training Performed: Yes  Ambulation/Gait  Training 1  Surface 1: Level tile  Device 1: Rolling walker  Assistance 1: Minimum assistance, Minimal verbal cues, Minimal tactile cues  Quality of Gait 1: Narrow base of support, Soft knee(s), Foot sweep, Inconsistent stride length, Decreased step length  Comments/Distance (ft) 1: pt ambualted 100ft x 2 with 5 min seated rest break in between  Transfers  Transfer: Yes  Transfer 1  Transfer From 1: Sit to, Stand to  Transfer to 1: Stand, Sit  Technique 1: Sit to stand, Stand to sit  Transfer Device 1: Walker  Transfer Level of Assistance 1: Contact guard, Minimal verbal cues, Moderate tactile cues         Outcome Measures:  [facility] Basic Mobility  Turning from your back to your side while in a flat bed without using bedrails: A little  Moving from lying on your back to sitting on the side of a flat bed without using bedrails: A little  Moving to and from bed to chair (including a wheelchair): A little  Standing up from a chair using your arms (e.g. wheelchair or bedside chair): A little  To walk in hospital room: A little  Climbing 3-5 steps with railing: A little  Basic Mobility - Total Score: 18    Education Documentation  Body Mechanics, taught by Lata Alvarez, PT at 2/16/2024  1:50 PM.  Learner: Patient  Readiness: Eager  Method: Demonstration  Response: Verbalizes Understanding    Precautions, taught by Lata Alvarez PT at 2/16/2024  1:50 PM.  Learner: Patient  Readiness: Eager  Method: Demonstration  Response: Verbalizes Understanding    ADL Training, taught by Lata Alvarez PT at 2/16/2024  1:50 PM.  Learner: Patient  Readiness: Eager  Method: Demonstration  Response: Verbalizes Understanding    Handouts, taught by Lata Alvarez PT at 2/16/2024  1:50 PM.  Learner: Patient  Readiness: Eager  Method: Demonstration  Response: Verbalizes Understanding    Precautions, taught by Lata Alvarez PT at 2/16/2024  1:50 PM.  Learner: Patient  Readiness: Eager  Method:  Demonstration  Response: Verbalizes Understanding    Body Mechanics, taught by Lata Alvarez, PT at 2/16/2024  1:50 PM.  Learner: Patient  Readiness: Eager  Method: Demonstration  Response: Verbalizes Understanding    Home Exercise Program, taught by Lata Alvarez, PT at 2/16/2024  1:50 PM.  Learner: Patient  Readiness: Eager  Method: Demonstration  Response: Verbalizes Understanding    Mobility Training, taught by Lata Alvarez, PT at 2/16/2024  1:50 PM.  Learner: Patient  Readiness: Eager  Method: Demonstration  Response: Verbalizes Understanding    Education Comments  No comments found.        OP EDUCATION:       Encounter Problems       Encounter Problems (Active)       Balance       STG - Maintains dynamic standing balance with upper extremity support on LRAD with SBA (Progressing)       Start:  02/12/24    Expected End:  03/04/24               Mobility       STG - Patient will ambulate x100 ft with LRAD and SBA (Progressing)       Start:  02/12/24    Expected End:  03/04/24            Pt will perform 5x STS in </= 12 sec (Progressing)       Start:  02/12/24    Expected End:  03/04/24               Pain - Adult          Transfers       STG - Patient will perform bed mobility with SBA (Progressing)       Start:  02/12/24    Expected End:  03/04/24            STG - Patient will transfer sit to and from stand with SBA using LRAD (Met)       Start:  02/12/24    Expected End:  03/04/24    Resolved:  02/14/24

## 2024-02-16 NOTE — CARE PLAN
The patient's goals for the shift include Unable to state patient intubated    The clinical goals for the shift include pt will manage pain throughout shift

## 2024-02-16 NOTE — CONSULTS
"Nutrition Follow Up Assessment:   Nutrition Assessment    Reason for Assessment: Dietitian discretion (follow up)    Patient is a 53 y.o. male presenting with: pneumoperitoneum with peritonitis    2/10: Last RDN note   2/11:  Extubated and weaned off Precedex  2/13: Transferred to [facility]  2/14: TF held; Upper GI study  2/15:TF to be restarted   2/16: Pivot 1.5 @ 10 ml/hr      Nutrition History:  Food and Nutrient History: Met with patient briefly today, patient under the covers, room dark. Pt reports that he has hiccups and a little cramping.  Food Allergies/Intolerances:  None  GI Symptoms:  hiccups and cramping  Oral Problems: None       Anthropometrics:  Height: 180.3 cm (5' 10.98\")   Weight: 87.5 kg (193 lb)   BMI (Calculated): 26.93  IBW/kg (Dietitian Calculated): 78.2 kg  Percent of IBW: 112 %       Weight History:   No updated weight       Nutrition Focused Physical Exam Findings:  defer: See RDN note 2/10    Nutrition Significant Labs:  CBC Trend:   Results from last 7 days   Lab Units 02/16/24  0639 02/15/24  0513 02/14/24  0609 02/13/24  2301   WBC AUTO x10*3/uL 11.2 11.6* 13.4* 15.1*   RBC AUTO x10*6/uL 3.26* 3.13* 3.03* 2.86*   HEMOGLOBIN g/dL 8.9* 8.6* 8.7* 7.9*   HEMATOCRIT % 26.7* 27.1* 25.5* 22.6*   MCV fL 82 87 84 79*   PLATELETS AUTO x10*3/uL 419 344 302 247    , BMP Trend:   Results from last 7 days   Lab Units 02/16/24  0638 02/15/24  0513 02/14/24  0609 02/13/24  2301   GLUCOSE mg/dL 115* 91 102* 83   CALCIUM mg/dL 7.6* 7.4* 7.8* 7.4*   SODIUM mmol/L 133* 134* 136 139   POTASSIUM mmol/L 3.6 4.0 3.4* 3.4*   CO2 mmol/L 22 23 24 26   CHLORIDE mmol/L 101 102 102 104   BUN mg/dL 12 13 13 15   CREATININE mg/dL 0.78 0.81 0.77 0.75    , BG POCT trend:   Results from last 7 days   Lab Units 02/16/24  1206 02/16/24  0832 02/16/24  0523 02/15/24  2351 02/15/24  1936   POCT GLUCOSE mg/dL 112* 116* 122* 105* 98    , Liver Function Trend:   Results from last 7 days   Lab Units 02/16/24  0638 02/15/24  0513 " 02/13/24  2301 02/09/24  1530   ALK PHOS U/L 90 76 83 46   AST U/L 46* 39 67* 419*   ALT U/L 85* 87* 195* 469*   BILIRUBIN TOTAL mg/dL 0.7 0.7 1.0 0.4    , Renal Lab Trend:   Results from last 7 days   Lab Units 02/16/24  0638 02/15/24  0513 02/14/24  0609 02/13/24  2301   POTASSIUM mmol/L 3.6 4.0 3.4* 3.4*   PHOSPHORUS mg/dL 2.7 3.6 3.3 2.7   SODIUM mmol/L 133* 134* 136 139   MAGNESIUM mg/dL 1.98 1.93 1.82 1.83   EGFR mL/min/1.73m*2 >90 >90 >90 >90   BUN mg/dL 12 13 13 15   CREATININE mg/dL 0.78 0.81 0.77 0.75         Nutrition Specific Medications:  Scheduled medications  enoxaparin, 40 mg, subcutaneous, q24h  HYDROmorphone, 0.4 mg, intravenous, Once  insulin lispro, 0-5 Units, subcutaneous, TID with meals  iohexol, 500 mL, oral, Once in imaging  pantoprazole, 40 mg, intravenous, BID  piperacillin-tazobactam, 3.375 g, intravenous, q6h  potassium chloride, 20 mEq, intravenous, q2h      Continuous medications  dextrose 5 % and lactated Ringer's, 75 mL/hr, Last Rate: 75 mL/hr (02/13/24 0621)      PRN medications  PRN medications: benzocaine-menthol, dextrose, glucagon, HYDROmorphone, oxyCODONE, oxyCODONE, oxygen, phenoL, white petrolatum      I/O:   Last BM Date: 02/16/24; Stool Appearance: Loose, Watery (02/15/24 0900)        Dietary Orders (From admission, onward)       Start     Ordered    02/15/24 1307  Enteral feeding with NPO Pivot 1.5; 10  Diet effective now        Comments: Increase by 10cc every 12 hours until reach goal   Question Answer Comment   Tube feeding formula: Pivot 1.5    Tube feeding continuous rate (mL/hr): 10        02/15/24 1307                     Estimated Needs:   Total Energy Estimated Needs (kCal):  (8341-9700)  Method for Estimating Needs: 25-28 x IBW  Total Protein Estimated Needs (g):  (101-115)  Method for Estimating Needs: IBW x 1.3- 1.4  Total Fluid Estimated Needs (mL):  (per team)           Nutrition Diagnosis   Malnutrition Diagnosis  Patient has Malnutrition Diagnosis:  No    Nutrition Diagnosis  Patient has Nutrition Diagnosis: Yes  Diagnosis Status (1): Ongoing  Nutrition Diagnosis 1: Increased nutrient needs  Related to (1): increased metabolic demand  As Evidenced by (1): s/p multiple ax lap. washout, partial gastrectomy       Nutrition Interventions/Recommendations         Nutrition Interventions:  When medically ready continue Pivot 1.5 @10 ml/hr and increase by 10ml/hr every 12 hours as tolerated to a goal rate of 55 ml/hr  Provides: 1320 ml total volume, 1980 kcal, 124g PRO, 990 ml free water  Additional water flushes per team, TF provides 990 ml free water  Monitor Phosphorus, Potassium + Magnesium while titrating TF up, if drops occur please hold TF at current rate + replete (once WNL, can continue titrating up).   Please obtain an updated weight          Nutrition Monitoring and Evaluation   Food/Nutrient Related History Monitoring  Monitoring and Evaluation Plan: Energy intake, Enteral and parenteral nutrition intake    Body Composition/Growth/Weight History  Monitoring and Evaluation Plan: Weight    Biochemical Data, Medical Tests and Procedures  Monitoring and Evaluation Plan: Electrolyte/renal panel, Glucose/endocrine profile    Nutrition Focused Physical Findings  Monitoring and Evaluation Plan: Skin       Time Spent/Follow-up Reminder:   Time Spent (min): 60 minutes  Last Date of Nutrition Visit: 02/16/24  Nutrition Follow-Up Needed?: Dietitian to reassess per policy  Follow up Comment: TF recs

## 2024-02-16 NOTE — CARE PLAN
CHW spoke to Divina patient wife, Family concerned regarding short term disability, CHW suggested patient/family to reach out to HR patient is an employee of . CHW connected family with contact information to assist with critical care assistance which family requested. CHW will follow up with family on 2/16 to check status of family contacting HR.    Community Resource Name:   Phone Number:   Staff Member:      Discussed the following topics on behalf of the patient:  []  Behavioral Health Assistance     []  Case Management  []   Assistance  []  Digital Equity Assistance  []  Dental Health Assistance  []  Education Assistance  [x]  Employment Assistance  []  Financial Strain Relief Assistance  []  Food Insecurity Assistance  []  Healthcare Coverage Assistance  []  Housing Stability Assistance  []  IP Violence Relief Assistance  []  Legal Assistance  []  Physical Activity Assistance  []  Social Connection Assistance  []  Stress Relief Assistance   []  Substance Abuse Assistance  []  Transportation Assistance  []  Utility Assistance  []  Other: [insert comment here]    Next Steps:     Will follow up with patient/family to check status of contacting  human resources, and also will provided Disability paperwork     Nishant Gomez LCSW

## 2024-02-17 LAB
ALBUMIN SERPL BCP-MCNC: 2.4 G/DL (ref 3.4–5)
ALP SERPL-CCNC: 135 U/L (ref 33–120)
ALT SERPL W P-5'-P-CCNC: 60 U/L (ref 10–52)
ANION GAP SERPL CALC-SCNC: 12 MMOL/L (ref 10–20)
AST SERPL W P-5'-P-CCNC: 31 U/L (ref 9–39)
BACTERIA BLD CULT: NORMAL
BACTERIA BLD CULT: NORMAL
BILIRUB SERPL-MCNC: 0.6 MG/DL (ref 0–1.2)
BUN SERPL-MCNC: 14 MG/DL (ref 6–23)
CALCIUM SERPL-MCNC: 7.8 MG/DL (ref 8.6–10.6)
CHLORIDE SERPL-SCNC: 102 MMOL/L (ref 98–107)
CO2 SERPL-SCNC: 23 MMOL/L (ref 21–32)
CREAT SERPL-MCNC: 0.73 MG/DL (ref 0.5–1.3)
EGFRCR SERPLBLD CKD-EPI 2021: >90 ML/MIN/1.73M*2
ERYTHROCYTE [DISTWIDTH] IN BLOOD BY AUTOMATED COUNT: 14.2 % (ref 11.5–14.5)
GLUCOSE BLD MANUAL STRIP-MCNC: 106 MG/DL (ref 74–99)
GLUCOSE BLD MANUAL STRIP-MCNC: 121 MG/DL (ref 74–99)
GLUCOSE BLD MANUAL STRIP-MCNC: 125 MG/DL (ref 74–99)
GLUCOSE BLD MANUAL STRIP-MCNC: 96 MG/DL (ref 74–99)
GLUCOSE BLD MANUAL STRIP-MCNC: 99 MG/DL (ref 74–99)
GLUCOSE SERPL-MCNC: 111 MG/DL (ref 74–99)
HCT VFR BLD AUTO: 27.5 % (ref 41–52)
HGB BLD-MCNC: 9.1 G/DL (ref 13.5–17.5)
MAGNESIUM SERPL-MCNC: 1.99 MG/DL (ref 1.6–2.4)
MCH RBC QN AUTO: 27.6 PG (ref 26–34)
MCHC RBC AUTO-ENTMCNC: 33.1 G/DL (ref 32–36)
MCV RBC AUTO: 83 FL (ref 80–100)
NRBC BLD-RTO: 0 /100 WBCS (ref 0–0)
PLATELET # BLD AUTO: 493 X10*3/UL (ref 150–450)
POTASSIUM SERPL-SCNC: 3.9 MMOL/L (ref 3.5–5.3)
PROT SERPL-MCNC: 5.7 G/DL (ref 6.4–8.2)
RBC # BLD AUTO: 3.3 X10*6/UL (ref 4.5–5.9)
SODIUM SERPL-SCNC: 133 MMOL/L (ref 136–145)
WBC # BLD AUTO: 9.2 X10*3/UL (ref 4.4–11.3)

## 2024-02-17 PROCEDURE — 1200000002 HC GENERAL ROOM WITH TELEMETRY DAILY

## 2024-02-17 PROCEDURE — 80053 COMPREHEN METABOLIC PANEL: CPT

## 2024-02-17 PROCEDURE — 2500000001 HC RX 250 WO HCPCS SELF ADMINISTERED DRUGS (ALT 637 FOR MEDICARE OP)

## 2024-02-17 PROCEDURE — 2500000004 HC RX 250 GENERAL PHARMACY W/ HCPCS (ALT 636 FOR OP/ED)

## 2024-02-17 PROCEDURE — C9113 INJ PANTOPRAZOLE SODIUM, VIA: HCPCS

## 2024-02-17 PROCEDURE — 36415 COLL VENOUS BLD VENIPUNCTURE: CPT

## 2024-02-17 PROCEDURE — 85027 COMPLETE CBC AUTOMATED: CPT

## 2024-02-17 PROCEDURE — 83735 ASSAY OF MAGNESIUM: CPT | Performed by: PODIATRIST

## 2024-02-17 PROCEDURE — 2500000004 HC RX 250 GENERAL PHARMACY W/ HCPCS (ALT 636 FOR OP/ED): Performed by: PODIATRIST

## 2024-02-17 PROCEDURE — 82947 ASSAY GLUCOSE BLOOD QUANT: CPT

## 2024-02-17 RX ADMIN — METHOCARBAMOL 1000 MG: 100 INJECTION, SOLUTION INTRAMUSCULAR; INTRAVENOUS at 17:26

## 2024-02-17 RX ADMIN — HYDROMORPHONE HYDROCHLORIDE 0.4 MG: 1 INJECTION, SOLUTION INTRAMUSCULAR; INTRAVENOUS; SUBCUTANEOUS at 06:28

## 2024-02-17 RX ADMIN — PIPERACILLIN SODIUM AND TAZOBACTAM SODIUM 3.38 G: 3; .375 INJECTION, SOLUTION INTRAVENOUS at 15:56

## 2024-02-17 RX ADMIN — PANTOPRAZOLE SODIUM 40 MG: 40 INJECTION, POWDER, FOR SOLUTION INTRAVENOUS at 09:00

## 2024-02-17 RX ADMIN — METHOCARBAMOL 1000 MG: 100 INJECTION, SOLUTION INTRAMUSCULAR; INTRAVENOUS at 01:35

## 2024-02-17 RX ADMIN — PANTOPRAZOLE SODIUM 40 MG: 40 INJECTION, POWDER, FOR SOLUTION INTRAVENOUS at 20:01

## 2024-02-17 RX ADMIN — ENOXAPARIN SODIUM 40 MG: 100 INJECTION SUBCUTANEOUS at 20:01

## 2024-02-17 RX ADMIN — PIPERACILLIN SODIUM AND TAZOBACTAM SODIUM 3.38 G: 3; .375 INJECTION, SOLUTION INTRAVENOUS at 03:52

## 2024-02-17 RX ADMIN — PIPERACILLIN SODIUM AND TAZOBACTAM SODIUM 3.38 G: 3; .375 INJECTION, SOLUTION INTRAVENOUS at 09:09

## 2024-02-17 RX ADMIN — OXYCODONE HYDROCHLORIDE 10 MG: 5 SOLUTION ORAL at 21:18

## 2024-02-17 RX ADMIN — PIPERACILLIN SODIUM AND TAZOBACTAM SODIUM 3.38 G: 3; .375 INJECTION, SOLUTION INTRAVENOUS at 20:02

## 2024-02-17 RX ADMIN — OXYCODONE HYDROCHLORIDE 5 MG: 5 SOLUTION ORAL at 12:41

## 2024-02-17 RX ADMIN — METHOCARBAMOL 1000 MG: 100 INJECTION, SOLUTION INTRAMUSCULAR; INTRAVENOUS at 09:09

## 2024-02-17 RX ADMIN — OXYCODONE HYDROCHLORIDE 10 MG: 5 SOLUTION ORAL at 17:11

## 2024-02-17 ASSESSMENT — COGNITIVE AND FUNCTIONAL STATUS - GENERAL
DAILY ACTIVITIY SCORE: 22
MOBILITY SCORE: 21
DRESSING REGULAR UPPER BODY CLOTHING: A LITTLE
CLIMB 3 TO 5 STEPS WITH RAILING: A LITTLE
WALKING IN HOSPITAL ROOM: A LITTLE
STANDING UP FROM CHAIR USING ARMS: A LITTLE
HELP NEEDED FOR BATHING: A LITTLE

## 2024-02-17 ASSESSMENT — PAIN SCALES - GENERAL
PAINLEVEL_OUTOF10: 9
PAINLEVEL_OUTOF10: 4
PAINLEVEL_OUTOF10: 4
PAINLEVEL_OUTOF10: 2
PAINLEVEL_OUTOF10: 3
PAINLEVEL_OUTOF10: 7
PAINLEVEL_OUTOF10: 0 - NO PAIN

## 2024-02-17 ASSESSMENT — PAIN - FUNCTIONAL ASSESSMENT
PAIN_FUNCTIONAL_ASSESSMENT: 0-10

## 2024-02-17 NOTE — PROGRESS NOTES
[facility]  ACUTE CARE SURGERY - PROGRESS NOTE    Patient Name: Trevor Yarbrough  MRN: [id number]  Admit Date: 206  : 1970  AGE: 53 y.o.   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      24: exlap, subtotal gastrectomy  : washout  : antrectomy, Billroth 2 anatomy; fascia closed, skin open with wound vac placed     Plan:   Neuro:   - PRN Oxycodone for Pain with IV Dilaudid for breakthrough   - Scheduled Robaxin      Card:  - Continuous tele monitoring  - vital signs [id number]      Resp:   - Encourage IS  - OOB ambulate, PT/OT     GI:   - Increase TF to goal of 55 ml/hr   - DC IVF per endocrine  - No crushed meds through dobhoff- only liquids   - IV PPI BID  - Continue daily wet to dry dressing over abdominal incision   - Maintain NGT to low continuous suction   - Monitor drains output; inferior drain output more gastric in appearance       -> Amylase sent : drain <10, Serum Amylase 28.      :  - Strict I&Os, monitor drains output  - mIVFs   - Replete lytes as indicate     ENDO:  - hx of insulinoma- Endocrine consult today. Appreciate recs.      Heme: acute postop anemia  - Given 1 unit of PRBC   - Trend H/H daily      ID:  - completed fluc/zosyn x 4 days for intra-abdominal infection  CT A/P obtained  due to low grade temp, abdominal pain and increasing leukocytosis- no definite leak, adjacent fluid collections around pancreas, thickening of anastomosis   - follow up blood cultures - NGTD x3 day   - Restarted on Zosyn - .  - Continue to trend WBC      DVT Proph:   - SCDs, Lovenox      Dispo: continue RNF, plan for home with [facility] once medically ready for dc     Patient discussed with Attending   John Cornell MD  General Surgery  Acute Care Surgery Service   Pager: [phone]    ==============================================================================  CHIEF COMPLAINT / EVENTS LAST 24HRS / HPI:  No overnight events. He has minimal abdominal pain. Had a bowel movement.     MEDICAL HISTORY / ROS:   Admission history and ROS reviewed.       PHYSICAL EXAM:  Heart Rate:  [70-80]   Temp:  [36.2 °C (97.2 °F)-37.1 °C (98.8 °F)]   Resp:  [18-19]   BP: (119-135)/(68-76)   Weight:  [80.2 kg (176 lb 11.2 oz)]   SpO2:  [93 %-98 %]       I/O past 24 hr  Intake/Output Summary (Last 24 hours) at 2/17/2024 1644  Last data filed at 2/17/2024 1459  Gross per 24 hour   Intake --   Output 3625 ml   Net -3625 ml        Vitals past 24h  Temp:  [36.2 °C (97.2 °F)-37.1 °C (98.8 °F)] 36.7 °C (98.1 °F)  Heart Rate:  [70-80] 70  Resp:  [18-19] 18  BP: (119-135)/(68-76) 135/76     Physical Exam  Constitutional- lying in bed, NAD  Cards- regular rate on monitor  Resp- nonlabored breathing on RA  Abdomen- soft, edematous, mildly distended; Midline incision with mostly granular wound base; small amount mucopurulent drainage noted superior part of incision; Superior drain serosang, inferior drain with dark gastric output, decreasing in output;  NGT in place to low continuous suction- bilious output. Dobbhoff in place with TF running  Extremities- BURKETT  Skin- warm, dry   Neuro- A&Ox3, appropriate mood and behavior     LABS:  Results for orders placed or performed during the hospital encounter of 02/06/24 (from the past 24 hour(s))   POCT GLUCOSE   Result Value Ref Range    POCT Glucose 111 (H) 74 - 99 mg/dL   POCT GLUCOSE   Result Value Ref Range    POCT Glucose 121 (H) 74 - 99 mg/dL   POCT GLUCOSE   Result Value Ref Range    POCT Glucose 125 (H) 74 - 99 mg/dL   Magnesium   Result Value Ref Range    Magnesium 1.99 1.60 - 2.40 mg/dL   CBC   Result Value Ref Range    WBC 9.2 4.4 - 11.3 x10*3/uL    nRBC 0.0 0.0 - 0.0 /100 WBCs    RBC  3.30 (L) 4.50 - 5.90 x10*6/uL    Hemoglobin 9.1 (L) 13.5 - 17.5 g/dL    Hematocrit 27.5 (L) 41.0 - 52.0 %    MCV 83 80 - 100 fL    MCH 27.6 26.0 - 34.0 pg    MCHC 33.1 32.0 - 36.0 g/dL    RDW 14.2 11.5 - 14.5 %    Platelets 493 (H) 150 - 450 x10*3/uL   Comprehensive metabolic panel   Result Value Ref Range    Glucose 111 (H) 74 - 99 mg/dL    Sodium 133 (L) 136 - 145 mmol/L    Potassium 3.9 3.5 - 5.3 mmol/L    Chloride 102 98 - 107 mmol/L    Bicarbonate 23 21 - 32 mmol/L    Anion Gap 12 10 - 20 mmol/L    Urea Nitrogen 14 6 - 23 mg/dL    Creatinine 0.73 0.50 - 1.30 mg/dL    eGFR >90 >60 mL/min/1.73m*2    Calcium 7.8 (L) 8.6 - 10.6 mg/dL    Albumin 2.4 (L) 3.4 - 5.0 g/dL    Alkaline Phosphatase 135 (H) 33 - 120 U/L    Total Protein 5.7 (L) 6.4 - 8.2 g/dL    AST 31 9 - 39 U/L    Bilirubin, Total 0.6 0.0 - 1.2 mg/dL    ALT 60 (H) 10 - 52 U/L   POCT GLUCOSE   Result Value Ref Range    POCT Glucose 99 74 - 99 mg/dL   POCT GLUCOSE   Result Value Ref Range    POCT Glucose 106 (H) 74 - 99 mg/dL     MEDICATIONS:  Current Facility-Administered Medications   Medication Dose Route Frequency Provider Last Rate Last Admin    benzocaine-menthol (Cepastat Sore Throat) 15-3.6 mg lozenge 2 lozenge  2 lozenge Mouth/Throat q2h PRN Constanza Rod MD   2 lozenge at 02/16/24 0819    dextrose 50 % injection 25 g  25 g intravenous q15 min PRN Constanza Rod MD        enoxaparin (Lovenox) syringe 40 mg  40 mg subcutaneous q24h Constanza Rod MD   40 mg at 02/16/24 2143    glucagon (Glucagen) injection 1 mg  1 mg intramuscular q15 min PRN Constanza Rod MD        HYDROmorphone (Dilaudid) injection 0.4 mg  0.4 mg intravenous q4h PRN Constanza Rod MD   0.4 mg at 02/17/24 0628    HYDROmorphone (Dilaudid) injection 0.4 mg  0.4 mg intravenous Once Patricia Guzman MD        insulin lispro (HumaLOG) injection 0-5 Units  0-5 Units subcutaneous TID with meals Constanza Rod MD   1 Units at 02/07/24 1319    iohexol (OMNIPaque) 12 mg iodine/mL oral contrast 500 mL   500 mL oral Once in imaging Martin Reyes MD        methocarbamol (Robaxin) injection 1,000 mg  1,000 mg intravenous q8h Patricia Guzman MD   1,000 mg at 02/17/24 0909    oxyCODONE (Roxicodone) solution 10 mg  10 mg oral q4h PRN Constanza Rod MD   10 mg at 02/16/24 2143    oxyCODONE (Roxicodone) solution 5 mg  5 mg oral q4h PRN Constanza Rod MD   5 mg at 02/17/24 1241    oxygen (O2) therapy   inhalation Continuous PRN - O2/gases Constanza Rod MD        pantoprazole (ProtoNix) injection 40 mg  40 mg intravenous BID Constanza Rod MD   40 mg at 02/17/24 0900    phenoL (Chloraseptic) 1.4 % mouth/throat spray 1 spray  1 spray Mouth/Throat q2h PRN Constanza Rod MD        piperacillin-tazobactam-dextrose (Zosyn) IV 3.375 g  3.375 g intravenous q6h Pat Cornell MD   Stopped at 02/17/24 1626    white petrolatum (Aquaphor) ointment   Topical q1h PRN Constanza Rod MD           IMAGING SUMMARY:  (summary of new imaging findings, not a copy of dictation)      I have reviewed all laboratory and imaging results ordered/pertinent for today's encounter.

## 2024-02-17 NOTE — CARE PLAN
The patient's goals for the shift include Unable to state patient intubated    The clinical goals for the shift include pt will have adequate pain control through out the shift

## 2024-02-18 LAB
ALBUMIN SERPL BCP-MCNC: 2.8 G/DL (ref 3.4–5)
ALP SERPL-CCNC: 140 U/L (ref 33–120)
ALT SERPL W P-5'-P-CCNC: 59 U/L (ref 10–52)
ANION GAP SERPL CALC-SCNC: 14 MMOL/L (ref 10–20)
AST SERPL W P-5'-P-CCNC: 25 U/L (ref 9–39)
BILIRUB SERPL-MCNC: 0.8 MG/DL (ref 0–1.2)
BUN SERPL-MCNC: 20 MG/DL (ref 6–23)
CALCIUM SERPL-MCNC: 8.3 MG/DL (ref 8.6–10.6)
CHLORIDE SERPL-SCNC: 100 MMOL/L (ref 98–107)
CO2 SERPL-SCNC: 25 MMOL/L (ref 21–32)
CREAT SERPL-MCNC: 0.9 MG/DL (ref 0.5–1.3)
EGFRCR SERPLBLD CKD-EPI 2021: >90 ML/MIN/1.73M*2
ERYTHROCYTE [DISTWIDTH] IN BLOOD BY AUTOMATED COUNT: 14.6 % (ref 11.5–14.5)
GLUCOSE BLD MANUAL STRIP-MCNC: 110 MG/DL (ref 74–99)
GLUCOSE BLD MANUAL STRIP-MCNC: 122 MG/DL (ref 74–99)
GLUCOSE BLD MANUAL STRIP-MCNC: 125 MG/DL (ref 74–99)
GLUCOSE BLD MANUAL STRIP-MCNC: 127 MG/DL (ref 74–99)
GLUCOSE BLD MANUAL STRIP-MCNC: 134 MG/DL (ref 74–99)
GLUCOSE BLD MANUAL STRIP-MCNC: 135 MG/DL (ref 74–99)
GLUCOSE SERPL-MCNC: 119 MG/DL (ref 74–99)
HCT VFR BLD AUTO: 28.7 % (ref 41–52)
HGB BLD-MCNC: 9.2 G/DL (ref 13.5–17.5)
MAGNESIUM SERPL-MCNC: 2.22 MG/DL (ref 1.6–2.4)
MCH RBC QN AUTO: 26.9 PG (ref 26–34)
MCHC RBC AUTO-ENTMCNC: 32.1 G/DL (ref 32–36)
MCV RBC AUTO: 84 FL (ref 80–100)
NRBC BLD-RTO: 0 /100 WBCS (ref 0–0)
PLATELET # BLD AUTO: 630 X10*3/UL (ref 150–450)
POTASSIUM SERPL-SCNC: 4.5 MMOL/L (ref 3.5–5.3)
PROT SERPL-MCNC: 6.3 G/DL (ref 6.4–8.2)
RBC # BLD AUTO: 3.42 X10*6/UL (ref 4.5–5.9)
SODIUM SERPL-SCNC: 134 MMOL/L (ref 136–145)
WBC # BLD AUTO: 10.8 X10*3/UL (ref 4.4–11.3)

## 2024-02-18 PROCEDURE — 36415 COLL VENOUS BLD VENIPUNCTURE: CPT

## 2024-02-18 PROCEDURE — 99024 POSTOP FOLLOW-UP VISIT: CPT | Performed by: SURGERY

## 2024-02-18 PROCEDURE — 2500000001 HC RX 250 WO HCPCS SELF ADMINISTERED DRUGS (ALT 637 FOR MEDICARE OP)

## 2024-02-18 PROCEDURE — 2500000004 HC RX 250 GENERAL PHARMACY W/ HCPCS (ALT 636 FOR OP/ED)

## 2024-02-18 PROCEDURE — 1100000001 HC PRIVATE ROOM DAILY

## 2024-02-18 PROCEDURE — 82947 ASSAY GLUCOSE BLOOD QUANT: CPT

## 2024-02-18 PROCEDURE — 2500000004 HC RX 250 GENERAL PHARMACY W/ HCPCS (ALT 636 FOR OP/ED): Performed by: PODIATRIST

## 2024-02-18 PROCEDURE — 80053 COMPREHEN METABOLIC PANEL: CPT

## 2024-02-18 PROCEDURE — C9113 INJ PANTOPRAZOLE SODIUM, VIA: HCPCS

## 2024-02-18 PROCEDURE — 85027 COMPLETE CBC AUTOMATED: CPT

## 2024-02-18 PROCEDURE — 83735 ASSAY OF MAGNESIUM: CPT | Performed by: PODIATRIST

## 2024-02-18 RX ADMIN — OXYCODONE HYDROCHLORIDE 10 MG: 5 SOLUTION ORAL at 03:20

## 2024-02-18 RX ADMIN — PANTOPRAZOLE SODIUM 40 MG: 40 INJECTION, POWDER, FOR SOLUTION INTRAVENOUS at 08:53

## 2024-02-18 RX ADMIN — HYDROMORPHONE HYDROCHLORIDE 0.4 MG: 1 INJECTION, SOLUTION INTRAMUSCULAR; INTRAVENOUS; SUBCUTANEOUS at 06:15

## 2024-02-18 RX ADMIN — METHOCARBAMOL 1000 MG: 100 INJECTION, SOLUTION INTRAMUSCULAR; INTRAVENOUS at 16:15

## 2024-02-18 RX ADMIN — PIPERACILLIN SODIUM AND TAZOBACTAM SODIUM 3.38 G: 3; .375 INJECTION, SOLUTION INTRAVENOUS at 16:10

## 2024-02-18 RX ADMIN — PIPERACILLIN SODIUM AND TAZOBACTAM SODIUM 3.38 G: 3; .375 INJECTION, SOLUTION INTRAVENOUS at 20:59

## 2024-02-18 RX ADMIN — METHOCARBAMOL 1000 MG: 100 INJECTION, SOLUTION INTRAMUSCULAR; INTRAVENOUS at 00:34

## 2024-02-18 RX ADMIN — METHOCARBAMOL 1000 MG: 100 INJECTION, SOLUTION INTRAMUSCULAR; INTRAVENOUS at 08:52

## 2024-02-18 RX ADMIN — ENOXAPARIN SODIUM 40 MG: 100 INJECTION SUBCUTANEOUS at 20:59

## 2024-02-18 RX ADMIN — PIPERACILLIN SODIUM AND TAZOBACTAM SODIUM 3.38 G: 3; .375 INJECTION, SOLUTION INTRAVENOUS at 03:20

## 2024-02-18 RX ADMIN — PANTOPRAZOLE SODIUM 40 MG: 40 INJECTION, POWDER, FOR SOLUTION INTRAVENOUS at 20:59

## 2024-02-18 RX ADMIN — OXYCODONE HYDROCHLORIDE 10 MG: 5 SOLUTION ORAL at 18:26

## 2024-02-18 RX ADMIN — PIPERACILLIN SODIUM AND TAZOBACTAM SODIUM 3.38 G: 3; .375 INJECTION, SOLUTION INTRAVENOUS at 08:35

## 2024-02-18 RX ADMIN — OXYCODONE HYDROCHLORIDE 5 MG: 5 SOLUTION ORAL at 22:56

## 2024-02-18 ASSESSMENT — COGNITIVE AND FUNCTIONAL STATUS - GENERAL
DRESSING REGULAR UPPER BODY CLOTHING: A LITTLE
MOBILITY SCORE: 21
STANDING UP FROM CHAIR USING ARMS: A LITTLE
CLIMB 3 TO 5 STEPS WITH RAILING: A LITTLE
WALKING IN HOSPITAL ROOM: A LITTLE
HELP NEEDED FOR BATHING: A LITTLE
DAILY ACTIVITIY SCORE: 22

## 2024-02-18 ASSESSMENT — PAIN - FUNCTIONAL ASSESSMENT
PAIN_FUNCTIONAL_ASSESSMENT: 0-10

## 2024-02-18 ASSESSMENT — PAIN SCALES - GENERAL
PAINLEVEL_OUTOF10: 6
PAINLEVEL_OUTOF10: 1
PAINLEVEL_OUTOF10: 6
PAINLEVEL_OUTOF10: 4
PAINLEVEL_OUTOF10: 7
PAINLEVEL_OUTOF10: 4
PAINLEVEL_OUTOF10: 6
PAINLEVEL_OUTOF10: 0 - NO PAIN
PAINLEVEL_OUTOF10: 0 - NO PAIN

## 2024-02-18 ASSESSMENT — PAIN DESCRIPTION - LOCATION: LOCATION: ABDOMEN

## 2024-02-18 NOTE — PROGRESS NOTES
[facility]  ACUTE CARE SURGERY - PROGRESS NOTE    Patient Name: Trevor Yarbrough  MRN: [id number]  Admit Date: 206  : 1970  AGE: 53 y.o.   GENDER: male  ==============================================================================  TODAY'S ASSESSMENT AND PLAN OF CARE:    Pt is a 54yo M with hx of presumed insulinoma who started octreotide and then felt abdominal pain a few hours later, found to have pneumoperitoneum with peritonitis.   During patients hospital course he required the following operations as listed below,   24: exlap, subtotal gastrectomy  : washout  : antrectomy, Billroth 2 anatomy; fascia closed, skin open with wound vac placed     Patient is scheduled fort CT scan tomorrow. He is able to discontinue zosyn tomorrow(). NG clamp trial was performed today, and patient had appx 60ml of bilious output, which included 60ml used for flush due to the thickness of secretions. During trial no increase in drainage was noted from ROSEMARIE drains, RN notified to continue documentation of output.  NG was re-clamped.     Plan:     Neuro:   - PRN Oxycodone for Pain with IV Dilaudid for breakthrough   - Scheduled Robaxin      Card:  - Continuous tele monitoring  - vital signs [id number]      Resp:   - Encourage IS  - OOB ambulate, encouraged patient to sit in chair and ambulate in room  -PT/OT     GI:   - Increase TF to goal of 55 ml/hr   - DC IVF per endocrine  - No crushed meds through dobhoff- only liquids   - IV PPI BID  - Continue daily wet to dry dressing over abdominal incision   - Maintain NGT but keep clamped  - Monitor drains output; inferior drain output more gastric in appearance       -> Amylase sent : drain <10, Serum Amylase 28.      :  - Strict I&Os, monitor drains output  - mIVFs   - Replete lytes as indicate     ENDO:  - hx of insulinoma  -Follow up regarding Endocrine consult. Appreciate recs.      Heme: acute postop anemia  - Given 1 unit of  PRBC 2/12  - Trend H/H daily   -Hgb: 9.2     ID:  - completed fluc/zosyn x 4 days for intra-abdominal infection  CT A/P obtained 2/13 due to low grade temp, abdominal pain and increasing leukocytosis- no definite leak, adjacent fluid collections around pancreas, thickening of anastomosis   - follow up blood cultures 2/13- NGTD x3 day   - Restarted on Zosyn 2/13, will discontinue Zosyn tomorrow (2/19)  - Continue to trend WBC      DVT Proph:   - SCDs, Lovenox      Dispo: continue RNF, plan for home with [facility] once medically ready for d/c     Daiana Alejandra MS III    Patient discussed with Attending Dr. John Cornell MD  General Surgery  Acute Care Surgery Service   Pager: [phone]       ==============================================================================  CHIEF COMPLAINT / EVENTS LAST 24HRS / HPI:  No acute events overnight. Patient seen and evaluated this AM during team rounds. Patient reports that he has been ambulating to the bathroom with assistance from family members and staff.   He reports that he continues to have abdominal pain particularly when dressing changes occur aside from that denies any continuous pain.     MEDICAL HISTORY / ROS:   Admission history reviewed. Pertinent changes as follows:  None.      PHYSICAL EXAM:  Vital Signs past 24h:  Heart Rate:  [70-86]   Temp:  [36.7 °C (98.1 °F)-37.7 °C (99.9 °F)]   Resp:  [16-18]   BP: (118-135)/(68-76)   Weight:  [74.6 kg (164 lb 7.4 oz)]   SpO2:  [93 %-98 %]     I/O past 24h:  I/O last 2 completed shifts:  In: 1430 (19.2 mL/kg) [P.O.:10; NG/GT:1320; IV Piggyback:100]  Out: 2580 (34.6 mL/kg) [Urine:850 (0.5 mL/kg/hr); Emesis/NG output:1700; Drains:30]  Weight: 74.6 kg      Physical Exam  Constitutional- lying in bed, No acute distress.   Cards- RRR  Resp- nonlabored breathing on RA  Abdomen- soft, edematous, mildly distended; Midline incision with mostly granular wound base; small amount mucopurulent drainage noted superior part of incision;  Superior drain serosang, inferior drain with dark gastric output, decreasing in output;  NGT in place. Dobbhoff in place with TF running  Extremities- Moves all extremities spontaneously.   Skin- warm, dry   Neuro- A&Ox3, appropriate mood and behavior     Labs past 24h:  Results for orders placed or performed during the hospital encounter of 02/06/24 (from the past 24 hour(s))   POCT GLUCOSE   Result Value Ref Range    POCT Glucose 106 (H) 74 - 99 mg/dL   POCT GLUCOSE   Result Value Ref Range    POCT Glucose 96 74 - 99 mg/dL   POCT GLUCOSE   Result Value Ref Range    POCT Glucose 125 (H) 74 - 99 mg/dL   POCT GLUCOSE   Result Value Ref Range    POCT Glucose 134 (H) 74 - 99 mg/dL   Magnesium   Result Value Ref Range    Magnesium 2.22 1.60 - 2.40 mg/dL   CBC   Result Value Ref Range    WBC 10.8 4.4 - 11.3 x10*3/uL    nRBC 0.0 0.0 - 0.0 /100 WBCs    RBC 3.42 (L) 4.50 - 5.90 x10*6/uL    Hemoglobin 9.2 (L) 13.5 - 17.5 g/dL    Hematocrit 28.7 (L) 41.0 - 52.0 %    MCV 84 80 - 100 fL    MCH 26.9 26.0 - 34.0 pg    MCHC 32.1 32.0 - 36.0 g/dL    RDW 14.6 (H) 11.5 - 14.5 %    Platelets 630 (H) 150 - 450 x10*3/uL   Comprehensive metabolic panel   Result Value Ref Range    Glucose 119 (H) 74 - 99 mg/dL    Sodium 134 (L) 136 - 145 mmol/L    Potassium 4.5 3.5 - 5.3 mmol/L    Chloride 100 98 - 107 mmol/L    Bicarbonate 25 21 - 32 mmol/L    Anion Gap 14 10 - 20 mmol/L    Urea Nitrogen 20 6 - 23 mg/dL    Creatinine 0.90 0.50 - 1.30 mg/dL    eGFR >90 >60 mL/min/1.73m*2    Calcium 8.3 (L) 8.6 - 10.6 mg/dL    Albumin 2.8 (L) 3.4 - 5.0 g/dL    Alkaline Phosphatase 140 (H) 33 - 120 U/L    Total Protein 6.3 (L) 6.4 - 8.2 g/dL    AST 25 9 - 39 U/L    Bilirubin, Total 0.8 0.0 - 1.2 mg/dL    ALT 59 (H) 10 - 52 U/L   POCT GLUCOSE   Result Value Ref Range    POCT Glucose 135 (H) 74 - 99 mg/dL   POCT GLUCOSE   Result Value Ref Range    POCT Glucose 110 (H) 74 - 99 mg/dL

## 2024-02-19 ENCOUNTER — APPOINTMENT (OUTPATIENT)
Dept: RADIOLOGY | Facility: HOSPITAL | Age: 54
DRG: 853 | End: 2024-02-19
Payer: COMMERCIAL

## 2024-02-19 LAB
ALBUMIN SERPL BCP-MCNC: 2.8 G/DL (ref 3.4–5)
ALP SERPL-CCNC: 177 U/L (ref 33–120)
ALT SERPL W P-5'-P-CCNC: 62 U/L (ref 10–52)
ANION GAP SERPL CALC-SCNC: 12 MMOL/L (ref 10–20)
AST SERPL W P-5'-P-CCNC: 33 U/L (ref 9–39)
BILIRUB SERPL-MCNC: 0.6 MG/DL (ref 0–1.2)
BUN SERPL-MCNC: 23 MG/DL (ref 6–23)
CALCIUM SERPL-MCNC: 8.4 MG/DL (ref 8.6–10.6)
CHLORIDE SERPL-SCNC: 103 MMOL/L (ref 98–107)
CO2 SERPL-SCNC: 24 MMOL/L (ref 21–32)
CREAT SERPL-MCNC: 0.78 MG/DL (ref 0.5–1.3)
EGFRCR SERPLBLD CKD-EPI 2021: >90 ML/MIN/1.73M*2
ERYTHROCYTE [DISTWIDTH] IN BLOOD BY AUTOMATED COUNT: 14.4 % (ref 11.5–14.5)
GLUCOSE BLD MANUAL STRIP-MCNC: 101 MG/DL (ref 74–99)
GLUCOSE BLD MANUAL STRIP-MCNC: 103 MG/DL (ref 74–99)
GLUCOSE BLD MANUAL STRIP-MCNC: 122 MG/DL (ref 74–99)
GLUCOSE BLD MANUAL STRIP-MCNC: 124 MG/DL (ref 74–99)
GLUCOSE BLD MANUAL STRIP-MCNC: 127 MG/DL (ref 74–99)
GLUCOSE BLD MANUAL STRIP-MCNC: 136 MG/DL (ref 74–99)
GLUCOSE SERPL-MCNC: 105 MG/DL (ref 74–99)
HCT VFR BLD AUTO: 30.5 % (ref 41–52)
HGB BLD-MCNC: 9.7 G/DL (ref 13.5–17.5)
MAGNESIUM SERPL-MCNC: 2.27 MG/DL (ref 1.6–2.4)
MCH RBC QN AUTO: 26.9 PG (ref 26–34)
MCHC RBC AUTO-ENTMCNC: 31.8 G/DL (ref 32–36)
MCV RBC AUTO: 85 FL (ref 80–100)
NRBC BLD-RTO: 0 /100 WBCS (ref 0–0)
PLATELET # BLD AUTO: 712 X10*3/UL (ref 150–450)
POTASSIUM SERPL-SCNC: 4.1 MMOL/L (ref 3.5–5.3)
PROT SERPL-MCNC: 6.5 G/DL (ref 6.4–8.2)
RBC # BLD AUTO: 3.6 X10*6/UL (ref 4.5–5.9)
SODIUM SERPL-SCNC: 135 MMOL/L (ref 136–145)
WBC # BLD AUTO: 12.4 X10*3/UL (ref 4.4–11.3)

## 2024-02-19 PROCEDURE — 84075 ASSAY ALKALINE PHOSPHATASE: CPT

## 2024-02-19 PROCEDURE — C9113 INJ PANTOPRAZOLE SODIUM, VIA: HCPCS

## 2024-02-19 PROCEDURE — 97110 THERAPEUTIC EXERCISES: CPT | Mod: GP

## 2024-02-19 PROCEDURE — 74177 CT ABD & PELVIS W/CONTRAST: CPT | Performed by: RADIOLOGY

## 2024-02-19 PROCEDURE — 2500000004 HC RX 250 GENERAL PHARMACY W/ HCPCS (ALT 636 FOR OP/ED)

## 2024-02-19 PROCEDURE — 82947 ASSAY GLUCOSE BLOOD QUANT: CPT

## 2024-02-19 PROCEDURE — 2500000001 HC RX 250 WO HCPCS SELF ADMINISTERED DRUGS (ALT 637 FOR MEDICARE OP): Performed by: PHYSICIAN ASSISTANT

## 2024-02-19 PROCEDURE — 1100000001 HC PRIVATE ROOM DAILY

## 2024-02-19 PROCEDURE — 83735 ASSAY OF MAGNESIUM: CPT

## 2024-02-19 PROCEDURE — 74177 CT ABD & PELVIS W/CONTRAST: CPT

## 2024-02-19 PROCEDURE — 2500000001 HC RX 250 WO HCPCS SELF ADMINISTERED DRUGS (ALT 637 FOR MEDICARE OP)

## 2024-02-19 PROCEDURE — 2550000001 HC RX 255 CONTRASTS: Performed by: SURGERY

## 2024-02-19 PROCEDURE — 97530 THERAPEUTIC ACTIVITIES: CPT | Mod: GP

## 2024-02-19 PROCEDURE — 85027 COMPLETE CBC AUTOMATED: CPT

## 2024-02-19 PROCEDURE — 36415 COLL VENOUS BLD VENIPUNCTURE: CPT

## 2024-02-19 PROCEDURE — 2500000004 HC RX 250 GENERAL PHARMACY W/ HCPCS (ALT 636 FOR OP/ED): Performed by: PODIATRIST

## 2024-02-19 RX ORDER — OXYCODONE HCL 5 MG/5 ML
2.5 SOLUTION, ORAL ORAL EVERY 4 HOURS PRN
Status: DISCONTINUED | OUTPATIENT
Start: 2024-02-19 | End: 2024-02-26 | Stop reason: HOSPADM

## 2024-02-19 RX ORDER — OXYCODONE HCL 5 MG/5 ML
5 SOLUTION, ORAL ORAL EVERY 4 HOURS PRN
Status: DISCONTINUED | OUTPATIENT
Start: 2024-02-19 | End: 2024-02-26 | Stop reason: HOSPADM

## 2024-02-19 RX ADMIN — IOHEXOL 80 ML: 350 INJECTION, SOLUTION INTRAVENOUS at 08:41

## 2024-02-19 RX ADMIN — OXYCODONE HYDROCHLORIDE 10 MG: 5 SOLUTION ORAL at 04:39

## 2024-02-19 RX ADMIN — ENOXAPARIN SODIUM 40 MG: 100 INJECTION SUBCUTANEOUS at 21:43

## 2024-02-19 RX ADMIN — METHOCARBAMOL 1000 MG: 100 INJECTION, SOLUTION INTRAMUSCULAR; INTRAVENOUS at 18:00

## 2024-02-19 RX ADMIN — PANTOPRAZOLE SODIUM 40 MG: 40 INJECTION, POWDER, FOR SOLUTION INTRAVENOUS at 21:43

## 2024-02-19 RX ADMIN — PANTOPRAZOLE SODIUM 40 MG: 40 INJECTION, POWDER, FOR SOLUTION INTRAVENOUS at 09:16

## 2024-02-19 RX ADMIN — PIPERACILLIN SODIUM AND TAZOBACTAM SODIUM 3.38 G: 3; .375 INJECTION, SOLUTION INTRAVENOUS at 03:57

## 2024-02-19 RX ADMIN — OXYCODONE HYDROCHLORIDE 2.5 MG: 5 SOLUTION ORAL at 18:09

## 2024-02-19 RX ADMIN — PIPERACILLIN SODIUM AND TAZOBACTAM SODIUM 3.38 G: 3; .375 INJECTION, SOLUTION INTRAVENOUS at 21:43

## 2024-02-19 RX ADMIN — PIPERACILLIN SODIUM AND TAZOBACTAM SODIUM 3.38 G: 3; .375 INJECTION, SOLUTION INTRAVENOUS at 09:18

## 2024-02-19 RX ADMIN — METHOCARBAMOL 1000 MG: 100 INJECTION, SOLUTION INTRAMUSCULAR; INTRAVENOUS at 09:16

## 2024-02-19 RX ADMIN — OXYCODONE HYDROCHLORIDE 2.5 MG: 5 SOLUTION ORAL at 12:38

## 2024-02-19 RX ADMIN — METHOCARBAMOL 1000 MG: 100 INJECTION, SOLUTION INTRAMUSCULAR; INTRAVENOUS at 00:46

## 2024-02-19 RX ADMIN — OXYCODONE HYDROCHLORIDE 2.5 MG: 5 SOLUTION ORAL at 22:03

## 2024-02-19 RX ADMIN — PIPERACILLIN SODIUM AND TAZOBACTAM SODIUM 3.38 G: 3; .375 INJECTION, SOLUTION INTRAVENOUS at 16:05

## 2024-02-19 ASSESSMENT — COGNITIVE AND FUNCTIONAL STATUS - GENERAL
HELP NEEDED FOR BATHING: A LITTLE
DRESSING REGULAR LOWER BODY CLOTHING: A LITTLE
HELP NEEDED FOR BATHING: A LITTLE
DRESSING REGULAR LOWER BODY CLOTHING: A LITTLE
DRESSING REGULAR UPPER BODY CLOTHING: A LITTLE
MOBILITY SCORE: 18
HELP NEEDED FOR BATHING: A LITTLE
DRESSING REGULAR LOWER BODY CLOTHING: A LITTLE
DRESSING REGULAR UPPER BODY CLOTHING: A LITTLE
PERSONAL GROOMING: A LITTLE
TURNING FROM BACK TO SIDE WHILE IN FLAT BAD: A LITTLE
MOBILITY SCORE: 18
PERSONAL GROOMING: A LITTLE
DRESSING REGULAR UPPER BODY CLOTHING: A LITTLE
TURNING FROM BACK TO SIDE WHILE IN FLAT BAD: A LITTLE
HELP NEEDED FOR BATHING: A LITTLE
WALKING IN HOSPITAL ROOM: A LITTLE
PERSONAL GROOMING: A LITTLE
CLIMB 3 TO 5 STEPS WITH RAILING: A LOT
DRESSING REGULAR UPPER BODY CLOTHING: A LITTLE
PERSONAL GROOMING: A LITTLE
DRESSING REGULAR UPPER BODY CLOTHING: A LITTLE
MOVING TO AND FROM BED TO CHAIR: A LITTLE
DAILY ACTIVITIY SCORE: 20
PERSONAL GROOMING: A LITTLE
CLIMB 3 TO 5 STEPS WITH RAILING: A LOT
WALKING IN HOSPITAL ROOM: A LITTLE
PERSONAL GROOMING: A LITTLE
STANDING UP FROM CHAIR USING ARMS: A LITTLE
DRESSING REGULAR UPPER BODY CLOTHING: A LITTLE
STANDING UP FROM CHAIR USING ARMS: A LITTLE
DRESSING REGULAR LOWER BODY CLOTHING: A LITTLE
DRESSING REGULAR LOWER BODY CLOTHING: A LITTLE
MOVING TO AND FROM BED TO CHAIR: A LITTLE
DRESSING REGULAR LOWER BODY CLOTHING: A LITTLE
HELP NEEDED FOR BATHING: A LITTLE
HELP NEEDED FOR BATHING: A LITTLE

## 2024-02-19 ASSESSMENT — PAIN - FUNCTIONAL ASSESSMENT: PAIN_FUNCTIONAL_ASSESSMENT: WONG-BAKER FACES

## 2024-02-19 ASSESSMENT — PAIN SCALES - GENERAL
PAINLEVEL_OUTOF10: 5 - MODERATE PAIN
PAINLEVEL_OUTOF10: 4
PAINLEVEL_OUTOF10: 6
PAINLEVEL_OUTOF10: 9
PAINLEVEL_OUTOF10: 6

## 2024-02-19 ASSESSMENT — PAIN SCALES - WONG BAKER: WONGBAKER_NUMERICALRESPONSE: HURTS LITTLE MORE

## 2024-02-19 NOTE — PROGRESS NOTES
Endocrine will sign off on at this time given no hypoglycemia on TF. Please reach out if any concerns with hypoglycemia.    Follow up scheduled for march 2024 with endocrine outpatient.     Discussed with Dr. Reyes

## 2024-02-19 NOTE — PROGRESS NOTES
Occupational Therapy    Therapy Communication Note    Patient Name: Trevor Yarbrough  MRN: [id number]  Today's Date: 2/19/2024     Missed Visit: Yes  Missed Visit Reason:  (Pt declined at this time and reported he just got back to bed after ambulating in the gates. Will re attempt at a later time as schedule permits.)      02/19/24 at 2:02 PM   Tesha RIVERO/L, OTD  Rehab Office: [phone]

## 2024-02-19 NOTE — PROGRESS NOTES
Physical Therapy    Physical Therapy Treatment    Patient Name: Trevor Yarbrough  MRN: [id number]  Today's Date: 2/19/2024  Time Calculation  Start Time: 1528  Stop Time: 1551  Time Calculation (min): 23 min       Assessment/Plan   PT Assessment  Evaluation/Treatment Tolerance: Patient tolerated treatment well, Patient limited by fatigue  Medical Staff Made Aware: Yes  End of Session Communication: Bedside nurse  Assessment Comment: Pt w/ improved tolerance to activity and mobiility but continues to be limited by weakness and fatigue. Patient continues to benefit from skilled physical therapy to maximize functional mobility and safety. Pt remains appropriate for high intensity therapy when medically appropriate for DC.    End of Session Patient Position: Alarm off, not on at start of session (sitting EOB; call bell within reach)  PT Plan  Inpatient/Swing Bed or Outpatient: Inpatient  PT Plan  Treatment/Interventions: Bed mobility, Transfer training, Gait training, Stair training, Neuromuscular re-education, Balance training, Strengthening, Range of motion, Endurance training, Therapeutic exercise, Therapeutic activity, Home exercise program, Postural re-education  PT Plan: Skilled PT  PT Frequency: 4 times per week  PT Discharge Recommendations: High intensity level of continued care  Equipment Recommended upon Discharge: Wheeled walker  PT Recommended Transfer Status: Assist x1  PT - OK to Discharge: Yes    General Visit Information:   PT  Visit  PT Received On: 02/19/24  Response to Previous Treatment: Patient reporting fatigue but able to participate.  Prior to Session Communication: Bedside nurse  Patient Position Received: Bed, 3 rail up, Alarm off, not on at start of session  Family/Caregiver Present: Yes  Caregiver Feedback: spouse present and supportive during session.  General Comment: Patient cooperative and agreeable to therapy     Subjective     Precautions:  Precautions  Medical Precautions: Fall  precautions, Abdominal precautions  Post-Surgical Precautions: Abdominal surgery precautions    Objective   Pain:  Pain Assessment  Pain Assessment: Juárez-Baker FACES  Juárez-Baker FACES Pain Rating: Hurts little more  Cognition:  Cognition  Overall Cognitive Status: Within Functional Limits  Orientation Level: Oriented X4    PT Treatments:  Therapeutic Exercise  Therapeutic Exercise Performed: Yes  Therapeutic Exercise Activity 1: seated active LAQ, ankle pumps, marches, hip ADD x 15  Therapeutic Exercise Activity 2: standing active sep ups on 3 in step with min A and RHHA; 2 trials of 8  Therapeutic Activity  Therapeutic Activity Performed: Yes  Therapeutic Activity 1: sitting EOB x 8 min with distant supervision  Therapeutic Activity 2: static standing x 2 min with close supervision     Bed Mobility  Bed Mobility: Yes  Bed Mobility 1  Bed Mobility 1: Supine to sitting  Level of Assistance 1: Contact guard  Bed Mobility Comments 1: min vc for safety, sequenicng, log roll  Ambulation/Gait Training  Ambulation/Gait Training Performed: No  Ambulation/Gait Training 1  Comments/Distance (ft) 1: pt just returned from [facility] in hallway so did not perform  Transfers  Transfer: Yes  Transfer 1  Transfer From 1: Sit to, Stand to  Transfer to 1: Stand, Sit  Technique 1: Sit to stand, Stand to sit  Transfer Device 1:  (none)  Transfer Level of Assistance 1: Contact guard, Minimal verbal cues  Trials/Comments 1: 2 trials; min vc for safety, sequencing, and posture           Outcome Measures:  [facility] Basic Mobility  Turning from your back to your side while in a flat bed without using bedrails: None  Moving from lying on your back to sitting on the side of a flat bed without using bedrails: A little  Moving to and from bed to chair (including a wheelchair): A little  Standing up from a chair using your arms (e.g. wheelchair or bedside chair): A little  To walk in hospital room: A little  Climbing 3-5 steps with railing: A  lot  Basic Mobility - Total Score: 18        Education Documentation  Handouts, taught by Erica Eller PT at 2/19/2024  4:04 PM.  Learner: Family, Patient  Readiness: Acceptance  Method: Explanation, Demonstration  Response: Verbalizes Understanding, Needs Reinforcement    Precautions, taught by Erica Eller PT at 2/19/2024  4:04 PM.  Learner: Family, Patient  Readiness: Acceptance  Method: Explanation, Demonstration  Response: Verbalizes Understanding, Needs Reinforcement    Body Mechanics, taught by Erica Eller PT at 2/19/2024  4:04 PM.  Learner: Family, Patient  Readiness: Acceptance  Method: Explanation, Demonstration  Response: Verbalizes Understanding, Needs Reinforcement    Home Exercise Program, taught by Erica Eller PT at 2/19/2024  4:04 PM.  Learner: Family, Patient  Readiness: Acceptance  Method: Explanation, Demonstration  Response: Verbalizes Understanding, Needs Reinforcement    Mobility Training, taught by Erica Eller PT at 2/19/2024  4:04 PM.  Learner: Family, Patient  Readiness: Acceptance  Method: Explanation, Demonstration  Response: Verbalizes Understanding, Needs Reinforcement    Education Comments  No comments found.      Encounter Problems       Encounter Problems (Active)       Balance       STG - Maintains dynamic standing balance with upper extremity support on LRAD with SBA (Progressing)       Start:  02/12/24    Expected End:  02/20/24               Mobility       STG - Patient will ambulate x100 ft with LRAD and SBA (Progressing)       Start:  02/12/24    Expected End:  02/20/24            Pt will perform 5x STS in </= 12 sec (Progressing)       Start:  02/12/24    Expected End:  02/20/24               Pain - Adult          Transfers       STG - Patient will perform bed mobility with SBA (Progressing)       Start:  02/12/24    Expected End:  02/20/24            STG - Patient will transfer sit to and from stand with SBA using LRAD (Met)       Start:  02/12/24    Expected End:   03/04/24    Resolved:  02/14/24 02/19/24 at 4:05 PM   SAMI STALLINGS, PT   Rehab Office: [phone]

## 2024-02-19 NOTE — CARE PLAN
The patient's goals for the shift include Unable to state patient intubated    The clinical goals for the shift include pain control throughout shift

## 2024-02-19 NOTE — CARE PLAN
The patient's goals for the shift include pain control    The clinical goals for the shift include pain control

## 2024-02-20 ENCOUNTER — APPOINTMENT (OUTPATIENT)
Dept: ENDOCRINOLOGY | Facility: CLINIC | Age: 54
End: 2024-02-20
Payer: COMMERCIAL

## 2024-02-20 LAB
ALBUMIN SERPL BCP-MCNC: 3 G/DL (ref 3.4–5)
ALP SERPL-CCNC: 160 U/L (ref 33–120)
ALT SERPL W P-5'-P-CCNC: 64 U/L (ref 10–52)
ANION GAP SERPL CALC-SCNC: 14 MMOL/L (ref 10–20)
AST SERPL W P-5'-P-CCNC: 36 U/L (ref 9–39)
BILIRUB SERPL-MCNC: 0.5 MG/DL (ref 0–1.2)
BUN SERPL-MCNC: 26 MG/DL (ref 6–23)
CALCIUM SERPL-MCNC: 8.4 MG/DL (ref 8.6–10.6)
CHLORIDE SERPL-SCNC: 100 MMOL/L (ref 98–107)
CO2 SERPL-SCNC: 25 MMOL/L (ref 21–32)
CREAT SERPL-MCNC: 0.85 MG/DL (ref 0.5–1.3)
EGFRCR SERPLBLD CKD-EPI 2021: >90 ML/MIN/1.73M*2
ERYTHROCYTE [DISTWIDTH] IN BLOOD BY AUTOMATED COUNT: 14.6 % (ref 11.5–14.5)
GLUCOSE BLD MANUAL STRIP-MCNC: 103 MG/DL (ref 74–99)
GLUCOSE BLD MANUAL STRIP-MCNC: 115 MG/DL (ref 74–99)
GLUCOSE BLD MANUAL STRIP-MCNC: 120 MG/DL (ref 74–99)
GLUCOSE BLD MANUAL STRIP-MCNC: 125 MG/DL (ref 74–99)
GLUCOSE BLD MANUAL STRIP-MCNC: 125 MG/DL (ref 74–99)
GLUCOSE BLD MANUAL STRIP-MCNC: 129 MG/DL (ref 74–99)
GLUCOSE SERPL-MCNC: 122 MG/DL (ref 74–99)
HCT VFR BLD AUTO: 30.3 % (ref 41–52)
HGB BLD-MCNC: 9.9 G/DL (ref 13.5–17.5)
MAGNESIUM SERPL-MCNC: 2.23 MG/DL (ref 1.6–2.4)
MCH RBC QN AUTO: 27.2 PG (ref 26–34)
MCHC RBC AUTO-ENTMCNC: 32.7 G/DL (ref 32–36)
MCV RBC AUTO: 83 FL (ref 80–100)
NRBC BLD-RTO: 0 /100 WBCS (ref 0–0)
PHOSPHATE SERPL-MCNC: 3.1 MG/DL (ref 2.5–4.9)
PLATELET # BLD AUTO: 806 X10*3/UL (ref 150–450)
POTASSIUM SERPL-SCNC: 4.9 MMOL/L (ref 3.5–5.3)
PROT SERPL-MCNC: 6.8 G/DL (ref 6.4–8.2)
RBC # BLD AUTO: 3.64 X10*6/UL (ref 4.5–5.9)
SODIUM SERPL-SCNC: 134 MMOL/L (ref 136–145)
WBC # BLD AUTO: 12.7 X10*3/UL (ref 4.4–11.3)

## 2024-02-20 PROCEDURE — 85027 COMPLETE CBC AUTOMATED: CPT

## 2024-02-20 PROCEDURE — 84100 ASSAY OF PHOSPHORUS: CPT

## 2024-02-20 PROCEDURE — 2500000001 HC RX 250 WO HCPCS SELF ADMINISTERED DRUGS (ALT 637 FOR MEDICARE OP): Performed by: PHYSICIAN ASSISTANT

## 2024-02-20 PROCEDURE — 2500000004 HC RX 250 GENERAL PHARMACY W/ HCPCS (ALT 636 FOR OP/ED)

## 2024-02-20 PROCEDURE — 83735 ASSAY OF MAGNESIUM: CPT

## 2024-02-20 PROCEDURE — C9113 INJ PANTOPRAZOLE SODIUM, VIA: HCPCS

## 2024-02-20 PROCEDURE — 82947 ASSAY GLUCOSE BLOOD QUANT: CPT

## 2024-02-20 PROCEDURE — 2500000001 HC RX 250 WO HCPCS SELF ADMINISTERED DRUGS (ALT 637 FOR MEDICARE OP): Performed by: PODIATRIST

## 2024-02-20 PROCEDURE — 36415 COLL VENOUS BLD VENIPUNCTURE: CPT

## 2024-02-20 PROCEDURE — 2500000004 HC RX 250 GENERAL PHARMACY W/ HCPCS (ALT 636 FOR OP/ED): Performed by: PODIATRIST

## 2024-02-20 PROCEDURE — 97116 GAIT TRAINING THERAPY: CPT | Mod: GP

## 2024-02-20 PROCEDURE — 84075 ASSAY ALKALINE PHOSPHATASE: CPT

## 2024-02-20 PROCEDURE — 1100000001 HC PRIVATE ROOM DAILY

## 2024-02-20 RX ORDER — ACETAMINOPHEN 500 MG
5 TABLET ORAL NIGHTLY PRN
Status: DISCONTINUED | OUTPATIENT
Start: 2024-02-20 | End: 2024-02-26 | Stop reason: HOSPADM

## 2024-02-20 RX ADMIN — METHOCARBAMOL 1000 MG: 100 INJECTION, SOLUTION INTRAMUSCULAR; INTRAVENOUS at 00:51

## 2024-02-20 RX ADMIN — PIPERACILLIN SODIUM AND TAZOBACTAM SODIUM 3.38 G: 3; .375 INJECTION, SOLUTION INTRAVENOUS at 04:50

## 2024-02-20 RX ADMIN — METHOCARBAMOL 1000 MG: 100 INJECTION, SOLUTION INTRAMUSCULAR; INTRAVENOUS at 08:15

## 2024-02-20 RX ADMIN — ENOXAPARIN SODIUM 40 MG: 100 INJECTION SUBCUTANEOUS at 20:57

## 2024-02-20 RX ADMIN — OXYCODONE HYDROCHLORIDE 2.5 MG: 5 SOLUTION ORAL at 20:55

## 2024-02-20 RX ADMIN — OXYCODONE HYDROCHLORIDE 2.5 MG: 5 SOLUTION ORAL at 07:01

## 2024-02-20 RX ADMIN — OXYCODONE HYDROCHLORIDE 5 MG: 5 SOLUTION ORAL at 15:40

## 2024-02-20 RX ADMIN — PIPERACILLIN SODIUM AND TAZOBACTAM SODIUM 3.38 G: 3; .375 INJECTION, SOLUTION INTRAVENOUS at 10:42

## 2024-02-20 RX ADMIN — PANTOPRAZOLE SODIUM 40 MG: 40 INJECTION, POWDER, FOR SOLUTION INTRAVENOUS at 20:56

## 2024-02-20 RX ADMIN — PANTOPRAZOLE SODIUM 40 MG: 40 INJECTION, POWDER, FOR SOLUTION INTRAVENOUS at 08:14

## 2024-02-20 RX ADMIN — OXYCODONE HYDROCHLORIDE 2.5 MG: 5 SOLUTION ORAL at 03:03

## 2024-02-20 RX ADMIN — Medication 5 MG: at 18:06

## 2024-02-20 ASSESSMENT — COGNITIVE AND FUNCTIONAL STATUS - GENERAL
WALKING IN HOSPITAL ROOM: A LITTLE
DAILY ACTIVITIY SCORE: 18
MOBILITY SCORE: 18
TURNING FROM BACK TO SIDE WHILE IN FLAT BAD: A LITTLE
MOVING TO AND FROM BED TO CHAIR: A LITTLE
PERSONAL GROOMING: A LITTLE
CLIMB 3 TO 5 STEPS WITH RAILING: A LITTLE
MOVING FROM LYING ON BACK TO SITTING ON SIDE OF FLAT BED WITH BEDRAILS: A LITTLE
TURNING FROM BACK TO SIDE WHILE IN FLAT BAD: A LITTLE
MOVING FROM LYING ON BACK TO SITTING ON SIDE OF FLAT BED WITH BEDRAILS: A LITTLE
EATING MEALS: A LITTLE
WALKING IN HOSPITAL ROOM: A LITTLE
MOVING TO AND FROM BED TO CHAIR: A LITTLE
STANDING UP FROM CHAIR USING ARMS: A LITTLE
MOBILITY SCORE: 18
STANDING UP FROM CHAIR USING ARMS: A LITTLE
DRESSING REGULAR LOWER BODY CLOTHING: A LITTLE
TOILETING: A LITTLE
HELP NEEDED FOR BATHING: A LITTLE
CLIMB 3 TO 5 STEPS WITH RAILING: A LITTLE
DRESSING REGULAR UPPER BODY CLOTHING: A LITTLE

## 2024-02-20 ASSESSMENT — PAIN SCALES - GENERAL
PAINLEVEL_OUTOF10: 1
PAINLEVEL_OUTOF10: 5 - MODERATE PAIN

## 2024-02-20 NOTE — PROGRESS NOTES
[facility]  ACUTE CARE SURGERY - PROGRESS NOTE    Patient Name: Trevor Yarbrough  MRN: [id number]  Admit Date: 206  : 1970  AGE: 53 y.o.   GENDER: male  ==============================================================================  TODAY'S ASSESSMENT AND PLAN OF CARE:    Pt is a 52yo M with hx of presumed insulinoma who started octreotide and then felt abdominal pain a few hours later, found to have pneumoperitoneum with peritonitis.   During patients hospital course he required the following operations as listed below:    24: exlap, subtotal gastrectomy  : washout  : antrectomy, Billroth 2 anatomy; fascia closed, skin open with wound vac placed     CT scan was repeated with c/f possible leak. NG was removed and a repeat blue gatorade test was done resulting in no gatorade seen in the drains.     Plan:     Neuro:   - PRN Oxycodone for Pain with IV Dilaudid for breakthrough   - Scheduled Robaxin      Card:  - vital signs [id number]      Resp:   - Encourage IS  - OOB ambulate  - PT/OT     GI:   - TF to goal of 55 ml/hr   - No crushed meds through dobhoff- only liquids   - IV PPI BID  - Continue daily wet to dry dressing over abdominal incision   - Monitor drains output; inferior drain output more bilious in appearance       -> Amylase sent : drain <10, Serum Amylase 28.      :  - Strict I&Os, monitor drains output  - Replete lytes as indicate     ENDO:  - hx of insulinoma  - Follow up Endocrine outpatient      Heme: acute postop anemia  - Given 1 unit of PRBC   - Trend H/H daily      ID:  - completed fluc/zosyn x 4 days for intra-abdominal infection  CT A/P obtained  due to low grade temp, abdominal pain and increasing leukocytosis- no definite leak, adjacent fluid collections around pancreas, thickening of anastomosis   - blood cultures - NGTD x3 day   - Restarted on Zosyn   - Continue to trend WBC      DVT Proph:   - SCDs, Lovenox      Dispo:  continue RNF, plan for home with [facility] once medically ready for d/c    Patient discussed with Attending Dr. John Cornell MD  General Surgery  Acute Care Surgery Service   Pager: [phone]       ==============================================================================  CHIEF COMPLAINT / EVENTS LAST 24HRS / HPI:  No acute events overnight. Pain controlled.      MEDICAL HISTORY / ROS:   Admission history reviewed.     PHYSICAL EXAM:  Vital Signs past 24h:  Heart Rate:  []   Temp:  [36.3 °C (97.3 °F)-36.6 °C (97.9 °F)]   Resp:  [16-18]   BP: (108-127)/(66-74)   SpO2:  [95 %-97 %]     I/O past 24h:  I/O last 2 completed shifts:  In: 905 (12.3 mL/kg) [P.O.:240; NG/GT:665]  Out: 1765 (24.1 mL/kg) [Urine:1575 (0.9 mL/kg/hr); Drains:190]  Weight: 73.3 kg      Physical Exam  Constitutional- lying in bed, No acute distress.   Cards- Regular rate  Resp- nonlabored breathing on RA  Abdomen- soft, nondistended; Midline incision with mostly granular wound base; small amount mucopurulent drainage noted superior part of incision; Superior drain serosang, inferior drain with dark gastric output, decreasing in output; Dobbhoff in place with TF running  Extremities- Moves all extremities spontaneously.   Skin- warm, dry   Neuro- A&Ox3, appropriate mood and behavior     Labs past 24h:  Results for orders placed or performed during the hospital encounter of 02/06/24 (from the past 24 hour(s))   POCT GLUCOSE   Result Value Ref Range    POCT Glucose 103 (H) 74 - 99 mg/dL   POCT GLUCOSE   Result Value Ref Range    POCT Glucose 124 (H) 74 - 99 mg/dL   POCT GLUCOSE   Result Value Ref Range    POCT Glucose 127 (H) 74 - 99 mg/dL   POCT GLUCOSE   Result Value Ref Range    POCT Glucose 120 (H) 74 - 99 mg/dL   POCT GLUCOSE   Result Value Ref Range    POCT Glucose 129 (H) 74 - 99 mg/dL   Comprehensive metabolic panel   Result Value Ref Range    Glucose 122 (H) 74 - 99 mg/dL    Sodium 134 (L) 136 - 145 mmol/L    Potassium 4.9 3.5 -  5.3 mmol/L    Chloride 100 98 - 107 mmol/L    Bicarbonate 25 21 - 32 mmol/L    Anion Gap 14 10 - 20 mmol/L    Urea Nitrogen 26 (H) 6 - 23 mg/dL    Creatinine 0.85 0.50 - 1.30 mg/dL    eGFR >90 >60 mL/min/1.73m*2    Calcium 8.4 (L) 8.6 - 10.6 mg/dL    Albumin 3.0 (L) 3.4 - 5.0 g/dL    Alkaline Phosphatase 160 (H) 33 - 120 U/L    Total Protein 6.8 6.4 - 8.2 g/dL    AST 36 9 - 39 U/L    Bilirubin, Total 0.5 0.0 - 1.2 mg/dL    ALT 64 (H) 10 - 52 U/L   CBC   Result Value Ref Range    WBC 12.7 (H) 4.4 - 11.3 x10*3/uL    nRBC 0.0 0.0 - 0.0 /100 WBCs    RBC 3.64 (L) 4.50 - 5.90 x10*6/uL    Hemoglobin 9.9 (L) 13.5 - 17.5 g/dL    Hematocrit 30.3 (L) 41.0 - 52.0 %    MCV 83 80 - 100 fL    MCH 27.2 26.0 - 34.0 pg    MCHC 32.7 32.0 - 36.0 g/dL    RDW 14.6 (H) 11.5 - 14.5 %    Platelets 806 (H) 150 - 450 x10*3/uL   Magnesium   Result Value Ref Range    Magnesium 2.23 1.60 - 2.40 mg/dL   Phosphorus   Result Value Ref Range    Phosphorus 3.1 2.5 - 4.9 mg/dL   POCT GLUCOSE   Result Value Ref Range    POCT Glucose 115 (H) 74 - 99 mg/dL

## 2024-02-20 NOTE — PROGRESS NOTES
[facility]  ACUTE CARE SURGERY - PROGRESS NOTE    Patient Name: Trevor Yarbrough  MRN: [id number]  Admit Date: 206  : 1970  AGE: 53 y.o.   GENDER: male  ==============================================================================  TODAY'S ASSESSMENT AND PLAN OF CARE:    Pt is a 52yo M with hx of presumed insulinoma who started octreotide and then felt abdominal pain a few hours later, found to have pneumoperitoneum with peritonitis.   During patients hospital course he required the following operations as listed below,   24: exlap, subtotal gastrectomy  : washout  : antrectomy, Billroth 2 anatomy; fascia closed, skin open with wound vac placed     CT : c/f leak near GJ anastomosis based on radiology read however Gatorade (blue dye) test showing no leak. Patient clinically stable with no leukocytosis. Will try sips today.     Plan:     Neuro:   - PRN Oxycodone for Pain with IV Dilaudid for breakthrough   - Scheduled Robaxin      Card:  - Continuous tele monitoring  - vital signs [id number]      Resp:   - Encourage IS  - OOB ambulate, encouraged patient to sit in chair and ambulate in room  - PT/OT     GI:   - Continue TF to goal of 55 ml/hr   - No crushed meds through dobhoff- only liquids   - Start on sips of clears today  - IV PPI BID  - Continue daily wet to dry dressing over abdominal incision   - NGT now out   - Monitor drains output; inferior drain output more gastric in appearance       -> Amylase sent : drain <10, Serum Amylase 28.      :  - Strict I&Os, monitor drains output  - mIVFs   - Replete lytes as indicate     ENDO:  - hx of insulinoma  -Follow up regarding Endocrine consult. Appreciate recs.     - IVFs discontinued while on TF    - no further hypoglycemia episodes    - Follow up with Endocrine as an outpatient      Heme: acute postop anemia  - Given 1 unit of PRBC   - Trend H/H daily   - Hgb: 9.9     ID:  - completed fluc/zosyn x 4 days  for intra-abdominal infection  CT A/P obtained 2/13 due to low grade temp, abdominal pain and increasing leukocytosis- no definite leak, adjacent fluid collections around pancreas, thickening of anastomosis   Repeat CT A/P 2/19- c/f GJ leak from radiology but based on patients clinical signs and symptoms no concern for leak at this time. Will continue to monitor clinically.   - follow up blood cultures 2/13- NGTD x4 days   - Restarted on Zosyn 2/13, completed course on 2/19   - Continue to trend WBC- remains stable at 12      DVT Proph:   - SCDs, Lovenox      Dispo: continue RNF, plan for home with [facility] once medically ready for d/c    Patient discussed with Attending Dr. Plac[facility]   Acute Care Surgery Service   Pager: [phone]       ==============================================================================  CHIEF COMPLAINT / EVENTS LAST 24HRS / HPI:  No acute events overnight. Patient seen and evaluated this AM during team rounds. Tolerating tube feeds, pain controlled.     MEDICAL HISTORY / ROS:   Admission history reviewed. Pertinent changes as follows:  None.      PHYSICAL EXAM:  Vital Signs past 24h:  Heart Rate:  []   Temp:  [36.3 °C (97.3 °F)-36.6 °C (97.9 °F)]   Resp:  [16-18]   BP: (108-127)/(66-74)   SpO2:  [95 %-97 %]     I/O past 24h:  I/O last 2 completed shifts:  In: 905 (12.3 mL/kg) [P.O.:240; NG/GT:665]  Out: 1765 (24.1 mL/kg) [Urine:1575 (0.9 mL/kg/hr); Drains:190]  Weight: 73.3 kg      Physical Exam  Constitutional- lying in bed, No acute distress.   Cards- non cyanotic   Resp- nonlabored breathing on RA  Abdomen- soft, mildly distended; Midline incision with mostly granular wound base; small amount mucopurulent drainage noted superior part of incision with concern for fascial dehiscence- packed with wet to moist kerlix; Superior drain serosang, inferior drain with dark gastric output, decreasing in output;  Dobbhoff in place with TF running  Extremities- Moves all  extremities spontaneously.   Skin- warm, dry   Neuro- A&Ox3, appropriate mood and behavior     Labs past 24h:  Results for orders placed or performed during the hospital encounter of 02/06/24 (from the past 24 hour(s))   POCT GLUCOSE   Result Value Ref Range    POCT Glucose 103 (H) 74 - 99 mg/dL   POCT GLUCOSE   Result Value Ref Range    POCT Glucose 124 (H) 74 - 99 mg/dL   POCT GLUCOSE   Result Value Ref Range    POCT Glucose 127 (H) 74 - 99 mg/dL   POCT GLUCOSE   Result Value Ref Range    POCT Glucose 120 (H) 74 - 99 mg/dL   POCT GLUCOSE   Result Value Ref Range    POCT Glucose 129 (H) 74 - 99 mg/dL   Comprehensive metabolic panel   Result Value Ref Range    Glucose 122 (H) 74 - 99 mg/dL    Sodium 134 (L) 136 - 145 mmol/L    Potassium 4.9 3.5 - 5.3 mmol/L    Chloride 100 98 - 107 mmol/L    Bicarbonate 25 21 - 32 mmol/L    Anion Gap 14 10 - 20 mmol/L    Urea Nitrogen 26 (H) 6 - 23 mg/dL    Creatinine 0.85 0.50 - 1.30 mg/dL    eGFR >90 >60 mL/min/1.73m*2    Calcium 8.4 (L) 8.6 - 10.6 mg/dL    Albumin 3.0 (L) 3.4 - 5.0 g/dL    Alkaline Phosphatase 160 (H) 33 - 120 U/L    Total Protein 6.8 6.4 - 8.2 g/dL    AST 36 9 - 39 U/L    Bilirubin, Total 0.5 0.0 - 1.2 mg/dL    ALT 64 (H) 10 - 52 U/L   CBC   Result Value Ref Range    WBC 12.7 (H) 4.4 - 11.3 x10*3/uL    nRBC 0.0 0.0 - 0.0 /100 WBCs    RBC 3.64 (L) 4.50 - 5.90 x10*6/uL    Hemoglobin 9.9 (L) 13.5 - 17.5 g/dL    Hematocrit 30.3 (L) 41.0 - 52.0 %    MCV 83 80 - 100 fL    MCH 27.2 26.0 - 34.0 pg    MCHC 32.7 32.0 - 36.0 g/dL    RDW 14.6 (H) 11.5 - 14.5 %    Platelets 806 (H) 150 - 450 x10*3/uL   Magnesium   Result Value Ref Range    Magnesium 2.23 1.60 - 2.40 mg/dL   Phosphorus   Result Value Ref Range    Phosphorus 3.1 2.5 - 4.9 mg/dL   POCT GLUCOSE   Result Value Ref Range    POCT Glucose 115 (H) 74 - 99 mg/dL

## 2024-02-20 NOTE — PROGRESS NOTES
Physical Therapy    Physical Therapy Treatment    Patient Name: rTevor Yarbrough  MRN: [id number]  Today's Date: 2/20/2024  Time Calculation  Start Time: 1214  Stop Time: 1240  Time Calculation (min): 26 min       Assessment/Plan   PT Assessment  Evaluation/Treatment Tolerance: Patient tolerated treatment well, Patient limited by fatigue  End of Session Communication: Bedside nurse  Assessment Comment: Pt. continues to progress accordingly though is limited by fatigue. Pt. remains an excellent candidate for HIGH intensity PT upon d/c to maximize return to PLOF and regain independence.  End of Session Patient Position: Bed, 3 rail up, Alarm off, not on at start of session (Family at bedside, call light in reach)  PT Plan  Inpatient/Swing Bed or Outpatient: Inpatient  PT Plan  Treatment/Interventions: Bed mobility, Transfer training, Gait training, Stair training, Neuromuscular re-education, Balance training, Strengthening, Range of motion, Endurance training, Therapeutic exercise, Therapeutic activity, Home exercise program, Postural re-education  PT Plan: Skilled PT  PT Frequency: 4 times per week  PT Discharge Recommendations: High intensity level of continued care (If pt./family refuse HIGH intensity PT, pt. would benefit from LOW intensity PT upon d/c.)  Equipment Recommended upon Discharge: Wheeled walker  PT Recommended Transfer Status: Assist x1, Assistive device  PT - OK to Discharge: Yes      General Visit Information:   PT  Visit  PT Received On: 02/20/24  Response to Previous Treatment: Patient with no complaints from previous session.  General  Reason for Referral: 54 y/o M presenting with Pneumoperitoneum with peritonitis s/p 2/6/24: exlap, subtotal gastrectomy  2/7: washout  2/9: antrectomy, Billroth 2 anatomy; fascia closed, skin open with wound vac placed  Past Medical History Relevant to Rehab: insulinoma and prior fundoplication for hiatal hernia  Family/Caregiver Present: Yes  Caregiver Feedback:  Spouse and father present during session, supportive and active in session.  Prior to Session Communication: Bedside nurse  Patient Position Received: Bed, 3 rail up, Alarm off, not on at start of session  Preferred Learning Style: verbal, visual  General Comment: Pt. received supine in bed, reports ambulated in hallways earlier this date though agreeable to participate in session. Pt. and spouse eager to attempt stairs.    Subjective   Precautions:  Precautions  Medical Precautions: Fall precautions, Abdominal precautions  Post-Surgical Precautions: Abdominal surgery precautions  Precautions Comment: Pt. able to recall log roll technique without cues.    Objective   Pain:  Pain Assessment  Pain Score: 1  Pain Type: Acute pain  Pain Location: Abdomen    Cognition:  Cognition  Overall Cognitive Status: Within Functional Limits  Orientation Level: Oriented X4    Postural Control:  Static Sitting Balance  Static Sitting-Comment/Number of Minutes: SBA  Dynamic Sitting Balance  Dynamic Sitting-Comments: SBA  Static Standing Balance  Static Standing-Comment/Number of Minutes: CGA with FWW, minAx1 without AD  Dynamic Standing Balance  Dynamic Standing-Comments: CGA with FWW, minAx1 without AD    Activity Tolerance:  Activity Tolerance  Endurance: Tolerates 10 - 20 min exercise with multiple rests    Treatments:  Therapeutic Activity  Therapeutic Activity Performed: Yes  Therapeutic Activity 1: Seated rest break provided between ambulation trials and prior to stair negotiation, ~5 min in duration. Emphasis on upright posture and deep breathing.    Bed Mobility  Bed Mobility: Yes  Bed Mobility 1  Bed Mobility 1: Supine to sitting, Sitting to supine  Level of Assistance 1: Close supervision  Bed Mobility Comments 1: HOB elevated slightly at 15 deg. Pt. able to recall log roll technique without cues.    Ambulation/Gait Training  Ambulation/Gait Training Performed: Yes  Ambulation/Gait Training 1  Surface 1: Level tile  Device  1:  (HHA on L as pt. wanting to attempt without AD)  Assistance 1:  (varied CGAx1-minAx1 2/2 unsteadiness)  Quality of Gait 1: Narrow base of support, Decreased step length (decreased ankita)  Comments/Distance (ft) 1: 80 ft. Cues to widen ABBEY. Pt. demo mild unsteadiness. Followed by seated rest break.    Ambulation/Gait Training 2  Surface 2: Level tile  Device 2:  (HHA on L)  Assistance 2: Contact guard  Quality of Gait 2: Narrow base of support, Decreased step length (decreased ankita)  Comments/Distance (ft) 2: 10 ft    Ambulation/Gait Training 3  Surface 3: Level tile  Device 3: Rolling walker  Assistance 3: Contact guard, Minimal verbal cues  Quality of Gait 3: Narrow base of support, Decreased step length (decreased ankita)  Comments/Distance (ft) 3: 90 ft. Cues for AD management and sequencing    Transfers  Transfer: Yes  Transfer 1  Transfer From 1: Sit to  Transfer to 1: Stand  Technique 1: Sit to stand  Transfer Device 1:  (HHA)  Transfer Level of Assistance 1: Contact guard, Minimal verbal cues  Trials/Comments 1: x2 trials.    Transfers 2  Transfer From 2: Stand to  Transfer to 2: Sit  Technique 2: Stand to sit  Transfer Device 2:  (HHA initial trial, FWW second trial)  Transfer Level of Assistance 2: Contact guard, Minimal verbal cues  Trials/Comments 2: x2 trials. Cues to reach posteriorly to maximize safety with descent.    Stairs  Stairs: Yes  Stairs  Rails 1: Right  Device 1: Railing (+HHA on L)  Assistance 1: Minimum assistance, Minimal verbal cues  Comment/Number of Steps 1: ascend/descent 9 stairs with non-reciprocal pattern    Outcome Measures:  [facility] Basic Mobility  Turning from your back to your side while in a flat bed without using bedrails: A little  Moving from lying on your back to sitting on the side of a flat bed without using bedrails: A little  Moving to and from bed to chair (including a wheelchair): A little  Standing up from a chair using your arms (e.g. wheelchair or bedside  chair): A little  To walk in hospital room: A little  Climbing 3-5 steps with railing: A little  Basic Mobility - Total Score: 18    Education Documentation  Precautions, taught by Chanell Enriquez, PT at 2/20/2024 12:54 PM.  Learner: Significant Other, Patient  Readiness: Acceptance  Method: Explanation, Demonstration  Response: Verbalizes Understanding, Demonstrated Understanding    Body Mechanics, taught by Chanell Enriquez, PT at 2/20/2024 12:54 PM.  Learner: Significant Other, Patient  Readiness: Acceptance  Method: Explanation, Demonstration  Response: Verbalizes Understanding, Demonstrated Understanding    Mobility Training, taught by Chanell Enriquez, PT at 2/20/2024 12:54 PM.  Learner: Significant Other, Patient  Readiness: Acceptance  Method: Explanation, Demonstration  Response: Verbalizes Understanding, Demonstrated Understanding    Education Comments  No comments found.        OP EDUCATION:       Encounter Problems       Encounter Problems (Active)       Balance       STG - Maintains dynamic standing balance with upper extremity support on LRAD with SBA (Progressing)       Start:  02/12/24    Expected End:  03/05/24               Mobility       STG - Patient will ambulate x100 ft with LRAD and SBA (Not met)       Start:  02/12/24    Expected End:  03/05/24    Resolved:  02/20/24    Updated to: STG - Patient will ambulate >/= 150 ft [place] with LRAD    Update reason: To reflect current level of function         Pt will perform 5x STS in </= 12 sec (Progressing)       Start:  02/12/24    Expected End:  03/05/24            STG - Patient will ambulate >/= 150 ft [place] with LRAD (Progressing)       Start:  02/20/24    Expected End:  03/05/24                   Pain - Adult          Transfers       STG - Patient will perform bed mobility with SBA (Progressing)       Start:  02/12/24    Expected End:  03/05/24            STG - Patient will transfer sit to and from stand with SBA using LRAD (Not met)       Start:   02/12/24    Expected End:  03/05/24    Resolved:  02/20/24    Updated to: STG - Patient will transfer sit to and from stand [place] using LRAD    Update reason: To reflect current level of function         STG - Patient will transfer sit to and from stand [place] using LRAD (Progressing)       Start:  02/20/24    Expected End:  03/05/24

## 2024-02-21 LAB
ALBUMIN SERPL BCP-MCNC: 2.7 G/DL (ref 3.4–5)
ALP SERPL-CCNC: 151 U/L (ref 33–120)
ALT SERPL W P-5'-P-CCNC: 58 U/L (ref 10–52)
ANION GAP SERPL CALC-SCNC: 14 MMOL/L (ref 10–20)
AST SERPL W P-5'-P-CCNC: 34 U/L (ref 9–39)
BILIRUB SERPL-MCNC: 0.4 MG/DL (ref 0–1.2)
BUN SERPL-MCNC: 24 MG/DL (ref 6–23)
CALCIUM SERPL-MCNC: 8.5 MG/DL (ref 8.6–10.6)
CHLORIDE SERPL-SCNC: 100 MMOL/L (ref 98–107)
CO2 SERPL-SCNC: 24 MMOL/L (ref 21–32)
CREAT SERPL-MCNC: 0.73 MG/DL (ref 0.5–1.3)
EGFRCR SERPLBLD CKD-EPI 2021: >90 ML/MIN/1.73M*2
ERYTHROCYTE [DISTWIDTH] IN BLOOD BY AUTOMATED COUNT: 14.5 % (ref 11.5–14.5)
GLUCOSE BLD MANUAL STRIP-MCNC: 105 MG/DL (ref 74–99)
GLUCOSE BLD MANUAL STRIP-MCNC: 108 MG/DL (ref 74–99)
GLUCOSE BLD MANUAL STRIP-MCNC: 115 MG/DL (ref 74–99)
GLUCOSE BLD MANUAL STRIP-MCNC: 124 MG/DL (ref 74–99)
GLUCOSE BLD MANUAL STRIP-MCNC: 124 MG/DL (ref 74–99)
GLUCOSE BLD MANUAL STRIP-MCNC: 134 MG/DL (ref 74–99)
GLUCOSE BLD MANUAL STRIP-MCNC: 148 MG/DL (ref 74–99)
GLUCOSE SERPL-MCNC: 108 MG/DL (ref 74–99)
HCT VFR BLD AUTO: 28.7 % (ref 41–52)
HGB BLD-MCNC: 9.2 G/DL (ref 13.5–17.5)
MAGNESIUM SERPL-MCNC: 2.09 MG/DL (ref 1.6–2.4)
MCH RBC QN AUTO: 26.9 PG (ref 26–34)
MCHC RBC AUTO-ENTMCNC: 32.1 G/DL (ref 32–36)
MCV RBC AUTO: 84 FL (ref 80–100)
NRBC BLD-RTO: 0 /100 WBCS (ref 0–0)
PLATELET # BLD AUTO: 779 X10*3/UL (ref 150–450)
POTASSIUM SERPL-SCNC: 4.5 MMOL/L (ref 3.5–5.3)
PROT SERPL-MCNC: 6.6 G/DL (ref 6.4–8.2)
RBC # BLD AUTO: 3.42 X10*6/UL (ref 4.5–5.9)
SODIUM SERPL-SCNC: 133 MMOL/L (ref 136–145)
WBC # BLD AUTO: 12.6 X10*3/UL (ref 4.4–11.3)

## 2024-02-21 PROCEDURE — C9113 INJ PANTOPRAZOLE SODIUM, VIA: HCPCS

## 2024-02-21 PROCEDURE — 2500000001 HC RX 250 WO HCPCS SELF ADMINISTERED DRUGS (ALT 637 FOR MEDICARE OP): Performed by: PODIATRIST

## 2024-02-21 PROCEDURE — 36415 COLL VENOUS BLD VENIPUNCTURE: CPT | Performed by: PODIATRIST

## 2024-02-21 PROCEDURE — 1100000001 HC PRIVATE ROOM DAILY

## 2024-02-21 PROCEDURE — 99024 POSTOP FOLLOW-UP VISIT: CPT | Performed by: SURGERY

## 2024-02-21 PROCEDURE — 97116 GAIT TRAINING THERAPY: CPT | Mod: GP,CQ

## 2024-02-21 PROCEDURE — 82947 ASSAY GLUCOSE BLOOD QUANT: CPT

## 2024-02-21 PROCEDURE — 97535 SELF CARE MNGMENT TRAINING: CPT | Mod: GO

## 2024-02-21 PROCEDURE — 80053 COMPREHEN METABOLIC PANEL: CPT | Performed by: PODIATRIST

## 2024-02-21 PROCEDURE — 2500000004 HC RX 250 GENERAL PHARMACY W/ HCPCS (ALT 636 FOR OP/ED)

## 2024-02-21 PROCEDURE — 97530 THERAPEUTIC ACTIVITIES: CPT | Mod: GP,CQ

## 2024-02-21 PROCEDURE — 83735 ASSAY OF MAGNESIUM: CPT | Performed by: PODIATRIST

## 2024-02-21 PROCEDURE — 2500000001 HC RX 250 WO HCPCS SELF ADMINISTERED DRUGS (ALT 637 FOR MEDICARE OP): Performed by: PHYSICIAN ASSISTANT

## 2024-02-21 PROCEDURE — 97530 THERAPEUTIC ACTIVITIES: CPT | Mod: GO

## 2024-02-21 PROCEDURE — 85027 COMPLETE CBC AUTOMATED: CPT | Performed by: PODIATRIST

## 2024-02-21 RX ADMIN — OXYCODONE HYDROCHLORIDE 5 MG: 5 SOLUTION ORAL at 12:02

## 2024-02-21 RX ADMIN — PANTOPRAZOLE SODIUM 40 MG: 40 INJECTION, POWDER, FOR SOLUTION INTRAVENOUS at 08:41

## 2024-02-21 RX ADMIN — OXYCODONE HYDROCHLORIDE 5 MG: 5 SOLUTION ORAL at 04:55

## 2024-02-21 RX ADMIN — OXYCODONE HYDROCHLORIDE 5 MG: 5 SOLUTION ORAL at 19:17

## 2024-02-21 RX ADMIN — Medication 5 MG: at 19:17

## 2024-02-21 RX ADMIN — ENOXAPARIN SODIUM 40 MG: 100 INJECTION SUBCUTANEOUS at 20:24

## 2024-02-21 RX ADMIN — PANTOPRAZOLE SODIUM 40 MG: 40 INJECTION, POWDER, FOR SOLUTION INTRAVENOUS at 20:24

## 2024-02-21 ASSESSMENT — COGNITIVE AND FUNCTIONAL STATUS - GENERAL
MOVING TO AND FROM BED TO CHAIR: A LITTLE
TOILETING: A LITTLE
DAILY ACTIVITIY SCORE: 24
STANDING UP FROM CHAIR USING ARMS: A LITTLE
CLIMB 3 TO 5 STEPS WITH RAILING: A LITTLE
DRESSING REGULAR LOWER BODY CLOTHING: A LITTLE
WALKING IN HOSPITAL ROOM: A LITTLE
DRESSING REGULAR UPPER BODY CLOTHING: A LITTLE
MOBILITY SCORE: 20
STANDING UP FROM CHAIR USING ARMS: A LITTLE
DAILY ACTIVITIY SCORE: 20
CLIMB 3 TO 5 STEPS WITH RAILING: A LITTLE
MOBILITY SCORE: 21
HELP NEEDED FOR BATHING: A LITTLE
MOVING TO AND FROM BED TO CHAIR: A LITTLE

## 2024-02-21 ASSESSMENT — PAIN SCALES - GENERAL
PAINLEVEL_OUTOF10: 7
PAINLEVEL_OUTOF10: 6
PAINLEVEL_OUTOF10: 6
PAINLEVEL_OUTOF10: 1
PAINLEVEL_OUTOF10: 2

## 2024-02-21 ASSESSMENT — PAIN - FUNCTIONAL ASSESSMENT
PAIN_FUNCTIONAL_ASSESSMENT: 0-10

## 2024-02-21 ASSESSMENT — ACTIVITIES OF DAILY LIVING (ADL): HOME_MANAGEMENT_TIME_ENTRY: 15

## 2024-02-21 NOTE — PROGRESS NOTES
[facility]  ACUTE CARE SURGERY - PROGRESS NOTE    Patient Name: Trevor Yarbrough  MRN: [id number]  Admit Date: 206  : 1970  AGE: 53 y.o.   GENDER: male  ==============================================================================  TODAY'S ASSESSMENT AND PLAN OF CARE:  Mr. Yarbrough is a 54 yo M with hx of presumed insulinoma who started octreotide and then felt abdominal pain a few hours later, found to have pneumoperitoneum with peritonitis.   During patients hospital course he required the following operations as listed below:    24: exlap, subtotal gastrectomy  24: washout  24: antrectomy, Billroth 2 anatomy; fascia closed, skin open with wound vac placed     CT : c/f leak near GJ anastomosis based on radiology read however Gatorade (blue dye) test showing no leak. Patient clinically stable with no leukocytosis. Continue CLD today and OOB.      Plan:   Neuro:   - PRN Oxycodone for Pain with IV Dilaudid for breakthrough   - Scheduled Robaxin      Card:  - Continuous tele monitoring  - Vital signs [id number]      Resp:   - Encourage IS  - OOB ambulate, encouraged patient to sit in chair and ambulate in room  - PT/OT recommending High intensity rehab     GI:   - Continue TF to goal of 55 ml/hr   - No crushed meds through dobhoff- only liquids   - Continue clears today  - IV PPI BID  - Continue daily wet to dry dressing over abdominal incision (consisting of Xeroform over superior incision, then moistened kerlix, abd pad)  - Monitor drains output; inferior drain output more gastric in appearance       -> Amylase sent : drain <10, Serum Amylase 28.      :  - Strict I&Os, monitor drains output  - Replete lytes as indicate     ENDO: hx of insulinoma  -Follow up regarding Endocrine consult. Appreciate recs.     - no further hypoglycemia episodes    - Follow up with Endocrine as an outpatient      Heme: acute postop anemia  - Given 1 unit of PRBC   - Trend  H/H daily   - Hgb: 9.2     ID:  -  Completed fluc/zosyn x 4 days for intra-abdominal infection  CT A/P obtained 2/13 due to low grade temp, abdominal pain and increasing leukocytosis- no definite leak, adjacent fluid collections around pancreas, thickening of anastomosis   Repeat CT A/P 2/19- c/f GJ leak from radiology but based on patients clinical signs and symptoms no concern for leak at this time. Will continue to monitor clinically.   - Blood cultures 2/13- No growth final report   - Restarted on Zosyn 2/13, completed course on 2/19   - Continue to trend WBC- remains stable at 12.6     DVT Proph:   - SCDs, Lovenox      Dispo: continue RNF, plan for home with C once medically ready for d/c     Patient discussed with Attending Dr. Lopez    Plan preliminary until cosigned by attending physician.      Patricia Guzman MD   PGY1, Family Medicine   Acute Care Surgery Service   Pager: [phone]    ==============================================================================  CHIEF COMPLAINT / EVENTS LAST 24HRS / HPI:  Denies overnight events. He slept well. The melatonin helped. Denies any pain. He has been tolerating the clear liquid diet. Denies nausea, vomiting and bloating.     MEDICAL HISTORY / ROS:   Admission history and ROS reviewed.     PHYSICAL EXAM:    I/O Brief past 24h  Intake/Output Summary (Last 24 hours) at 2/21/2024 1125  Last data filed at 2/21/2024 0737  Gross per 24 hour   Intake 120 ml   Output 570 ml   Net -450 ml      VS for past 24h  Temp:  [36 °C (96.8 °F)-36.7 °C (98.1 °F)] 36.6 °C (97.9 °F)  Heart Rate:  [77-92] 83  Resp:  [16-19] 18  BP: (101-123)/(55-73) 101/58       Physical Exam  Constitutional- No acute distress, resting comfortably in bed  Cards- non cyanotic, regular rate  Resp- nonlabored breathing on RA  Abdomen- soft, mildly distended; Midline incision with mostly granular wound base; small amount mucopurulent drainage noted superior part of incision with concern for fascial  dehiscence- packed with Xeroform, then wet to moist kerlix; Superior drain serosang, inferior drain with dark gastric output, decreasing in output;  Dobbhoff in place with TF running  Extremities- Moving all extremities spontaneously  Skin- warm, dry   Neuro- A&Ox3, appropriate mood and behavior      LABS:  Results for orders placed or performed during the hospital encounter of 02/06/24 (from the past 24 hour(s))   POCT GLUCOSE   Result Value Ref Range    POCT Glucose 125 (H) 74 - 99 mg/dL   POCT GLUCOSE   Result Value Ref Range    POCT Glucose 103 (H) 74 - 99 mg/dL   POCT GLUCOSE   Result Value Ref Range    POCT Glucose 125 (H) 74 - 99 mg/dL   POCT GLUCOSE   Result Value Ref Range    POCT Glucose 124 (H) 74 - 99 mg/dL   POCT GLUCOSE   Result Value Ref Range    POCT Glucose 134 (H) 74 - 99 mg/dL   Comprehensive metabolic panel   Result Value Ref Range    Glucose 108 (H) 74 - 99 mg/dL    Sodium 133 (L) 136 - 145 mmol/L    Potassium 4.5 3.5 - 5.3 mmol/L    Chloride 100 98 - 107 mmol/L    Bicarbonate 24 21 - 32 mmol/L    Anion Gap 14 10 - 20 mmol/L    Urea Nitrogen 24 (H) 6 - 23 mg/dL    Creatinine 0.73 0.50 - 1.30 mg/dL    eGFR >90 >60 mL/min/1.73m*2    Calcium 8.5 (L) 8.6 - 10.6 mg/dL    Albumin 2.7 (L) 3.4 - 5.0 g/dL    Alkaline Phosphatase 151 (H) 33 - 120 U/L    Total Protein 6.6 6.4 - 8.2 g/dL    AST 34 9 - 39 U/L    Bilirubin, Total 0.4 0.0 - 1.2 mg/dL    ALT 58 (H) 10 - 52 U/L   CBC   Result Value Ref Range    WBC 12.6 (H) 4.4 - 11.3 x10*3/uL    nRBC 0.0 0.0 - 0.0 /100 WBCs    RBC 3.42 (L) 4.50 - 5.90 x10*6/uL    Hemoglobin 9.2 (L) 13.5 - 17.5 g/dL    Hematocrit 28.7 (L) 41.0 - 52.0 %    MCV 84 80 - 100 fL    MCH 26.9 26.0 - 34.0 pg    MCHC 32.1 32.0 - 36.0 g/dL    RDW 14.5 11.5 - 14.5 %    Platelets 779 (H) 150 - 450 x10*3/uL   Magnesium   Result Value Ref Range    Magnesium 2.09 1.60 - 2.40 mg/dL   POCT GLUCOSE   Result Value Ref Range    POCT Glucose 148 (H) 74 - 99 mg/dL     MEDICATIONS:  Current  Facility-Administered Medications   Medication Dose Route Frequency Provider Last Rate Last Admin    benzocaine-menthol (Cepastat Sore Throat) 15-3.6 mg lozenge 2 lozenge  2 lozenge Mouth/Throat q2h PRN Constanza Rod MD   2 lozenge at 02/16/24 0819    dextrose 50 % injection 25 g  25 g intravenous q15 min PRN Constanza Rod MD        enoxaparin (Lovenox) syringe 40 mg  40 mg subcutaneous q24h Constanza Rod MD   40 mg at 02/20/24 2057    glucagon (Glucagen) injection 1 mg  1 mg intramuscular q15 min PRN Constanza Rod MD        HYDROmorphone (Dilaudid) injection 0.4 mg  0.4 mg intravenous q4h PRN Constanza Rod MD   0.4 mg at 02/18/24 0615    HYDROmorphone (Dilaudid) injection 0.4 mg  0.4 mg intravenous Once Patricia Guzman MD        insulin lispro (HumaLOG) injection 0-5 Units  0-5 Units subcutaneous TID with meals Constanza Rod MD   1 Units at 02/07/24 1319    iohexol (OMNIPaque) 12 mg iodine/mL oral contrast 500 mL  500 mL oral Once in imaging Martin Reyes MD        melatonin tablet 5 mg  5 mg oral Nightly PRN Patricia Guzman MD   5 mg at 02/20/24 1806    methocarbamol (Robaxin) injection 1,000 mg  1,000 mg intravenous q8h Patricia Guzman MD   1,000 mg at 02/20/24 0815    oxyCODONE (Roxicodone) solution 2.5 mg  2.5 mg oral q4h PRN Allie Antunez PA-C   2.5 mg at 02/20/24 2055    oxyCODONE (Roxicodone) solution 5 mg  5 mg oral q4h PRN Allie Antunez PA-C   5 mg at 02/21/24 0455    oxygen (O2) therapy   inhalation Continuous PRN - O2/gases Constanza Rod MD        pantoprazole (ProtoNix) injection 40 mg  40 mg intravenous BID Constanza Rod MD   40 mg at 02/21/24 0841    phenoL (Chloraseptic) 1.4 % mouth/throat spray 1 spray  1 spray Mouth/Throat q2h PRN Constanza Rod MD        white petrolatum (Aquaphor) ointment   Topical q1h PRN Constanza Rod MD           IMAGING SUMMARY:  (summary of new imaging findings, not a copy of dictation)      I have reviewed all laboratory and imaging results ordered/pertinent for today's  encounter.

## 2024-02-21 NOTE — PROGRESS NOTES
Occupational Therapy    OT Treatment    Patient Name: Trevor Yarbrough  MRN: [id number]  Today's Date: 2/21/2024  Time Calculation  Start Time: 0942  Stop Time: 1005  Time Calculation (min): 23 min         Assessment:  Medical Staff Made Aware: Yes  End of Session Communication: Bedside nurse  End of Session Patient Position:  (in restroom with assist from wife)  Medical Staff Made Aware: Yes  Plan:  Treatment Interventions: ADL retraining, Visual perceptual retraining, Functional transfer training, UE strengthening/ROM, Endurance training, Patient/family training, Equipment evaluation/education, Cognitive reorientation, UE splinting, Neuromuscular reeducation, Fine motor coordination activities, Compensatory technique education  OT Frequency: 2 times per week  OT Discharge Recommendations: Low intensity level of continued care (progressing)  OT Recommended Transfer Status: Assist of 1  OT - OK to Discharge: Yes  Treatment Interventions: ADL retraining, Visual perceptual retraining, Functional transfer training, UE strengthening/ROM, Endurance training, Patient/family training, Equipment evaluation/education, Cognitive reorientation, UE splinting, Neuromuscular reeducation, Fine motor coordination activities, Compensatory technique education    Subjective   Previous Visit Info:  OT Last Visit  OT Received On: 02/21/24  General:  General  Family/Caregiver Present: Yes  Caregiver Feedback: spouse present, engaged  Prior to Session Communication: Bedside nurse  Patient Position Received: Up in chair, Alarm off, not on at start of session  General Comment: Pt agreeable to OT. Reports continued functional improvements. Session focused on ADL and mobility techniques, discussing home accessibility, sleeping posture/positioning. (NG and PIV intact)  Precautions:  Medical Precautions: Fall precautions, Abdominal precautions  Post-Surgical Precautions: Abdominal surgery precautions    Pain:  Pain Assessment  Pain Assessment:  "0-10  Pain Score:  (\"stretching\")  Pain Type: Surgical pain  Pain Location: Abdomen    Objective    Cognition:  Cognition  Overall Cognitive Status: Within Functional Limits  Orientation Level: Oriented X4  Following Commands: Follows all commands and directions without difficulty  Insight: Within function limits    Activities of Daily Living: Grooming  Grooming Level of Assistance: Distant supervision  Grooming Where Assessed: Standing sinkside  Grooming Comments: brushes teeth with distant sup for safety during standing activity    LE Bathing  LE Bathing Comments: Reviewed bathing techniques for task grading and energy conservation         LE Dressing  LE Dressing: Yes  Sock Level of Assistance: Moderate assistance  LE Dressing Where Assessed: Chair  LE Dressing Comments: pt able to demo RADHA figure-4 position from supported, seated level with good insight to carryover for all LB dressing tasks. assist then provided d/t being second trial    Toileting  Toileting Level of Assistance: Distant supervision  Where Assessed: Toilet  Toileting Comments: clothing management only, no hygiene required  Functional Standing Tolerance:     Bed Mobility/Transfers: Bed Mobility  Bed Mobility: No    Transfers  Transfer: Yes  Transfer 1  Transfer From 1: Chair with arms to, Toilet to  Transfer to 1: Stand  Technique 1: Sit to stand  Transfer Level of Assistance 1: Close supervision  Trials/Comments 1: two trials sit > stand from both chair and toilet demonstrating good body mechanics    Toilet Transfers  Toilet Transfer From:  (stand)  Toilet Transfer Type: To and from  Toilet Transfer to: Standard toilet  Toilet Transfer Technique: Ambulating  Toilet Transfers Comments: ambulatory toilet transfer with use of grab bar, no LOB    Ambulation/Gait Training:  Ambulation/Gait Training  Ambulation/Gait Training Performed: Yes  Ambulation/Gait Training 1  Surface 1: Level tile  Device 1: No device  Assistance 1: Close " supervision  Comments/Distance (ft) 1: short household distance mobility in room              RUE   RUE : Within Functional Limits and LUE   LUE: Within Functional Limits    Outcome Measures:[facility] Daily Activity  Putting on and taking off regular lower body clothing: A little  Bathing (including washing, rinsing, drying): A little  Putting on and taking off regular upper body clothing: A little  Toileting, which includes using toilet, bedpan or urinal: A little  Taking care of personal grooming such as brushing teeth: None  Eating Meals: None  Daily Activity - Total Score: 20        Education Documentation  Body Mechanics, taught by Irma Bryan OT at 2/21/2024 10:25 AM.  Learner: Significant Other, Patient  Readiness: Acceptance  Method: Explanation  Response: Verbalizes Understanding    Precautions, taught by Irma Bryan OT at 2/21/2024 10:25 AM.  Learner: Significant Other, Patient  Readiness: Acceptance  Method: Explanation  Response: Verbalizes Understanding    ADL Training, taught by Irma Bryan OT at 2/21/2024 10:25 AM.  Learner: Significant Other, Patient  Readiness: Acceptance  Method: Explanation  Response: Verbalizes Understanding    Education Comments  No comments found.        Goals:  Encounter Problems       Encounter Problems (Active)       ADLs       Patient with complete lower body dressing with independent level of assistance  (Progressing)       Start:  02/21/24    Expected End:  02/28/24            Patient will complete toileting including hygiene clothing management/hygiene with independent level of assistance . (Progressing)       Start:  02/21/24    Expected End:  02/28/24                 MOBILITY       Patient will perform Functional mobility Household distances/Community Distances with modified independent level of assistance and least restrictive device in order to improve safety and functional mobility. (Progressing)       Start:  02/21/24    Expected End:  02/28/24                  TRANSFERS       Patient will perform bed mobility modified independent level of assistance in order to improve safety and independence with mobility (Progressing)       Start:  02/21/24    Expected End:  02/28/24            Patient will complete functional transfer to toilet with least restrictive device with modified independent level of assistance. (Progressing)       Start:  02/21/24    Expected End:  02/28/24                    Encounter Problems (Resolved)       ADLs       Patient will complete toileting with supervision assist and min cues.   (Met)       Start:  02/14/24    Expected End:  02/28/24    Resolved:  02/21/24         Patient will complete UB dressing with supervision assist and min cues.   (Met)       Start:  02/14/24    Expected End:  02/28/24    Resolved:  02/21/24         Patient will complete LB dressing with MIN assist and min cues.   (Met)       Start:  02/14/24    Expected End:  02/28/24    Resolved:  02/21/24         Patient will complete grooming with supervision assist and min cues.   (Met)       Start:  02/14/24    Expected End:  02/28/24    Resolved:  02/21/24                MOBILITY       Patient will complete bed mobility with supervision and min cues.    (Met)       Start:  02/14/24    Expected End:  02/28/24    Resolved:  02/21/24         Pt. Will demo household distance functional mobility with supervision using LRAD.   (Met)       Start:  02/14/24    Expected End:  02/28/24    Resolved:  02/21/24            TRANSFERS       Pt. Will complete stand pivot transfer with supervision assist with min cues and using LRAD.   (Met)       Start:  02/14/24    Expected End:  02/28/24    Resolved:  02/21/24                DEZ MCALLISTER OT

## 2024-02-21 NOTE — PROGRESS NOTES
Physical Therapy    Physical Therapy Treatment    Patient Name: Trevor Yarbrough  MRN: [id number]  Today's Date: 2/21/2024  Time Calculation  Start Time: 1522  Stop Time: 1545  Time Calculation (min): 23 min       Assessment/Plan   PT Assessment  PT Assessment Results: Decreased strength, Decreased endurance, Impaired balance, Decreased mobility  Evaluation/Treatment Tolerance: Patient tolerated treatment well  Assessment Comment: Pt continues to progrss, pt able to tolerate ambulation without AD this date. Discussed progression with  PT, agrees pt is appropriate to progresse to low intensity therapy with spouse support.  End of Session Patient Position: Bed, 3 rail up, Alarm off, not on at start of session  PT Plan  Inpatient/Swing Bed or Outpatient: Inpatient  PT Plan  Treatment/Interventions: Bed mobility, Transfer training, Gait training, Stair training, Neuromuscular re-education, Balance training, Strengthening, Range of motion, Endurance training, Therapeutic exercise, Therapeutic activity, Home exercise program, Postural re-education  PT Plan: Skilled PT  PT Frequency: 4 times per week  PT Discharge Recommendations: High intensity level of continued care (If pt./family refuse HIGH intensity PT, pt. would benefit from LOW intensity PT upon d/c.)  Equipment Recommended upon Discharge: Wheeled walker  PT Recommended Transfer Status: Assist x1, Assistive device  PT - OK to Discharge: Yes      General Visit Information:   PT  Visit  PT Received On: 02/21/24  General  Prior to Session Communication: Bedside nurse  General Comment: Pt supine in bed upon arrival. Pt pleasant and agreeable to therapy. Pt stated he would prefer to dc home and his wife will be home to help him.    Subjective   Precautions:  Precautions  Medical Precautions: Fall precautions, Abdominal precautions  Post-Surgical Precautions: Abdominal surgery precautions  Precautions Comment: Pt. able to recall log roll technique without cues.  Vital  Signs:       Objective   Pain:     Cognition:       Activity Tolerance:  Activity Tolerance  Endurance: Tolerates 30 min exercise with multiple rests  Treatments:  Therapeutic Activity  Therapeutic Activity Performed: Yes  Therapeutic Activity 1: Pt completed 5xSTS in 30 seconds. Pt requires increased time due to discomfort in abdomen during transitional movements.    Bed Mobility  Bed Mobility: Yes  Bed Mobility 1  Bed Mobility 1: Supine to sitting  Level of Assistance 1: Modified independent  Bed Mobility Comments 1: use of side rails. Log roll.  Bed Mobility 2  Bed Mobility  2: Sitting to supine  Level of Assistance 2: Modified independent  Bed Mobility Comments 2: log roll.    Ambulation/Gait Training  Ambulation/Gait Training Performed: Yes  Ambulation/Gait Training 1  Surface 1: Level tile  Device 1: No device  Assistance 1: Close supervision  Quality of Gait 1: Narrow base of support  Comments/Distance (ft) 1: 200' x2 Pt cued to increased ABBEY for increased stability.  Transfers  Transfer: Yes  Transfer 1  Transfer From 1: Sit to  Transfer to 1: Stand  Technique 1: Sit to stand  Transfer Device 1:  (no AD)  Transfer Level of Assistance 1: Close supervision  Transfers 2  Transfer From 2: Stand to  Transfer to 2: Sit  Technique 2: Stand to sit  Transfer Device 2:  (no AD)    Stairs  Stairs: Yes  Stairs  Rails 1: Right  Device 1: Railing  Assistance 1: Close supervision  Comment/Number of Steps 1: 12 steps    Outcome Measures:  [facility] Basic Mobility  Turning from your back to your side while in a flat bed without using bedrails: None  Moving from lying on your back to sitting on the side of a flat bed without using bedrails: None  Moving to and from bed to chair (including a wheelchair): A little  Standing up from a chair using your arms (e.g. wheelchair or bedside chair): A little  To walk in hospital room: A little  Climbing 3-5 steps with railing: A little  Basic Mobility - Total Score: 20    Education  Documentation  Handouts, taught by Lauren Foy PTA at 2/21/2024  3:57 PM.  Learner: Patient  Readiness: Acceptance  Method: Explanation  Response: Verbalizes Understanding    Precautions, taught by Lauren Foy PTA at 2/21/2024  3:57 PM.  Learner: Patient  Readiness: Acceptance  Method: Explanation  Response: Verbalizes Understanding    Body Mechanics, taught by Lauren Foy PTA at 2/21/2024  3:57 PM.  Learner: Patient  Readiness: Acceptance  Method: Explanation  Response: Verbalizes Understanding    Home Exercise Program, taught by Lauren Foy PTA at 2/21/2024  3:57 PM.  Learner: Patient  Readiness: Acceptance  Method: Explanation  Response: Verbalizes Understanding    Mobility Training, taught by Lauren Foy PTA at 2/21/2024  3:57 PM.  Learner: Patient  Readiness: Acceptance  Method: Explanation  Response: Verbalizes Understanding    Education Comments  No comments found.        OP EDUCATION:       Encounter Problems       Encounter Problems (Active)       Balance       STG - Maintains dynamic standing balance with upper extremity support on LRAD with SBA (Progressing)       Start:  02/12/24    Expected End:  03/05/24               Mobility       STG - Patient will ambulate x100 ft with LRAD and SBA (Not met)       Start:  02/12/24    Expected End:  03/05/24    Resolved:  02/20/24    Updated to: STG - Patient will ambulate >/= 150 ft [place] with LRAD    Update reason: To reflect current level of function         Pt will perform 5x STS in </= 12 sec (Progressing)       Start:  02/12/24    Expected End:  03/05/24            STG - Patient will ambulate >/= 150 ft [place] with LRAD (Progressing)       Start:  02/20/24    Expected End:  03/05/24                   Pain - Adult          Safety       LTG - Patient will utilize safety techniques       Start:  02/21/24               Transfers       STG - Patient will perform bed mobility with SBA (Met)       Start:  02/12/24    Expected End:  03/05/24     Resolved:  02/21/24         STG - Patient will transfer sit to and from stand with SBA using LRAD (Not met)       Start:  02/12/24    Expected End:  03/05/24    Resolved:  02/20/24    Updated to: STG - Patient will transfer sit to and from stand [place] using LRAD    Update reason: To reflect current level of function         STG - Patient will transfer sit to and from stand [place] using LRAD (Progressing)       Start:  02/20/24    Expected End:  03/05/24

## 2024-02-21 NOTE — CARE PLAN
The patient's goals for the shift include patient will adhere to safety awareness.    The clinical goals for the shift include patient will remain HDS throughout this shift    Over the shift, the patient did make progress towards his goals and remained stable and safe throughout the shift.

## 2024-02-22 LAB
ALBUMIN SERPL BCP-MCNC: 2.8 G/DL (ref 3.4–5)
ALP SERPL-CCNC: 139 U/L (ref 33–120)
ALT SERPL W P-5'-P-CCNC: 66 U/L (ref 10–52)
ANION GAP SERPL CALC-SCNC: 12 MMOL/L (ref 10–20)
AST SERPL W P-5'-P-CCNC: 34 U/L (ref 9–39)
ATRIAL RATE: 66 BPM
BILIRUB SERPL-MCNC: 0.4 MG/DL (ref 0–1.2)
BUN SERPL-MCNC: 22 MG/DL (ref 6–23)
CALCIUM SERPL-MCNC: 8.4 MG/DL (ref 8.6–10.6)
CHLORIDE SERPL-SCNC: 98 MMOL/L (ref 98–107)
CO2 SERPL-SCNC: 27 MMOL/L (ref 21–32)
CREAT SERPL-MCNC: 0.68 MG/DL (ref 0.5–1.3)
EGFRCR SERPLBLD CKD-EPI 2021: >90 ML/MIN/1.73M*2
ERYTHROCYTE [DISTWIDTH] IN BLOOD BY AUTOMATED COUNT: 14.5 % (ref 11.5–14.5)
GLUCOSE BLD MANUAL STRIP-MCNC: 108 MG/DL (ref 74–99)
GLUCOSE BLD MANUAL STRIP-MCNC: 109 MG/DL (ref 74–99)
GLUCOSE BLD MANUAL STRIP-MCNC: 121 MG/DL (ref 74–99)
GLUCOSE BLD MANUAL STRIP-MCNC: 122 MG/DL (ref 74–99)
GLUCOSE BLD MANUAL STRIP-MCNC: 131 MG/DL (ref 74–99)
GLUCOSE SERPL-MCNC: 114 MG/DL (ref 74–99)
HCT VFR BLD AUTO: 28.4 % (ref 41–52)
HGB BLD-MCNC: 9.1 G/DL (ref 13.5–17.5)
MAGNESIUM SERPL-MCNC: 2.07 MG/DL (ref 1.6–2.4)
MCH RBC QN AUTO: 27.1 PG (ref 26–34)
MCHC RBC AUTO-ENTMCNC: 32 G/DL (ref 32–36)
MCV RBC AUTO: 85 FL (ref 80–100)
NRBC BLD-RTO: 0 /100 WBCS (ref 0–0)
P AXIS: 38 DEGREES
P OFFSET: 173 MS
P ONSET: 120 MS
PLATELET # BLD AUTO: 754 X10*3/UL (ref 150–450)
POTASSIUM SERPL-SCNC: 4.4 MMOL/L (ref 3.5–5.3)
PR INTERVAL: 190 MS
PROT SERPL-MCNC: 6.6 G/DL (ref 6.4–8.2)
Q ONSET: 215 MS
QRS COUNT: 11 BEATS
QRS DURATION: 110 MS
QT INTERVAL: 434 MS
QTC CALCULATION(BAZETT): 454 MS
QTC FREDERICIA: 448 MS
R AXIS: 64 DEGREES
RBC # BLD AUTO: 3.36 X10*6/UL (ref 4.5–5.9)
SODIUM SERPL-SCNC: 133 MMOL/L (ref 136–145)
T AXIS: 43 DEGREES
T OFFSET: 432 MS
VENTRICULAR RATE: 66 BPM
WBC # BLD AUTO: 10.5 X10*3/UL (ref 4.4–11.3)

## 2024-02-22 PROCEDURE — C9113 INJ PANTOPRAZOLE SODIUM, VIA: HCPCS

## 2024-02-22 PROCEDURE — 2500000004 HC RX 250 GENERAL PHARMACY W/ HCPCS (ALT 636 FOR OP/ED)

## 2024-02-22 PROCEDURE — 36415 COLL VENOUS BLD VENIPUNCTURE: CPT | Performed by: PODIATRIST

## 2024-02-22 PROCEDURE — 80053 COMPREHEN METABOLIC PANEL: CPT | Performed by: PODIATRIST

## 2024-02-22 PROCEDURE — 83735 ASSAY OF MAGNESIUM: CPT | Performed by: PODIATRIST

## 2024-02-22 PROCEDURE — 2500000001 HC RX 250 WO HCPCS SELF ADMINISTERED DRUGS (ALT 637 FOR MEDICARE OP): Performed by: PODIATRIST

## 2024-02-22 PROCEDURE — 2500000001 HC RX 250 WO HCPCS SELF ADMINISTERED DRUGS (ALT 637 FOR MEDICARE OP): Performed by: PHYSICIAN ASSISTANT

## 2024-02-22 PROCEDURE — 82947 ASSAY GLUCOSE BLOOD QUANT: CPT

## 2024-02-22 PROCEDURE — 85027 COMPLETE CBC AUTOMATED: CPT | Performed by: PODIATRIST

## 2024-02-22 PROCEDURE — 1100000001 HC PRIVATE ROOM DAILY

## 2024-02-22 RX ORDER — ACETAMINOPHEN 160 MG/5ML
650 SOLUTION ORAL EVERY 4 HOURS PRN
Status: DISCONTINUED | OUTPATIENT
Start: 2024-02-22 | End: 2024-02-22 | Stop reason: ENTERED-IN-ERROR

## 2024-02-22 RX ORDER — ACETAMINOPHEN 650 MG/1
650 SUPPOSITORY RECTAL EVERY 4 HOURS PRN
Status: DISCONTINUED | OUTPATIENT
Start: 2024-02-22 | End: 2024-02-22 | Stop reason: ENTERED-IN-ERROR

## 2024-02-22 RX ORDER — ACETAMINOPHEN 325 MG/1
650 TABLET ORAL EVERY 6 HOURS PRN
Status: DISCONTINUED | OUTPATIENT
Start: 2024-02-22 | End: 2024-02-24

## 2024-02-22 RX ORDER — ACETAMINOPHEN 325 MG/1
650 TABLET ORAL EVERY 4 HOURS PRN
Status: DISCONTINUED | OUTPATIENT
Start: 2024-02-22 | End: 2024-02-22

## 2024-02-22 RX ORDER — FLUTICASONE PROPIONATE 50 MCG
2 SPRAY, SUSPENSION (ML) NASAL 2 TIMES DAILY PRN
Status: DISCONTINUED | OUTPATIENT
Start: 2024-02-22 | End: 2024-02-26 | Stop reason: HOSPADM

## 2024-02-22 RX ADMIN — OXYCODONE HYDROCHLORIDE 5 MG: 5 SOLUTION ORAL at 01:15

## 2024-02-22 RX ADMIN — PANTOPRAZOLE SODIUM 40 MG: 40 INJECTION, POWDER, FOR SOLUTION INTRAVENOUS at 20:17

## 2024-02-22 RX ADMIN — OXYCODONE HYDROCHLORIDE 5 MG: 5 SOLUTION ORAL at 05:48

## 2024-02-22 RX ADMIN — PANTOPRAZOLE SODIUM 40 MG: 40 INJECTION, POWDER, FOR SOLUTION INTRAVENOUS at 08:16

## 2024-02-22 RX ADMIN — Medication 5 MG: at 20:16

## 2024-02-22 RX ADMIN — OXYCODONE HYDROCHLORIDE 5 MG: 5 SOLUTION ORAL at 20:16

## 2024-02-22 RX ADMIN — ENOXAPARIN SODIUM 40 MG: 100 INJECTION SUBCUTANEOUS at 20:16

## 2024-02-22 ASSESSMENT — COGNITIVE AND FUNCTIONAL STATUS - GENERAL
DAILY ACTIVITIY SCORE: 24
DAILY ACTIVITIY SCORE: 24
MOBILITY SCORE: 23
CLIMB 3 TO 5 STEPS WITH RAILING: A LITTLE
MOBILITY SCORE: 23
CLIMB 3 TO 5 STEPS WITH RAILING: A LITTLE

## 2024-02-22 ASSESSMENT — PAIN SCALES - GENERAL
PAINLEVEL_OUTOF10: 0 - NO PAIN
PAINLEVEL_OUTOF10: 3
PAINLEVEL_OUTOF10: 7
PAINLEVEL_OUTOF10: 7

## 2024-02-22 ASSESSMENT — PAIN - FUNCTIONAL ASSESSMENT: PAIN_FUNCTIONAL_ASSESSMENT: 0-10

## 2024-02-22 NOTE — CONSULTS
"Nutrition Follow Up Assessment:   Nutrition Assessment    Reason for Assessment: Dietitian discretion, Provider consult order (follow up/ cycled TF)    Patient is a 53 y.o. male presenting with: pneumoperitoneum with peritonitis    2/16: Last RDN note   2/19: concern for leak new GJ anastomosis based on radiology however no leak indicated per ACS note  2/21: CLD today  2/22: Advance diet to soft foods    Nutrition History:  Food and Nutrient History: Met with patient this morning. Pt reports he is trying soft foods today and is open to trying magic cup and ensure. Pt reports having a BM today and tolerating the TF well today. Pt expresses concern for dehydration due to feeling of thirst.  GI Symptoms: None       Anthropometrics:  Height: 180.3 cm (5' 10.98\")   Weight: 73.5 kg (162 lb 0.6 oz)   BMI (Calculated): 22.61  IBW/kg (Dietitian Calculated): 78.2 kg  Percent of IBW: 112 %       Weight History:   Date/Time Weight   02/21/24 0821 73.5 kg (162 lb 0.6 oz)   02/21/24 0600 73.5 kg (162 lb 0.6 oz)   02/19/24 0600 73.4 kg (161 lb 11.3 oz)   02/18/24 0317 74.6 kg (164 lb 7.4 oz)   02/17/24 0546 80.2 kg (176 l     Weight Change %:  Weight History / % Weight Change: weight has been stable this week at this time    Nutrition Focused Physical Exam Findings:  defer: See RDN note 2/10    Nutrition Significant Labs:  CBC Trend:   Results from last 7 days   Lab Units 02/22/24  0621 02/21/24  0514 02/20/24  0542 02/19/24  0539   WBC AUTO x10*3/uL 10.5 12.6* 12.7* 12.4*   RBC AUTO x10*6/uL 3.36* 3.42* 3.64* 3.60*   HEMOGLOBIN g/dL 9.1* 9.2* 9.9* 9.7*   HEMATOCRIT % 28.4* 28.7* 30.3* 30.5*   MCV fL 85 84 83 85   PLATELETS AUTO x10*3/uL 754* 779* 806* 712*    , BMP Trend:   Results from last 7 days   Lab Units 02/22/24  0621 02/21/24  0514 02/20/24  0542 02/19/24  0539   GLUCOSE mg/dL 114* 108* 122* 105*   CALCIUM mg/dL 8.4* 8.5* 8.4* 8.4*   SODIUM mmol/L 133* 133* 134* 135*   POTASSIUM mmol/L 4.4 4.5 4.9 4.1   CO2 mmol/L 27 24 " 25 24   CHLORIDE mmol/L 98 100 100 103   BUN mg/dL 22 24* 26* 23   CREATININE mg/dL 0.68 0.73 0.85 0.78   , BG POCT trend:   Results from last 7 days   Lab Units 02/22/24  0827 02/22/24  0341 02/21/24  2045 02/21/24  1745 02/21/24  1441   POCT GLUCOSE mg/dL 131* 109* 124* 105* 108*    , Liver Function Trend:   Results from last 7 days   Lab Units 02/22/24  0621 02/21/24  0514 02/20/24  0542 02/19/24  0539   ALK PHOS U/L 139* 151* 160* 177*   AST U/L 34 34 36 33   ALT U/L 66* 58* 64* 62*   BILIRUBIN TOTAL mg/dL 0.4 0.4 0.5 0.6    , Renal Lab Trend:   Results from last 7 days   Lab Units 02/22/24  0621 02/21/24  0514 02/20/24  0542 02/19/24  0539   POTASSIUM mmol/L 4.4 4.5 4.9 4.1   PHOSPHORUS mg/dL  --   --  3.1  --    SODIUM mmol/L 133* 133* 134* 135*   MAGNESIUM mg/dL 2.07 2.09 2.23 2.27   EGFR mL/min/1.73m*2 >90 >90 >90 >90   BUN mg/dL 22 24* 26* 23   CREATININE mg/dL 0.68 0.73 0.85 0.78     Nutrition Specific Medications:  Scheduled medications  enoxaparin, 40 mg, subcutaneous, q24h  HYDROmorphone, 0.4 mg, intravenous, Once  insulin lispro, 0-5 Units, subcutaneous, TID with meals  iohexol, 500 mL, oral, Once in imaging  methocarbamol, 1,000 mg, intravenous, q8h  pantoprazole, 40 mg, intravenous, BID      PRN medications  PRN medications: benzocaine-menthol, dextrose, glucagon, HYDROmorphone, melatonin, oxyCODONE, oxyCODONE, oxygen, phenoL, white petrolatum      I/O:   Last BM Date: 02/20/24; Stool Appearance: Loose, Watery (02/15/24 0900)        Dietary Orders (From admission, onward)       Start     Ordered    02/22/24 0949  Oral nutritional supplements  Until discontinued        Question Answer Comment   Deliver with All meals    Select supplement: Magic Cup        02/22/24 0948    02/22/24 0759  Oral nutritional supplements  Until discontinued        Comments: Strawberry   Question Answer Comment   Deliver with All meals    Select supplement: Ensure High Protein        02/22/24 0759    02/22/24 0758  Enteral  feeding WITH diet order Diet type: Regular; Texture: Easy to chew; Tube feeding formula: Pivot 1.5; 55; 100; Water; Every 6 hours  Continuous        Question Answer Comment   Diet type Regular    Texture Easy to chew    Tube feeding formula: Pivot 1.5    Tube feeding continuous rate (mL/hr): 55    Tube feeding flush (mL): 100    Flush type: Water    Flush frequency: Every 6 hours        02/22/24 3010                     Estimated Needs:   Total Energy Estimated Needs (kCal):  (6374-0048)  Method for Estimating Needs: 25-28 x IBW  Total Protein Estimated Needs (g):  (101-115)  Method for Estimating Needs: IBW x 1.3- 1.4  Total Fluid Estimated Needs (mL):  (per team)           Nutrition Diagnosis   Malnutrition Diagnosis  Patient has Malnutrition Diagnosis: No    Nutrition Diagnosis  Patient has Nutrition Diagnosis: Yes  Diagnosis Status (1): Ongoing  Nutrition Diagnosis 1: Increased nutrient needs  Related to (1): increased metabolic demand  As Evidenced by (1): s/p multiple ax lap. washout, partial gastrectomy       Nutrition Interventions/Recommendations         Nutrition Intervention:  Continue Magic cup and Ensure plus as able  Continue diet per team  TF current regimen: Pivot 1.5 @ 55ml/hr  When medically able to transition to a cycled tube feed regimen. Run Pivot 1.5 @ 110 ml/hr x 12 hours  Provides: 1320 ml total volume, 1980 kcal, 124g PRO, 990 ml free water   Additonal water flushes per team, TF provides 990 ml free water   Monitor phosphorus, potassium and magnesium, if drops occur replete lab  Consider adjusting water flushes d/t pt report of feeling dehydrated and sodium lab high    Nutrition Monitoring and Evaluation   Food/Nutrient Related History Monitoring  Monitoring and Evaluation Plan: Energy intake, Amount of food, Enteral and parenteral nutrition intake    Body Composition/Growth/Weight History  Monitoring and Evaluation Plan: Weight    Biochemical Data, Medical Tests and Procedures  Monitoring  and Evaluation Plan: Electrolyte/renal panel, Glucose/endocrine profile      Time Spent/Follow-up Reminder:   Time Spent (min): 60 minutes  Last Date of Nutrition Visit: 02/22/24  Nutrition Follow-Up Needed?: Dietitian to reassess per policy  Follow up Comment: cycled TF recs

## 2024-02-22 NOTE — CARE PLAN
The patient's goals for the shift include Unable to state patient intubated    The clinical goals for the shift include Pt will be able to tolerate regular diet      Problem: Pain  Goal: My pain/discomfort is manageable  Outcome: Progressing     Problem: Safety  Goal: Patient will be injury free during hospitalization  Outcome: Progressing  Goal: I will remain free of falls  Outcome: Progressing     Problem: Daily Care  Goal: Daily care needs are met  Outcome: Progressing     Problem: Psychosocial Needs  Goal: Demonstrates ability to cope with hospitalization/illness  Outcome: Progressing  Goal: Collaborate with me, my family, and caregiver to identify my specific goals  Outcome: Progressing     Problem: Discharge Barriers  Goal: My discharge needs are met  Outcome: Progressing     Problem: Skin  Goal: Decreased wound size/increased tissue granulation at next dressing change  Outcome: Progressing  Goal: Participates in plan/prevention/treatment measures  Outcome: Progressing  Goal: Prevent/manage excess moisture  Outcome: Progressing  Goal: Prevent/minimize sheer/friction injuries  Outcome: Progressing  Goal: Promote/optimize nutrition  Outcome: Progressing  Goal: Promote skin healing  Outcome: Progressing     Problem: Fall/Injury  Goal: Not fall by end of shift  Outcome: Progressing  Goal: Be free from injury by end of the shift  Outcome: Progressing  Goal: Verbalize understanding of personal risk factors for fall in the hospital  Outcome: Progressing  Goal: Verbalize understanding of risk factor reduction measures to prevent injury from fall in the home  Outcome: Progressing  Goal: Use assistive devices by end of the shift  Outcome: Progressing  Goal: Pace activities to prevent fatigue by end of the shift  Outcome: Progressing     Problem: Safety - Medical Restraint  Goal: Remains free of injury from restraints (Restraint for Interference with Medical Device)  Outcome: Progressing  Goal: Free from restraint(s)  (Restraint for Interference with Medical Device)  Outcome: Progressing     Problem: Pain - Adult  Goal: Verbalizes/displays adequate comfort level or baseline comfort level  Outcome: Progressing     Problem: Safety - Adult  Goal: Free from fall injury  Outcome: Progressing     Problem: Discharge Planning  Goal: Discharge to home or other facility with appropriate resources  Outcome: Progressing     Problem: Chronic Conditions and Co-morbidities  Goal: Patient's chronic conditions and co-morbidity symptoms are monitored and maintained or improved  Outcome: Progressing     Problem: Knowledge Deficit  Goal: Patient/family/caregiver demonstrates understanding of disease process, treatment plan, medications, and discharge instructions  Outcome: Progressing     Problem: Mechanical Ventilation  Goal: Patient Will Maintain Patent Airway  Outcome: Progressing  Goal: Oral health is maintained or improved  Outcome: Progressing  Goal: Tracheostomy will be managed safely  Outcome: Progressing  Goal: ET tube will be managed safely  Outcome: Progressing  Goal: Ability to express needs and understand communication  Outcome: Progressing  Goal: Mobility/activity is maintained at optimum level for patient  Outcome: Progressing     Problem: Pain  Goal: Takes deep breaths with improved pain control throughout the shift  Outcome: Progressing  Goal: Turns in bed with improved pain control throughout the shift  Outcome: Progressing  Goal: Walks with improved pain control throughout the shift  Outcome: Progressing  Goal: Performs ADL's with improved pain control throughout shift  Outcome: Progressing  Goal: Participates in PT with improved pain control throughout the shift  Outcome: Progressing  Goal: Free from opioid side effects throughout the shift  Outcome: Progressing  Goal: Free from acute confusion related to pain meds throughout the shift  Outcome: Progressing

## 2024-02-22 NOTE — PROGRESS NOTES
[facility]  ACUTE CARE SURGERY - PROGRESS NOTE    Patient Name: Trevor Yarbrough  MRN: [id number]  Admit Date: 206  : 1970  AGE: 53 y.o.   GENDER: male  ==============================================================================  TODAY'S ASSESSMENT AND PLAN OF CARE:  Mr. Yarbrough is a 53 year old male with as history of insulinoma and fundoplication who presented with peritonitis c/b gastric perforation. During patients hospital course he required the following operations:    24: exlap, subtotal gastrectomy  24: washout  24: antrectomy, Billroth 2 reconstruction; fascia closed, skin open with wound vac placed     CT : c/f leak near GJ anastomosis based on radiology read. Upper GI study confirms integrity of the anastomosis. Additionally, Gatorade (blue dye) test showed no leak. Patient clinically stable with no leukocytosis.        Plan  NEURO:   # Pain control  - Continue oxycodone 2.5 mg and 5 mg prn for moderate/severe pain respectively  - Continue with Dilaudid 0.4 mg for breakthrough pain  - Continue with scheduled Robaxin 1,000 mg     CV:  # Dyslipidemia  - Home atorvastatin 10 mg held at this time   - Continuous tele monitoring  - Vital signs q4h     RESP:  # No current pulmonary issues  - Encourage incentive spirometry  - OOB ambulate, encouraged patient to sit in chair and ambulate in room  - PT/OT recommending High intensity rehab     GI:  # S/p subtotal gastrectomy with Bilroth 2 reconstruction POD 13  - Continue daily wet to dry dressing over abdominal incision (consisting of Xeroform over superior incision, then moistened kerlix, abd pad)  - Monitor drains output; inferior drain output more gastric in appearance       -> Amylase sent : drain <10, Serum Amylase 28.   - Continue TF to goal of 55 ml/hr   - No crushed meds through dobhoff- only liquids   - Advance to soft foods today, incorporate at least 2 protein shakes in diet per day  -  Continue IV pantoprazole 40 mg bid     :  - Strict I/Os  - Replete lytes as indicate     ENDO:   # Insulinoma  -Follow up regarding Endocrine consult. Appreciate recs.     - no further hypoglycemia episodes    - Follow up with Endocrine as an outpatient      HEMATOLOGY:   # Acute post-operative anemia  - Given 1 unit of PRBC on 2/12  - Trend H/H daily   - Hgb: 9.1 today      ID:  # Afebrile leukocytosis - resolved 2/22  -  Completed fluc/zosyn x 4 days for intra-abdominal infection  CT A/P obtained 2/13 due to low grade temp, abdominal pain and increasing leukocytosis- no definite leak, adjacent fluid collections around pancreas, thickening of anastomosis   Repeat CT A/P 2/19- c/f GJ leak from radiology but based on patients clinical signs and symptoms no concern for leak at this time. Will continue to monitor clinically.   - Blood cultures 2/13- No growth final report   - Restarted on Zosyn 2/13, completed course on 2/19   - Continue to trend WBC- remains stable at 12.6     DVT ppx:   - SCDs   - On Lovenox 40 mg     Dispo: continue RNF, plan for home with C once medically ready for d/c     Patient seen and discussed with attending, Dr. Solares.    Aaron Yanes MS3  General Surgery  ACS    Agree with assessment and plan of MS3.     Patricia Guzman MD   PGY1, Family Medicine   Acute Care Surgery Service  Pager: [phone]  ==============================================================================  CHIEF COMPLAINT / EVENTS LAST 24HRS / HPI:  Denies overnight events, slept well. Endorses mild abdominal pain that is usual for him for the past few days. He has been tolerating the clear liquid diet. Denies nausea, vomiting and bloating.     MEDICAL HISTORY / ROS:   Admission history and ROS reviewed.     PHYSICAL EXAM:    I/O Brief past 24h    Intake/Output Summary (Last 24 hours) at 2/22/2024 1047  Last data filed at 2/22/2024 0636  Gross per 24 hour   Intake 220 ml   Output 1075 ml   Net -855 ml        VS for  past 24h  Temp:  [36.4 °C (97.5 °F)-36.9 °C (98.4 °F)] 36.9 °C (98.4 °F)  Heart Rate:  [83-92] 83  Resp:  [18-19] 18  BP: (101-123)/(58-79) 114/68       Physical Exam  Vitals and nursing note reviewed.   Constitutional:       General: He is not in acute distress.     Appearance: Normal appearance.   HENT:      Head: Normocephalic and atraumatic.   Cardiovascular:      Rate and Rhythm: Normal rate and regular rhythm.      Heart sounds: No murmur heard.     No friction rub. No gallop.   Pulmonary:      Effort: Pulmonary effort is normal.      Breath sounds: No wheezing, rhonchi or rales.   Abdominal:      General: Abdomen is flat. There is no distension.      Palpations: Abdomen is soft.      Tenderness: There is no abdominal tenderness.          Comments: Midline incision c/d/i with concern for mild dehiscence at the superior aspect.  Upper drain output 10 ml   Lower drain output 40 ml (scant amount of bile draining)    Skin:     General: Skin is warm and dry.      Capillary Refill: Capillary refill takes less than 2 seconds.      Coloration: Skin is not jaundiced.   Neurological:      General: No focal deficit present.      Mental Status: He is alert and oriented to person, place, and time.   Psychiatric:         Mood and Affect: Mood normal.         Behavior: Behavior normal.       LABS:  Results for orders placed or performed during the hospital encounter of 02/06/24 (from the past 24 hour(s))   POCT GLUCOSE   Result Value Ref Range    POCT Glucose 115 (H) 74 - 99 mg/dL   POCT GLUCOSE   Result Value Ref Range    POCT Glucose 108 (H) 74 - 99 mg/dL   POCT GLUCOSE   Result Value Ref Range    POCT Glucose 105 (H) 74 - 99 mg/dL   POCT GLUCOSE   Result Value Ref Range    POCT Glucose 124 (H) 74 - 99 mg/dL   POCT GLUCOSE   Result Value Ref Range    POCT Glucose 109 (H) 74 - 99 mg/dL   Comprehensive metabolic panel   Result Value Ref Range    Glucose 114 (H) 74 - 99 mg/dL    Sodium 133 (L) 136 - 145 mmol/L    Potassium  4.4 3.5 - 5.3 mmol/L    Chloride 98 98 - 107 mmol/L    Bicarbonate 27 21 - 32 mmol/L    Anion Gap 12 10 - 20 mmol/L    Urea Nitrogen 22 6 - 23 mg/dL    Creatinine 0.68 0.50 - 1.30 mg/dL    eGFR >90 >60 mL/min/1.73m*2    Calcium 8.4 (L) 8.6 - 10.6 mg/dL    Albumin 2.8 (L) 3.4 - 5.0 g/dL    Alkaline Phosphatase 139 (H) 33 - 120 U/L    Total Protein 6.6 6.4 - 8.2 g/dL    AST 34 9 - 39 U/L    Bilirubin, Total 0.4 0.0 - 1.2 mg/dL    ALT 66 (H) 10 - 52 U/L   CBC   Result Value Ref Range    WBC 10.5 4.4 - 11.3 x10*3/uL    nRBC 0.0 0.0 - 0.0 /100 WBCs    RBC 3.36 (L) 4.50 - 5.90 x10*6/uL    Hemoglobin 9.1 (L) 13.5 - 17.5 g/dL    Hematocrit 28.4 (L) 41.0 - 52.0 %    MCV 85 80 - 100 fL    MCH 27.1 26.0 - 34.0 pg    MCHC 32.0 32.0 - 36.0 g/dL    RDW 14.5 11.5 - 14.5 %    Platelets 754 (H) 150 - 450 x10*3/uL   Magnesium   Result Value Ref Range    Magnesium 2.07 1.60 - 2.40 mg/dL   POCT GLUCOSE   Result Value Ref Range    POCT Glucose 131 (H) 74 - 99 mg/dL     MEDICATIONS:  Current Facility-Administered Medications   Medication Dose Route Frequency Provider Last Rate Last Admin    benzocaine-menthol (Cepastat Sore Throat) 15-3.6 mg lozenge 2 lozenge  2 lozenge Mouth/Throat q2h PRN Constanza Rod MD   2 lozenge at 02/16/24 0819    dextrose 50 % injection 25 g  25 g intravenous q15 min PRN Constanza Rod MD        enoxaparin (Lovenox) syringe 40 mg  40 mg subcutaneous q24h Constanza Rod MD   40 mg at 02/21/24 2024    glucagon (Glucagen) injection 1 mg  1 mg intramuscular q15 min PRN Constanza Rod MD        HYDROmorphone (Dilaudid) injection 0.4 mg  0.4 mg intravenous q4h PRN Constanza Rod MD   0.4 mg at 02/18/24 0615    HYDROmorphone (Dilaudid) injection 0.4 mg  0.4 mg intravenous Once Patricia Guzman MD        insulin lispro (HumaLOG) injection 0-5 Units  0-5 Units subcutaneous TID with meals Constanza Rod MD   1 Units at 02/07/24 1319    iohexol (OMNIPaque) 12 mg iodine/mL oral contrast 500 mL  500 mL oral Once in imaging [facility]  MD Eric        melatonin tablet 5 mg  5 mg oral Nightly PRN Patricia Guzman MD   5 mg at 02/21/24 1917    methocarbamol (Robaxin) injection 1,000 mg  1,000 mg intravenous q8h Patricia Guzman MD   1,000 mg at 02/20/24 0815    oxyCODONE (Roxicodone) solution 2.5 mg  2.5 mg oral q4h PRN RICCARDO Regalado-C   2.5 mg at 02/20/24 2055    oxyCODONE (Roxicodone) solution 5 mg  5 mg oral q4h PRN RICCARDO Regalado-C   5 mg at 02/22/24 0548    oxygen (O2) therapy   inhalation Continuous PRN - O2/gases Constanza Rod MD        pantoprazole (ProtoNix) injection 40 mg  40 mg intravenous BID Constanza Rod MD   40 mg at 02/22/24 0816    phenoL (Chloraseptic) 1.4 % mouth/throat spray 1 spray  1 spray Mouth/Throat q2h PRN Constanza Rod MD        white petrolatum (Aquaphor) ointment   Topical q1h PRN Constanza Rod MD           IMAGING SUMMARY:  (summary of new imaging findings, not a copy of dictation)      I have reviewed all laboratory and imaging results ordered/pertinent for today's encounter.

## 2024-02-23 ENCOUNTER — SPECIALTY PHARMACY (OUTPATIENT)
Dept: PHARMACY | Facility: CLINIC | Age: 54
End: 2024-02-23

## 2024-02-23 LAB
ALBUMIN SERPL BCP-MCNC: 2.7 G/DL (ref 3.4–5)
ALP SERPL-CCNC: 140 U/L (ref 33–120)
ALT SERPL W P-5'-P-CCNC: 74 U/L (ref 10–52)
ANION GAP SERPL CALC-SCNC: 13 MMOL/L (ref 10–20)
AST SERPL W P-5'-P-CCNC: 39 U/L (ref 9–39)
BILIRUB SERPL-MCNC: 0.3 MG/DL (ref 0–1.2)
BUN SERPL-MCNC: 26 MG/DL (ref 6–23)
CALCIUM SERPL-MCNC: 8.2 MG/DL (ref 8.6–10.6)
CHLORIDE SERPL-SCNC: 98 MMOL/L (ref 98–107)
CO2 SERPL-SCNC: 26 MMOL/L (ref 21–32)
CREAT SERPL-MCNC: 0.68 MG/DL (ref 0.5–1.3)
EGFRCR SERPLBLD CKD-EPI 2021: >90 ML/MIN/1.73M*2
ERYTHROCYTE [DISTWIDTH] IN BLOOD BY AUTOMATED COUNT: 14.2 % (ref 11.5–14.5)
GLUCOSE BLD MANUAL STRIP-MCNC: 116 MG/DL (ref 74–99)
GLUCOSE BLD MANUAL STRIP-MCNC: 130 MG/DL (ref 74–99)
GLUCOSE BLD MANUAL STRIP-MCNC: 137 MG/DL (ref 74–99)
GLUCOSE BLD MANUAL STRIP-MCNC: 143 MG/DL (ref 74–99)
GLUCOSE BLD MANUAL STRIP-MCNC: 144 MG/DL (ref 74–99)
GLUCOSE BLD MANUAL STRIP-MCNC: 89 MG/DL (ref 74–99)
GLUCOSE BLD MANUAL STRIP-MCNC: 96 MG/DL (ref 74–99)
GLUCOSE SERPL-MCNC: 128 MG/DL (ref 74–99)
HCT VFR BLD AUTO: 29.2 % (ref 41–52)
HGB BLD-MCNC: 9.3 G/DL (ref 13.5–17.5)
MAGNESIUM SERPL-MCNC: 1.99 MG/DL (ref 1.6–2.4)
MCH RBC QN AUTO: 26.6 PG (ref 26–34)
MCHC RBC AUTO-ENTMCNC: 31.8 G/DL (ref 32–36)
MCV RBC AUTO: 83 FL (ref 80–100)
NRBC BLD-RTO: 0 /100 WBCS (ref 0–0)
PLATELET # BLD AUTO: 695 X10*3/UL (ref 150–450)
POTASSIUM SERPL-SCNC: 4.4 MMOL/L (ref 3.5–5.3)
PROT SERPL-MCNC: 6.5 G/DL (ref 6.4–8.2)
RBC # BLD AUTO: 3.5 X10*6/UL (ref 4.5–5.9)
SODIUM SERPL-SCNC: 133 MMOL/L (ref 136–145)
WBC # BLD AUTO: 9.4 X10*3/UL (ref 4.4–11.3)

## 2024-02-23 PROCEDURE — C9113 INJ PANTOPRAZOLE SODIUM, VIA: HCPCS

## 2024-02-23 PROCEDURE — 2500000001 HC RX 250 WO HCPCS SELF ADMINISTERED DRUGS (ALT 637 FOR MEDICARE OP): Performed by: PHYSICIAN ASSISTANT

## 2024-02-23 PROCEDURE — 2500000001 HC RX 250 WO HCPCS SELF ADMINISTERED DRUGS (ALT 637 FOR MEDICARE OP): Performed by: PODIATRIST

## 2024-02-23 PROCEDURE — 36415 COLL VENOUS BLD VENIPUNCTURE: CPT | Performed by: PODIATRIST

## 2024-02-23 PROCEDURE — 2500000004 HC RX 250 GENERAL PHARMACY W/ HCPCS (ALT 636 FOR OP/ED)

## 2024-02-23 PROCEDURE — 82947 ASSAY GLUCOSE BLOOD QUANT: CPT

## 2024-02-23 PROCEDURE — 80053 COMPREHEN METABOLIC PANEL: CPT | Performed by: PODIATRIST

## 2024-02-23 PROCEDURE — 85027 COMPLETE CBC AUTOMATED: CPT | Performed by: PODIATRIST

## 2024-02-23 PROCEDURE — 83735 ASSAY OF MAGNESIUM: CPT | Performed by: PODIATRIST

## 2024-02-23 PROCEDURE — 1100000001 HC PRIVATE ROOM DAILY

## 2024-02-23 RX ADMIN — PANTOPRAZOLE SODIUM 40 MG: 40 INJECTION, POWDER, FOR SOLUTION INTRAVENOUS at 20:42

## 2024-02-23 RX ADMIN — OXYCODONE HYDROCHLORIDE 5 MG: 5 SOLUTION ORAL at 14:36

## 2024-02-23 RX ADMIN — OXYCODONE HYDROCHLORIDE 5 MG: 5 SOLUTION ORAL at 01:19

## 2024-02-23 RX ADMIN — PANTOPRAZOLE SODIUM 40 MG: 40 INJECTION, POWDER, FOR SOLUTION INTRAVENOUS at 08:34

## 2024-02-23 RX ADMIN — Medication 5 MG: at 20:42

## 2024-02-23 RX ADMIN — OXYCODONE HYDROCHLORIDE 5 MG: 5 SOLUTION ORAL at 20:42

## 2024-02-23 RX ADMIN — ACETAMINOPHEN 650 MG: 325 TABLET ORAL at 08:41

## 2024-02-23 RX ADMIN — OXYCODONE HYDROCHLORIDE 5 MG: 5 SOLUTION ORAL at 05:38

## 2024-02-23 RX ADMIN — ENOXAPARIN SODIUM 40 MG: 100 INJECTION SUBCUTANEOUS at 20:42

## 2024-02-23 ASSESSMENT — COGNITIVE AND FUNCTIONAL STATUS - GENERAL
CLIMB 3 TO 5 STEPS WITH RAILING: A LITTLE
MOBILITY SCORE: 23
DAILY ACTIVITIY SCORE: 24

## 2024-02-23 ASSESSMENT — PAIN - FUNCTIONAL ASSESSMENT
PAIN_FUNCTIONAL_ASSESSMENT: 0-10

## 2024-02-23 ASSESSMENT — PAIN SCALES - GENERAL
PAINLEVEL_OUTOF10: 7
PAINLEVEL_OUTOF10: 7
PAINLEVEL_OUTOF10: 2
PAINLEVEL_OUTOF10: 7
PAINLEVEL_OUTOF10: 1
PAINLEVEL_OUTOF10: 2
PAINLEVEL_OUTOF10: 7
PAINLEVEL_OUTOF10: 0 - NO PAIN

## 2024-02-23 NOTE — CARE PLAN
The patient's goals for the shift include Unable to state patient intubated    Problem: Pain  Goal: My pain/discomfort is manageable  2/23/2024 1733 by Lexi Barney RN  Outcome: Progressing  2/23/2024 1733 by Lexi Barney RN  Outcome: Progressing     Problem: Safety  Goal: Patient will be injury free during hospitalization  2/23/2024 1733 by Lexi Barney RN  Outcome: Progressing  2/23/2024 1733 by Lexi Barney RN  Outcome: Progressing  Goal: I will remain free of falls  2/23/2024 1733 by Lexi Barney RN  Outcome: Progressing  2/23/2024 1733 by Lexi Barney RN  Outcome: Progressing     Problem: Daily Care  Goal: Daily care needs are met  2/23/2024 1733 by Lexi Barney RN  Outcome: Progressing  2/23/2024 1733 by Lexi Barney RN  Outcome: Progressing     Problem: Psychosocial Needs  Goal: Demonstrates ability to cope with hospitalization/illness  2/23/2024 1733 by Lexi Barney RN  Outcome: Progressing  2/23/2024 1733 by Lexi Barney RN  Outcome: Progressing  Goal: Collaborate with me, my family, and caregiver to identify my specific goals  2/23/2024 1733 by Lexi Barney RN  Outcome: Progressing  2/23/2024 1733 by Lexi Barney RN  Outcome: Progressing     Problem: Discharge Barriers  Goal: My discharge needs are met  2/23/2024 1733 by Lexi Barney RN  Outcome: Progressing  2/23/2024 1733 by Lexi Barney RN  Outcome: Progressing     Problem: Skin  Goal: Decreased wound size/increased tissue granulation at next dressing change  2/23/2024 1733 by Lexi Barney RN  Outcome: Progressing  2/23/2024 1733 by Lexi Barney RN  Outcome: Progressing  Goal: Participates in plan/prevention/treatment measures  2/23/2024 1733 by Lexi Barney RN  Outcome: Progressing  2/23/2024 1733 by Lexi Barney RN  Outcome: Progressing  Goal: Prevent/manage excess moisture  2/23/2024 1733 by Lexi Barney RN  Outcome: Progressing  2/23/2024 1733 by Lexi COUCH  Ector, JENNIFER  Outcome: Progressing  Goal: Prevent/minimize sheer/friction injuries  2/23/2024 1733 by Lexi Barney, RN  Outcome: Progressing  2/23/2024 1733 by Lexi Barney, RN  Outcome: Progressing  Goal: Promote/optimize nutrition  2/23/2024 1733 by Lexi Barney, RN  Outcome: Progressing  2/23/2024 1733 by Lexi Barney, RN  Outcome: Progressing  Goal: Promote skin healing  2/23/2024 1733 by Lexi Barney, RN  Outcome: Progressing  2/23/2024 1733 by Lexi Barney, RN  Outcome: Progressing     Problem: Fall/Injury  Goal: Not fall by end of shift  2/23/2024 1733 by Lexi Barney, RN  Outcome: Progressing  2/23/2024 1733 by Lexi Barney RN  Outcome: Progressing  Goal: Be free from injury by end of the shift  2/23/2024 1733 by Lexi Barney, RN  Outcome: Progressing  2/23/2024 1733 by Lexi Barney, RN  Outcome: Progressing  Goal: Verbalize understanding of personal risk factors for fall in the hospital  2/23/2024 1733 by Lexi Barney, RN  Outcome: Progressing  2/23/2024 1733 by Lexi Barney, RN  Outcome: Progressing  Goal: Verbalize understanding of risk factor reduction measures to prevent injury from fall in the home  2/23/2024 1733 by Lexi Barney, RN  Outcome: Progressing  2/23/2024 1733 by Lexi Barney, RN  Outcome: Progressing  Goal: Use assistive devices by end of the shift  2/23/2024 1733 by Lexi Barney, RN  Outcome: Progressing  2/23/2024 1733 by Lexi Barney, RN  Outcome: Progressing  Goal: Pace activities to prevent fatigue by end of the shift  2/23/2024 1733 by Lexi Barney, RN  Outcome: Progressing  2/23/2024 1733 by Lexi Barney, RN  Outcome: Progressing     Problem: Safety - Medical Restraint  Goal: Remains free of injury from restraints (Restraint for Interference with Medical Device)  2/23/2024 1733 by Lexi Barney, RN  Outcome: Progressing  2/23/2024 1733 by Lexi Barney, RN  Outcome: Progressing  Goal: Free from restraint(s)  (Restraint for Interference with Medical Device)  2/23/2024 1733 by Lexi Barney RN  Outcome: Progressing  2/23/2024 1733 by Lexi Barney RN  Outcome: Progressing     Problem: Pain - Adult  Goal: Verbalizes/displays adequate comfort level or baseline comfort level  Outcome: Progressing     Problem: Safety - Adult  Goal: Free from fall injury  Outcome: Progressing     Problem: Discharge Planning  Goal: Discharge to home or other facility with appropriate resources  Outcome: Progressing     Problem: Chronic Conditions and Co-morbidities  Goal: Patient's chronic conditions and co-morbidity symptoms are monitored and maintained or improved  Outcome: Progressing     Problem: Knowledge Deficit  Goal: Patient/family/caregiver demonstrates understanding of disease process, treatment plan, medications, and discharge instructions  Outcome: Progressing     Problem: Mechanical Ventilation  Goal: Patient Will Maintain Patent Airway  Outcome: Progressing  Goal: Oral health is maintained or improved  Outcome: Progressing  Goal: Tracheostomy will be managed safely  Outcome: Progressing  Goal: ET tube will be managed safely  Outcome: Progressing  Goal: Ability to express needs and understand communication  Outcome: Progressing  Goal: Mobility/activity is maintained at optimum level for patient  Outcome: Progressing     Problem: Pain  Goal: Takes deep breaths with improved pain control throughout the shift  Outcome: Progressing  Goal: Turns in bed with improved pain control throughout the shift  Outcome: Progressing  Goal: Walks with improved pain control throughout the shift  Outcome: Progressing  Goal: Performs ADL's with improved pain control throughout shift  Outcome: Progressing  Goal: Participates in PT with improved pain control throughout the shift  Outcome: Progressing  Goal: Free from opioid side effects throughout the shift  Outcome: Progressing  Goal: Free from acute confusion related to pain meds throughout the  shift  Outcome: Progressing     The clinical goals for the shift include pain control    Pt has requested the Robaxcin be removed from his list of meds. The pt stated the medication make him feel loopy. I sent a note to the team.

## 2024-02-23 NOTE — PROGRESS NOTES
Discharge Planning Note      Trevor Yarbrough is a 53 y.o. male on day 17 of admission presenting with Pneumoperitoneum.    Patient pending discharge to home with [facility] PT/OT [facility]/Nurse for wound care Sunday or Monday. Informed homecare in case discharge order the weekend.           Addendum order for TF sent to [place] for delivery to room. Pending walker script       Shawnee Larose RN TCC

## 2024-02-23 NOTE — PROGRESS NOTES
[facility]  ACUTE CARE SURGERY - PROGRESS NOTE    Patient Name: Trevor Yarbrough  MRN: [id number]  Admit Date: 206  : 1970  AGE: 53 y.o.   GENDER: male  ==============================================================================  TODAY'S ASSESSMENT AND PLAN OF CARE:  Mr. Yarbrough is a 53 year old male with as history of insulinoma and fundoplication who presented with peritonitis c/b gastric perforation. During patients hospital course he required the following operations:     24: exlap, subtotal gastrectomy  24: washout  24: antrectomy, Billroth 2 reconstruction; fascia closed, skin open with wound vac placed     CT : c/f leak near GJ anastomosis based on radiology read. Upper GI study confirms integrity of the anastomosis. Additionally, Gatorade (blue dye) test showed no leak. Patient clinically stable with no leukocytosis.         Plan  NEURO:   # Pain control  - Continue oxycodone 2.5 mg and 5 mg prn for moderate/severe pain respectively  - Continue with Dilaudid 0.4 mg for breakthrough pain  - Continue with scheduled Robaxin 1,000 mg     CV:  # Dyslipidemia  - Home atorvastatin 10 mg held at this time   - Continuous tele monitoring  - Vital signs q4h     RESP:  # No current pulmonary issues  - Encourage incentive spirometry  - OOB ambulate, encouraged patient to sit in chair and ambulate in room  - PT recommending High intensity rehab     GI:  # S/p subtotal gastrectomy with Bilroth 2 reconstruction POD 13  - Continue daily wet to dry dressing over abdominal incision (consisting of Xeroform over superior incision, then moistened kerlix, abd pad)  - Monitor drains output; inferior drain output more gastric in appearance       -> Amylase sent : drain <10, Serum Amylase 28.   - Continue TF to goal of 55 ml/hr. Cycling tubes feeds at night from 8196-4268.   - No crushed meds through dobhoff- only liquids   - Continue soft foods; incorporate at least 2  protein shakes in diet per day  - Started Ensure Clear with all meals today   - Continue IV pantoprazole 40 mg bid     :  - Strict I/Os  - Replete lytes as indicate     ENDO:   # Insulinoma  -Follow up regarding Endocrine consult. Appreciate recs.     - no further hypoglycemia episodes    - Follow up with Endocrine as an outpatient      HEMATOLOGY:   # Acute post-operative anemia  - Given 1 unit of PRBC on 2/12  - Trend H/H daily   - Hgb: 9.3 today      ID:  # Afebrile leukocytosis - resolved 2/22  -  Completed fluc/zosyn x 4 days for intra-abdominal infection  CT A/P obtained 2/13 due to low grade temp, abdominal pain and increasing leukocytosis- no definite leak, adjacent fluid collections around pancreas, thickening of anastomosis   Repeat CT A/P 2/19- c/f GJ leak from radiology but based on patients clinical signs and symptoms no concern for leak at this time. Will continue to monitor clinically.   - Blood cultures 2/13- No growth final report   - Restarted on Zosyn 2/13, completed course on 2/19   - Continue to trend WBC- remains stable at 9.4     DVT ppx:   - SCDs   - On Lovenox 40 mg     MSK:  -OOB  -PT rec mod intensity; Walker ordered today     Dispo:   - Continue RNF, plan for home with HHC once medically ready for discharge  - Home health orders placed for 2-3X per week wound changes, for PT and home health aid.     Patient seen and discussed with attending, Dr. Solares.     Patricia Guzman MD   PGY1, Family Medicine   Acute Care Surgery Service  Pager: [phone]    ==============================================================================  CHIEF COMPLAINT / EVENTS LAST 24HRS / HPI:  No acute overnight events. He is slowly increasing his amount of po intake as tolerated. Is getting out of bed and sitting in chair. Denies nausea and vomiting.     MEDICAL HISTORY / ROS:   Admission history and ROS reviewed.     PHYSICAL EXAM:  VS past 24h  Temp:  [36 °C (96.8 °F)-36.9 °C (98.4 °F)] 36.1 °C (97 °F)  Heart  Rate:  [76-93] 84  Resp:  [17-18] 18  BP: (102-122)/(61-73) 122/73     I/O past 24h  Intake/Output Summary (Last 24 hours) at 2/23/2024 1546  Last data filed at 2/23/2024 1439  Gross per 24 hour   Intake 2007 ml   Output 712.5 ml   Net 1294.5 ml      Physical Exam  Constitutional- lying in bed, No acute distress, resting comfortably   Cards- Regular rate  Resp- nonlabored breathing on RA  Abdomen- soft, nondistended; Midline incision with mostly granular wound base; small amount mucopurulent drainage noted superior part of incision; Superior drain serosang, inferior drain with scant dark gastric output; Dobbhoff in place  Extremities- Moving all extremities spontaneously.   Skin- warm, dry   Neuro- A&Ox3, appropriate mood and behavior       LABS:  Results for orders placed or performed during the hospital encounter of 02/06/24 (from the past 24 hour(s))   POCT GLUCOSE   Result Value Ref Range    POCT Glucose 108 (H) 74 - 99 mg/dL   POCT GLUCOSE   Result Value Ref Range    POCT Glucose 121 (H) 74 - 99 mg/dL   POCT GLUCOSE   Result Value Ref Range    POCT Glucose 143 (H) 74 - 99 mg/dL   POCT GLUCOSE   Result Value Ref Range    POCT Glucose 137 (H) 74 - 99 mg/dL   Comprehensive metabolic panel   Result Value Ref Range    Glucose 128 (H) 74 - 99 mg/dL    Sodium 133 (L) 136 - 145 mmol/L    Potassium 4.4 3.5 - 5.3 mmol/L    Chloride 98 98 - 107 mmol/L    Bicarbonate 26 21 - 32 mmol/L    Anion Gap 13 10 - 20 mmol/L    Urea Nitrogen 26 (H) 6 - 23 mg/dL    Creatinine 0.68 0.50 - 1.30 mg/dL    eGFR >90 >60 mL/min/1.73m*2    Calcium 8.2 (L) 8.6 - 10.6 mg/dL    Albumin 2.7 (L) 3.4 - 5.0 g/dL    Alkaline Phosphatase 140 (H) 33 - 120 U/L    Total Protein 6.5 6.4 - 8.2 g/dL    AST 39 9 - 39 U/L    Bilirubin, Total 0.3 0.0 - 1.2 mg/dL    ALT 74 (H) 10 - 52 U/L   CBC   Result Value Ref Range    WBC 9.4 4.4 - 11.3 x10*3/uL    nRBC 0.0 0.0 - 0.0 /100 WBCs    RBC 3.50 (L) 4.50 - 5.90 x10*6/uL    Hemoglobin 9.3 (L) 13.5 - 17.5 g/dL     Hematocrit 29.2 (L) 41.0 - 52.0 %    MCV 83 80 - 100 fL    MCH 26.6 26.0 - 34.0 pg    MCHC 31.8 (L) 32.0 - 36.0 g/dL    RDW 14.2 11.5 - 14.5 %    Platelets 695 (H) 150 - 450 x10*3/uL   Magnesium   Result Value Ref Range    Magnesium 1.99 1.60 - 2.40 mg/dL   POCT GLUCOSE   Result Value Ref Range    POCT Glucose 116 (H) 74 - 99 mg/dL   POCT GLUCOSE   Result Value Ref Range    POCT Glucose 130 (H) 74 - 99 mg/dL     MEDICATIONS:  Current Facility-Administered Medications   Medication Dose Route Frequency Provider Last Rate Last Admin    acetaminophen (Tylenol) tablet 650 mg  650 mg oral q6h PRN JUAN LUIS Nova   650 mg at 02/23/24 0841    benzocaine-menthol (Cepastat Sore Throat) 15-3.6 mg lozenge 2 lozenge  2 lozenge Mouth/Throat q2h PRN Constanza Rod MD   2 lozenge at 02/16/24 0819    dextrose 50 % injection 25 g  25 g intravenous q15 min PRN Constanza Rod MD        enoxaparin (Lovenox) syringe 40 mg  40 mg subcutaneous q24h Constanza Rod MD   40 mg at 02/22/24 2016    fluticasone (Flonase) nasal spray 2 spray  2 spray Each Nostril BID PRN JUAN LUIS Nova        glucagon (Glucagen) injection 1 mg  1 mg intramuscular q15 min PRN Constanza Rod MD        HYDROmorphone (Dilaudid) injection 0.4 mg  0.4 mg intravenous q4h PRN Constanza Rod MD   0.4 mg at 02/18/24 0615    HYDROmorphone (Dilaudid) injection 0.4 mg  0.4 mg intravenous Once Patricia Guzman MD        insulin lispro (HumaLOG) injection 0-5 Units  0-5 Units subcutaneous TID with meals Constanza Rod MD   1 Units at 02/07/24 1319    iohexol (OMNIPaque) 12 mg iodine/mL oral contrast 500 mL  500 mL oral Once in imaging Martin Reyes MD        melatonin tablet 5 mg  5 mg oral Nightly PRN Patricia Guzman MD   5 mg at 02/22/24 2016    methocarbamol (Robaxin) injection 1,000 mg  1,000 mg intravenous q8h Patricia Guzman MD   1,000 mg at 02/20/24 0815    oxyCODONE (Roxicodone) solution 2.5 mg  2.5 mg oral q4h PRN Allie Antunez PA-C   2.5 mg at  02/20/24 2055    oxyCODONE (Roxicodone) solution 5 mg  5 mg oral q4h PRN Allie Antunez PA-C   5 mg at 02/23/24 0538    oxygen (O2) therapy   inhalation Continuous PRN - O2/gases Constanza Rod MD        pantoprazole (ProtoNix) injection 40 mg  40 mg intravenous BID Constanza Rod MD   40 mg at 02/23/24 0834    phenoL (Chloraseptic) 1.4 % mouth/throat spray 1 spray  1 spray Mouth/Throat q2h PRN Constanza Rod MD        white petrolatum (Aquaphor) ointment   Topical q1h PRN Constanza Rod MD           IMAGING SUMMARY:  (summary of new imaging findings, not a copy of dictation)      I have reviewed all laboratory and imaging results ordered/pertinent for today's encounter

## 2024-02-23 NOTE — CONSULTS
Nutrition Note:   Nutrition Assessment    Reason for Assessment: Dietitian discretion (consult from RN)    Consulted via RN for pt questions on supplements and overall questions on protein. Pt reporting dislike of ensure high protein chocolate and magic cup berry. Discussed fairlife option once outpatient (26g or 30g PRO). Provided patient with ensure clear apple and ensure plus vanilla to trial while inpatient. Pt was able to obtain fairlife vanilla core power to try as well. Provided patient with High Protein handout from [facility] and provided outpatient RDN handout to continue following outpatient.     Time Spent/Follow-up Reminder:   Time Spent (min): 90 minutes  Last Date of Nutrition Visit: 02/23/24  Nutrition Follow-Up Needed?: Dietitian to reassess per policy  Follow up Comment: protein

## 2024-02-24 PROBLEM — K63.1 PERFORATED BOWEL (MULTI): Status: RESOLVED | Noted: 2024-02-06 | Resolved: 2024-02-24

## 2024-02-24 PROBLEM — D64.9 ANEMIA: Status: RESOLVED | Noted: 2024-02-07 | Resolved: 2024-02-24

## 2024-02-24 PROBLEM — K66.8 PNEUMOPERITONEUM: Status: RESOLVED | Noted: 2024-02-06 | Resolved: 2024-02-24

## 2024-02-24 LAB
ALBUMIN SERPL BCP-MCNC: 2.8 G/DL (ref 3.4–5)
ALP SERPL-CCNC: 142 U/L (ref 33–120)
ALT SERPL W P-5'-P-CCNC: 66 U/L (ref 10–52)
ANION GAP SERPL CALC-SCNC: 13 MMOL/L (ref 10–20)
AST SERPL W P-5'-P-CCNC: 30 U/L (ref 9–39)
BILIRUB SERPL-MCNC: 0.3 MG/DL (ref 0–1.2)
BUN SERPL-MCNC: 28 MG/DL (ref 6–23)
CALCIUM SERPL-MCNC: 8 MG/DL (ref 8.6–10.6)
CHLORIDE SERPL-SCNC: 99 MMOL/L (ref 98–107)
CO2 SERPL-SCNC: 26 MMOL/L (ref 21–32)
CREAT SERPL-MCNC: 0.65 MG/DL (ref 0.5–1.3)
EGFRCR SERPLBLD CKD-EPI 2021: >90 ML/MIN/1.73M*2
ERYTHROCYTE [DISTWIDTH] IN BLOOD BY AUTOMATED COUNT: 14.4 % (ref 11.5–14.5)
GLUCOSE BLD MANUAL STRIP-MCNC: 101 MG/DL (ref 74–99)
GLUCOSE BLD MANUAL STRIP-MCNC: 101 MG/DL (ref 74–99)
GLUCOSE BLD MANUAL STRIP-MCNC: 133 MG/DL (ref 74–99)
GLUCOSE BLD MANUAL STRIP-MCNC: 145 MG/DL (ref 74–99)
GLUCOSE BLD MANUAL STRIP-MCNC: 184 MG/DL (ref 74–99)
GLUCOSE SERPL-MCNC: 143 MG/DL (ref 74–99)
HCT VFR BLD AUTO: 28.4 % (ref 41–52)
HGB BLD-MCNC: 9.2 G/DL (ref 13.5–17.5)
MAGNESIUM SERPL-MCNC: 1.97 MG/DL (ref 1.6–2.4)
MCH RBC QN AUTO: 27.1 PG (ref 26–34)
MCHC RBC AUTO-ENTMCNC: 32.4 G/DL (ref 32–36)
MCV RBC AUTO: 84 FL (ref 80–100)
NRBC BLD-RTO: 0 /100 WBCS (ref 0–0)
PLATELET # BLD AUTO: 659 X10*3/UL (ref 150–450)
POTASSIUM SERPL-SCNC: 4.2 MMOL/L (ref 3.5–5.3)
PROT SERPL-MCNC: 6.6 G/DL (ref 6.4–8.2)
RBC # BLD AUTO: 3.39 X10*6/UL (ref 4.5–5.9)
SODIUM SERPL-SCNC: 134 MMOL/L (ref 136–145)
WBC # BLD AUTO: 10.4 X10*3/UL (ref 4.4–11.3)

## 2024-02-24 PROCEDURE — 82947 ASSAY GLUCOSE BLOOD QUANT: CPT

## 2024-02-24 PROCEDURE — 2500000001 HC RX 250 WO HCPCS SELF ADMINISTERED DRUGS (ALT 637 FOR MEDICARE OP): Performed by: PHYSICIAN ASSISTANT

## 2024-02-24 PROCEDURE — C9113 INJ PANTOPRAZOLE SODIUM, VIA: HCPCS

## 2024-02-24 PROCEDURE — 80053 COMPREHEN METABOLIC PANEL: CPT | Performed by: PODIATRIST

## 2024-02-24 PROCEDURE — 2500000004 HC RX 250 GENERAL PHARMACY W/ HCPCS (ALT 636 FOR OP/ED)

## 2024-02-24 PROCEDURE — 85027 COMPLETE CBC AUTOMATED: CPT | Performed by: PODIATRIST

## 2024-02-24 PROCEDURE — 36415 COLL VENOUS BLD VENIPUNCTURE: CPT | Performed by: PODIATRIST

## 2024-02-24 PROCEDURE — 1100000001 HC PRIVATE ROOM DAILY

## 2024-02-24 PROCEDURE — 83735 ASSAY OF MAGNESIUM: CPT | Performed by: PODIATRIST

## 2024-02-24 PROCEDURE — 2500000001 HC RX 250 WO HCPCS SELF ADMINISTERED DRUGS (ALT 637 FOR MEDICARE OP): Performed by: PODIATRIST

## 2024-02-24 RX ORDER — ACETAMINOPHEN 325 MG/1
650 TABLET ORAL EVERY 6 HOURS
Status: DISCONTINUED | OUTPATIENT
Start: 2024-02-24 | End: 2024-02-26 | Stop reason: HOSPADM

## 2024-02-24 RX ADMIN — OXYCODONE HYDROCHLORIDE 5 MG: 5 SOLUTION ORAL at 21:13

## 2024-02-24 RX ADMIN — ACETAMINOPHEN 650 MG: 325 TABLET ORAL at 13:17

## 2024-02-24 RX ADMIN — PANTOPRAZOLE SODIUM 40 MG: 40 INJECTION, POWDER, FOR SOLUTION INTRAVENOUS at 21:14

## 2024-02-24 RX ADMIN — OXYCODONE HYDROCHLORIDE 5 MG: 5 SOLUTION ORAL at 03:48

## 2024-02-24 RX ADMIN — ACETAMINOPHEN 650 MG: 325 TABLET ORAL at 21:13

## 2024-02-24 RX ADMIN — PANTOPRAZOLE SODIUM 40 MG: 40 INJECTION, POWDER, FOR SOLUTION INTRAVENOUS at 08:25

## 2024-02-24 RX ADMIN — Medication 5 MG: at 21:13

## 2024-02-24 RX ADMIN — ENOXAPARIN SODIUM 40 MG: 100 INJECTION SUBCUTANEOUS at 21:14

## 2024-02-24 ASSESSMENT — COGNITIVE AND FUNCTIONAL STATUS - GENERAL
CLIMB 3 TO 5 STEPS WITH RAILING: A LITTLE
DAILY ACTIVITIY SCORE: 24
DAILY ACTIVITIY SCORE: 24
MOBILITY SCORE: 23
MOBILITY SCORE: 24

## 2024-02-24 ASSESSMENT — PAIN SCALES - GENERAL
PAINLEVEL_OUTOF10: 6
PAINLEVEL_OUTOF10: 7
PAINLEVEL_OUTOF10: 4
PAINLEVEL_OUTOF10: 1

## 2024-02-24 ASSESSMENT — PAIN - FUNCTIONAL ASSESSMENT
PAIN_FUNCTIONAL_ASSESSMENT: 0-10
PAIN_FUNCTIONAL_ASSESSMENT: 0-10

## 2024-02-24 NOTE — CARE PLAN
The patient's goals for the shift include Unable to state patient intubated    The clinical goals for the shift include Pain control      Problem: Pain  Goal: My pain/discomfort is manageable  2/24/2024 1657 by Lexi Barney RN  Outcome: Progressing  2/24/2024 1656 by Lexi Barney RN  Outcome: Progressing     Problem: Safety  Goal: Patient will be injury free during hospitalization  2/24/2024 1657 by Lexi Barney RN  Outcome: Progressing  2/24/2024 1656 by Lexi Barney RN  Outcome: Progressing  Goal: I will remain free of falls  2/24/2024 1657 by Lexi Barney RN  Outcome: Progressing  2/24/2024 1656 by Lexi Barney RN  Outcome: Progressing     Problem: Daily Care  Goal: Daily care needs are met  2/24/2024 1657 by Lexi Barney RN  Outcome: Progressing  2/24/2024 1656 by Lexi Barney RN  Outcome: Progressing     Problem: Psychosocial Needs  Goal: Demonstrates ability to cope with hospitalization/illness  2/24/2024 1657 by Lexi Barney RN  Outcome: Progressing  2/24/2024 1656 by Lexi Barney RN  Outcome: Progressing  Goal: Collaborate with me, my family, and caregiver to identify my specific goals  2/24/2024 1657 by Lexi Barney RN  Outcome: Progressing  2/24/2024 1656 by Lexi Barney RN  Outcome: Progressing     Problem: Discharge Barriers  Goal: My discharge needs are met  2/24/2024 1657 by Lexi Barney RN  Outcome: Progressing  2/24/2024 1656 by Lexi Barney RN  Outcome: Progressing     Problem: Skin  Goal: Decreased wound size/increased tissue granulation at next dressing change  2/24/2024 1657 by Lexi Barney RN  Outcome: Progressing  2/24/2024 1656 by Lexi Barney RN  Outcome: Progressing  Goal: Participates in plan/prevention/treatment measures  2/24/2024 1657 by Lexi Barney RN  Outcome: Progressing  2/24/2024 1656 by Lexi Barney RN  Outcome: Progressing  Goal: Prevent/manage excess moisture  2/24/2024 1657 by Lexi COUCH  Ector, JENNIFER  Outcome: Progressing  2/24/2024 1656 by Lexi Barney RN  Outcome: Progressing  Goal: Prevent/minimize sheer/friction injuries  2/24/2024 1657 by Lexi Barney, RN  Outcome: Progressing  2/24/2024 1656 by Lexi Barney RN  Outcome: Progressing  Goal: Promote/optimize nutrition  2/24/2024 1657 by Lexi Barnye, RN  Outcome: Progressing  2/24/2024 1656 by Lexi Barney, RN  Outcome: Progressing  Goal: Promote skin healing  2/24/2024 1657 by Lexi Barney, RN  Outcome: Progressing  2/24/2024 1656 by Lexi Barney, RN  Outcome: Progressing     Problem: Fall/Injury  Goal: Not fall by end of shift  2/24/2024 1657 by Lexi Barney, RN  Outcome: Progressing  2/24/2024 1656 by Lexi Barney, RN  Outcome: Progressing  Goal: Be free from injury by end of the shift  2/24/2024 1657 by Lexi Barney, RN  Outcome: Progressing  2/24/2024 1656 by Lexi Barney, RN  Outcome: Progressing  Goal: Verbalize understanding of personal risk factors for fall in the hospital  2/24/2024 1657 by Lexi Barney, RN  Outcome: Progressing  2/24/2024 1656 by Lexi Barney, RN  Outcome: Progressing  Goal: Verbalize understanding of risk factor reduction measures to prevent injury from fall in the home  2/24/2024 1657 by Lexi Barney, RN  Outcome: Progressing  2/24/2024 1656 by Lexi Barney, RN  Outcome: Progressing  Goal: Use assistive devices by end of the shift  2/24/2024 1657 by Lexi Barney, RN  Outcome: Progressing  2/24/2024 1656 by Lexi Barney, RN  Outcome: Progressing  Goal: Pace activities to prevent fatigue by end of the shift  2/24/2024 1657 by Lexi Barney, RN  Outcome: Progressing  2/24/2024 1656 by Lexi Barney RN  Outcome: Progressing     Problem: Safety - Medical Restraint  Goal: Remains free of injury from restraints (Restraint for Interference with Medical Device)  2/24/2024 1657 by Lexi Barney, RN  Outcome: Progressing  2/24/2024 1656 by Lexi COUCH  JENNIFER Barney  Outcome: Progressing  Goal: Free from restraint(s) (Restraint for Interference with Medical Device)  2/24/2024 1657 by Lexi Barney RN  Outcome: Progressing  2/24/2024 1656 by Lexi Barney RN  Outcome: Progressing     Problem: Pain - Adult  Goal: Verbalizes/displays adequate comfort level or baseline comfort level  2/24/2024 1657 by Lexi Barney RN  Outcome: Progressing  2/24/2024 1656 by Lexi Barney RN  Outcome: Progressing     Problem: Safety - Adult  Goal: Free from fall injury  2/24/2024 1657 by Lexi Barney RN  Outcome: Progressing  2/24/2024 1656 by Lexi Barney RN  Outcome: Progressing     Problem: Discharge Planning  Goal: Discharge to home or other facility with appropriate resources  2/24/2024 1657 by Lexi Barney RN  Outcome: Progressing  2/24/2024 1656 by Lexi Barney RN  Outcome: Progressing     Problem: Chronic Conditions and Co-morbidities  Goal: Patient's chronic conditions and co-morbidity symptoms are monitored and maintained or improved  2/24/2024 1657 by Lexi Barney RN  Outcome: Progressing  2/24/2024 1656 by Lexi Barney RN  Outcome: Progressing     Problem: Knowledge Deficit  Goal: Patient/family/caregiver demonstrates understanding of disease process, treatment plan, medications, and discharge instructions  2/24/2024 1657 by Lexi Barney RN  Outcome: Progressing  2/24/2024 1656 by Lexi Barney RN  Outcome: Progressing     Problem: Mechanical Ventilation  Goal: Patient Will Maintain Patent Airway  2/24/2024 1657 by Lexi Barney RN  Outcome: Progressing  2/24/2024 1656 by Lexi Barney RN  Outcome: Progressing  Goal: Oral health is maintained or improved  2/24/2024 1657 by Lexi Barney RN  Outcome: Progressing  2/24/2024 1656 by Lexi Barney RN  Outcome: Progressing  Goal: Tracheostomy will be managed safely  2/24/2024 1657 by Lexi Barney RN  Outcome: Progressing  2/24/2024 1656 by Lexi COUCH  JENNIFER Barney  Outcome: Progressing  Goal: ET tube will be managed safely  2/24/2024 1657 by Lexi Barney RN  Outcome: Progressing  2/24/2024 1656 by Lexi Barney RN  Outcome: Progressing  Goal: Ability to express needs and understand communication  2/24/2024 1657 by Lexi Barney RN  Outcome: Progressing  2/24/2024 1656 by Lexi Barney RN  Outcome: Progressing  Goal: Mobility/activity is maintained at optimum level for patient  2/24/2024 1657 by Lexi Barney RN  Outcome: Progressing  2/24/2024 1656 by Lexi Barney RN  Outcome: Progressing     Problem: Pain  Goal: Takes deep breaths with improved pain control throughout the shift  2/24/2024 1657 by Lexi Barney RN  Outcome: Progressing  2/24/2024 1656 by Lexi Barney RN  Outcome: Progressing  Goal: Turns in bed with improved pain control throughout the shift  2/24/2024 1657 by Lexi Barney RN  Outcome: Progressing  2/24/2024 1656 by Lexi Barney RN  Outcome: Progressing  Goal: Walks with improved pain control throughout the shift  2/24/2024 1657 by Lexi Barney RN  Outcome: Progressing  2/24/2024 1656 by Lexi Barney RN  Outcome: Progressing  Goal: Performs ADL's with improved pain control throughout shift  2/24/2024 1657 by Lexi Barney RN  Outcome: Progressing  2/24/2024 1656 by Lexi Barney RN  Outcome: Progressing  Goal: Participates in PT with improved pain control throughout the shift  2/24/2024 1657 by Lexi Barney RN  Outcome: Progressing  2/24/2024 1656 by Lexi Barney RN  Outcome: Progressing  Goal: Free from opioid side effects throughout the shift  2/24/2024 1657 by Lexi Barney RN  Outcome: Progressing  2/24/2024 1656 by Lexi Barney RN  Outcome: Progressing  Goal: Free from acute confusion related to pain meds throughout the shift  2/24/2024 1657 by Lexi Barney RN  Outcome: Progressing  2/24/2024 1656 by Lexi Barney RN  Outcome: Progressing

## 2024-02-24 NOTE — CARE PLAN
The patient's goals for the shift include tolerate TF   The clinical goals for the shift include Pain control    Over the shift, the patient did not make progress toward the following goals. Barriers to progression include n/a. Recommendations to address these barriers include n/a.

## 2024-02-24 NOTE — PROGRESS NOTES
[facility]  ACUTE CARE SURGERY - PROGRESS NOTE    Patient Name: Trevor Yarbrough  MRN: [id number]  Admit Date: 206  : 1970  AGE: 53 y.o.   GENDER: male  ==============================================================================  TODAY'S ASSESSMENT AND PLAN OF CARE:  54yo M with hx of presumed insulinoma and toupet fundoplication  with Dr. Tracy who presented with peritonitis and pneumoperitoneum. Found to have gastric perforation in lesser curve.      24: exlap, subtotal gastrectomy  24: washout  24: antrectomy, Billroth 2 reconstruction; fascia closed, skin open with wound vac placed    Course c/b superficial dehiscence of superior aspect of midline laparotomy and duodenal stump leak controlled with intra-abdominal drains. Feeds via dohboff that is distal to G-J.     PLAN  - Scheduled PO tylenol and IV robaxin, PRN oxy 2.5/5 and breakthrough dilaudid 0.4  - IS, ambulate TID  - Dobhoff feeds cycled, regular diet, high protein and calorie ensures, IV PPI BID, wound care consult for assistance with dehiscence management  - SSI TID with meals  - no antibiotics indicated at this time  - lovenox 40 dvt ppx     Seen with Attending, Dr. Solares.     Dylon Webster MD   Pager [phone]     ==============================================================================  CHIEF COMPLAINT / EVENTS LAST 24HRS / HPI:  Drank 2 protein shakes, beef, yogurt   Passing gas, having BM    Denies fevers, chills, chest pain, SOB, n/v    MEDICAL HISTORY / ROS:   Admission history and ROS reviewed. Pertinent changes as follows:  See above     PHYSICAL EXAM:  Heart Rate:  [77-96]   Temp:  [36.1 °C (97 °F)-36.8 °C (98.2 °F)]   Resp:  [17-18]   BP: (102-122)/(62-73)   SpO2:  [92 %-97 %]   Physical Exam    Physical Exam     Constitutional- no acute distress  Cards- regular rate   Resp- nonlabored breathing on room air   Abdomen- soft, not tender, not distended; midline laparotomy incision  with healthy granulation; superior aspect of incision with dehiscence and low volume bilious drainage; Xeroform over defect and WTD over rest of incision; both abdominal drains with bilious output  Extremities- ambulating easily  Skin- warm, dry   Neuro- alert and oriented x3   Psych- appropriate mood   Tubes/lines- dobhoff with TF, PIV     IMAGING SUMMARY:  (summary of new imaging findings, not a copy of dictation)  none    LABS:  Results from last 7 days   Lab Units 02/24/24  0541 02/23/24  0523 02/22/24  0621   WBC AUTO x10*3/uL 10.4 9.4 10.5   HEMOGLOBIN g/dL 9.2* 9.3* 9.1*   HEMATOCRIT % 28.4* 29.2* 28.4*   PLATELETS AUTO x10*3/uL 659* 695* 754*         Results from last 7 days   Lab Units 02/24/24  0541 02/23/24  0523 02/22/24  0621   SODIUM mmol/L 134* 133* 133*   POTASSIUM mmol/L 4.2 4.4 4.4   CHLORIDE mmol/L 99 98 98   CO2 mmol/L 26 26 27   BUN mg/dL 28* 26* 22   CREATININE mg/dL 0.65 0.68 0.68   CALCIUM mg/dL 8.0* 8.2* 8.4*   PROTEIN TOTAL g/dL 6.6 6.5 6.6   BILIRUBIN TOTAL mg/dL 0.3 0.3 0.4   ALK PHOS U/L 142* 140* 139*   ALT U/L 66* 74* 66*   AST U/L 30 39 34   GLUCOSE mg/dL 143* 128* 114*     Results from last 7 days   Lab Units 02/24/24  0541 02/23/24  0523 02/22/24  0621   BILIRUBIN TOTAL mg/dL 0.3 0.3 0.4           I have reviewed all medications, laboratory results, and imaging pertinent for today's encounter.

## 2024-02-25 LAB
ALBUMIN SERPL BCP-MCNC: 2.8 G/DL (ref 3.4–5)
ALP SERPL-CCNC: 122 U/L (ref 33–120)
ALT SERPL W P-5'-P-CCNC: 64 U/L (ref 10–52)
ANION GAP SERPL CALC-SCNC: 9 MMOL/L (ref 10–20)
AST SERPL W P-5'-P-CCNC: 33 U/L (ref 9–39)
BILIRUB SERPL-MCNC: 0.2 MG/DL (ref 0–1.2)
BUN SERPL-MCNC: 26 MG/DL (ref 6–23)
CALCIUM SERPL-MCNC: 8.7 MG/DL (ref 8.6–10.6)
CHLORIDE SERPL-SCNC: 99 MMOL/L (ref 98–107)
CO2 SERPL-SCNC: 31 MMOL/L (ref 21–32)
CREAT SERPL-MCNC: 0.68 MG/DL (ref 0.5–1.3)
EGFRCR SERPLBLD CKD-EPI 2021: >90 ML/MIN/1.73M*2
ERYTHROCYTE [DISTWIDTH] IN BLOOD BY AUTOMATED COUNT: 14.3 % (ref 11.5–14.5)
GLUCOSE BLD MANUAL STRIP-MCNC: 104 MG/DL (ref 74–99)
GLUCOSE BLD MANUAL STRIP-MCNC: 113 MG/DL (ref 74–99)
GLUCOSE BLD MANUAL STRIP-MCNC: 134 MG/DL (ref 74–99)
GLUCOSE BLD MANUAL STRIP-MCNC: 139 MG/DL (ref 74–99)
GLUCOSE BLD MANUAL STRIP-MCNC: 140 MG/DL (ref 74–99)
GLUCOSE SERPL-MCNC: 132 MG/DL (ref 74–99)
HCT VFR BLD AUTO: 28.4 % (ref 41–52)
HGB BLD-MCNC: 8.8 G/DL (ref 13.5–17.5)
MAGNESIUM SERPL-MCNC: 2 MG/DL (ref 1.6–2.4)
MCH RBC QN AUTO: 26.2 PG (ref 26–34)
MCHC RBC AUTO-ENTMCNC: 31 G/DL (ref 32–36)
MCV RBC AUTO: 85 FL (ref 80–100)
NRBC BLD-RTO: 0 /100 WBCS (ref 0–0)
PLATELET # BLD AUTO: 612 X10*3/UL (ref 150–450)
POTASSIUM SERPL-SCNC: 4.3 MMOL/L (ref 3.5–5.3)
PROT SERPL-MCNC: 6.4 G/DL (ref 6.4–8.2)
RBC # BLD AUTO: 3.36 X10*6/UL (ref 4.5–5.9)
SODIUM SERPL-SCNC: 135 MMOL/L (ref 136–145)
WBC # BLD AUTO: 7.8 X10*3/UL (ref 4.4–11.3)

## 2024-02-25 PROCEDURE — 83735 ASSAY OF MAGNESIUM: CPT | Performed by: PODIATRIST

## 2024-02-25 PROCEDURE — 85027 COMPLETE CBC AUTOMATED: CPT | Performed by: PODIATRIST

## 2024-02-25 PROCEDURE — 1100000001 HC PRIVATE ROOM DAILY

## 2024-02-25 PROCEDURE — 82947 ASSAY GLUCOSE BLOOD QUANT: CPT

## 2024-02-25 PROCEDURE — 2500000001 HC RX 250 WO HCPCS SELF ADMINISTERED DRUGS (ALT 637 FOR MEDICARE OP): Performed by: PHYSICIAN ASSISTANT

## 2024-02-25 PROCEDURE — 36415 COLL VENOUS BLD VENIPUNCTURE: CPT | Performed by: PODIATRIST

## 2024-02-25 PROCEDURE — 80053 COMPREHEN METABOLIC PANEL: CPT | Performed by: PODIATRIST

## 2024-02-25 PROCEDURE — 2500000001 HC RX 250 WO HCPCS SELF ADMINISTERED DRUGS (ALT 637 FOR MEDICARE OP): Performed by: PODIATRIST

## 2024-02-25 PROCEDURE — 2500000004 HC RX 250 GENERAL PHARMACY W/ HCPCS (ALT 636 FOR OP/ED)

## 2024-02-25 PROCEDURE — C9113 INJ PANTOPRAZOLE SODIUM, VIA: HCPCS

## 2024-02-25 RX ADMIN — Medication 5 MG: at 21:11

## 2024-02-25 RX ADMIN — ACETAMINOPHEN 650 MG: 325 TABLET ORAL at 21:06

## 2024-02-25 RX ADMIN — ENOXAPARIN SODIUM 40 MG: 100 INJECTION SUBCUTANEOUS at 21:06

## 2024-02-25 RX ADMIN — ACETAMINOPHEN 650 MG: 325 TABLET ORAL at 14:07

## 2024-02-25 RX ADMIN — ACETAMINOPHEN 650 MG: 325 TABLET ORAL at 08:18

## 2024-02-25 RX ADMIN — ACETAMINOPHEN 650 MG: 325 TABLET ORAL at 02:05

## 2024-02-25 RX ADMIN — PANTOPRAZOLE SODIUM 40 MG: 40 INJECTION, POWDER, FOR SOLUTION INTRAVENOUS at 21:06

## 2024-02-25 RX ADMIN — PANTOPRAZOLE SODIUM 40 MG: 40 INJECTION, POWDER, FOR SOLUTION INTRAVENOUS at 08:18

## 2024-02-25 RX ADMIN — OXYCODONE HYDROCHLORIDE 5 MG: 5 SOLUTION ORAL at 21:11

## 2024-02-25 ASSESSMENT — COGNITIVE AND FUNCTIONAL STATUS - GENERAL
MOBILITY SCORE: 24
DAILY ACTIVITIY SCORE: 24

## 2024-02-25 ASSESSMENT — PAIN SCALES - GENERAL: PAINLEVEL_OUTOF10: 7

## 2024-02-25 NOTE — CARE PLAN
The patient's goals for the shift include Unable to state patient intubated    Problem: Pain  Goal: My pain/discomfort is manageable  Outcome: Progressing     Problem: Safety  Goal: Patient will be injury free during hospitalization  Outcome: Progressing  Goal: I will remain free of falls  Outcome: Progressing     Problem: Daily Care  Goal: Daily care needs are met  Outcome: Progressing     Problem: Psychosocial Needs  Goal: Demonstrates ability to cope with hospitalization/illness  Outcome: Progressing  Goal: Collaborate with me, my family, and caregiver to identify my specific goals  Outcome: Progressing     Problem: Discharge Barriers  Goal: My discharge needs are met  Outcome: Progressing     Problem: Skin  Goal: Decreased wound size/increased tissue granulation at next dressing change  Outcome: Progressing  Goal: Participates in plan/prevention/treatment measures  Outcome: Progressing  Goal: Prevent/manage excess moisture  Outcome: Progressing  Goal: Prevent/minimize sheer/friction injuries  Outcome: Progressing  Goal: Promote/optimize nutrition  Outcome: Progressing  Goal: Promote skin healing  Outcome: Progressing     Problem: Fall/Injury  Goal: Not fall by end of shift  Outcome: Progressing  Goal: Be free from injury by end of the shift  Outcome: Progressing  Goal: Verbalize understanding of personal risk factors for fall in the hospital  Outcome: Progressing  Goal: Verbalize understanding of risk factor reduction measures to prevent injury from fall in the home  Outcome: Progressing  Goal: Use assistive devices by end of the shift  Outcome: Progressing  Goal: Pace activities to prevent fatigue by end of the shift  Outcome: Progressing     Problem: Safety - Medical Restraint  Goal: Remains free of injury from restraints (Restraint for Interference with Medical Device)  Outcome: Progressing  Goal: Free from restraint(s) (Restraint for Interference with Medical Device)  Outcome: Progressing     Problem: Pain -  Adult  Goal: Verbalizes/displays adequate comfort level or baseline comfort level  Outcome: Progressing     Problem: Safety - Adult  Goal: Free from fall injury  Outcome: Progressing     Problem: Discharge Planning  Goal: Discharge to home or other facility with appropriate resources  Outcome: Progressing     Problem: Chronic Conditions and Co-morbidities  Goal: Patient's chronic conditions and co-morbidity symptoms are monitored and maintained or improved  Outcome: Progressing     Problem: Knowledge Deficit  Goal: Patient/family/caregiver demonstrates understanding of disease process, treatment plan, medications, and discharge instructions  Outcome: Progressing     Problem: Mechanical Ventilation  Goal: Patient Will Maintain Patent Airway  Outcome: Progressing  Goal: Oral health is maintained or improved  Outcome: Progressing  Goal: Tracheostomy will be managed safely  Outcome: Progressing  Goal: ET tube will be managed safely  Outcome: Progressing  Goal: Ability to express needs and understand communication  Outcome: Progressing  Goal: Mobility/activity is maintained at optimum level for patient  Outcome: Progressing     Problem: Pain  Goal: Takes deep breaths with improved pain control throughout the shift  Outcome: Progressing  Goal: Turns in bed with improved pain control throughout the shift  Outcome: Progressing  Goal: Walks with improved pain control throughout the shift  Outcome: Progressing  Goal: Performs ADL's with improved pain control throughout shift  Outcome: Progressing  Goal: Participates in PT with improved pain control throughout the shift  Outcome: Progressing  Goal: Free from opioid side effects throughout the shift  Outcome: Progressing  Goal: Free from acute confusion related to pain meds throughout the shift  Outcome: Progressing     The clinical goals for the shift include pt pain will be controlled this shift

## 2024-02-26 ENCOUNTER — HOME HEALTH ADMISSION (OUTPATIENT)
Dept: HOME HEALTH SERVICES | Facility: HOME HEALTH | Age: 54
End: 2024-02-26
Payer: COMMERCIAL

## 2024-02-26 ENCOUNTER — DOCUMENTATION (OUTPATIENT)
Dept: HOME HEALTH SERVICES | Facility: HOME HEALTH | Age: 54
End: 2024-02-26
Payer: COMMERCIAL

## 2024-02-26 ENCOUNTER — PHARMACY VISIT (OUTPATIENT)
Dept: PHARMACY | Facility: CLINIC | Age: 54
End: 2024-02-26
Payer: COMMERCIAL

## 2024-02-26 VITALS
WEIGHT: 162.04 LBS | RESPIRATION RATE: 18 BRPM | OXYGEN SATURATION: 96 % | DIASTOLIC BLOOD PRESSURE: 68 MMHG | BODY MASS INDEX: 22.69 KG/M2 | SYSTOLIC BLOOD PRESSURE: 106 MMHG | TEMPERATURE: 97.7 F | HEART RATE: 84 BPM | HEIGHT: 71 IN

## 2024-02-26 LAB
GLUCOSE BLD MANUAL STRIP-MCNC: 101 MG/DL (ref 74–99)
GLUCOSE BLD MANUAL STRIP-MCNC: 118 MG/DL (ref 74–99)
GLUCOSE BLD MANUAL STRIP-MCNC: 121 MG/DL (ref 74–99)
GLUCOSE BLD MANUAL STRIP-MCNC: 122 MG/DL (ref 74–99)
GLUCOSE BLD MANUAL STRIP-MCNC: 89 MG/DL (ref 74–99)

## 2024-02-26 PROCEDURE — 2500000001 HC RX 250 WO HCPCS SELF ADMINISTERED DRUGS (ALT 637 FOR MEDICARE OP): Performed by: PHYSICIAN ASSISTANT

## 2024-02-26 PROCEDURE — RXMED WILLOW AMBULATORY MEDICATION CHARGE

## 2024-02-26 PROCEDURE — 82947 ASSAY GLUCOSE BLOOD QUANT: CPT

## 2024-02-26 PROCEDURE — 2500000004 HC RX 250 GENERAL PHARMACY W/ HCPCS (ALT 636 FOR OP/ED)

## 2024-02-26 PROCEDURE — C9113 INJ PANTOPRAZOLE SODIUM, VIA: HCPCS

## 2024-02-26 RX ORDER — PANTOPRAZOLE SODIUM 40 MG/1
40 TABLET, DELAYED RELEASE ORAL 2 TIMES DAILY
Qty: 60 TABLET | Refills: 0 | Status: SHIPPED | OUTPATIENT
Start: 2024-02-26 | End: 2024-04-23 | Stop reason: ALTCHOICE

## 2024-02-26 RX ORDER — OXYCODONE HCL 5 MG/5 ML
5 SOLUTION, ORAL ORAL EVERY 4 HOURS PRN
Qty: 90 ML | Refills: 0 | Status: SHIPPED | OUTPATIENT
Start: 2024-02-26 | End: 2024-03-02

## 2024-02-26 RX ADMIN — PANTOPRAZOLE SODIUM 40 MG: 40 INJECTION, POWDER, FOR SOLUTION INTRAVENOUS at 08:25

## 2024-02-26 RX ADMIN — OXYCODONE HYDROCHLORIDE 5 MG: 5 SOLUTION ORAL at 05:27

## 2024-02-26 RX ADMIN — ACETAMINOPHEN 650 MG: 325 TABLET ORAL at 13:31

## 2024-02-26 ASSESSMENT — COGNITIVE AND FUNCTIONAL STATUS - GENERAL
DAILY ACTIVITIY SCORE: 24
MOBILITY SCORE: 24

## 2024-02-26 ASSESSMENT — PAIN SCALES - GENERAL: PAINLEVEL_OUTOF10: 4

## 2024-02-26 ASSESSMENT — PAIN - FUNCTIONAL ASSESSMENT: PAIN_FUNCTIONAL_ASSESSMENT: 0-10

## 2024-02-26 NOTE — PROGRESS NOTES
Discharge Planning Note:      Trevor Yarbrough is a 53 y.o. male on day 20 of admission presenting with Pneumoperitoneum.     Patient set to discharge today with [facility]. TF script sent to [facility], wound care and therapy to home. HC pending same day SOC.       Addendum 2/26/24 @ 1249 [facility] is out of network with his insurance. Sent referral to [facility] for review and delivery.       Addendum 2/26/24 @ 211pm Called [facility] [phone] to inquire if they can accept insurance and delivery of TF. Informed to fax information to [phone] for review  takes 24-48hrs will rush order for discharge. Team and patient made aware.     Addendum 2/27/24 @ 917am Call placed to [facility] [phone] , for update on TF. They are still processing with his insurance and will contact patient with any co pays needed once complete. Informed [facility] the patient discharged yesterday and will need to change the location of delivery to the patients home.         Shawnee Larose RN  [facility]

## 2024-02-26 NOTE — HOSPITAL COURSE
54yo M with history of persumed insulinoma and toupe fundoplication in 2021 with Dr. Tracy who presented with peritonitis and pneumoperitoneum. Found to have a gastric perforation in lesser curve. Patient taken to the OR as listed below:     2/6/24: exlap, subtotal gastrectomy  2/7/24: washout  2/9/24: antrectomy, Billroth 2 reconstruction; fascia closed, skin open with wound vac placed    His postop course was complicated by superficial fascial dehiscence of superior aspect of midline laparotomy and duodenal stump leak controlled with intra-abdominal drains. Feeds via dobhoff that is distal to G-J.     Patient now tolerating a regular diet without n/v. He will remain on nocturnal tube feeds at night for additional nutritional support. His drains will remain in place at discharge to monitor duodenal stump leak. Patient encouraged to monitor output.   Midline wound seen and evaluated by wound care RN and adaptic applied over dehiscence and lightly packed with Aquacel Silver Rope to be changed daily.     Patient is being discharged home with home care for PT/OT and Skilled Nursing. He is to have close follow up in 1 week for postop check.

## 2024-02-26 NOTE — PROGRESS NOTES
[facility]  ACUTE CARE SURGERY - PROGRESS NOTE    Patient Name: Trevor Yarbrough  MRN: [id number]  Admit Date: 206  : 1970  AGE: 53 y.o.   GENDER: male  ==============================================================================  TODAY'S ASSESSMENT AND PLAN OF CARE:  52yo M with hx of presumed insulinoma and toupet fundoplication  with Dr. Tracy who presented with peritonitis and pneumoperitoneum. Found to have gastric perforation in lesser curve.      24: exlap, subtotal gastrectomy  24: washout  24: antrectomy, Billroth 2 reconstruction; fascia closed, skin open with wound vac placed    Course c/b superficial dehiscence of superior aspect of midline laparotomy and duodenal stump leak controlled with intra-abdominal drains. Feeds via dohboff that is distal to G-J.     PLAN  - Scheduled PO tylenol and IV robaxin, PRN oxy 2.5/5 and breakthrough dilaudid 0.4  - IS, ambulate TID  - Dobhoff feeds cycled, regular diet, high protein and calorie ensures, IV PPI BID, wound care consult for assistance with dehiscence management  - SSI TID with meals  - no antibiotics indicated at this time  - lovenox 40 dvt ppx   - no labs needed for tomorrow     Seen with Attending, Dr. Solares.     Dylon Webster MD   Pager [phone]     ==============================================================================  CHIEF COMPLAINT / EVENTS LAST 24HRS / HPI:  Eating high protein meals   Passing gas, having BM    Denies fevers, chills, chest pain, SOB, n/v    MEDICAL HISTORY / ROS:   Admission history and ROS reviewed. Pertinent changes as follows:  See above     PHYSICAL EXAM:  Heart Rate:  [78-90]   Temp:  [36.3 °C (97.3 °F)-37.3 °C (99.1 °F)]   Resp:  [16-18]   BP: (107-112)/(66-71)   SpO2:  [94 %-99 %]   Physical Exam    Physical Exam     Constitutional- no acute distress  Cards- regular rate   Resp- nonlabored breathing on room air   Abdomen- soft, not tender, not distended;  midline laparotomy incision with healthy granulation; superior aspect of incision with dehiscence and low volume bilious drainage - no bowel seen; Xeroform over defect and WTD over rest of incision; both abdominal drains with bilious output; abdominal drains with bilious/purulent output  Extremities- ambulating easily  Skin- warm, dry   Neuro- alert and oriented x3   Psych- appropriate mood   Tubes/lines- dobhoff with TF, PIV     IMAGING SUMMARY:  (summary of new imaging findings, not a copy of dictation)  none    LABS:  Results from last 7 days   Lab Units 02/25/24  0633 02/24/24  0541 02/23/24  0523   WBC AUTO x10*3/uL 7.8 10.4 9.4   HEMOGLOBIN g/dL 8.8* 9.2* 9.3*   HEMATOCRIT % 28.4* 28.4* 29.2*   PLATELETS AUTO x10*3/uL 612* 659* 695*           Results from last 7 days   Lab Units 02/25/24  0633 02/24/24  0541 02/23/24  0523   SODIUM mmol/L 135* 134* 133*   POTASSIUM mmol/L 4.3 4.2 4.4   CHLORIDE mmol/L 99 99 98   CO2 mmol/L 31 26 26   BUN mg/dL 26* 28* 26*   CREATININE mg/dL 0.68 0.65 0.68   CALCIUM mg/dL 8.7 8.0* 8.2*   PROTEIN TOTAL g/dL 6.4 6.6 6.5   BILIRUBIN TOTAL mg/dL 0.2 0.3 0.3   ALK PHOS U/L 122* 142* 140*   ALT U/L 64* 66* 74*   AST U/L 33 30 39   GLUCOSE mg/dL 132* 143* 128*       Results from last 7 days   Lab Units 02/25/24  0633 02/24/24  0541 02/23/24  0523   BILIRUBIN TOTAL mg/dL 0.2 0.3 0.3             I have reviewed all medications, laboratory results, and imaging pertinent for today's encounter.

## 2024-02-26 NOTE — HH CARE COORDINATION
Home Care received a Referral for Nursing, Physical Therapy, and Home Health Aide. We have processed the referral for a Start of Care on 02/26.     If you have any questions or concerns, please feel free to contact us at [phone]. Follow the prompts, enter your five digit zip code, and you will be directed to your care team on [facility] 1.

## 2024-02-26 NOTE — DISCHARGE SUMMARY
Discharge Diagnosis  Pneumoperitoneum    Issues Requiring Follow-Up  Postop follow up     Test Results Pending At Discharge  Pending Labs       Order Current Status    Amylase, Fluid Collected (02/13/24 0651)            Hospital Course  54yo M with history of persumed insulinoma and toupe fundoplication in 2021 with Dr. Tracy who presented with peritonitis and pneumoperitoneum. Found to have a gastric perforation in lesser curve. Patient taken to the OR as listed below:     2/6/24: exlap, subtotal gastrectomy  2/7/24: washout  2/9/24: antrectomy, Billroth 2 reconstruction; fascia closed, skin open with wound vac placed    His postop course was complicated by superficial fascial dehiscence of superior aspect of midline laparotomy and duodenal stump leak controlled with intra-abdominal drains. Feeds via dobhoff that is distal to G-J.     Patient now tolerating a regular diet without n/v. He will remain on nocturnal tube feeds at night for additional nutritional support. His drains will remain in place at discharge to monitor duodenal stump leak. Patient encouraged to monitor output.   Midline wound seen and evaluated by wound care RN and adaptic applied over dehiscence and lightly packed with Aquacel Silver Rope to be changed daily.     Patient is being discharged home with home care for PT/OT and Skilled Nursing. He is to have close follow up in 1 week for postop check.         Pertinent Physical Exam At Time of Discharge  Physical Exam  Constitutional:       Appearance: Normal appearance.   HENT:      Nose:      Comments: Dobhoff in place     Mouth/Throat:      Mouth: Mucous membranes are moist.   Eyes:      Pupils: Pupils are equal, round, and reactive to light.   Cardiovascular:      Comments: Non cyanotic   Pulmonary:      Effort: Pulmonary effort is normal.   Abdominal:      Comments: Soft, non distended, appropriately tender to palpitation, midline incision with superior end with small fascial dehiscence.  Remainder of wound is pink and clean. Xeroform applied to dehiscence and remainder of wound packed with wet to moist kerlix.   Jps in place with inferior drain thick yellow/bilious output. Superior ROSEMARIE with minimal output in bulb   Musculoskeletal:         General: Normal range of motion.   Skin:     General: Skin is warm and dry.   Neurological:      Mental Status: He is alert and oriented to person, place, and time.   Psychiatric:         Mood and Affect: Mood normal.         Behavior: Behavior normal.         Home Medications     Medication List      START taking these medications     oxyCODONE 5 mg/5 mL solution; Commonly known as: Roxicodone; Take 5 mL   (5 mg) by mouth every 4 hours if needed (CPOT > 4) for up to 5 days.   pantoprazole 40 mg EC tablet; Commonly known as: ProtoNix; Take 1 tablet   (40 mg) by mouth 2 times a day. Do not crush, chew, or split.     CONTINUE taking these medications     atorvastatin 10 mg tablet; Commonly known as: Lipitor   FreeStyle Sunita 3 Sensor device; Generic drug: blood-glucose sensor;   CHANGE EVERY 14 DAYS     STOP taking these medications     diazoxide 50 mg/mL suspension; Commonly known as: Proglycem   octreotide 100 mcg/mL injection; Commonly known as: SandoSTATIN       Outpatient Follow-Up  Future Appointments   Date Time Provider Department Center   2/27/2024 To Be Determined Tommy Holliday PT [facility]   2/28/2024 To Be Determined Alina Valles OT [facility]   3/15/2024  9:00 AM Naren Vega MD [id number]   3/19/2024  3:20 PM Dusty Birmingham MD [id number]       Gerardo Flores, APRN-CNP

## 2024-02-26 NOTE — PROGRESS NOTES
[facility]  ACUTE CARE SURGERY - PROGRESS NOTE    Patient Name: Trevor Yarbrough  MRN: [id number]  Admit Date: 206  : 1970  AGE: 53 y.o.   GENDER: male  ==============================================================================  TODAY'S ASSESSMENT AND PLAN OF CARE:  Mr. Yarbrough is a 53 year old male with as history of insulinoma and fundoplication who presented with peritonitis c/b gastric perforation. During patients hospital course he required the following operations:     24: exlap, subtotal gastrectomy  24: washout  24: antrectomy, Billroth 2 reconstruction; fascia closed, skin open with wound vac placed     CT : c/f leak near GJ anastomosis based on radiology read. Upper GI study confirms integrity of the anastomosis. Additionally, Gatorade (blue dye) test showed no leak. Patient clinically stable with no leukocytosis.     Likely discharge today.      Plan  NEURO:   # Pain control  - Continue oxycodone 2.5 mg and 5 mg prn for moderate/severe pain respectively  - Continue with Dilaudid 0.4 mg for breakthrough pain  - Continue with scheduled Robaxin 1,000 mg     CV:  # Dyslipidemia  - Home atorvastatin 10 mg held at this time   - Continuous tele monitoring  - Vital signs q4h     RESP:  # No current pulmonary issues  - Encourage incentive spirometry  - OOB ambulate, encouraged patient to sit in chair and ambulate in room  - PT recommending High intensity rehab     GI:  # S/p subtotal gastrectomy with Bilroth 2 reconstruction POD 13  - Continue daily wet to dry dressing over abdominal incision (consisting of Xeroform over superior incision, then moistened kerlix, abd pad)  - Monitor drains output; inferior drain output more gastric in appearance       -> Amylase sent : drain <10, Serum Amylase 28.   - Continue TF to goal of 55 ml/hr. Cycling tubes feeds at night from 5550-5023.   - No crushed meds through dobhoff- only liquids   - Continue soft  foods; incorporate at least 2 protein shakes in diet per day  - Started Ensure Clear with all meals  - Continue IV pantoprazole 40 mg bid     :  - Strict I/Os  - Replete lytes as indicate     ENDO:   # Insulinoma  -Follow up regarding Endocrine consult. Appreciate recs.     - no further hypoglycemia episodes    - Follow up with Endocrine as an outpatient      HEMATOLOGY:   # Acute post-operative anemia  - Given 1 unit of PRBC on 2/12  - Trend H/H daily   - Hgb: 9.3 today      ID:  # Afebrile leukocytosis - resolved 2/22  -  Completed fluc/zosyn x 4 days for intra-abdominal infection  CT A/P obtained 2/13 due to low grade temp, abdominal pain and increasing leukocytosis- no definite leak, adjacent fluid collections around pancreas, thickening of anastomosis   Repeat CT A/P 2/19- c/f GJ leak from radiology but based on patients clinical signs and symptoms no concern for leak at this time. Will continue to monitor clinically.   - Blood cultures 2/13- No growth final report   - Restarted on Zosyn 2/13, completed course on 2/19   - Continue to trend WBC- remains stable at 9.4     DVT ppx:   - SCDs   - On Lovenox 40 mg     MSK:  -OOB  -PT rec mod intensity; Walker ordered     Dispo:   - Continue RNF, plan for home with HHC once medically ready for discharge  - Home health orders placed for 2-3X per week wound changes, for PT and home health aid.     Patient seen and discussed with attending, Dr. Solares.     Aaron Yanes MS3  General Surgery  ACS    Pat Cornell MD  General Surgery  [id number]    ==============================================================================  CHIEF COMPLAINT / EVENTS LAST 24HRS / HPI:  No acute overnight events. He is slowly increasing his amount of po intake as tolerated. Is getting out of bed and sitting in chair. Denies nausea and vomiting. Had formed stool BM overnight    MEDICAL HISTORY / ROS:   Admission history and ROS reviewed.     PHYSICAL EXAM:  VS past 24h  Temp:  [36.2 °C  (97.2 °F)-37.3 °C (99.1 °F)] 36.2 °C (97.2 °F)  Heart Rate:  [78-95] 95  Resp:  [16-18] 18  BP: (102-116)/(64-71) 116/65     I/O past 24h    Intake/Output Summary (Last 24 hours) at 2/26/2024 0922  Last data filed at 2/26/2024 0721  Gross per 24 hour   Intake --   Output 510 ml   Net -510 ml      Physical Exam  Vitals and nursing note reviewed.   Constitutional:       General: He is not in acute distress.     Appearance: Normal appearance.   HENT:      Head: Normocephalic and atraumatic.   Cardiovascular:      Rate and Rhythm: Normal rate and regular rhythm.      Heart sounds: No murmur heard.     No friction rub. No gallop.   Pulmonary:      Effort: Pulmonary effort is normal.      Breath sounds: No wheezing, rhonchi or rales.   Abdominal:      General: Abdomen is flat. There is no distension.      Palpations: Abdomen is soft.      Tenderness: There is no abdominal tenderness.          Comments: Midline incision c/d/i with concern for mild dehiscence at the superior aspect.  Upper drain output 10 ml   Lower drain output 10 ml (cloudy, hazy, yellow/white fluid)    Skin:     General: Skin is warm and dry.      Capillary Refill: Capillary refill takes less than 2 seconds.      Coloration: Skin is not jaundiced.   Neurological:      General: No focal deficit present.      Mental Status: He is alert and oriented to person, place, and time.   Psychiatric:         Mood and Affect: Mood normal.         Behavior: Behavior normal.             LABS:  Results for orders placed or performed during the hospital encounter of 02/06/24 (from the past 24 hour(s))   POCT GLUCOSE   Result Value Ref Range    POCT Glucose 113 (H) 74 - 99 mg/dL   POCT GLUCOSE   Result Value Ref Range    POCT Glucose 104 (H) 74 - 99 mg/dL   POCT GLUCOSE   Result Value Ref Range    POCT Glucose 122 (H) 74 - 99 mg/dL   POCT GLUCOSE   Result Value Ref Range    POCT Glucose 121 (H) 74 - 99 mg/dL   POCT GLUCOSE   Result Value Ref Range    POCT Glucose 118 (H)  74 - 99 mg/dL     MEDICATIONS:  Current Facility-Administered Medications   Medication Dose Route Frequency Provider Last Rate Last Admin    acetaminophen (Tylenol) tablet 650 mg  650 mg oral q6h Dylon Webster MD   650 mg at 02/25/24 2106    benzocaine-menthol (Cepastat Sore Throat) 15-3.6 mg lozenge 2 lozenge  2 lozenge Mouth/Throat q2h PRN Constanza Rod MD   2 lozenge at 02/16/24 0819    dextrose 50 % injection 25 g  25 g intravenous q15 min PRN Constanza Rod MD        enoxaparin (Lovenox) syringe 40 mg  40 mg subcutaneous q24h Constanza Rod MD   40 mg at 02/25/24 2106    fluticasone (Flonase) nasal spray 2 spray  2 spray Each Nostril BID PRN OSMIN Nova-CNP        glucagon (Glucagen) injection 1 mg  1 mg intramuscular q15 min PRN Constanza Rod MD        HYDROmorphone (Dilaudid) injection 0.4 mg  0.4 mg intravenous q4h PRN Cosntanza Rod MD   0.4 mg at 02/18/24 0615    HYDROmorphone (Dilaudid) injection 0.4 mg  0.4 mg intravenous Once Patricia Guzman MD        insulin lispro (HumaLOG) injection 0-5 Units  0-5 Units subcutaneous TID with meals Constanza Rod MD   1 Units at 02/07/24 1319    iohexol (OMNIPaque) 12 mg iodine/mL oral contrast 500 mL  500 mL oral Once in imaging Martin Reyes MD        melatonin tablet 5 mg  5 mg oral Nightly PRN Patricia Guzman MD   5 mg at 02/25/24 2111    methocarbamol (Robaxin) injection 1,000 mg  1,000 mg intravenous q8h Patricia Guzman MD   1,000 mg at 02/20/24 0815    moisturizing mouth (Biotene Dry Mouth) solution 15 mL  15 mL Swish & Spit TID PRN Pat Cornell MD        oxyCODONE (Roxicodone) solution 2.5 mg  2.5 mg oral q4h PRN Allie Antunez PA-C   2.5 mg at 02/20/24 2055    oxyCODONE (Roxicodone) solution 5 mg  5 mg oral q4h PRN Allie Antunez PA-C   5 mg at 02/26/24 0527    oxygen (O2) therapy   inhalation Continuous PRN - O2/gases Constanza Rod MD        pantoprazole (ProtoNix) injection 40 mg  40 mg intravenous BID Constanza Rod MD   40 mg at 02/26/24 3747     phenoL (Chloraseptic) 1.4 % mouth/throat spray 1 spray  1 spray Mouth/Throat q2h PRN Constanza Rod MD        white petrolatum (Aquaphor) ointment   Topical q1h PRN Constanza Rod MD           IMAGING SUMMARY:  (summary of new imaging findings, not a copy of dictation)      I have reviewed all laboratory and imaging results ordered/pertinent for today's encounter

## 2024-02-26 NOTE — CARE PLAN
Problem: Pain  Goal: My pain/discomfort is manageable  Outcome: Progressing   The patient's goals for the shift include rest    The clinical goals for the shift include safety    Over the shift, the patient did make progress toward the following goals.

## 2024-02-26 NOTE — CARE PLAN
Problem: Pain  Goal: My pain/discomfort is manageable  Outcome: Met     Problem: Safety  Goal: Patient will be injury free during hospitalization  Outcome: Met  Goal: I will remain free of falls  Outcome: Met     Problem: Daily Care  Goal: Daily care needs are met  Outcome: Met     Problem: Psychosocial Needs  Goal: Demonstrates ability to cope with hospitalization/illness  Outcome: Met  Goal: Collaborate with me, my family, and caregiver to identify my specific goals  Outcome: Met     Problem: Discharge Barriers  Goal: My discharge needs are met  Outcome: Met     Problem: Skin  Goal: Decreased wound size/increased tissue granulation at next dressing change  Outcome: Met  Goal: Participates in plan/prevention/treatment measures  Outcome: Met  Goal: Prevent/manage excess moisture  Outcome: Met  Goal: Prevent/minimize sheer/friction injuries  Outcome: Met  Goal: Promote/optimize nutrition  Outcome: Met  Goal: Promote skin healing  Outcome: Met     Problem: Fall/Injury  Goal: Not fall by end of shift  Outcome: Met  Goal: Be free from injury by end of the shift  Outcome: Met  Goal: Verbalize understanding of personal risk factors for fall in the hospital  Outcome: Met  Goal: Verbalize understanding of risk factor reduction measures to prevent injury from fall in the home  Outcome: Met  Goal: Use assistive devices by end of the shift  Outcome: Met  Goal: Pace activities to prevent fatigue by end of the shift  Outcome: Met     Problem: Safety - Medical Restraint  Goal: Remains free of injury from restraints (Restraint for Interference with Medical Device)  Outcome: Met  Goal: Free from restraint(s) (Restraint for Interference with Medical Device)  Outcome: Met     Problem: Pain - Adult  Goal: Verbalizes/displays adequate comfort level or baseline comfort level  Outcome: Met     Problem: Safety - Adult  Goal: Free from fall injury  Outcome: Met     Problem: Discharge Planning  Goal: Discharge to home or other facility  with appropriate resources  Outcome: Met     Problem: Chronic Conditions and Co-morbidities  Goal: Patient's chronic conditions and co-morbidity symptoms are monitored and maintained or improved  Outcome: Met     Problem: Knowledge Deficit  Goal: Patient/family/caregiver demonstrates understanding of disease process, treatment plan, medications, and discharge instructions  Outcome: Met     Problem: Mechanical Ventilation  Goal: Patient Will Maintain Patent Airway  Outcome: Met  Goal: Oral health is maintained or improved  Outcome: Met  Goal: Tracheostomy will be managed safely  Outcome: Met  Goal: ET tube will be managed safely  Outcome: Met  Goal: Ability to express needs and understand communication  Outcome: Met  Goal: Mobility/activity is maintained at optimum level for patient  Outcome: Met     Problem: Pain  Goal: Takes deep breaths with improved pain control throughout the shift  Outcome: Met  Goal: Turns in bed with improved pain control throughout the shift  Outcome: Met  Goal: Walks with improved pain control throughout the shift  Outcome: Met  Goal: Performs ADL's with improved pain control throughout shift  Outcome: Met  Goal: Participates in PT with improved pain control throughout the shift  Outcome: Met  Goal: Free from opioid side effects throughout the shift  Outcome: Met  Goal: Free from acute confusion related to pain meds throughout the shift  Outcome: Met   The patient's goals for the shift include Unable to state patient intubated    The clinical goals for the shift include safety  Pt is discharged home. All belongings are with the pt. All medical supplies requested have been delivered. All LDA's were removed.

## 2024-02-26 NOTE — CONSULTS
Wound Care Consult     Visit Date: 2/26/2024      Patient Name: Trevor Yarbrough         MRN: [id number]           YOB: 1970     Reason for Consult: Midline ABD wound         Wound History: 2/6/24: exlap, subtotal gastrectomy  2/7/24: washout  2/9/24: antrectomy, Billroth 2 reconstruction; fascia closed, skin open with wound vac placed     Wound Assessment:  Wound 02/06/24 Incision Abdomen Lower;Medial (Active)   Site Assessment Red;Granulation 02/26/24 1409   Rosa Maria-Wound Assessment Clean;Dry;Intact 02/26/24 1409   Shape linear 02/26/24 1409   Wound Length (cm) 15 cm 02/26/24 1409   Wound Width (cm) 3 cm 02/26/24 1409   Wound Surface Area (cm^2) 45 cm^2 02/26/24 1409   Wound Depth (cm) 3 cm 02/26/24 1409   Wound Volume (cm^3) 135 cm^3 02/26/24 1409   State of Healing Early/partial granulation 02/26/24 1409   Margins Well-defined edges 02/26/24 1409   Closure Dehisced;Open to air 02/26/24 1409   Sutures/Staple Line Non approximated 02/26/24 1409   Drainage Description Serosanguineous 02/26/24 1409   Drainage Amount Small 02/26/24 1409   Dressing Non adherent;Hydrofiber 02/26/24 1409   Dressing Changed New 02/26/24 1409   Dressing Status Clean;Dry 02/26/24 1409       Wound Team Summary Assessment: The wound was cleansed with normal saline.  The area of concern is on the proximal portion of the wound bedside that is at the fascial layer. A nonadherent silicone gauze was applied over the area and Aquacel Ag rope was used to fill the wound and covered with an ABD pad.       Wound Team Plan:   Recommendation: Daily   Cleanse the wound with normal saline or soap and water   Apply adaptic nonadherent gauze the proximal abd midline wound   Apply Aquacel rope to the wound bed   Cover with an ABD pad.     Zbigniew Cohen RN-BC, CWON  2/26/2024  2:10 PM

## 2024-02-27 ENCOUNTER — HOME CARE VISIT (OUTPATIENT)
Dept: HOME HEALTH SERVICES | Facility: HOME HEALTH | Age: 54
End: 2024-02-27
Payer: COMMERCIAL

## 2024-02-27 ENCOUNTER — PATIENT OUTREACH (OUTPATIENT)
Dept: CARE COORDINATION | Facility: CLINIC | Age: 54
End: 2024-02-27
Payer: COMMERCIAL

## 2024-02-27 VITALS
HEART RATE: 84 BPM | TEMPERATURE: 98.1 F | HEIGHT: 71 IN | WEIGHT: 162 LBS | OXYGEN SATURATION: 94 % | RESPIRATION RATE: 16 BRPM | BODY MASS INDEX: 22.68 KG/M2

## 2024-02-27 PROCEDURE — 0023 HH SOC

## 2024-02-27 PROCEDURE — G0151 HHCP-SERV OF PT,EA 15 MIN: HCPCS

## 2024-02-27 PROCEDURE — G0299 HHS/HOSPICE OF RN EA 15 MIN: HCPCS

## 2024-02-27 ASSESSMENT — ENCOUNTER SYMPTOMS
TROUBLE SWALLOWING: 1
PERSON REPORTING PAIN: PATIENT
PAIN LOCATION - PAIN DURATION: CONSTANT
HIGHEST PAIN SEVERITY IN PAST 24 HOURS: 3/10
PAIN LOCATION - RELIEVING FACTORS: REST
FATIGUE: 1
DOUBLE VISION: 1
PAIN: 1
COUGH CHARACTERISTICS: DRY
HIGHEST PAIN SEVERITY IN PAST 24 HOURS: 5/10
STOOL FREQUENCY: DAILY
SUBJECTIVE PAIN PROGRESSION: UNCHANGED
PAIN LOCATION - PAIN FREQUENCY: CONSTANT
LOWEST PAIN SEVERITY IN PAST 24 HOURS: 2/10
PAIN LOCATION: ABDOMEN
MUSCLE WEAKNESS: 1
PAIN LOCATION - PAIN SEVERITY: 3/10
LOSS OF SENSATION IN FEET: 1
PAIN SEVERITY GOAL: 0/10
PAIN: 1
PAIN LOCATION - RELIEVING FACTORS: TYLENOL
PAIN LOCATION - PAIN SEVERITY: 3/10
LAST BOWEL MOVEMENT: 66897
PAIN LOCATION - EXACERBATING FACTORS: ACTIVITY
PAIN LOCATION - PAIN QUALITY: ACHING
FATIGUES EASILY: 1
BLURRED VISION: 1
PERSON REPORTING PAIN: PATIENT
OCCASIONAL FEELINGS OF UNSTEADINESS: 1
ABDOMINAL PAIN: 1
SUBJECTIVE PAIN PROGRESSION: GRADUALLY IMPROVING
COUGH: 1
UNPLANNED WEIGHT LOSS: 1
DEPRESSION: 0
PAIN LOCATION: ABDOMEN
FLATUS: 1

## 2024-02-27 ASSESSMENT — PAIN SCALES - PAIN ASSESSMENT IN ADVANCED DEMENTIA (PAINAD)
FACIALEXPRESSION: 0
CONSOLABILITY: 0
CONSOLABILITY: 0 - NO NEED TO CONSOLE.
FACIALEXPRESSION: 0 - SMILING OR INEXPRESSIVE.
BODYLANGUAGE: 0
TOTALSCORE: 0
NEGVOCALIZATION: 0
BREATHING: 0
BODYLANGUAGE: 0 - RELAXED.
NEGVOCALIZATION: 0 - NONE.

## 2024-02-27 ASSESSMENT — ACTIVITIES OF DAILY LIVING (ADL)
OASIS_M1830: 05
AMBULATION ASSISTANCE: 1
ENTERING_EXITING_HOME: MINIMUM ASSIST
CURRENT_FUNCTION: STAND BY ASSIST
ENTERAL_FEEDING_REQUIRES_ASSISTANCE: 1
AMBULATION ASSISTANCE: STAND BY ASSIST

## 2024-02-27 ASSESSMENT — LIFESTYLE VARIABLES: SMOKING_STATUS: HAS NEVER SMOKED

## 2024-02-27 NOTE — PROGRESS NOTES
Call placed, spoke briefly with Trevor who is currently working with home PT.  Requests call back tomorrow

## 2024-02-27 NOTE — HOME HEALTH
Started a new medication, a few hours later I had belly pain.  Two weeks ago, stomach burst.  I was a runner, tried, Working as nurse practicioner, critical care.  Wants to get back to what they were doing.  2 6 24 surgery.  Only acute care.  ICU x 1 week.  3 surgeries.    10 pounds lifting restriction.  3 5 24 Surgeon revisit.   acute care surgery.  Dr. Solares.    TUG 20 seconds.    3 minutes, 400 feet.  Therapy was spotty.  They would come in.  First person was super.  Walking with walker.  Made good progress.  Leg exercises, get up in the chair, how to get out of bed.  walking 4 to 5 times per day in the unit.  Endurance got better.      Circulation, ROM, balance exercises as well as walking.    Patient recommended written exercises of  1.  Seated Ankle pumps  2.  Seated full arc quads  3.  Standing hip extension  4.  Standing hip abduction  5.  Standing knee flexion  6.  Standing toe raises       for up to 20 repetitions, 3 sessions per day.  Patient also indicated he can do 2 minutes duration for each one.

## 2024-02-28 ENCOUNTER — PATIENT OUTREACH (OUTPATIENT)
Dept: CARE COORDINATION | Facility: CLINIC | Age: 54
End: 2024-02-28
Payer: COMMERCIAL

## 2024-02-28 NOTE — PROGRESS NOTES
Medications  Medications reviewed with patient/caregiver?: Yes (2/28/2024  9:50 AM)  Is the patient having any side effects they believe may be caused by any medication additions or changes?: No (2/28/2024  9:50 AM)  Does the patient have all medications ordered at discharge?: Yes (2/28/2024  9:50 AM)  Care Management Interventions: No intervention needed (2/28/2024  9:50 AM)  Is the patient taking all medications as directed (includes completed medication regime)?: Yes (2/28/2024  9:50 AM)    Appointments  Does the patient have a primary care provider?: Yes (2/28/2024  9:50 AM)  Care Management Interventions: Verified appointment date/time/provider (has f/u arranged with sx and endocrine as well) (2/28/2024  9:50 AM)  Has the patient kept scheduled appointments due by today?: Not applicable (2/28/2024  9:50 AM)    Self Management  What is the home health agency?: [facility] (2/28/2024  9:50 AM)  Has home health visited the patient within 72 hours of discharge?: Yes (2/28/2024  9:50 AM)  What Durable Medical Equipment (DME) was ordered?: TF formula, pump and supplies ordered through [facility] (2/28/2024  9:50 AM)  Has all Durable Medical Equipment (DME) been delivered?: Yes (2/28/2024  9:50 AM)    Patient Teaching  Does the patient have access to their discharge instructions?: Yes (2/28/2024  9:50 AM)  Care Management Interventions: Reviewed instructions with patient (2/28/2024  9:50 AM)  What is the patient's perception of their health status since discharge?: Improving (2/28/2024  9:50 AM)    Wrap Up  Wrap Up Additional Comments: Trevor reports he is doing OK, home PT and SN were both out yesterday, RN coming twice weekly for wound care, otherwise Trevor and his wife do the wound care.  His appetite is fair, has HS tube feedings with pump via dobhoff.  He is knowledgeable on checking for proper placement of tube.  He is hopeful that it can come out next week if he is eating enough.  Pain is manageable with tylenol.  He  reports he is getting around a bit.  He has good supports.  He has a CGM and readings have been stable now that he has nocturnal TFs.  All follow up appts are scheduled (2/28/2024  9:50 AM)

## 2024-02-29 ENCOUNTER — HOME CARE VISIT (OUTPATIENT)
Dept: HOME HEALTH SERVICES | Facility: HOME HEALTH | Age: 54
End: 2024-02-29
Payer: COMMERCIAL

## 2024-02-29 VITALS
OXYGEN SATURATION: 97 % | HEART RATE: 87 BPM | TEMPERATURE: 97.8 F | RESPIRATION RATE: 18 BRPM | SYSTOLIC BLOOD PRESSURE: 112 MMHG | DIASTOLIC BLOOD PRESSURE: 62 MMHG

## 2024-02-29 PROCEDURE — G0151 HHCP-SERV OF PT,EA 15 MIN: HCPCS

## 2024-02-29 PROCEDURE — G0152 HHCP-SERV OF OT,EA 15 MIN: HCPCS

## 2024-02-29 ASSESSMENT — ENCOUNTER SYMPTOMS
PAIN SEVERITY GOAL: 0/10
PAIN LOCATION - PAIN QUALITY: ACHING
PAIN: 1
LOWEST PAIN SEVERITY IN PAST 24 HOURS: 3/10
ANGER WITHIN DEFINED LIMITS: 1
PAIN: 1
LOWEST PAIN SEVERITY IN PAST 24 HOURS: 2/10
PAIN SEVERITY GOAL: 0/10
HIGHEST PAIN SEVERITY IN PAST 24 HOURS: 6/10
PAIN LOCATION - EXACERBATING FACTORS: DRESSING CHANGES
HIGHEST PAIN SEVERITY IN PAST 24 HOURS: 4/10
SUBJECTIVE PAIN PROGRESSION: GRADUALLY IMPROVING
AGGRESSION WITHIN DEFINED LIMITS: 1
PERSON REPORTING PAIN: PATIENT
PAIN LOCATION - PAIN SEVERITY: 3/10
SUBJECTIVE PAIN PROGRESSION: WAXING AND WANING

## 2024-02-29 ASSESSMENT — PAIN SCALES - PAIN ASSESSMENT IN ADVANCED DEMENTIA (PAINAD)
FACIALEXPRESSION: 0 - SMILING OR INEXPRESSIVE.
NEGVOCALIZATION: 0
BODYLANGUAGE: 0 - RELAXED.
FACIALEXPRESSION: 0
BREATHING: 0
CONSOLABILITY: 0
BODYLANGUAGE: 0
TOTALSCORE: 0
NEGVOCALIZATION: 0 - NONE.
CONSOLABILITY: 0 - NO NEED TO CONSOLE.

## 2024-02-29 ASSESSMENT — ACTIVITIES OF DAILY LIVING (ADL)
FEEDING_WITHIN_DEFINED_LIMITS: 1
TOILETING: 1
GROOMING_WITHIN_DEFINED_LIMITS: 1
DRESSING_LB_CURRENT_FUNCTION: INDEPENDENT
WASHING_LB_CURRENT_FUNCTION: INDEPENDENT
BATHING_CURRENT_FUNCTION: INDEPENDENT
BATHING ASSESSED: 1
TOILETING: INDEPENDENT

## 2024-02-29 NOTE — HOME HEALTH
Been doing the exercises.  OT is coming at 2pm.  Some pain to the abdomen with some of the exercises, I am up walking more and turning more.   I try to get up every hour and move.  I have been up since 1am.  3 5 24 Surgeon.  Possibility of discontinuation of tubes, dressings, etc.    16 seconds TUG.    Walking no device, 750 feet, over 5 minutes, 15 seconds after which I suggested patient take a rest.      Full review of written exercises of  1.  Ankle pumps  2.  Full arc quads  3.  Standing hip extension  4.  Standing hip abduction  5.  Standing knee flexion  6.  Standing toe raises        up to 20 repetitions, 3 sessions per day.  Patient performed these in sitting and standing as indicated with only minimal demo and verbal cues for completeness and technique and posture.  He demonstrated understanding of my recommendations.

## 2024-03-01 ENCOUNTER — HOME CARE VISIT (OUTPATIENT)
Dept: HOME HEALTH SERVICES | Facility: HOME HEALTH | Age: 54
End: 2024-03-01
Payer: COMMERCIAL

## 2024-03-01 VITALS
TEMPERATURE: 96.7 F | RESPIRATION RATE: 16 BRPM | HEART RATE: 90 BPM | DIASTOLIC BLOOD PRESSURE: 60 MMHG | OXYGEN SATURATION: 97 % | WEIGHT: 161 LBS | BODY MASS INDEX: 22.45 KG/M2 | SYSTOLIC BLOOD PRESSURE: 110 MMHG

## 2024-03-01 PROCEDURE — G0299 HHS/HOSPICE OF RN EA 15 MIN: HCPCS

## 2024-03-01 SDOH — ECONOMIC STABILITY: FOOD INSECURITY: MEALS PER DAY: 3

## 2024-03-01 ASSESSMENT — ENCOUNTER SYMPTOMS
PAIN: 1
LOWEST PAIN SEVERITY IN PAST 24 HOURS: 1/10
HIGHEST PAIN SEVERITY IN PAST 24 HOURS: 3/10
PAIN LOCATION - PAIN FREQUENCY: INTERMITTENT
PERSON REPORTING PAIN: PATIENT
CONSTIPATION: 1
PAIN LOCATION - PAIN QUALITY: ACHE
PAIN LOCATION - EXACERBATING FACTORS: MOVEMENT, DRESSING CHANGE
PAIN SEVERITY GOAL: 0/10
APPETITE LEVEL: FAIR
PAIN LOCATION: ABDOMEN
SUBJECTIVE PAIN PROGRESSION: WAXING AND WANING
PAIN LOCATION - PAIN SEVERITY: 3/10
CHANGE IN APPETITE: INCREASED
PAIN LOCATION - RELIEVING FACTORS: REST, PAIN MEDS
LAST BOWEL MOVEMENT: 66899
STOOL FREQUENCY: DAILY
PAIN LOCATION - PAIN DURATION: DAILY

## 2024-03-01 ASSESSMENT — ACTIVITIES OF DAILY LIVING (ADL)
CURRENT_FUNCTION: STAND BY ASSIST
AMBULATION ASSISTANCE: STAND BY ASSIST

## 2024-03-01 ASSESSMENT — PAIN SCALES - PAIN ASSESSMENT IN ADVANCED DEMENTIA (PAINAD)
NEGVOCALIZATION: 0 - NONE.
CONSOLABILITY: 0 - NO NEED TO CONSOLE.
BODYLANGUAGE: 0 - RELAXED.
FACIALEXPRESSION: 0
TOTALSCORE: 0
CONSOLABILITY: 0
BODYLANGUAGE: 0
FACIALEXPRESSION: 0 - SMILING OR INEXPRESSIVE.
NEGVOCALIZATION: 0
BREATHING: 0

## 2024-03-04 ENCOUNTER — HOME CARE VISIT (OUTPATIENT)
Dept: HOME HEALTH SERVICES | Facility: HOME HEALTH | Age: 54
End: 2024-03-04
Payer: COMMERCIAL

## 2024-03-04 PROCEDURE — G0151 HHCP-SERV OF PT,EA 15 MIN: HCPCS

## 2024-03-04 ASSESSMENT — ENCOUNTER SYMPTOMS
HIGHEST PAIN SEVERITY IN PAST 24 HOURS: 5/10
LOWEST PAIN SEVERITY IN PAST 24 HOURS: 0/10
PAIN LOCATION - PAIN QUALITY: SORENESS
PERSON REPORTING PAIN: PATIENT
PAIN LOCATION: ABDOMEN
PAIN SEVERITY GOAL: 0/10
SUBJECTIVE PAIN PROGRESSION: GRADUALLY IMPROVING
PAIN LOCATION - PAIN SEVERITY: 3/10
PAIN: 1

## 2024-03-04 NOTE — HOME HEALTH
Soreness, heat/cold, eating, neuro system, chills after I eat.   I was trying to interact more with the kids over the weekend.   3 5 24 Surgeon. Possibility of discontinuation of tubes, dressings, etc.  Last surgery 3 plus weeks ago.   I tried squats yesterday and went down about half.      14 seconds TUG.     Walking no device, 500 feet, over 4 minutes, 30 seconds after which I suggested patient take a rest. Patient also got in and out of the passenger side of his [employer] using good technique and even avoided using the grab bar in the truck.    Full review of written exercises of   1. Ankle pumps   2. Full arc quads   3. Standing hip extension   4. Standing hip abduction   5. Standing knee flexion   6. Standing toe raises up to 20 repetitions, 3 sessions per day.  No change today but discussed possible addition of ankle weights to be used with all current exercises.          Patient performed these in sitting and standing as indicated with only minimal demo and verbal cues for completeness and technique and posture. He demonstrated understanding of my recommendations.

## 2024-03-05 ENCOUNTER — HOME CARE VISIT (OUTPATIENT)
Dept: HOME HEALTH SERVICES | Facility: HOME HEALTH | Age: 54
End: 2024-03-05
Payer: COMMERCIAL

## 2024-03-05 ENCOUNTER — CLINICAL SUPPORT (OUTPATIENT)
Dept: SURGERY | Facility: CLINIC | Age: 54
End: 2024-03-05
Payer: COMMERCIAL

## 2024-03-05 VITALS
OXYGEN SATURATION: 98 % | HEART RATE: 91 BPM | HEIGHT: 71 IN | BODY MASS INDEX: 23.1 KG/M2 | SYSTOLIC BLOOD PRESSURE: 105 MMHG | WEIGHT: 165 LBS | TEMPERATURE: 97.6 F | DIASTOLIC BLOOD PRESSURE: 70 MMHG

## 2024-03-05 VITALS
OXYGEN SATURATION: 99 % | RESPIRATION RATE: 20 BRPM | DIASTOLIC BLOOD PRESSURE: 60 MMHG | HEART RATE: 100 BPM | SYSTOLIC BLOOD PRESSURE: 106 MMHG | TEMPERATURE: 97.5 F

## 2024-03-05 DIAGNOSIS — Z90.3 HISTORY OF PARTIAL GASTRECTOMY: Primary | ICD-10-CM

## 2024-03-05 DIAGNOSIS — Z09 POSTOP CHECK: ICD-10-CM

## 2024-03-05 PROCEDURE — G0300 HHS/HOSPICE OF LPN EA 15 MIN: HCPCS

## 2024-03-05 RX ORDER — DOCUSATE SODIUM 100 MG/1
100 CAPSULE, LIQUID FILLED ORAL 2 TIMES DAILY
COMMUNITY

## 2024-03-05 ASSESSMENT — ENCOUNTER SYMPTOMS
PAIN SEVERITY GOAL: 0/10
SUBJECTIVE PAIN PROGRESSION: WAXING AND WANING
LAST BOWEL MOVEMENT: 66904
HIGHEST PAIN SEVERITY IN PAST 24 HOURS: 4/10
STOOL FREQUENCY: DAILY
CHANGE IN APPETITE: INCREASED
PAIN: 1
SHORTNESS OF BREATH: 1
TROUBLE SWALLOWING: 1
APPETITE LEVEL: FAIR
CONSTIPATION: 1
LOWEST PAIN SEVERITY IN PAST 24 HOURS: 0/10
DYSPNEA ACTIVITY LEVEL: AFTER AMBULATING 10 - 20 FT

## 2024-03-05 ASSESSMENT — PAIN SCALES - GENERAL: PAINLEVEL: 0-NO PAIN

## 2024-03-05 NOTE — PROGRESS NOTES
[facility]  TRAUMA CLINIC PROGRESS NOTE    Patient Name: Trevor Yarbrough  MRN: [id number]  Admit Date:   : 1970  AGE: 53 y.o.   GENDER: male  ==============================================================================  CHIEF COMPLAINT:   Post operative appointment      OTHER MEDICAL PROBLEMS:  insulinoma   fundoplication     INCIDENTAL FINDINGS:  NA    PROCEDURES:  24: exlap, subtotal gastrectomy  24: washout  24: antrectomy, Billroth 2 reconstruction; fascia closed, skin open with wound vac placed   : CT:  c/f leak near GJ anastomosis based on radiology read. Upper GI study confirms integrity of the anastomosis. Additionally, Gatorade (blue dye) test showed no leak.     PATHOLOGY:  24:   FINAL DIAGNOSIS   A. Stomach, gastrectomy:  Portion of stomach with necrosis, transmural defect (perforation), acute inflammation and bacterial colonies morphologically consistent with Sarcina, and submucosal organizing thrombi (see comment).      . 2024:       Component    FINAL DIAGNOSIS   A. Stomach, antrum, resection:  Duodenum and stomach with serosal fibrinoinflammatory reaction.     B. Stomach, gastrectomy:  Necrotic portion of stomach with submucosal organizing thrombi.     C. Omentum, resection:  Inflamed fibroadipose tissue with necrosis, organizing thrombi, and hemorrhage.        ==============================================================================  TODAY'S ASSESSMENT AND PLAN OF CARE:  WOUND CARE  - Take daily showers  - Allow warm, soapy water to wash over wound  - Do not scrub at the wound  - When out of the shower, gently pat the wound dry.  - Do not apply lotions, ointments or creams  - Avoid soaking in bodies of water (bathtub, hot tubs, pools, lakes, etc) until wound is completely healed  - No heavy lifting > 15 lbs until 6 weeks after surgery    PACKING THE WOUND  - after your shower take aquacell and place in the wound bed  - cover  the packed area with a 4x4 and paper tape.     3. DRAINS  - both drains still have murky and odorous drainage  - both drains kept in place and asked the patient to start to record drain output    4. CONSTIPATION  - please start taking Miralax daily.     5.  FOLLOW UP/CALL  - 03/19/2024 trauma/ACS follow up for wound check  - Return to clinic or ER sooner if pt. has any development of erythema, drainage, swelling, pain, fevers, or chills  - If you have questions or concerns that are not urgent, please feel free to call  [phone].  - Call [phone] to make additional appointment(s) as needed if unable to reschedule in office today    ==============================================================================  HISTORY OF PRESENT ILLNESS  52yo M with history of persumed insulinoma and toupe fundoplication in 2021 with Dr. Tracy who presented with peritonitis and pneumoperitoneum. Found to have a gastric perforation in lesser curve. Patient taken to the OR as listed below:      2/6/24: exlap, subtotal gastrectomy  2/7/24: washout  2/9/24: antrectomy, Billroth 2 reconstruction; fascia closed, skin open with wound vac placed     His postop course was complicated by superficial fascial dehiscence of superior aspect of midline laparotomy and duodenal stump leak controlled with intra-abdominal drains. Feeds via dobhoff that is distal to G-J.      Patient now tolerating a regular diet without n/v. He will remain on nocturnal tube feeds at night for additional nutritional support. His drains will remain in place at discharge to monitor duodenal stump leak. Patient encouraged to monitor output.   Midline wound seen and evaluated by wound care RN and adaptic applied over dehiscence and lightly packed with Aquacel Silver Rope to be changed daily.      Patient is being discharged home with home care for PT/OT and Skilled Nursing. He is to have close follow up in 1 week for postop check.      Patient continues voiding  and having flatus, bowel movements. Continues with his 2 drains  He and his wife have been doing the dressing changes, also a wound Care RN comes to assist at home      MEDICAL HISTORY / ROS:  Admission history and ROS reviewed.   Patient denies:  fevers; chills; headache;  dizziness; chest pain; shortness of breath; nausea/vomiting/diarrhea/constipation; new/worsening abdominal pain or numbness/tingling/weakness of extremities.   Pertinent changes as follows:  Patient states that he has not had a bowel movement in 3 days only taking 100 mc f colace twice daily.     PHYSICAL EXAM:  GCS 15, A+OX3, RRR, S1, S2, CTA=, no increased WOB. Abd soft, nt, nd. MAEx4, SENAIT 5/5 x4, no extremity edema noted. 2+pp. MEGAN inferior portion is healing well and beginning to scab. The superior portion is still open, slight drainage noted.     LABS:  No results found for this or any previous visit (from the past 24 hour(s)).  MEDICATIONS:  Current Outpatient Medications   Medication Sig Dispense Refill    docusate sodium (Colace) 100 mg capsule Take 1 capsule (100 mg) by mouth 2 times a day.      pantoprazole (ProtoNix) 40 mg EC tablet Take 1 tablet (40 mg) by mouth 2 times a day. Do not crush, chew, or split. 60 tablet 0    atorvastatin (Lipitor) 10 mg tablet Take 1 tablet (10 mg) by mouth once daily.      blood-glucose sensor device CHANGE EVERY 14 DAYS (Patient not taking: Reported on 3/5/2024) 2 each 11     No current facility-administered medications for this visit.       IMAGING SUMMARY:  (summary of new imaging findings, not a copy of dictation)  NA    I have reviewed all laboratory and imaging results ordered/pertinent for today's encounter.

## 2024-03-06 NOTE — PROGRESS NOTES
[facility]  TRAUMA CLINIC PROGRESS NOTE    Patient Name: Trevor Yarbrough  MRN: [id number]  Admit Date:   : 1970  AGE: 53 y.o.   GENDER: male  ==============================================================================  CHIEF COMPLAINT:   Post operative appointment     OTHER MEDICAL PROBLEMS:  insulinoma   fundoplication      INCIDENTAL FINDINGS:  NA     PROCEDURES:  24: exlap, subtotal gastrectomy  24: washout  24: antrectomy, Billroth 2 reconstruction; fascia closed, skin open with wound vac placed   : CT:  c/f leak near GJ anastomosis based on radiology read. Upper GI study confirms integrity of the anastomosis. Additionally, Gatorade (blue dye) test showed no leak.      PATHOLOGY:  24:   FINAL DIAGNOSIS   A. Stomach, gastrectomy:  Portion of stomach with necrosis, transmural defect (perforation), acute inflammation and bacterial colonies morphologically consistent with Sarcina, and submucosal organizing thrombi (see comment).       2. 2024:   FINAL DIAGNOSIS   A. Stomach, antrum, resection:  Duodenum and stomach with serosal fibrinoinflammatory reaction.     B. Stomach, gastrectomy:  Necrotic portion of stomach with submucosal organizing thrombi.     C. Omentum, resection:  Inflamed fibroadipose tissue with necrosis, organizing thrombi, and hemorrhage.        ==============================================================================  TODAY'S ASSESSMENT AND PLAN OF CARE:  WOUND CARE, abdomen  Can continue with aquacell in the wound bed after showers  Daily dressing changes  - Take daily showers  - Allow warm, soapy water to wash over wound  - Do not scrub at the wound  - When out of the shower, gently pat the wound dry.  - Do not apply lotions, ointments or creams  - Avoid soaking in bodies of water (bathtub, hot tubs, pools, lakes, etc) until wound is completely healed    2.. DRAINS  Continue recording drain output  Follow up in 3 weeks to  either consider dc 1 drain or imaging if needed    3. FOLLOW UP/CALL  - Follow up in 3 weeks trauma/ACS follow up, drain check, wound check  - Return to clinic or ER sooner if pt. has any development of erythema, drainage, swelling, pain, fevers, or chills  - If you have questions or concerns that are not urgent, please feel free to call  [phone].  - Call [phone] to make additional appointment(s) as needed if unable to reschedule in office today    ==============================================================================  HISTORY OF PRESENT ILLNESS  52yo M with history of persumed insulinoma and toupe fundoplication in 2021 with Dr. Tracy who presented with peritonitis and pneumoperitoneum. Found to have a gastric perforation in lesser curve. Patient taken to the OR as listed below:      2/6/24: exlap, subtotal gastrectomy  2/7/24: washout  2/9/24: antrectomy, Billroth 2 reconstruction; fascia closed, skin open with wound vac placed     His postop course was complicated by superficial fascial dehiscence of superior aspect of midline laparotomy and duodenal stump leak controlled with intra-abdominal drains. Feeds via dobhoff that is distal to G-J.      Patient now tolerating a regular diet without n/v. He will remain on nocturnal tube feeds at night for additional nutritional support. His drains will remain in place at discharge to monitor duodenal stump leak. Patient encouraged to monitor output.   Midline wound seen and evaluated by wound care RN and adaptic applied over dehiscence and lightly packed with Aquacel Silver Rope to be changed daily.      Patient is being discharged home with home care for PT/OT and Skilled Nursing. He is to have close follow up in 1 week for postop check.     At his appointment on 03/05/2024 both drains were left in place due to output and odor, patient asked to record output. Dobhoff was also left in place. The plan was to cut the nocturnal tube feed in half for 3  days and then discontinue for 3 days. If patient is able to maintain blood glucose then the Dobhoff can be removed. If blood glucose is not within normal limits he will restart the tube feeds.    Since his last clinic visit, patient dobhoff has been removed, he has been tolerating PO nutrition and keeping his weight.  Patient is eating, drinking, voiding and having flatus, bowel movements.     He and his wife , along with HC have been doing dressing changes to his abdominal wound    They have also been recording output of drains      MEDICAL HISTORY / ROS:  Admission history and ROS reviewed.   Patient denies:  fevers; chills; headache;  dizziness; chest pain; shortness of breath; nausea/vomiting/diarrhea/constipation; new/worsening abdominal pain or numbness/tingling/weakness of extremities.   Pertinent changes as follows:  none    PHYSICAL EXAM:  GCS 15, A+OX3, RRR, S1, S2, CTA=, no increased WOB.     Abd soft, nt, nd. Abdominal wound is healing, there is minimal erythema at the superior aspect  Otherwise no more packing indicated    2 drains  1 drain with 5 ml daily output, mostly serous, minimally cloudy output  1 drain with 5 ml daily output, mucopurulent output    MAEx4, SENAIT 5/5 x4, no extremity edema noted. 2+pp.       LABS:  No results found for this or any previous visit (from the past 24 hour(s)).  MEDICATIONS:  Current Outpatient Medications   Medication Sig Dispense Refill    atorvastatin (Lipitor) 10 mg tablet Take 1 tablet (10 mg) by mouth once daily.      blood-glucose sensor device CHANGE EVERY 14 DAYS (Patient not taking: Reported on 3/5/2024) 2 each 11    docusate sodium (Colace) 100 mg capsule Take 1 capsule (100 mg) by mouth 2 times a day.      pantoprazole (ProtoNix) 40 mg EC tablet Take 1 tablet (40 mg) by mouth 2 times a day. Do not crush, chew, or split. 60 tablet 0     No current facility-administered medications for this visit.       IMAGING SUMMARY:  (summary of new imaging findings, not a  copy of dictation)  NA    I have reviewed all laboratory and imaging results ordered/pertinent for today's encounter.

## 2024-03-07 ENCOUNTER — TELEPHONE (OUTPATIENT)
Dept: PRIMARY CARE | Facility: CLINIC | Age: 54
End: 2024-03-07
Payer: COMMERCIAL

## 2024-03-07 ENCOUNTER — HOME CARE VISIT (OUTPATIENT)
Dept: HOME HEALTH SERVICES | Facility: HOME HEALTH | Age: 54
End: 2024-03-07
Payer: COMMERCIAL

## 2024-03-07 PROCEDURE — G0151 HHCP-SERV OF PT,EA 15 MIN: HCPCS

## 2024-03-07 ASSESSMENT — ENCOUNTER SYMPTOMS
PAIN SEVERITY GOAL: 0/10
SUBJECTIVE PAIN PROGRESSION: GRADUALLY IMPROVING
LOWEST PAIN SEVERITY IN PAST 24 HOURS: 1/10
PAIN LOCATION - PAIN SEVERITY: 3/10
PAIN LOCATION: ABDOMEN
PERSON REPORTING PAIN: PATIENT
PAIN: 1
HIGHEST PAIN SEVERITY IN PAST 24 HOURS: 3/10

## 2024-03-07 NOTE — TELEPHONE ENCOUNTER
She is calling b/c the insurance has denied his formula that he is using in his feeding tube. They need an appeal to be done. It must say tube feeding is his sole feeding source. Mouth feeding is only for pleasure feeding.     #[phone] Ext [phone]  Denial Benefit exclusion #[id number]

## 2024-03-07 NOTE — HOME HEALTH
5.5 hours of sleep last night, most in a month.  Visit with surgeon this past Tuesday, tubes stay in for a few more weeks.  Returning in two weeks.  No falls, I do the stairs a lot more.  Up and down the stairs three times already.   4 weeks post op now.      13 seconds TUG.     Walking no device, 1000 feet, over about 10 minutes, after which I suggested patient take a rest.    Added 2 lbs. ankle weights to both ankles.  Full review of written exercises of   1. Ankle pumps   2. Full arc quads   3. Standing hip extension   4. Standing hip abduction   5. Standing knee flexion   6. Standing toe raises up to 20 repetitions, 3 sessions per day. He did that today with the 2 lb. ankle weights in place.  He indicated that he liked the resistance.    Patient performed these in sitting and standing as indicated with only minimal demo and verbal cues for completeness and technique and posture. He demonstrated understanding of my recommendations.

## 2024-03-07 NOTE — TELEPHONE ENCOUNTER
MD Monika Mart MA  Caller: Unspecified (Today,  9:44 AM)  This needs to go to his other physicians probably general surgery I have not seen this osmin in over a year          Previous Messages    Prior Authorization  (Newest Message First)  View All Conversations on this Encounter   Lexi Darden MD  You6 hours ago (10:49 AM)       This needs to go to his other physicians probably general surgery I have not seen this osmin in over a year      You routed conversation to Lexi Darden MD6 hours ago (10:24 AM)      Christine Lloyd routed conversation to Do G[facility] Clinical Support Staff7 hours ago (9:50 AM)     Christine Lloyd7 hours ago (9:50 AM)     CF  She is calling b/c the insurance has denied his formula that he is using in his feeding tube. They need an appeal to be done. It must say tube feeding is his sole feeding source. Mouth feeding is only for pleasure feeding.      Ph#[phone] Ext [phone]  Denial Benefit exclusion #[id number]            Note        Shraddha Dickerson [phone]  Christine Lloyd7 hours ago (9:44 AM)   Spoke with Shraddha from [facility]; she will contact general surgery. [place]

## 2024-03-08 ENCOUNTER — HOME CARE VISIT (OUTPATIENT)
Dept: HOME HEALTH SERVICES | Facility: HOME HEALTH | Age: 54
End: 2024-03-08
Payer: COMMERCIAL

## 2024-03-08 VITALS
DIASTOLIC BLOOD PRESSURE: 70 MMHG | RESPIRATION RATE: 18 BRPM | HEART RATE: 88 BPM | OXYGEN SATURATION: 97 % | SYSTOLIC BLOOD PRESSURE: 98 MMHG | TEMPERATURE: 97.3 F

## 2024-03-08 PROCEDURE — G0300 HHS/HOSPICE OF LPN EA 15 MIN: HCPCS

## 2024-03-08 ASSESSMENT — ENCOUNTER SYMPTOMS
DENIES PAIN: 1
APPETITE LEVEL: FAIR
SORE THROAT: 1
CHANGE IN APPETITE: INCREASED
LAST BOWEL MOVEMENT: 66907

## 2024-03-12 ENCOUNTER — HOME CARE VISIT (OUTPATIENT)
Dept: HOME HEALTH SERVICES | Facility: HOME HEALTH | Age: 54
End: 2024-03-12
Payer: COMMERCIAL

## 2024-03-12 VITALS
TEMPERATURE: 96.5 F | RESPIRATION RATE: 16 BRPM | HEART RATE: 88 BPM | DIASTOLIC BLOOD PRESSURE: 60 MMHG | SYSTOLIC BLOOD PRESSURE: 102 MMHG | OXYGEN SATURATION: 98 %

## 2024-03-12 PROCEDURE — G0299 HHS/HOSPICE OF RN EA 15 MIN: HCPCS

## 2024-03-12 PROCEDURE — G0151 HHCP-SERV OF PT,EA 15 MIN: HCPCS

## 2024-03-12 SDOH — ECONOMIC STABILITY: FOOD INSECURITY: MEALS PER DAY: 3

## 2024-03-12 ASSESSMENT — ENCOUNTER SYMPTOMS
PAIN LOCATION - PAIN SEVERITY: 3/10
PAIN LOCATION - PAIN DURATION: DAILY
PAIN LOCATION - PAIN QUALITY: SORENESS
PAIN LOCATION - PAIN SEVERITY: 2/10
SUBJECTIVE PAIN PROGRESSION: GRADUALLY IMPROVING
SUBJECTIVE PAIN PROGRESSION: WAXING AND WANING
PAIN LOCATION: ABDOMEN
PAIN: 1
HIGHEST PAIN SEVERITY IN PAST 24 HOURS: 3/10
APPETITE LEVEL: FAIR
PERSON REPORTING PAIN: PATIENT
LOWEST PAIN SEVERITY IN PAST 24 HOURS: 0/10
PAIN LOCATION - PAIN FREQUENCY: INTERMITTENT
PAIN: 1
PAIN LOCATION: ABDOMEN
HIGHEST PAIN SEVERITY IN PAST 24 HOURS: 3/10
PERSON REPORTING PAIN: PATIENT
CHANGE IN APPETITE: INCREASED
PAIN SEVERITY GOAL: 0/10
LOWEST PAIN SEVERITY IN PAST 24 HOURS: 2/10
PAIN SEVERITY GOAL: 0/10

## 2024-03-12 ASSESSMENT — ACTIVITIES OF DAILY LIVING (ADL)
AMBULATION ASSISTANCE: ONE PERSON
CURRENT_FUNCTION: TWO PERSON
CURRENT_FUNCTION: ONE PERSON
AMBULATION ASSISTANCE: STAND BY ASSIST

## 2024-03-12 ASSESSMENT — PAIN SCALES - PAIN ASSESSMENT IN ADVANCED DEMENTIA (PAINAD)
CONSOLABILITY: 0 - NO NEED TO CONSOLE.
FACIALEXPRESSION: 0
CONSOLABILITY: 0
FACIALEXPRESSION: 0 - SMILING OR INEXPRESSIVE.
BREATHING: 0
BODYLANGUAGE: 0 - RELAXED.
BODYLANGUAGE: 0
NEGVOCALIZATION: 0
TOTALSCORE: 0
NEGVOCALIZATION: 0 - NONE.

## 2024-03-12 NOTE — HOME HEALTH
Got out yesterday.  3 19 24.  Dr. Solares, surgeons.  Better sleep on and off.  averaging 5 hours of sleep.  Doing stairs more lately.  I get winded and I get a little shaky.  Have been using the 2 lb. ankle weights and I also brought up a 10 lb. barbell.  I took tubes out on Sunday since the doctor said that I could.  I am talking better.      5 weeks post op now.     11 seconds TUG.    Walking no device, 1000 feet, over about 10 minutes, after which I suggested patient take a rest. Added 2 lbs. ankle weights to both ankles.   Full review of written exercises of   1. Ankle pumps   2. Full arc quads   3. Standing hip extension   4. Standing hip abduction   5. Standing knee flexion   6. Standing toe raises up to 20 repetitions, 3 sessions per day.          He did that today with the 2 lb. ankle weights in place. He indicated that he liked the resistance. Patient performed these in sitting and standing as indicated with only minimal demo and verbal cues for completeness and technique and posture. He demonstrated understanding of my recommendations.

## 2024-03-15 ENCOUNTER — HOME CARE VISIT (OUTPATIENT)
Dept: HOME HEALTH SERVICES | Facility: HOME HEALTH | Age: 54
End: 2024-03-15
Payer: COMMERCIAL

## 2024-03-15 ENCOUNTER — APPOINTMENT (OUTPATIENT)
Dept: ENDOCRINOLOGY | Facility: CLINIC | Age: 54
End: 2024-03-15
Payer: COMMERCIAL

## 2024-03-18 ENCOUNTER — HOME CARE VISIT (OUTPATIENT)
Dept: HOME HEALTH SERVICES | Facility: HOME HEALTH | Age: 54
End: 2024-03-18
Payer: COMMERCIAL

## 2024-03-18 VITALS
DIASTOLIC BLOOD PRESSURE: 62 MMHG | HEART RATE: 76 BPM | OXYGEN SATURATION: 96 % | SYSTOLIC BLOOD PRESSURE: 108 MMHG | TEMPERATURE: 97.1 F | RESPIRATION RATE: 16 BRPM

## 2024-03-18 PROCEDURE — G0151 HHCP-SERV OF PT,EA 15 MIN: HCPCS

## 2024-03-18 PROCEDURE — G0299 HHS/HOSPICE OF RN EA 15 MIN: HCPCS

## 2024-03-18 ASSESSMENT — PAIN SCALES - PAIN ASSESSMENT IN ADVANCED DEMENTIA (PAINAD)
BREATHING: 0
BODYLANGUAGE: 0 - RELAXED.
NEGVOCALIZATION: 0
CONSOLABILITY: 0 - NO NEED TO CONSOLE.
FACIALEXPRESSION: 0
FACIALEXPRESSION: 0 - SMILING OR INEXPRESSIVE.
NEGVOCALIZATION: 0 - NONE.
TOTALSCORE: 0
CONSOLABILITY: 0
BODYLANGUAGE: 0

## 2024-03-18 ASSESSMENT — ENCOUNTER SYMPTOMS
HIGHEST PAIN SEVERITY IN PAST 24 HOURS: 3/10
HIGHEST PAIN SEVERITY IN PAST 24 HOURS: 5/10
PAIN LOCATION - PAIN QUALITY: SORENESS
PAIN LOCATION: ABDOMEN
CHANGE IN APPETITE: INCREASED
PAIN LOCATION - PAIN FREQUENCY: INTERMITTENT
SUBJECTIVE PAIN PROGRESSION: WAXING AND WANING
LAST BOWEL MOVEMENT: 66917
PAIN: 1
APPETITE LEVEL: FAIR
PAIN LOCATION - PAIN DURATION: DAILY
PERSON REPORTING PAIN: PATIENT
HEADACHES: 1
PAIN SEVERITY GOAL: 0/10
PAIN: 1
STOOL FREQUENCY: DAILY
PAIN LOCATION - RELIEVING FACTORS: REST, PAIN MEDS
PAIN LOCATION - PAIN SEVERITY: 5/10
PAIN SEVERITY GOAL: 0/10
LOWEST PAIN SEVERITY IN PAST 24 HOURS: 0/10
PAIN LOCATION - PAIN SEVERITY: 0/10
LOWEST PAIN SEVERITY IN PAST 24 HOURS: 2/10
SUBJECTIVE PAIN PROGRESSION: GRADUALLY IMPROVING

## 2024-03-18 NOTE — HOME HEALTH
Wounds are red/pink and nurse was just here.  3 19 24. Dr. Solares, surgeons.  Today I got up and walked down to mail box.  5 hours last night of sleep.   Have been using the 2 lb. ankle weights and I also brought up a 10 lb. barbell. I took tubes out on Sunday since the doctor said that I could. I am talking better. 6 weeks post op now.     11 seconds TUG.     Walking no device, 1000 feet, over about 10 minutes, after which I suggested patient take a rest. Added 2 lbs. ankle weights to both ankles. Full review of written exercises of   1. Ankle pumps   2. Full arc quads   3. Standing hip extension   4. Standing hip abduction   5. Standing knee flexion   6. Standing toe raises up to 20 repetitions, 3 sessions per day.         He did that today with the 2 lb. ankle weights in place. He indicated that he liked the resistance. Patient performed these in sitting and standing as indicated with only minimal demo and verbal cues for completeness and technique and posture. He demonstrated understanding of my recommendations.

## 2024-03-19 ENCOUNTER — CLINICAL SUPPORT (OUTPATIENT)
Dept: SURGERY | Facility: CLINIC | Age: 54
End: 2024-03-19
Payer: COMMERCIAL

## 2024-03-19 ENCOUNTER — OFFICE VISIT (OUTPATIENT)
Dept: ENDOCRINOLOGY | Facility: CLINIC | Age: 54
End: 2024-03-19
Payer: COMMERCIAL

## 2024-03-19 VITALS
HEART RATE: 82 BPM | BODY MASS INDEX: 22.54 KG/M2 | DIASTOLIC BLOOD PRESSURE: 72 MMHG | SYSTOLIC BLOOD PRESSURE: 116 MMHG | HEIGHT: 71 IN | RESPIRATION RATE: 16 BRPM | WEIGHT: 161 LBS

## 2024-03-19 VITALS
BODY MASS INDEX: 22.54 KG/M2 | WEIGHT: 161 LBS | DIASTOLIC BLOOD PRESSURE: 65 MMHG | HEIGHT: 71 IN | HEART RATE: 87 BPM | SYSTOLIC BLOOD PRESSURE: 121 MMHG

## 2024-03-19 DIAGNOSIS — E16.1 ADULT ONSET PERSISTENT HYPERINSULINEMIC HYPOGLYCEMIA WITHOUT INSULINOMA: Primary | ICD-10-CM

## 2024-03-19 DIAGNOSIS — Z51.89 VISIT FOR WOUND CHECK: Primary | ICD-10-CM

## 2024-03-19 PROCEDURE — 99215 OFFICE O/P EST HI 40 MIN: CPT | Performed by: INTERNAL MEDICINE

## 2024-03-19 PROCEDURE — 1036F TOBACCO NON-USER: CPT | Performed by: INTERNAL MEDICINE

## 2024-03-19 ASSESSMENT — PAIN SCALES - GENERAL
PAINLEVEL: 0-NO PAIN
PAINLEVEL: 0-NO PAIN

## 2024-03-20 DIAGNOSIS — E16.2 HYPOGLYCEMIA: ICD-10-CM

## 2024-03-20 RX ORDER — BLOOD-GLUCOSE SENSOR
EACH MISCELLANEOUS
Qty: 3 EACH | Refills: 11 | Status: SHIPPED | OUTPATIENT
Start: 2024-03-20

## 2024-03-20 NOTE — PROGRESS NOTES
Chief Complaint    Follow up on hypoglycemia    HPI    53 year old man who was recently admitted to the hospital with abdominal pain and found to have gastric perforation. He had a long postop course and is being followed by surgery with drains still there. He was discharged on tube feeding that was removed 10 days ago and is now able to eat.     Prior to his admission with gastric perforation, he was having  sx of hypoglycemia for several months. The se occurred at many times day and night. The initial confirmation was when he had low glucose and a high insulin along with sx that improved with glucose. Thsi however was documented in the afternoon 2 hours after he had lunch. There were instances when he had low glucose levels during the night up to 3 AM. He usually eats dinner at 6 Pm and often does not eat afer that. He did not lose or gain weight before his current episode but has lost 20 Ibs during the current illness.     Currently, he is eating well, trying to use mor proten than CHO. Review of his Labre-3 showed glucose going up after eating seferino when CHO (up to 200) and nothing lower than 80 and no sx of hypoglycemia.     His course in the hospital was complicated by pancreatitis. The path did not show definitive tumor (will review in detail). At this time the prior hx of hypoglycemia seems to be often postprandial although there were some episodes that occurred at night taht were not immediately related to food intake. Th good thing is that he is not hypoglycemic at this point.     For now he is doing well and recovering slowly from the critical illness. Will try to investigate further after he recovers. In the mean time he is encouraged to maintain good keshawn intake with preeminence of protein over carb. Will see him again in 2 months.       Dusty Birmingham MD

## 2024-03-22 ENCOUNTER — HOME CARE VISIT (OUTPATIENT)
Dept: HOME HEALTH SERVICES | Facility: HOME HEALTH | Age: 54
End: 2024-03-22
Payer: COMMERCIAL

## 2024-03-22 VITALS
DIASTOLIC BLOOD PRESSURE: 80 MMHG | OXYGEN SATURATION: 97 % | HEART RATE: 84 BPM | TEMPERATURE: 97.7 F | RESPIRATION RATE: 18 BRPM | SYSTOLIC BLOOD PRESSURE: 110 MMHG

## 2024-03-22 PROCEDURE — RXMED WILLOW AMBULATORY MEDICATION CHARGE

## 2024-03-22 PROCEDURE — G0300 HHS/HOSPICE OF LPN EA 15 MIN: HCPCS

## 2024-03-22 ASSESSMENT — ENCOUNTER SYMPTOMS
APPETITE LEVEL: GOOD
SUBJECTIVE PAIN PROGRESSION: GRADUALLY IMPROVING
CHANGE IN APPETITE: INCREASED
PAIN LOCATION: ABDOMEN
LOWEST PAIN SEVERITY IN PAST 24 HOURS: 0/10
PAIN SEVERITY GOAL: 0/10
PAIN: 1
PAIN LOCATION - RELIEVING FACTORS: TYLENOL
PAIN LOCATION - PAIN SEVERITY: 3/10
HIGHEST PAIN SEVERITY IN PAST 24 HOURS: 3/10
LAST BOWEL MOVEMENT: 66921
PERSON REPORTING PAIN: PATIENT

## 2024-03-25 ENCOUNTER — PHARMACY VISIT (OUTPATIENT)
Dept: PHARMACY | Facility: CLINIC | Age: 54
End: 2024-03-25
Payer: COMMERCIAL

## 2024-03-26 ENCOUNTER — HOME CARE VISIT (OUTPATIENT)
Dept: HOME HEALTH SERVICES | Facility: HOME HEALTH | Age: 54
End: 2024-03-26
Payer: COMMERCIAL

## 2024-03-26 VITALS
RESPIRATION RATE: 20 BRPM | TEMPERATURE: 97.2 F | HEART RATE: 84 BPM | OXYGEN SATURATION: 98 % | SYSTOLIC BLOOD PRESSURE: 110 MMHG | DIASTOLIC BLOOD PRESSURE: 70 MMHG

## 2024-03-26 PROCEDURE — G0300 HHS/HOSPICE OF LPN EA 15 MIN: HCPCS

## 2024-03-26 ASSESSMENT — ENCOUNTER SYMPTOMS
DENIES PAIN: 1
APPETITE LEVEL: GOOD
CHANGE IN APPETITE: UNCHANGED

## 2024-03-26 NOTE — PROGRESS NOTES
[facility]  TRAUMA CLINIC PROGRESS NOTE    Patient Name: Trevor Yarbrough  MRN: [id number]  Admit Date:   : 1970  AGE: 53 y.o.   GENDER: male  ==============================================================================  CHIEF COMPLAINT:   Post operative appointment     OTHER MEDICAL PROBLEMS:  insulinoma   fundoplication      INCIDENTAL FINDINGS:  NA     PROCEDURES:  24: exlap, subtotal gastrectomy  24: washout  24: antrectomy, Billroth 2 reconstruction; fascia closed, skin open with wound vac placed   : CT:  c/f leak near GJ anastomosis based on radiology read. Upper GI study confirms integrity of the anastomosis. Additionally, Gatorade (blue dye) test showed no leak.      PATHOLOGY:  24:   FINAL DIAGNOSIS   A. Stomach, gastrectomy:  Portion of stomach with necrosis, transmural defect (perforation), acute inflammation and bacterial colonies morphologically consistent with Sarcina, and submucosal organizing thrombi (see comment).       2. 2024:   FINAL DIAGNOSIS   A. Stomach, antrum, resection:  Duodenum and stomach with serosal fibrinoinflammatory reaction.     B. Stomach, gastrectomy:  Necrotic portion of stomach with submucosal organizing thrombi.     C. Omentum, resection:  Inflamed fibroadipose tissue with necrosis, organizing thrombi, and hemorrhage.        ==============================================================================  TODAY'S ASSESSMENT AND PLAN OF CARE:  WOUND CARE  - Take daily showers  - Allow warm, soapy water to wash over wound  - Do not scrub at the wound  - When out of the shower, gently pat the wound dry.  - Do not apply lotions, ointments or creams  - Avoid soaking in bodies of water (bathtub, hot tubs, pools, lakes, etc) until wound is completely healed  - No heavy lifting > 15 lbs until 6 weeks after surgery  - FINISH COURSE OF ANTIBIOTICS  - Continue to monitor red/excoriated areas of the lateral midline incision  for expansion, worsening pain or itching.       FOLLOW UP/CALL  - 1 month  trauma/ACS follow up for wound check  - Return to clinic or ER sooner if pt. has any development of erythema, drainage, swelling, pain, fevers, or chills  - If you have questions or concerns that are not urgent, please feel free to call  [phone].  - Call [phone] to make additional appointment(s) as needed if unable to reschedule in office today    ==============================================================================  HISTORY OF PRESENT ILLNESS  52yo M with history of persumed insulinoma and toupe fundoplication in 2021 with Dr. Tracy who presented with peritonitis and pneumoperitoneum. Found to have a gastric perforation in lesser curve. Patient taken to the OR as listed below:      2/6/24: exlap, subtotal gastrectomy  2/7/24: washout  2/9/24: antrectomy, Billroth 2 reconstruction; fascia closed, skin open with wound vac placed     His postop course was complicated by superficial fascial dehiscence of superior aspect of midline laparotomy and duodenal stump leak controlled with intra-abdominal drains. Feeds via dobhoff that is distal to G-J.      Patient now tolerating a regular diet without n/v. He will remain on nocturnal tube feeds at night for additional nutritional support. His drains will remain in place at discharge to monitor duodenal stump leak. Patient encouraged to monitor output.   Midline wound seen and evaluated by wound care RN and adaptic applied over dehiscence and lightly packed with Aquacel Silver Rope to be changed daily.      Patient is being discharged home with home care for PT/OT and Skilled Nursing. He is to have close follow up in 1 week for postop check.      At his appointment on 03/05/2024 both drains were left in place due to output and odor, patient asked to record output. Dobhoff was also left in place. The plan was to cut the nocturnal tube feed in half for 3 days and then discontinue for  3 days. If patient is able to maintain blood glucose then the Dobhoff can be removed. If blood glucose is not within normal limits he will restart the tube feeds. Since his last clinic visit, patient dobhoff has been removed, he has been tolerating PO nutrition and keeping his weight.    At his appointment on 03/19/2024 both drains were left in place. Patient is presenting today for drain checks.    At his appointment on 04/02/2024 antibiotics prescribed for concern for incisional infection. Both drains were removed without issue.    Today he states that he feels significantly better than the previous visit. There is no longer induration lateral to his incision. He is eating, but not really gaining weight. He states that he is having a difficult time eating more than 2000 calories. He is using protein shakes and several small meals per day. He has been in contact with a nutritionist to help guide his nutritional needs.     Patient is eating, drinking, voiding and having flatus, bowel movements.   MEDICAL HISTORY / ROS:  Admission history and ROS reviewed.   Patient denies:  fevers; chills; headache;  dizziness; chest pain; shortness of breath; nausea/vomiting/diarrhea/constipation; new/worsening abdominal pain or numbness/tingling/weakness of extremities.   Pertinent changes as follows:  NA    PHYSICAL EXAM:  GCS 15, A+OX3, RRR, S1, S2, CTA=, no increased WOB. Abd soft, nt, nd. MAEx4, SENAIT 5/5 x4, no extremity edema noted. 2+pp. Areas of MEGAN appear mildly excoriated lateral edges indicating possible moisture irritation.       LABS:  No results found for this or any previous visit (from the past 24 hour(s)).  MEDICATIONS:  Current Outpatient Medications   Medication Sig Dispense Refill    atorvastatin (Lipitor) 10 mg tablet Take 1 tablet (10 mg) by mouth once daily.      blood-glucose sensor device CHANGE EVERY 14 DAYS (Patient not taking: Reported on 3/5/2024) 2 each 11    Dexcom G7 Sensor device Change sensor every  10 days as directed 3 each 11    docusate sodium (Colace) 100 mg capsule Take 1 capsule (100 mg) by mouth 2 times a day.      pantoprazole (ProtoNix) 40 mg EC tablet Take 1 tablet (40 mg) by mouth 2 times a day. Do not crush, chew, or split. 60 tablet 0     No current facility-administered medications for this visit.       IMAGING SUMMARY:  (summary of new imaging findings, not a copy of dictation)  NA    I have reviewed all laboratory and imaging results ordered/pertinent for today's encounter.

## 2024-03-27 ENCOUNTER — PATIENT OUTREACH (OUTPATIENT)
Dept: CARE COORDINATION | Facility: CLINIC | Age: 54
End: 2024-03-27
Payer: COMMERCIAL

## 2024-03-27 NOTE — PROGRESS NOTES
[facility] 30 day IONA follow up:  Chart reviewed, spoke with Trevor.  Things are going well, part of his incision has healed, the top part is healing slowly.  Still has home care coming out twice a week and he and his wife do the wound care themselves.  Has some tenderness, but no real pain.  Saw his endocrinologist and surgeon and has future appts scheduled.  Dobhoff is out and he is tolerating small frequent meals.  Trevor does have a CGM, runs low during sleep hours, 50-60.  80s during the day.  He is asymptomatic with the lows, but will discuss with his endocrinologist.  Will graduate from the IONA program

## 2024-03-29 ENCOUNTER — HOME CARE VISIT (OUTPATIENT)
Dept: HOME HEALTH SERVICES | Facility: HOME HEALTH | Age: 54
End: 2024-03-29
Payer: COMMERCIAL

## 2024-03-29 VITALS
TEMPERATURE: 97 F | BODY MASS INDEX: 22.18 KG/M2 | DIASTOLIC BLOOD PRESSURE: 68 MMHG | WEIGHT: 159 LBS | SYSTOLIC BLOOD PRESSURE: 100 MMHG | RESPIRATION RATE: 18 BRPM | HEART RATE: 76 BPM | OXYGEN SATURATION: 97 %

## 2024-03-29 PROCEDURE — G0300 HHS/HOSPICE OF LPN EA 15 MIN: HCPCS

## 2024-03-29 PROCEDURE — 0023 HH SOC

## 2024-03-29 ASSESSMENT — ENCOUNTER SYMPTOMS
CHANGE IN APPETITE: UNCHANGED
APPETITE LEVEL: GOOD

## 2024-04-02 ENCOUNTER — CLINICAL SUPPORT (OUTPATIENT)
Dept: SURGERY | Facility: CLINIC | Age: 54
End: 2024-04-02
Payer: COMMERCIAL

## 2024-04-02 VITALS
HEIGHT: 71 IN | DIASTOLIC BLOOD PRESSURE: 77 MMHG | TEMPERATURE: 97.4 F | SYSTOLIC BLOOD PRESSURE: 117 MMHG | HEART RATE: 87 BPM | BODY MASS INDEX: 22.26 KG/M2 | WEIGHT: 159 LBS | OXYGEN SATURATION: 99 %

## 2024-04-02 DIAGNOSIS — Z51.89 VISIT FOR WOUND CHECK: ICD-10-CM

## 2024-04-02 DIAGNOSIS — L03.90 CELLULITIS, UNSPECIFIED CELLULITIS SITE: Primary | ICD-10-CM

## 2024-04-02 RX ORDER — AMOXICILLIN AND CLAVULANATE POTASSIUM 875; 125 MG/1; MG/1
1 TABLET, FILM COATED ORAL 2 TIMES DAILY
Qty: 28 TABLET | Refills: 0 | Status: SHIPPED | OUTPATIENT
Start: 2024-04-02 | End: 2024-04-23 | Stop reason: ALTCHOICE

## 2024-04-02 ASSESSMENT — PATIENT HEALTH QUESTIONNAIRE - PHQ9
2. FEELING DOWN, DEPRESSED OR HOPELESS: NOT AT ALL
SUM OF ALL RESPONSES TO PHQ9 QUESTIONS 1 & 2: 0
1. LITTLE INTEREST OR PLEASURE IN DOING THINGS: NOT AT ALL

## 2024-04-02 ASSESSMENT — ENCOUNTER SYMPTOMS
OCCASIONAL FEELINGS OF UNSTEADINESS: 0
LOSS OF SENSATION IN FEET: 0
DEPRESSION: 0

## 2024-04-02 ASSESSMENT — PAIN SCALES - GENERAL: PAINLEVEL: 4

## 2024-04-02 NOTE — PROGRESS NOTES
[facility]  TRAUMA CLINIC PROGRESS NOTE    Patient Name: Trevor Yarbrough  MRN: [id number]  Admit Date:   : 1970  AGE: 54 y.o.   GENDER: male  ==============================================================================  CHIEF COMPLAINT:   Post operative appointment     OTHER MEDICAL PROBLEMS:  insulinoma   fundoplication      INCIDENTAL FINDINGS:  NA     PROCEDURES:  24: exlap, subtotal gastrectomy  24: washout  24: antrectomy, Billroth 2 reconstruction; fascia closed, skin open with wound vac placed   : CT:  c/f leak near GJ anastomosis based on radiology read. Upper GI study confirms integrity of the anastomosis. Additionally, Gatorade (blue dye) test showed no leak.      PATHOLOGY:  24:   FINAL DIAGNOSIS   A. Stomach, gastrectomy:  Portion of stomach with necrosis, transmural defect (perforation), acute inflammation and bacterial colonies morphologically consistent with Sarcina, and submucosal organizing thrombi (see comment).       2. 2024:   FINAL DIAGNOSIS   A. Stomach, antrum, resection:  Duodenum and stomach with serosal fibrinoinflammatory reaction.     B. Stomach, gastrectomy:  Necrotic portion of stomach with submucosal organizing thrombi.     C. Omentum, resection:  Inflamed fibroadipose tissue with necrosis, organizing thrombi, and hemorrhage.        ==============================================================================  TODAY'S ASSESSMENT AND PLAN OF CARE:  WOUND CARE, abdomen  Antibiotics prescribed for concern for incisional infection     Daily dressing changes  - Take daily showers  - Allow warm, soapy water to wash over wound  - Do not scrub at the wound   When out of the shower, gently pat the wound dry.  - Do not apply lotions, ointments or creams  - Avoid soaking in bodies of water (bathtub, hot tubs, pools, lakes, etc) until wound is completely healed    2.. DRAINS  Drains dc today, patient tolerated well        FOLLOW  UP/CALL  - Follow up in 1 week at scheduled appointment for wound check with trauma/ACS clinic  - Return to clinic or ER sooner if pt. has any development of erythema, drainage, swelling, pain, fevers, or chills  - If you have questions or concerns that are not urgent, please feel free to call  [phone].  - Call [phone] to make additional appointment(s) as needed if unable to reschedule in office today    ==============================================================================  HISTORY OF PRESENT ILLNESS  52yo M with history of persumed insulinoma and toupe fundoplication in 2021 with Dr. Tracy who presented with peritonitis and pneumoperitoneum. Found to have a gastric perforation in lesser curve. Patient taken to the OR as listed below:      2/6/24: exlap, subtotal gastrectomy  2/7/24: washout  2/9/24: antrectomy, Billroth 2 reconstruction; fascia closed, skin open with wound vac placed     His postop course was complicated by superficial fascial dehiscence of superior aspect of midline laparotomy and duodenal stump leak controlled with intra-abdominal drains. Feeds via dobhoff that is distal to G-J.      Patient now tolerating a regular diet without n/v. He will remain on nocturnal tube feeds at night for additional nutritional support. His drains will remain in place at discharge to monitor duodenal stump leak. Patient encouraged to monitor output.   Midline wound seen and evaluated by wound care RN and adaptic applied over dehiscence and lightly packed with Aquacel Silver Rope to be changed daily.      Patient is being discharged home with home care for PT/OT and Skilled Nursing. He is to have close follow up in 1 week for postop check.      At his appointment on 03/05/2024 both drains were left in place due to output and odor, patient asked to record output. Dobhoff was also left in place. The plan was to cut the nocturnal tube feed in half for 3 days and then discontinue for 3 days. If  patient is able to maintain blood glucose then the Dobhoff can be removed. If blood glucose is not within normal limits he will restart the tube feeds.    Reports doing well with eating and drinking, BM. He reports about 2 ml in drain #1 drain, mostly serosanguinous output, at one time since his last visit it was little blood tinged. His #2 drain is about 10 ml output, serous output.     He also has concern about his incision, there is redness and warmth, tenderness to the inferior aspect of his incision. He reports this started a few days ago. There has been no drainage.     MEDICAL HISTORY / ROS:  Admission history and ROS reviewed.   Patient denies:  fevers; chills; headache;  dizziness; chest pain; shortness of breath; nausea/vomiting/diarrhea/constipation; new/worsening abdominal pain or numbness/tingling/weakness of extremities.   Pertinent changes as follows:  none    PHYSICAL EXAM:  GCS 15, A+OX3, RRR, S1, S2, CTA=, no increased WOB.   Abd soft, nt, nd. MAEx4, SENAIT 5/5 x4, no extremity edema noted. 2+pp.   Wound location: abdominal incision  Wound description: overall incision is intact, there is redness and warmth to the inferior aspect of the incision, he is also ttp on this area     2 drains, #1drain with 5 ml output serosanguinous, #2 drain with 10 ml output serous    LABS:  No results found for this or any previous visit (from the past 24 hour(s)).  MEDICATIONS:  Current Outpatient Medications   Medication Sig Dispense Refill    atorvastatin (Lipitor) 10 mg tablet Take 1 tablet (10 mg) by mouth once daily.      blood-glucose sensor device CHANGE EVERY 14 DAYS (Patient not taking: Reported on 3/5/2024) 2 each 11    Dexcom G7 Sensor device Change sensor every 10 days as directed 3 each 11    docusate sodium (Colace) 100 mg capsule Take 1 capsule (100 mg) by mouth 2 times a day.      pantoprazole (ProtoNix) 40 mg EC tablet Take 1 tablet (40 mg) by mouth 2 times a day. Do not crush, chew, or split. 60 tablet  0     No current facility-administered medications for this visit.       IMAGING SUMMARY:  (summary of new imaging findings, not a copy of dictation)  NA    I have reviewed all laboratory and imaging results ordered/pertinent for today's encounter.

## 2024-04-03 ENCOUNTER — HOME CARE VISIT (OUTPATIENT)
Dept: HOME HEALTH SERVICES | Facility: HOME HEALTH | Age: 54
End: 2024-04-03
Payer: COMMERCIAL

## 2024-04-03 VITALS
RESPIRATION RATE: 20 BRPM | TEMPERATURE: 97.5 F | SYSTOLIC BLOOD PRESSURE: 110 MMHG | HEART RATE: 80 BPM | DIASTOLIC BLOOD PRESSURE: 70 MMHG | OXYGEN SATURATION: 100 %

## 2024-04-03 PROCEDURE — G0300 HHS/HOSPICE OF LPN EA 15 MIN: HCPCS

## 2024-04-03 ASSESSMENT — ENCOUNTER SYMPTOMS
CHANGE IN APPETITE: UNCHANGED
PAIN: TENDERNESS
SUBJECTIVE PAIN PROGRESSION: GRADUALLY IMPROVING
HIGHEST PAIN SEVERITY IN PAST 24 HOURS: 3/10
PAIN SEVERITY GOAL: 0/10
LOWEST PAIN SEVERITY IN PAST 24 HOURS: 0/10
APPETITE LEVEL: GOOD
PAIN: 1

## 2024-04-05 ENCOUNTER — HOME CARE VISIT (OUTPATIENT)
Dept: HOME HEALTH SERVICES | Facility: HOME HEALTH | Age: 54
End: 2024-04-05
Payer: COMMERCIAL

## 2024-04-05 VITALS
HEART RATE: 94 BPM | BODY MASS INDEX: 21.9 KG/M2 | TEMPERATURE: 96.9 F | SYSTOLIC BLOOD PRESSURE: 106 MMHG | RESPIRATION RATE: 16 BRPM | WEIGHT: 157 LBS | DIASTOLIC BLOOD PRESSURE: 62 MMHG | OXYGEN SATURATION: 98 %

## 2024-04-05 PROCEDURE — G0299 HHS/HOSPICE OF RN EA 15 MIN: HCPCS

## 2024-04-05 SDOH — ECONOMIC STABILITY: FOOD INSECURITY: MEALS PER DAY: 3

## 2024-04-05 ASSESSMENT — PAIN SCALES - PAIN ASSESSMENT IN ADVANCED DEMENTIA (PAINAD)
CONSOLABILITY: 0 - NO NEED TO CONSOLE.
BODYLANGUAGE: 0
CONSOLABILITY: 0
NEGVOCALIZATION: 0
NEGVOCALIZATION: 0 - NONE.
TOTALSCORE: 0
BODYLANGUAGE: 0 - RELAXED.
FACIALEXPRESSION: 0
FACIALEXPRESSION: 0 - SMILING OR INEXPRESSIVE.
BREATHING: 0

## 2024-04-05 ASSESSMENT — ENCOUNTER SYMPTOMS
SUBJECTIVE PAIN PROGRESSION: WAXING AND WANING
LOWEST PAIN SEVERITY IN PAST 24 HOURS: 2/10
PAIN SEVERITY GOAL: 1/10
PAIN LOCATION - PAIN SEVERITY: 3/10
PAIN: 1
PAIN LOCATION: ABDOMEN
PERSON REPORTING PAIN: PATIENT
PAIN LOCATION - PAIN DURATION: DAILY
CHANGE IN APPETITE: DECREASED
APPETITE LEVEL: FAIR
PAIN LOCATION - PAIN FREQUENCY: INTERMITTENT
PAIN LOCATION - PAIN QUALITY: TENDER
HIGHEST PAIN SEVERITY IN PAST 24 HOURS: 3/10

## 2024-04-09 ENCOUNTER — CLINICAL SUPPORT (OUTPATIENT)
Dept: SURGERY | Facility: CLINIC | Age: 54
End: 2024-04-09
Payer: COMMERCIAL

## 2024-04-09 VITALS
BODY MASS INDEX: 22.4 KG/M2 | HEIGHT: 71 IN | HEART RATE: 80 BPM | SYSTOLIC BLOOD PRESSURE: 110 MMHG | WEIGHT: 160 LBS | RESPIRATION RATE: 16 BRPM | DIASTOLIC BLOOD PRESSURE: 71 MMHG

## 2024-04-09 DIAGNOSIS — Z51.89 ENCOUNTER FOR WOUND CARE: Primary | ICD-10-CM

## 2024-04-09 PROCEDURE — 99024 POSTOP FOLLOW-UP VISIT: CPT | Performed by: NURSE PRACTITIONER

## 2024-04-09 ASSESSMENT — PAIN SCALES - GENERAL: PAINLEVEL: 0-NO PAIN

## 2024-04-10 ENCOUNTER — HOME CARE VISIT (OUTPATIENT)
Dept: HOME HEALTH SERVICES | Facility: HOME HEALTH | Age: 54
End: 2024-04-10
Payer: COMMERCIAL

## 2024-04-10 VITALS
OXYGEN SATURATION: 99 % | TEMPERATURE: 97.3 F | DIASTOLIC BLOOD PRESSURE: 88 MMHG | RESPIRATION RATE: 18 BRPM | SYSTOLIC BLOOD PRESSURE: 120 MMHG | HEART RATE: 80 BPM

## 2024-04-10 PROCEDURE — G0300 HHS/HOSPICE OF LPN EA 15 MIN: HCPCS

## 2024-04-10 ASSESSMENT — ENCOUNTER SYMPTOMS
APPETITE LEVEL: GOOD
CHANGE IN APPETITE: UNCHANGED
DENIES PAIN: 1

## 2024-04-11 PROCEDURE — RXMED WILLOW AMBULATORY MEDICATION CHARGE

## 2024-04-12 ENCOUNTER — PHARMACY VISIT (OUTPATIENT)
Dept: PHARMACY | Facility: CLINIC | Age: 54
End: 2024-04-12
Payer: COMMERCIAL

## 2024-04-12 ENCOUNTER — HOME CARE VISIT (OUTPATIENT)
Dept: HOME HEALTH SERVICES | Facility: HOME HEALTH | Age: 54
End: 2024-04-12
Payer: COMMERCIAL

## 2024-04-12 VITALS
TEMPERATURE: 97 F | DIASTOLIC BLOOD PRESSURE: 90 MMHG | RESPIRATION RATE: 18 BRPM | OXYGEN SATURATION: 97 % | HEART RATE: 76 BPM | SYSTOLIC BLOOD PRESSURE: 120 MMHG

## 2024-04-12 PROCEDURE — RXOTC WILLOW AMBULATORY OTC CHARGE

## 2024-04-12 PROCEDURE — G0300 HHS/HOSPICE OF LPN EA 15 MIN: HCPCS

## 2024-04-12 ASSESSMENT — ENCOUNTER SYMPTOMS
DENIES PAIN: 1
CHANGE IN APPETITE: INCREASED
APPETITE LEVEL: GOOD

## 2024-04-16 ENCOUNTER — HOME CARE VISIT (OUTPATIENT)
Dept: HOME HEALTH SERVICES | Facility: HOME HEALTH | Age: 54
End: 2024-04-16
Payer: COMMERCIAL

## 2024-04-16 VITALS
HEART RATE: 74 BPM | DIASTOLIC BLOOD PRESSURE: 60 MMHG | BODY MASS INDEX: 22.04 KG/M2 | TEMPERATURE: 97.2 F | OXYGEN SATURATION: 97 % | RESPIRATION RATE: 16 BRPM | SYSTOLIC BLOOD PRESSURE: 112 MMHG | WEIGHT: 158 LBS

## 2024-04-16 PROCEDURE — G0299 HHS/HOSPICE OF RN EA 15 MIN: HCPCS

## 2024-04-16 ASSESSMENT — PAIN SCALES - PAIN ASSESSMENT IN ADVANCED DEMENTIA (PAINAD)
BODYLANGUAGE: 0 - RELAXED.
TOTALSCORE: 0
BREATHING: 0
CONSOLABILITY: 0 - NO NEED TO CONSOLE.
NEGVOCALIZATION: 0
CONSOLABILITY: 0
FACIALEXPRESSION: 0 - SMILING OR INEXPRESSIVE.
NEGVOCALIZATION: 0 - NONE.
BODYLANGUAGE: 0
FACIALEXPRESSION: 0

## 2024-04-16 ASSESSMENT — ENCOUNTER SYMPTOMS: PAIN: 1

## 2024-04-16 ASSESSMENT — ACTIVITIES OF DAILY LIVING (ADL)
OASIS_M1830: 00
HOME_HEALTH_OASIS: 00

## 2024-04-22 PROBLEM — E16.1: Status: ACTIVE | Noted: 2024-04-22

## 2024-04-22 PROBLEM — E16.2 HYPOGLYCEMIA: Status: ACTIVE | Noted: 2023-10-11

## 2024-04-22 PROBLEM — R11.2 NAUSEA AND VOMITING: Status: RESOLVED | Noted: 2022-08-03 | Resolved: 2024-04-22

## 2024-04-22 PROBLEM — R93.89 ABNORMAL FINDING OF DIAGNOSTIC IMAGING: Status: ACTIVE | Noted: 2023-11-20

## 2024-04-22 PROBLEM — D49.0 PANCREATIC NEOPLASM: Status: ACTIVE | Noted: 2023-10-31

## 2024-04-22 PROBLEM — U07.1 DISEASE DUE TO SEVERE ACUTE RESPIRATORY SYNDROME CORONAVIRUS 2 (SARS-COV-2): Status: RESOLVED | Noted: 2022-08-03 | Resolved: 2024-04-22

## 2024-04-22 PROBLEM — D13.7: Status: ACTIVE | Noted: 2024-04-22

## 2024-04-22 PROBLEM — E16.1 ADULT ONSET PERSISTENT HYPERINSULINEMIC HYPOGLYCEMIA WITHOUT INSULINOMA: Status: ACTIVE | Noted: 2024-04-22

## 2024-04-22 PROBLEM — E16.1 REACTIVE HYPOGLYCEMIA: Status: ACTIVE | Noted: 2024-04-22

## 2024-04-22 PROBLEM — R31.29 MICROSCOPIC HEMATURIA: Status: ACTIVE | Noted: 2023-03-31

## 2024-04-22 PROBLEM — R51.9 HEADACHE: Status: ACTIVE | Noted: 2024-04-22

## 2024-04-22 PROBLEM — R13.10 DYSPHAGIA: Status: ACTIVE | Noted: 2024-04-22

## 2024-04-22 PROBLEM — M89.8X9 BONE PAIN: Status: ACTIVE | Noted: 2024-04-22

## 2024-04-22 PROBLEM — I10 ESSENTIAL (PRIMARY) HYPERTENSION: Status: ACTIVE | Noted: 2022-08-03

## 2024-04-23 ENCOUNTER — OFFICE VISIT (OUTPATIENT)
Dept: PRIMARY CARE | Facility: CLINIC | Age: 54
End: 2024-04-23
Payer: COMMERCIAL

## 2024-04-23 VITALS
OXYGEN SATURATION: 96 % | DIASTOLIC BLOOD PRESSURE: 74 MMHG | BODY MASS INDEX: 22.17 KG/M2 | TEMPERATURE: 98.3 F | HEIGHT: 71 IN | HEART RATE: 91 BPM | SYSTOLIC BLOOD PRESSURE: 109 MMHG | WEIGHT: 158.38 LBS

## 2024-04-23 DIAGNOSIS — K90.9 INTESTINAL MALABSORPTION, UNSPECIFIED TYPE (HHS-HCC): ICD-10-CM

## 2024-04-23 DIAGNOSIS — D64.9 ANEMIA, UNSPECIFIED TYPE: ICD-10-CM

## 2024-04-23 DIAGNOSIS — D49.0 PANCREATIC NEOPLASM: Primary | ICD-10-CM

## 2024-04-23 DIAGNOSIS — Z12.5 SCREENING FOR PROSTATE CANCER: ICD-10-CM

## 2024-04-23 DIAGNOSIS — Z90.3 H/O RESECTION OF STOMACH: ICD-10-CM

## 2024-04-23 DIAGNOSIS — E78.2 MIXED HYPERLIPIDEMIA: ICD-10-CM

## 2024-04-23 DIAGNOSIS — R53.83 FATIGUE, UNSPECIFIED TYPE: ICD-10-CM

## 2024-04-23 DIAGNOSIS — E16.1 ADULT ONSET PERSISTENT HYPERINSULINEMIC HYPOGLYCEMIA WITHOUT INSULINOMA: ICD-10-CM

## 2024-04-23 DIAGNOSIS — R11.0 NAUSEA: ICD-10-CM

## 2024-04-23 PROBLEM — M89.8X9 BONE PAIN: Status: RESOLVED | Noted: 2024-04-22 | Resolved: 2024-04-23

## 2024-04-23 PROBLEM — R00.1 BRADYCARDIA: Status: RESOLVED | Noted: 2023-03-30 | Resolved: 2024-04-23

## 2024-04-23 PROBLEM — M54.50 LOWER BACK PAIN: Status: RESOLVED | Noted: 2023-03-30 | Resolved: 2024-04-23

## 2024-04-23 PROBLEM — R55 VASOVAGAL EPISODE: Status: RESOLVED | Noted: 2023-03-30 | Resolved: 2024-04-23

## 2024-04-23 PROBLEM — R42 VERTIGO: Status: RESOLVED | Noted: 2023-03-30 | Resolved: 2024-04-23

## 2024-04-23 PROBLEM — R13.10 DYSPHAGIA: Status: RESOLVED | Noted: 2024-04-22 | Resolved: 2024-04-23

## 2024-04-23 PROCEDURE — 99214 OFFICE O/P EST MOD 30 MIN: CPT | Performed by: FAMILY MEDICINE

## 2024-04-23 PROCEDURE — RXMED WILLOW AMBULATORY MEDICATION CHARGE

## 2024-04-23 PROCEDURE — 3078F DIAST BP <80 MM HG: CPT | Performed by: FAMILY MEDICINE

## 2024-04-23 PROCEDURE — 99396 PREV VISIT EST AGE 40-64: CPT | Performed by: FAMILY MEDICINE

## 2024-04-23 PROCEDURE — 1036F TOBACCO NON-USER: CPT | Performed by: FAMILY MEDICINE

## 2024-04-23 PROCEDURE — 3074F SYST BP LT 130 MM HG: CPT | Performed by: FAMILY MEDICINE

## 2024-04-23 RX ORDER — ONDANSETRON 4 MG/1
4 TABLET, FILM COATED ORAL EVERY 8 HOURS PRN
Qty: 40 TABLET | Refills: 1 | Status: SHIPPED | OUTPATIENT
Start: 2024-04-23 | End: 2024-05-17

## 2024-04-23 ASSESSMENT — PATIENT HEALTH QUESTIONNAIRE - PHQ9
1. LITTLE INTEREST OR PLEASURE IN DOING THINGS: NOT AT ALL
SUM OF ALL RESPONSES TO PHQ9 QUESTIONS 1 AND 2: 0
2. FEELING DOWN, DEPRESSED OR HOPELESS: NOT AT ALL

## 2024-04-23 ASSESSMENT — ENCOUNTER SYMPTOMS
CONSTIPATION: 0
DIARRHEA: 0

## 2024-04-23 NOTE — PATIENT INSTRUCTIONS
Get your blood work as ordered.  You should hear from our office with results whether they are normal are not within a few days.  Please call the office if you do not hear from us.     After you get testing done you will get notified from our office with regards to your results whether they are normal or not.  If you are registered in the electronic health record we will send you information in that system.  If you have any questions or need clarification please feel free to call the office.     Trial ondansetron for nausea   Watch for stool changes   Abdominal pain       Malabsorption likely  Will check blood work      Anemia needs follow-up  Continue protein drinks

## 2024-04-23 NOTE — PROGRESS NOTES
Subjective   Patient ID: Trevor Yarbrough is a 54 y.o. male who presents for Annual Exam. Concerned with malabsorption, lack of energy and inability to gain weight. Pt underwent home PT; states he is friends with one of the dieticians at [facility] and they are giving him some advice on diet. Pt states this all onset after taking his first dose of Octreontide originally prescribed by Dr. Vega. Pt is no longer under Dr. Vega's care; seeing a new Endocrinologist Dr. Abreu. Wearing a glucose monitor and watching his carb intake. Pt has a follow up with General surgery next month. Pt is requesting a rx for Zofran due to the nausea he is experiencing from the nutritional drinks; states they are very thick and heavy.           Pancreatic insulinoma with hypoglycemia  Patient had acute gastric ulceration admitted in February Ischemia   Had peritonitis and pneumoperitoneum  Was on octreotide   February 6 had exploratory laparotomy and celiotomy due to acute symptoms  2/7/24: Another surgery  2/9/24: antrectomy, Billroth 2 reconstruction;  wound vac placed  Not sure if the insulinoma was actually resected       postop course was complicated by superficial fascial dehiscence of superior aspect of midline laparotomy,  duodenal stump leak controlled with intra-abdominal drains. Feeds via feeding tube initially 5 -6 weeks ago     Eats small portion ,   Occ nausea with eating     Taking multivitamin     Just finished Abx ,  due to wound drainage,  is doing better     Weight lost 30 #   Home 2 months   Not gaining weight   Meeting with nutrition  High calorie drink gets nausea with it     Seeing endocrinology :  occ drops to 50s at night   : most low sugars seem reactive   Has CGM   Is planning on further work up       history of GERD with gastric fundoplication in 2019     ROS :  ( No or Yes )  Any eye problems:    N  Frequent nasal congestion or sneezing:  N  Difficulty hearing:  N  Ear problems:   N  Asthma or wheezing:    "N  Frequent cough:   N  Shortness of breath:N  Hemoptysis: N  Hx of TB: N  High blood pressure: N  Heart disease: N  Heart murmur:N  Chest pain or pressure with exertion:N  Leg pains with walking up hill: N  Fast heartbeat or palpitations:N  Varicose veins: N  Difficulty swallowing foods or liquids: N  Abdominal pains: N  Frequent indigestion or heartburn: N  Constipation: N  Diarrhea or loose stools: N  Weight changes recently: y  Change in bowel movements: N  An ulcer: N  Black stools: N  Jaundice, hepatitis or liver problems: N  Gallstones or gallbladder problems: N  Stomach or intestinal problems: y  Vomited blood : N  Blood in bowel movements: N  Sickle cell trait  or Anemia: N  Been refused as a blood donor: N  Problems with her kidney, bladder, or prostate: N  Loss of control of your urine: N  Pain or burning with urination: N  Blood in her urine: N  Trouble starting flow of urine: N  Frequent urination at night: N  History of venereal disease: N  Any skin problems: N  Diabetes: N  Thyroid disease: N  Frequent back pain: N  Pain or swelling around joints: N  Broken any bones: y  Frequent headaches: N  Dizziness: N  Have you ever had Seizures or convulsions: N  Have you ever temporarily lost control of your hand or foot : N   Had a stroke or been paralyzed : N  Temporarily lost your ability to speak: N  Fainted or lost consciousness: N  Hallucinations: N  Nervousness: N  Do you take medications for your nerves: N  Trouble falling asleep or staying asleep: N  Do you feel tired even after a good night sleep: y  Do you feel down in the dumps or depressed: N  Frequent crying: N  Using alcohol excessively: N  Any street drug use : N  Do have any other medical problems that are concerns :      Review of Systems   Gastrointestinal:  Negative for constipation and diarrhea.       Objective   /74   Pulse 91   Temp 36.8 °C (98.3 °F)   Ht 1.803 m (5' 11\")   Wt 71.8 kg (158 lb 6 oz)   SpO2 96%   BMI 22.09 kg/m² "     Physical Exam  Constitutional:       General: He is not in acute distress.     Appearance: Normal appearance.      Comments: Thin   HENT:      Head: Normocephalic and atraumatic.      Right Ear: Tympanic membrane, ear canal and external ear normal.      Left Ear: Tympanic membrane, ear canal and external ear normal.      Nose: Nose normal.      Mouth/Throat:      Mouth: Mucous membranes are moist.      Pharynx: No oropharyngeal exudate or posterior oropharyngeal erythema.   Eyes:      Extraocular Movements: Extraocular movements intact.      Conjunctiva/sclera: Conjunctivae normal.      Pupils: Pupils are equal, round, and reactive to light.   Cardiovascular:      Rate and Rhythm: Normal rate and regular rhythm.      Heart sounds: No murmur heard.  Pulmonary:      Effort: Pulmonary effort is normal.      Breath sounds: Normal breath sounds.   Abdominal:      General: Bowel sounds are normal.      Palpations: Abdomen is soft.      Comments: Midline scar with central eschar, small suture noted  Minimal tenderness   Musculoskeletal:         General: Normal range of motion.      Cervical back: No rigidity.   Lymphadenopathy:      Cervical: No cervical adenopathy.   Skin:     General: Skin is warm and dry.      Findings: No rash.   Neurological:      General: No focal deficit present.      Mental Status: He is alert and oriented to person, place, and time.      Cranial Nerves: No cranial nerve deficit.      Gait: Gait normal.   Psychiatric:         Mood and Affect: Mood normal.         Behavior: Behavior normal.         Assessment/Plan   Problem List Items Addressed This Visit             ICD-10-CM    Hyperlipidemia E78.5    Relevant Orders    CBC    Comprehensive Metabolic Panel    Thyroid Stimulating Hormone    Lipid Panel    Hemoglobin A1C    Vitamin B12    Thyroxine, Free    Vitamin D 25-Hydroxy,Total (for eval of Vitamin D levels)    Magnesium    Vitamin B1, Whole Blood    Iron and TIBC    Zinc    Pancreatic  neoplasm - Primary D49.0    Relevant Orders    CBC    Comprehensive Metabolic Panel    Thyroid Stimulating Hormone    Lipid Panel    Hemoglobin A1C    Vitamin B12    Thyroxine, Free    Vitamin D 25-Hydroxy,Total (for eval of Vitamin D levels)    Magnesium    Vitamin B1, Whole Blood    Iron and TIBC    Zinc    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.1    Relevant Orders    CBC    Comprehensive Metabolic Panel    Thyroid Stimulating Hormone    Lipid Panel    Hemoglobin A1C    Vitamin B12    Thyroxine, Free    Vitamin D 25-Hydroxy,Total (for eval of Vitamin D levels)    Magnesium    Vitamin B1, Whole Blood    Iron and TIBC    Zinc     Other Visit Diagnoses         Codes    Fatigue, unspecified type     R53.83    Relevant Orders    CBC    Comprehensive Metabolic Panel    Thyroid Stimulating Hormone    Lipid Panel    Hemoglobin A1C    Vitamin B12    Thyroxine, Free    Vitamin D 25-Hydroxy,Total (for eval of Vitamin D levels)    Magnesium    Vitamin B1, Whole Blood    Iron and TIBC    Zinc    Anemia, unspecified type     D64.9    Relevant Orders    CBC    Comprehensive Metabolic Panel    Thyroid Stimulating Hormone    Lipid Panel    Hemoglobin A1C    Vitamin B12    Thyroxine, Free    Vitamin D 25-Hydroxy,Total (for eval of Vitamin D levels)    Magnesium    Vitamin B1, Whole Blood    Iron and TIBC    Zinc    Intestinal malabsorption, unspecified type ([facility])     K90.9    Relevant Orders    CBC    Comprehensive Metabolic Panel    Thyroid Stimulating Hormone    Lipid Panel    Hemoglobin A1C    Vitamin B12    Thyroxine, Free    Vitamin D 25-Hydroxy,Total (for eval of Vitamin D levels)    Magnesium    Vitamin B1, Whole Blood    Iron and TIBC    Zinc    Screening for prostate cancer     Z12.5    Relevant Orders    Prostate Specific Antigen, Screen    Nausea     R11.0    Relevant Medications    ondansetron (Zofran) 4 mg tablet    H/O resection of stomach     Z90.3

## 2024-04-23 NOTE — PROGRESS NOTES
Subjective   Patient ID: Trevor Yarbrough is a 54 y.o. male who presents for Follow up hospitalization for emergent subtotal gastrectomy. Concerned with malabsorption, lack of energy and inability to gain weight. Pt underwent home PT; states he is friends with one of the dieticians at [facility] and they are giving him some advice on diet. Pt states this all onset after taking his first dose of Octreontide originally prescribed by Dr. Vega. Pt is no longer under Dr. Vega's care; seeing a new Endocrinologist Dr. Abreu. Wearing a glucose monitor and watching his carb intake. Pt has a follow up with General surgery next month. Pt is requesting a rx for Zofran due to the nausea he is experiencing from the nutritional drinks; states they are very thick and heavy.     HPI     Review of Systems    Objective   There were no vitals taken for this visit.    Physical Exam    Assessment/Plan   {Assess/PlanSmartLinks:30453}

## 2024-04-24 ENCOUNTER — LAB (OUTPATIENT)
Dept: LAB | Facility: LAB | Age: 54
End: 2024-04-24
Payer: COMMERCIAL

## 2024-04-24 DIAGNOSIS — D64.9 ANEMIA, UNSPECIFIED TYPE: ICD-10-CM

## 2024-04-24 DIAGNOSIS — E78.2 MIXED HYPERLIPIDEMIA: ICD-10-CM

## 2024-04-24 DIAGNOSIS — K90.9 INTESTINAL MALABSORPTION, UNSPECIFIED TYPE (HHS-HCC): ICD-10-CM

## 2024-04-24 DIAGNOSIS — Z12.5 SCREENING FOR PROSTATE CANCER: ICD-10-CM

## 2024-04-24 DIAGNOSIS — D49.0 PANCREATIC NEOPLASM: ICD-10-CM

## 2024-04-24 DIAGNOSIS — E16.1 ADULT ONSET PERSISTENT HYPERINSULINEMIC HYPOGLYCEMIA WITHOUT INSULINOMA: ICD-10-CM

## 2024-04-24 DIAGNOSIS — R53.83 FATIGUE, UNSPECIFIED TYPE: ICD-10-CM

## 2024-04-24 LAB
25(OH)D3 SERPL-MCNC: 42 NG/ML (ref 30–100)
ALBUMIN SERPL BCP-MCNC: 4.2 G/DL (ref 3.4–5)
ALP SERPL-CCNC: 84 U/L (ref 33–120)
ALT SERPL W P-5'-P-CCNC: 27 U/L (ref 10–52)
ANION GAP SERPL CALC-SCNC: 12 MMOL/L (ref 10–20)
AST SERPL W P-5'-P-CCNC: 19 U/L (ref 9–39)
BILIRUB SERPL-MCNC: 0.3 MG/DL (ref 0–1.2)
BUN SERPL-MCNC: 22 MG/DL (ref 6–23)
CALCIUM SERPL-MCNC: 9.5 MG/DL (ref 8.6–10.3)
CHLORIDE SERPL-SCNC: 103 MMOL/L (ref 98–107)
CHOLEST SERPL-MCNC: 155 MG/DL (ref 0–199)
CHOLESTEROL/HDL RATIO: 3.6
CO2 SERPL-SCNC: 28 MMOL/L (ref 21–32)
CREAT SERPL-MCNC: 0.79 MG/DL (ref 0.5–1.3)
EGFRCR SERPLBLD CKD-EPI 2021: >90 ML/MIN/1.73M*2
ERYTHROCYTE [DISTWIDTH] IN BLOOD BY AUTOMATED COUNT: 15.4 % (ref 11.5–14.5)
EST. AVERAGE GLUCOSE BLD GHB EST-MCNC: 111 MG/DL
GLUCOSE SERPL-MCNC: 91 MG/DL (ref 74–99)
HBA1C MFR BLD: 5.5 %
HCT VFR BLD AUTO: 40.9 % (ref 41–52)
HDLC SERPL-MCNC: 43.4 MG/DL
HGB BLD-MCNC: 12.4 G/DL (ref 13.5–17.5)
IRON SATN MFR SERPL: 7 % (ref 25–45)
IRON SERPL-MCNC: 28 UG/DL (ref 35–150)
LDLC SERPL CALC-MCNC: 94 MG/DL
MAGNESIUM SERPL-MCNC: 2.05 MG/DL (ref 1.6–2.4)
MCH RBC QN AUTO: 23.8 PG (ref 26–34)
MCHC RBC AUTO-ENTMCNC: 30.3 G/DL (ref 32–36)
MCV RBC AUTO: 79 FL (ref 80–100)
NON HDL CHOLESTEROL: 112 MG/DL (ref 0–149)
NRBC BLD-RTO: 0 /100 WBCS (ref 0–0)
PLATELET # BLD AUTO: 242 X10*3/UL (ref 150–450)
POTASSIUM SERPL-SCNC: 4.3 MMOL/L (ref 3.5–5.3)
PROT SERPL-MCNC: 7.1 G/DL (ref 6.4–8.2)
PSA SERPL-MCNC: 1.39 NG/ML
RBC # BLD AUTO: 5.2 X10*6/UL (ref 4.5–5.9)
SODIUM SERPL-SCNC: 139 MMOL/L (ref 136–145)
T4 FREE SERPL-MCNC: 0.86 NG/DL (ref 0.61–1.12)
TIBC SERPL-MCNC: 383 UG/DL (ref 240–445)
TRIGL SERPL-MCNC: 90 MG/DL (ref 0–149)
TSH SERPL-ACNC: 1.55 MIU/L (ref 0.44–3.98)
UIBC SERPL-MCNC: 355 UG/DL (ref 110–370)
VIT B12 SERPL-MCNC: 456 PG/ML (ref 211–911)
VLDL: 18 MG/DL (ref 0–40)
WBC # BLD AUTO: 4.7 X10*3/UL (ref 4.4–11.3)

## 2024-04-24 PROCEDURE — 84425 ASSAY OF VITAMIN B-1: CPT

## 2024-04-24 PROCEDURE — 36415 COLL VENOUS BLD VENIPUNCTURE: CPT

## 2024-04-24 PROCEDURE — 80061 LIPID PANEL: CPT

## 2024-04-24 PROCEDURE — 83550 IRON BINDING TEST: CPT

## 2024-04-24 PROCEDURE — 84443 ASSAY THYROID STIM HORMONE: CPT

## 2024-04-24 PROCEDURE — 84153 ASSAY OF PSA TOTAL: CPT

## 2024-04-24 PROCEDURE — 83735 ASSAY OF MAGNESIUM: CPT

## 2024-04-24 PROCEDURE — 85027 COMPLETE CBC AUTOMATED: CPT

## 2024-04-24 PROCEDURE — 82607 VITAMIN B-12: CPT

## 2024-04-24 PROCEDURE — 84439 ASSAY OF FREE THYROXINE: CPT

## 2024-04-24 PROCEDURE — 84630 ASSAY OF ZINC: CPT

## 2024-04-24 PROCEDURE — 80053 COMPREHEN METABOLIC PANEL: CPT

## 2024-04-24 PROCEDURE — 83036 HEMOGLOBIN GLYCOSYLATED A1C: CPT

## 2024-04-24 PROCEDURE — 82306 VITAMIN D 25 HYDROXY: CPT

## 2024-04-24 PROCEDURE — 83540 ASSAY OF IRON: CPT

## 2024-04-24 NOTE — PROGRESS NOTES
[facility]  TRAUMA CLINIC PROGRESS NOTE    Patient Name: Trevor Yarbrough  MRN: [id number]  Admit Date:   : 1970  AGE: 54 y.o.   GENDER: male  ==============================================================================  CHIEF COMPLAINT:   Post operative appointment     OTHER MEDICAL PROBLEMS:  insulinoma   fundoplication      INCIDENTAL FINDINGS:  NA     PROCEDURES:  24: exlap, subtotal gastrectomy  24: washout  24: antrectomy, Billroth 2 reconstruction; fascia closed, skin open with wound vac placed   : CT:  c/f leak near GJ anastomosis based on radiology read. Upper GI study confirms integrity of the anastomosis. Additionally, Gatorade (blue dye) test showed no leak.      PATHOLOGY:  24:   FINAL DIAGNOSIS   A. Stomach, gastrectomy:  Portion of stomach with necrosis, transmural defect (perforation), acute inflammation and bacterial colonies morphologically consistent with Sarcina, and submucosal organizing thrombi (see comment).       2. 2024:   FINAL DIAGNOSIS   A. Stomach, antrum, resection:  Duodenum and stomach with serosal fibrinoinflammatory reaction.     B. Stomach, gastrectomy:  Necrotic portion of stomach with submucosal organizing thrombi.     C. Omentum, resection:  Inflamed fibroadipose tissue with necrosis, organizing thrombi, and hemorrhage.        ==============================================================================  TODAY'S ASSESSMENT AND PLAN OF CARE:  WOUND CARE  - Take daily showers  - Allow warm, soapy water to wash over wound  - Do not scrub at the wound  - When out of the shower, gently pat the wound dry.  - Do not apply lotions, ointments or creams  - Avoid soaking in bodies of water (bathtub, hot tubs, pools, lakes, etc) until wound is completely healed  - No heavy lifting > 15 lbs until 6 weeks after surgery  - Suture tail trimmed    Please continue to monitor weight and if concern about decreasing weight please  call to be seen in office.     FOLLOW UP/CALL  - No trauma/ACS follow up   - May return to work or school on 06/10/2024  - Return to clinic or ER sooner if pt. has any development of erythema, drainage, swelling, pain, fevers, or chills  - If you have questions or concerns that are not urgent, please feel free to call  [phone].  - Call [phone] to make additional appointment(s) as needed if unable to reschedule in office today    ==============================================================================  HISTORY OF PRESENT ILLNESS  52yo M with history of persumed insulinoma and toupe fundoplication in 2021 with Dr. Tracy who presented with peritonitis and pneumoperitoneum. Found to have a gastric perforation in lesser curve. Patient taken to the OR as listed below:      2/6/24: exlap, subtotal gastrectomy  2/7/24: washout  2/9/24: antrectomy, Billroth 2 reconstruction; fascia closed, skin open with wound vac placed     His postop course was complicated by superficial fascial dehiscence of superior aspect of midline laparotomy and duodenal stump leak controlled with intra-abdominal drains. Feeds via dobhoff that is distal to G-J.      Patient now tolerating a regular diet without n/v. He will remain on nocturnal tube feeds at night for additional nutritional support. His drains will remain in place at discharge to monitor duodenal stump leak. Patient encouraged to monitor output.   Midline wound seen and evaluated by wound care RN and adaptic applied over dehiscence and lightly packed with Aquacel Silver Rope to be changed daily.      Patient is being discharged home with home care for PT/OT and Skilled Nursing. He is to have close follow up in 1 week for postop check.      At his appointment on 03/05/2024 both drains were left in place due to output and odor, patient asked to record output. Dobhoff was also left in place. The plan was to cut the nocturnal tube feed in half for 3 days and then  "discontinue for 3 days. If patient is able to maintain blood glucose then the Dobhoff can be removed. If blood glucose is not within normal limits he will restart the tube feeds. Since his last clinic visit, patient dobhoff has been removed, he has been tolerating PO nutrition and keeping his weight.     At his appointment on 03/19/2024 both drains were left in place. Patient is presenting today for drain checks.     At his appointment on 04/02/2024 antibiotics prescribed for concern for incisional infection. Both drains were removed without issue.     At his appointment on 4/9/24 he states that he feels significantly better than the previous visit. There is no longer induration lateral to his incision. He is eating, but not really gaining weight. He states that he is having a difficult time eating more than 2000 calories. He is using protein shakes and several small meals per day. He has been in contact with a nutritionist to help guide his nutritional needs. Wound was left JUANCARLOS.    Patient is still unable to meet his caloric needs with eating alone. Reports that the high protein boost \"is like syrup and upsets his stomach\", suggested boost breeze. Trevor is working with a nutritionist to work on PO and protein intake.  Patient is drinking, voiding and having flatus, bowel movements.     MEDICAL HISTORY / ROS:  Admission history and ROS reviewed.   Patient denies:  fevers; chills; headache;  dizziness; chest pain; shortness of breath; nausea/vomiting/diarrhea/constipation; new/worsening abdominal pain or numbness/tingling/weakness of extremities.   Pertinent changes as follows:  Reports poor PO intake    PHYSICAL EXAM:  GCS 15, A+OX3, RRR, S1, S2, CTA=, no increased WOB. Abd soft, nt, nd. MAEx4, SENAIT 5/5 x4, no extremity edema noted. 2+pp. One suture tail in MEGAN was trimmed without issue. Otherwise well approximated, well healing MEGAN.     LABS:  No results found for this or any previous visit (from the past 24 " hour(s)).  MEDICATIONS:  Current Outpatient Medications   Medication Sig Dispense Refill    blood-glucose sensor device CHANGE EVERY 14 DAYS 2 each 11    Dexcom G7 Sensor device Change sensor every 10 days as directed 3 each 11    docusate sodium (Colace) 100 mg capsule Take 1 capsule (100 mg) by mouth 2 times a day.      ondansetron (Zofran) 4 mg tablet Take 1 tablet (4 mg) by mouth every 8 hours if needed for nausea or vomiting for up to 7 days. 40 tablet 1    pediatric multivitamin tablet,chewable Chew.       No current facility-administered medications for this visit.       IMAGING SUMMARY:  (summary of new imaging findings, not a copy of dictation)  NA    I have reviewed all laboratory and imaging results ordered/pertinent for today's encounter.

## 2024-04-26 LAB — ZINC SERPL-MCNC: 72.8 UG/DL (ref 60–120)

## 2024-04-29 NOTE — RESULT ENCOUNTER NOTE
Generally the labs look good so far, very slight anemia and slight iron deficiency, otherwise normal  Just check the nutritional drinks and see if they have iron in them they probably do if they do not we may want to add over-the-counter iron supplement

## 2024-04-30 LAB — VIT B1 PYROPHOSHATE BLD-SCNC: 88 NMOL/L (ref 70–180)

## 2024-05-02 PROCEDURE — RXMED WILLOW AMBULATORY MEDICATION CHARGE

## 2024-05-03 ENCOUNTER — PHARMACY VISIT (OUTPATIENT)
Dept: PHARMACY | Facility: CLINIC | Age: 54
End: 2024-05-03
Payer: COMMERCIAL

## 2024-05-07 ENCOUNTER — OFFICE VISIT (OUTPATIENT)
Dept: SURGERY | Facility: CLINIC | Age: 54
End: 2024-05-07
Payer: COMMERCIAL

## 2024-05-07 VITALS
HEART RATE: 75 BPM | SYSTOLIC BLOOD PRESSURE: 115 MMHG | WEIGHT: 158 LBS | BODY MASS INDEX: 22.12 KG/M2 | TEMPERATURE: 97.2 F | HEIGHT: 71 IN | OXYGEN SATURATION: 100 % | DIASTOLIC BLOOD PRESSURE: 78 MMHG

## 2024-05-07 DIAGNOSIS — Z51.89 ENCOUNTER FOR WOUND CARE: Primary | ICD-10-CM

## 2024-05-07 PROCEDURE — 99024 POSTOP FOLLOW-UP VISIT: CPT | Performed by: PHYSICIAN ASSISTANT

## 2024-05-07 PROCEDURE — 1036F TOBACCO NON-USER: CPT | Performed by: PHYSICIAN ASSISTANT

## 2024-05-07 PROCEDURE — 3074F SYST BP LT 130 MM HG: CPT | Performed by: PHYSICIAN ASSISTANT

## 2024-05-07 PROCEDURE — 3078F DIAST BP <80 MM HG: CPT | Performed by: PHYSICIAN ASSISTANT

## 2024-05-07 RX ORDER — FERROUS SULFATE 325(65) MG
325 TABLET ORAL
COMMUNITY

## 2024-05-07 ASSESSMENT — PAIN SCALES - GENERAL: PAINLEVEL: 0-NO PAIN

## 2024-06-04 ENCOUNTER — CLINICAL SUPPORT (OUTPATIENT)
Dept: SURGERY | Facility: CLINIC | Age: 54
End: 2024-06-04
Payer: COMMERCIAL

## 2024-06-04 DIAGNOSIS — T14.8XXD WOUND HEALING, DELAYED: Primary | ICD-10-CM

## 2024-06-04 PROCEDURE — RXMED WILLOW AMBULATORY MEDICATION CHARGE

## 2024-06-04 PROCEDURE — 99213 OFFICE O/P EST LOW 20 MIN: CPT | Performed by: PHYSICIAN ASSISTANT

## 2024-06-04 NOTE — PROGRESS NOTES
[facility]  TRAUMA CLINIC PROGRESS NOTE    Patient Name: Trevor Yarbrough  MRN: [id number]  Admit Date:   : 1970  AGE: 54 y.o.   GENDER: male  ==============================================================================  CHIEF COMPLAINT:   Post operative appointment     OTHER MEDICAL PROBLEMS:  insulinoma   fundoplication      INCIDENTAL FINDINGS:  NA     PROCEDURES:  24: exlap, subtotal gastrectomy  24: washout  24: antrectomy, Billroth 2 reconstruction; fascia closed, skin open with wound vac placed   : CT:  c/f leak near GJ anastomosis based on radiology read. Upper GI study confirms integrity of the anastomosis. Additionally, Gatorade (blue dye) test showed no leak.      PATHOLOGY:  24:   FINAL DIAGNOSIS   A. Stomach, gastrectomy:  Portion of stomach with necrosis, transmural defect (perforation), acute inflammation and bacterial colonies morphologically consistent with Sarcina, and submucosal organizing thrombi (see comment).       2. 2024:   FINAL DIAGNOSIS   A. Stomach, antrum, resection:  Duodenum and stomach with serosal fibrinoinflammatory reaction.     B. Stomach, gastrectomy:  Necrotic portion of stomach with submucosal organizing thrombi.     C. Omentum, resection:  Inflamed fibroadipose tissue with necrosis, organizing thrombi, and hemorrhage.        ==============================================================================  TODAY'S ASSESSMENT AND PLAN OF CARE:  WOUND CARE - defect over abdominal midline incision   - Take daily showers  - Allow warm, soapy water to wash over wound  - Do not scrub at the wound  - When out of the shower, gently pat the wound dry.  - Do not apply lotions, ointments or creams  - Avoid soaking in bodies of water (bathtub, hot tubs, pools, lakes, etc) until wound is completely healed  - No heavy lifting > 15 lbs until 6 weeks after surgery  - Do not pack wound  - Cover with 4x4 and paper tape but do not  cover all four sides, allow oxygen to hit the wound  - If green/yellowish fluid starts to drain from the wound please notify us immediately     FOLLOW UP/CALL  - No trauma/ACS follow up   - May return to work or school on 6/10/24.  - Return to clinic or ER sooner if pt. has any development of erythema, drainage, swelling, pain, fevers, or chills  - If you have questions or concerns that are not urgent, please feel free to call  [phone].  - Call [phone] to make additional appointment(s) as needed if unable to reschedule in office today    ==============================================================================  HISTORY OF PRESENT ILLNESS  52yo M with history of persumed insulinoma and toupe fundoplication in 2021 with Dr. Tracy who presented with peritonitis and pneumoperitoneum. Found to have a gastric perforation in lesser curve. Patient taken to the OR as listed below:      2/6/24: exlap, subtotal gastrectomy  2/7/24: washout  2/9/24: antrectomy, Billroth 2 reconstruction; fascia closed, skin open with wound vac placed     His postop course was complicated by superficial fascial dehiscence of superior aspect of midline laparotomy and duodenal stump leak controlled with intra-abdominal drains. Feeds via dobhoff that is distal to G-J.      Patient now tolerating a regular diet without n/v. He will remain on nocturnal tube feeds at night for additional nutritional support. His drains will remain in place at discharge to monitor duodenal stump leak. Patient encouraged to monitor output.   Midline wound seen and evaluated by wound care RN and adaptic applied over dehiscence and lightly packed with Aquacel Silver Rope to be changed daily.      Patient is being discharged home with home care for PT/OT and Skilled Nursing. He is to have close follow up in 1 week for postop check.      At his appointment on 03/05/2024 both drains were left in place due to output and odor, patient asked to record output.  "Dobhoff was also left in place. The plan was to cut the nocturnal tube feed in half for 3 days and then discontinue for 3 days. If patient is able to maintain blood glucose then the Dobhoff can be removed. If blood glucose is not within normal limits he will restart the tube feeds. Since his last clinic visit, patient dobhoff has been removed, he has been tolerating PO nutrition and keeping his weight.     At his appointment on 03/19/2024 both drains were left in place. Patient is presenting today for drain checks.     At his appointment on 04/02/2024 antibiotics prescribed for concern for incisional infection. Both drains were removed without issue.     At his appointment on 4/9/24 he states that he feels significantly better than the previous visit. There is no longer induration lateral to his incision. He is eating, but not really gaining weight. He states that he is having a difficult time eating more than 2000 calories. He is using protein shakes and several small meals per day. He has been in contact with a nutritionist to help guide his nutritional needs. Wound was left JUANCARLOS.     At his appointment on 05/07/2024 Patient is still unable to meet his caloric needs with eating alone. Reports that the high protein boost \"is like syrup and upsets his stomach\", suggested boost breeze. Trevor is working with a nutritionist to work on PO and protein intake.      Trevor states that his weight has remained the same at this visit today. He returns today due to concerns about his wound.     Patient is eating, drinking, voiding and having flatus, bowel movements.   MEDICAL HISTORY / ROS:  Admission history and ROS reviewed.   Patient denies:  fevers; chills; headache;  dizziness; chest pain; shortness of breath; nausea/vomiting/diarrhea/constipation; new/worsening abdominal pain or numbness/tingling/weakness of extremities.   Pertinent changes as follows:  NA    PHYSICAL EXAM:  GCS 15, A+OX3, RRR, S1, S2, CTA=, no increased " WOB. Abd soft, nt, nd. MAEx4, SENAIT 5/5 x4, no extremity edema noted. 2+pp.   Wound location: superior portion of EMGAN  Wound length: 1 cm  Wound width: 1 cm  Wound depth: 0.5 cm  Wound description: healthy beefy red tissue noted    LABS:  No results found for this or any previous visit (from the past 24 hour(s)).  MEDICATIONS:  Current Outpatient Medications   Medication Sig Dispense Refill    blood-glucose sensor device CHANGE EVERY 14 DAYS 2 each 11    Dexcom G7 Sensor device Change sensor every 10 days as directed 3 each 11    docusate sodium (Colace) 100 mg capsule Take 1 capsule (100 mg) by mouth 2 times a day.      ferrous sulfate, 325 mg ferrous sulfate, tablet Take 1 tablet by mouth once daily with breakfast.      pediatric multivitamin tablet,chewable Chew.       No current facility-administered medications for this visit.       IMAGING SUMMARY:  (summary of new imaging findings, not a copy of dictation)  NA    I have reviewed all laboratory and imaging results ordered/pertinent for today's encounter.

## 2024-06-05 DIAGNOSIS — M95.9 BONE DEFORMITY: Primary | ICD-10-CM

## 2024-06-06 ENCOUNTER — PHARMACY VISIT (OUTPATIENT)
Dept: PHARMACY | Facility: CLINIC | Age: 54
End: 2024-06-06
Payer: COMMERCIAL

## 2024-06-06 ENCOUNTER — HOSPITAL ENCOUNTER (OUTPATIENT)
Dept: RADIOLOGY | Facility: HOSPITAL | Age: 54
Discharge: HOME | End: 2024-06-06
Payer: COMMERCIAL

## 2024-06-06 DIAGNOSIS — M95.9 BONE DEFORMITY: ICD-10-CM

## 2024-06-06 PROCEDURE — 71046 X-RAY EXAM CHEST 2 VIEWS: CPT

## 2024-07-03 ENCOUNTER — APPOINTMENT (OUTPATIENT)
Dept: SURGERY | Facility: CLINIC | Age: 54
End: 2024-07-03
Payer: COMMERCIAL

## 2024-07-03 VITALS
OXYGEN SATURATION: 98 % | SYSTOLIC BLOOD PRESSURE: 122 MMHG | DIASTOLIC BLOOD PRESSURE: 75 MMHG | WEIGHT: 155 LBS | BODY MASS INDEX: 21.62 KG/M2 | HEART RATE: 69 BPM

## 2024-07-03 DIAGNOSIS — K43.9 VENTRAL HERNIA WITHOUT OBSTRUCTION OR GANGRENE: ICD-10-CM

## 2024-07-03 DIAGNOSIS — R11.0 NAUSEA: Primary | ICD-10-CM

## 2024-07-03 PROCEDURE — 3074F SYST BP LT 130 MM HG: CPT | Performed by: SURGERY

## 2024-07-03 PROCEDURE — 3078F DIAST BP <80 MM HG: CPT | Performed by: SURGERY

## 2024-07-03 PROCEDURE — RXMED WILLOW AMBULATORY MEDICATION CHARGE

## 2024-07-03 PROCEDURE — 99203 OFFICE O/P NEW LOW 30 MIN: CPT | Performed by: SURGERY

## 2024-07-03 RX ORDER — ONDANSETRON 4 MG/1
8 TABLET, FILM COATED ORAL EVERY 8 HOURS PRN
Qty: 20 TABLET | Refills: 0 | Status: SHIPPED | OUTPATIENT
Start: 2024-07-03 | End: 2024-07-10

## 2024-07-03 NOTE — PROGRESS NOTES
General Surgery History and Physical    Referring Provider:  Leix Darden MD  No ref. provider found     Chief Complaint:  Painful bulging in his epigastrium    History of Present Illness:  This is a 54 y.o. male who presents with some mildly tender bulging in the epigastrium.  He recently underwent an emergent subtotal gastrectomy with Billroth II reconstruction after having an open abdomen for a gastric perforation about 6 months ago.  He had retention sutures for a while, including in the epigastrium.  He reports that he had some wound dehiscence in the epigastrium where 1 retention suture was.  This was debrided and the suture was removed, and it eventually healed.  However, within the last week or so, there has been fluid bulging in that same area.  He denies any fevers or chills.  He denies any overlying erythema.    Past Medical History:  Gastroesophageal reflux disease  Insulinoma    Past Surgical History:  Laparoscopic hiatal hernia repair  Laparoscopic revision of hiatal hernia with fundoplication  Emergent subtotal gastrectomy  Plan second surgery with Billroth II reconstruction and abdominal closure    Medications:  Zofran  Multivitamins    Allergies:  No Known Allergies     Family History:  Hypertension  Myocardial infarction    Social History:  .  Works as a nurse practitioner.  Denies tobacco, alcohol, and illicits.       Review of Systems:  A complete 12 point review of systems was performed and is negative except as noted in the history of present illness.      Vital Signs:  Vitals:    07/03/24 1129   BP: 122/75   Pulse: 69   SpO2: 98%          Physical Exam:  General: No acute distress. Sitting up in bed.   Neuro: Alert and oriented ×3. Follows commands.  Head: Atraumatic  Eyes: Pupils equal reactive to light. Extraocular motions intact.  Ears: Hears normal speaking voice.  Mouth, Nose, Throat: Mucous membranes moist.  Normal dentition.  Neck: Supple. No appreciable masses.  Chest: No  crepitus.  No appreciable scars.  Heart: Regular rate and rhythm.  Lung: Clear to auscultation bilaterally.  Vascular: No carotid bruits.  Palpable radial pulses bilaterally.  Abdomen: Soft. Nondistended. Nontender.  Midline scars.  Seroma in the epigastrium.  Xiphoid displaced to the left.  No signs of infection.  Musculoskeletal: Moves all extremities.  Normal range of motion.  Lymphatic: No palpable lymph nodes.  Skin: No rashes or lesions.  Psychological: Normal affect      Laboratory Values:  CBC:   Lab Results   Component Value Date    WBC 4.7 04/24/2024    RBC 5.20 04/24/2024    HGB 12.4 (L) 04/24/2024    HCT 40.9 (L) 04/24/2024     04/24/2024       RFP:   Lab Results   Component Value Date     04/24/2024    K 4.3 04/24/2024     04/24/2024    CO2 28 04/24/2024    BUN 22 04/24/2024    CREATININE 0.79 04/24/2024    CALCIUM 9.5 04/24/2024    MG 2.05 04/24/2024    PHOS 3.1 02/20/2024        LFTs:   Lab Results   Component Value Date    PROT 7.1 04/24/2024    ALBUMIN 4.2 04/24/2024    BILITOT 0.3 04/24/2024    BILIDIR 0.2 02/15/2024    ALKPHOS 84 04/24/2024    AST 19 04/24/2024    ALT 27 04/24/2024            Imaging:  I have personally reviewed the images and the radiologist's report.  CT scan February: Fascial dehiscence      Assessment:  This is a 54 y.o. male who presents with concerns for bulging in the epigastrium.  We discussed that the hard spot appears to be his xiphoid, which likely was fractured during the surgery and is now pointing to the left.  We discussed that the fluid bulging in the area does not appear to be infected fluid.  We discussed that it could be seroma related to his recently removed retention sutures, but there is potential for an incisional hernia.  We discussed that because of the potential for hernia, I do not recommend drainage until we have imaging to confirm closure of the fascia.      Plan:  -- CT abdomen pelvis with IV contrast for incisional ventral  hernia  -- Uziel Barboza MD  General Surgery  Office: [phone]  Fax:     [phone]  11:46 AM   07/03/24

## 2024-07-08 ENCOUNTER — PHARMACY VISIT (OUTPATIENT)
Dept: PHARMACY | Facility: CLINIC | Age: 54
End: 2024-07-08
Payer: COMMERCIAL

## 2024-07-10 ENCOUNTER — HOSPITAL ENCOUNTER (OUTPATIENT)
Dept: RADIOLOGY | Facility: HOSPITAL | Age: 54
Discharge: HOME | End: 2024-07-10
Payer: COMMERCIAL

## 2024-07-10 DIAGNOSIS — K43.9 VENTRAL HERNIA WITHOUT OBSTRUCTION OR GANGRENE: ICD-10-CM

## 2024-07-10 LAB
CREAT SERPL-MCNC: 0.61 MG/DL (ref 0.6–1.3)
GFR SERPL CREATININE-BSD FRML MDRD: >90 ML/MIN/1.73M*2

## 2024-07-10 PROCEDURE — A9698 NON-RAD CONTRAST MATERIALNOC: HCPCS | Performed by: SURGERY

## 2024-07-10 PROCEDURE — 82565 ASSAY OF CREATININE: CPT

## 2024-07-10 PROCEDURE — 74177 CT ABD & PELVIS W/CONTRAST: CPT | Performed by: RADIOLOGY

## 2024-07-10 PROCEDURE — 2550000001 HC RX 255 CONTRASTS: Performed by: SURGERY

## 2024-07-10 PROCEDURE — 74177 CT ABD & PELVIS W/CONTRAST: CPT

## 2024-07-17 ENCOUNTER — APPOINTMENT (OUTPATIENT)
Dept: RADIOLOGY | Facility: HOSPITAL | Age: 54
End: 2024-07-17
Payer: COMMERCIAL

## 2024-08-06 PROCEDURE — RXMED WILLOW AMBULATORY MEDICATION CHARGE

## 2024-08-12 ENCOUNTER — PHARMACY VISIT (OUTPATIENT)
Dept: PHARMACY | Facility: CLINIC | Age: 54
End: 2024-08-12
Payer: COMMERCIAL

## 2024-09-09 ENCOUNTER — PHARMACY VISIT (OUTPATIENT)
Dept: PHARMACY | Facility: CLINIC | Age: 54
End: 2024-09-09
Payer: COMMERCIAL

## 2024-09-09 PROCEDURE — RXMED WILLOW AMBULATORY MEDICATION CHARGE

## 2024-09-23 ENCOUNTER — PHARMACY VISIT (OUTPATIENT)
Dept: PHARMACY | Facility: CLINIC | Age: 54
End: 2024-09-23
Payer: COMMERCIAL

## 2024-09-23 PROCEDURE — RXMED WILLOW AMBULATORY MEDICATION CHARGE

## 2024-10-10 PROCEDURE — RXMED WILLOW AMBULATORY MEDICATION CHARGE

## 2024-10-11 ENCOUNTER — PHARMACY VISIT (OUTPATIENT)
Dept: PHARMACY | Facility: CLINIC | Age: 54
End: 2024-10-11
Payer: COMMERCIAL

## 2024-11-12 ENCOUNTER — PHARMACY VISIT (OUTPATIENT)
Dept: PHARMACY | Facility: CLINIC | Age: 54
End: 2024-11-12
Payer: COMMERCIAL

## 2024-11-12 PROCEDURE — RXMED WILLOW AMBULATORY MEDICATION CHARGE

## 2024-12-03 ENCOUNTER — PHARMACY VISIT (OUTPATIENT)
Dept: PHARMACY | Facility: CLINIC | Age: 54
End: 2024-12-03
Payer: COMMERCIAL

## 2024-12-03 PROCEDURE — RXMED WILLOW AMBULATORY MEDICATION CHARGE

## 2024-12-26 PROCEDURE — RXMED WILLOW AMBULATORY MEDICATION CHARGE

## 2024-12-30 ENCOUNTER — PHARMACY VISIT (OUTPATIENT)
Dept: PHARMACY | Facility: CLINIC | Age: 54
End: 2024-12-30
Payer: COMMERCIAL

## 2025-01-10 ENCOUNTER — APPOINTMENT (OUTPATIENT)
Dept: ENDOCRINOLOGY | Facility: CLINIC | Age: 55
End: 2025-01-10
Payer: COMMERCIAL

## 2025-01-10 VITALS
WEIGHT: 150 LBS | BODY MASS INDEX: 20.92 KG/M2 | HEART RATE: 84 BPM | DIASTOLIC BLOOD PRESSURE: 67 MMHG | SYSTOLIC BLOOD PRESSURE: 113 MMHG

## 2025-01-10 DIAGNOSIS — E16.2 HYPOGLYCEMIA: Primary | ICD-10-CM

## 2025-01-10 PROCEDURE — 99214 OFFICE O/P EST MOD 30 MIN: CPT | Performed by: INTERNAL MEDICINE

## 2025-01-10 PROCEDURE — 1036F TOBACCO NON-USER: CPT | Performed by: INTERNAL MEDICINE

## 2025-01-10 PROCEDURE — 3078F DIAST BP <80 MM HG: CPT | Performed by: INTERNAL MEDICINE

## 2025-01-10 PROCEDURE — 3074F SYST BP LT 130 MM HG: CPT | Performed by: INTERNAL MEDICINE

## 2025-01-10 NOTE — PROGRESS NOTES
"Subjective   Patient ID: Trevor Yarbrough is a 54 y.o. 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 He had a long postop course and is being followed by surgery with drains still there. He was discharged on tube feeding that was removed 10 days before his visit with us and is was able to eat.      Per chart review, background Hx:  - Patient reports that it has been going on for the past 2 years (summer 2022) with symptoms of lightheadedness, and visual disturbances.  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 Vega in 9/2023.  Labs, and CGM were ordered.  And patient was advised to obtain blood work when having episodes of hypoglycemia.  -Workup revealed HbA1c of 5.7, TSH of 1.9, cortisol of 13.5.  - Blood work from 10/11/2023 showed: Insulin 15, proinsulin 4.5, C-peptide 4, beta hydroxybutyrate is 0.06, negative sulfonylurea urine test on 10/13/2023  -Of note, patient reports on the day of obtaining the hypoglycemia workup blood work he ate at 11:00 and started experience symptoms at 1 PM.    -Patient had further workup with [facility] on 10/12/2023 which showed \"There is faint rounded asymmetric hyperenhancement posterior pancreatic body\". This was followed up by dotatate scan 10/17/2023 that was unremarkable " (fulls report and results section). This was followed by an endoscopic ultrasound on 11/20/2023 which was also unremarkable (full report and results section).  -Patient was placed on diazoxide to treat his hypoglycemia however this was complicated by severe fluctuations in his blood sugar from hyperglycemia followed by significant hypoglycemia with episodes of palpitations, drenching sweats, lightheadedness and confusion along with GI side effects.  Doses was adjusted multiple times (decreased and increased) however he continued to have symptoms  - Patient finally tried octreotide injection on 2/6/2024 but unfortunately had gastric perforation on the same day as mentioned above.  -Prior to his admission with gastric perforation, he was having  sx of hypoglycemia for several months. The sx occurred at many times day and night. The initial confirmation was when he had low glucose and a high insulin along with sx that improved with glucose. Thsi however was documented in the afternoon 2 hours after he had lunch.   -His course in the hospital was complicated by pancreatitis. The path did not show definitive tumor (will review in detail). At this time the prior hx of hypoglycemia seems to be often postprandial although there were some episodes that occurred at night taht were not immediately related to food intake.         Today reports feeling ok at this time  Very good energy level  Weight was 160 lbs in April/2024, Said when he Went back to work in June he noted he was losing some weight but now weight is holding steady about 150 lbs over the past month  If eats high CHO food his blood glucose goes to 200-250 then in 1hour glucose drops down to 50, with Sx including headache, and some blurry vision, dizziness.   Reports No hypoglycemia if he minimize CHO intake    Reports blood glucose level overnight pretty steady 70-80, experience occasional hypoglycemia after meals only. He has no specific time for eating dinner  "but often does not eat dinner late  He follows with nutrition. Now on diet High protein and high fiber seems to be working better with less hypoglycemic episodes    -Reports significant diarrhea with BM being all over the place, about 4-5 BM a day. He has a BM within an hour after eating      Review of Systems  As above    Objective   Vitals:    01/10/25 1113   BP: 113/67   Pulse: 84   Weight: 68 kg (150 lb)      Physical Exam  Constitutional: Well developed, awake/alert/oriented x3, no distress, alert and cooperative  Eyes: EOMI, clear sclera  ENMT: mucous membranes moist, no apparent injury, no lesions seen  Head/Neck: Neck supple, no apparent injury  Respiratory/Thorax: Patent airways, CTAB, normal breath sounds with good chest expansion, thorax symmetric  Cardiovascular: Regular, rate and rhythm, 2+ equal pulses of the extremities, normal S 1and S 2  Gastrointestinal: Nondistended, soft, non-tender, no rebound tenderness or guarding  Musculoskeletal: ROM intact, normal strength  Extremities: normal extremities, no edema  Neurological: alert and oriented x3, CN's grossly intact, normal strength  Psychological: Appropriate mood and behavior  Skin: Warm and dry, no lesions, no rashes    Assessment/Plan     54 year old man with PMHx of severe GERD, hiatal hernia, Rodríguez's esophagitis s/p fundoplication in December 2019, HLD, and concern for hyperinsulinemic hypoglycemia, hospital admission in 2/2024 for an acute abdomen secondary to gastric perforation s/p emergent subtotal gastrectomy with Billroth II reconstruction , presenting today for follow up visit for hypoglycemia .  His course in the hospital was complicated by pancreatitis. The path did not show definitive tumor (will review in detail). At this time the prior hx of hypoglycemia seems to be postprandial   Currently, he is eating well, trying to use mor protein and fibers than CHO.   Reports significant diarrhea with BM \"being all over the place\", he has " about 4-5 BM a day. He has a BM within an hour after eating.    -We recommend referral to GI for evaluation of diarrhea likely 2/2 pancreatitis and the need of pancreatic enzymes replacement  -Instructed to c/w flow up with dietitian for diet adjustment to minimize postprandial hypoglycemic episodes.    Case seen, examined, and discussed with Dr. Arafah Diana Sawass Najjar, MD 01/10/25 11:10 AM

## 2025-01-23 ENCOUNTER — APPOINTMENT (OUTPATIENT)
Dept: PRIMARY CARE | Facility: CLINIC | Age: 55
End: 2025-01-23
Payer: COMMERCIAL

## 2025-01-27 PROCEDURE — RXMED WILLOW AMBULATORY MEDICATION CHARGE

## 2025-01-29 ENCOUNTER — PHARMACY VISIT (OUTPATIENT)
Dept: PHARMACY | Facility: CLINIC | Age: 55
End: 2025-01-29
Payer: COMMERCIAL

## 2025-02-04 ENCOUNTER — APPOINTMENT (OUTPATIENT)
Dept: PRIMARY CARE | Facility: CLINIC | Age: 55
End: 2025-02-04
Payer: COMMERCIAL

## 2025-02-04 VITALS
BODY MASS INDEX: 20.72 KG/M2 | HEART RATE: 65 BPM | OXYGEN SATURATION: 95 % | WEIGHT: 148 LBS | SYSTOLIC BLOOD PRESSURE: 114 MMHG | DIASTOLIC BLOOD PRESSURE: 72 MMHG | HEIGHT: 71 IN

## 2025-02-04 DIAGNOSIS — H53.9 VISION CHANGES: ICD-10-CM

## 2025-02-04 DIAGNOSIS — R63.4 WEIGHT LOSS: ICD-10-CM

## 2025-02-04 DIAGNOSIS — K14.0 GLOSSITIS: ICD-10-CM

## 2025-02-04 DIAGNOSIS — E16.2 HYPOGLYCEMIA: Primary | ICD-10-CM

## 2025-02-04 DIAGNOSIS — R19.5 LOOSE STOOLS: ICD-10-CM

## 2025-02-04 DIAGNOSIS — E16.1 ADULT ONSET PERSISTENT HYPERINSULINEMIC HYPOGLYCEMIA WITHOUT INSULINOMA: ICD-10-CM

## 2025-02-04 DIAGNOSIS — K90.89 OTHER SPECIFIED INTESTINAL MALABSORPTION (HHS-HCC): ICD-10-CM

## 2025-02-04 DIAGNOSIS — D64.9 ANEMIA, UNSPECIFIED TYPE: ICD-10-CM

## 2025-02-04 PROBLEM — R51.9 HEADACHE: Status: RESOLVED | Noted: 2024-04-22 | Resolved: 2025-02-04

## 2025-02-04 PROBLEM — D13.7: Status: RESOLVED | Noted: 2024-04-22 | Resolved: 2025-02-04

## 2025-02-04 PROBLEM — Z86.69 HISTORY OF MIGRAINE HEADACHES: Status: RESOLVED | Noted: 2023-03-30 | Resolved: 2025-02-04

## 2025-02-04 PROBLEM — I10 ESSENTIAL (PRIMARY) HYPERTENSION: Status: RESOLVED | Noted: 2022-08-03 | Resolved: 2025-02-04

## 2025-02-04 PROBLEM — R42 DIZZINESS: Status: RESOLVED | Noted: 2023-03-30 | Resolved: 2025-02-04

## 2025-02-04 PROBLEM — G43.909 MIGRAINES: Status: RESOLVED | Noted: 2023-03-30 | Resolved: 2025-02-04

## 2025-02-04 PROBLEM — K90.9 MALABSORPTION (HHS-HCC): Status: ACTIVE | Noted: 2025-02-04

## 2025-02-04 PROBLEM — K21.9 GASTROESOPHAGEAL REFLUX DISEASE: Status: RESOLVED | Noted: 2024-02-06 | Resolved: 2025-02-04

## 2025-02-04 PROBLEM — R93.89 ABNORMAL FINDING OF DIAGNOSTIC IMAGING: Status: RESOLVED | Noted: 2023-11-20 | Resolved: 2025-02-04

## 2025-02-04 PROBLEM — E78.5 HYPERLIPIDEMIA: Status: RESOLVED | Noted: 2023-03-30 | Resolved: 2025-02-04

## 2025-02-04 PROCEDURE — 99214 OFFICE O/P EST MOD 30 MIN: CPT | Performed by: FAMILY MEDICINE

## 2025-02-04 PROCEDURE — 1036F TOBACCO NON-USER: CPT | Performed by: FAMILY MEDICINE

## 2025-02-04 PROCEDURE — 3008F BODY MASS INDEX DOCD: CPT | Performed by: FAMILY MEDICINE

## 2025-02-04 ASSESSMENT — PATIENT HEALTH QUESTIONNAIRE - PHQ9
2. FEELING DOWN, DEPRESSED OR HOPELESS: NOT AT ALL
1. LITTLE INTEREST OR PLEASURE IN DOING THINGS: NOT AT ALL
SUM OF ALL RESPONSES TO PHQ9 QUESTIONS 1 AND 2: 0

## 2025-02-04 NOTE — PROGRESS NOTES
"Subjective   Patient ID: Trevor Yarbrough is a 54 y.o. male who presents for Follow-up (Pt in for followup , pt worried about malabsorbtion.  No strength, vision is worsening, blurry, night vision etc...).    Earlier this year patient had gastrectomy  Pancreatic insulinoma with hypoglycemia  Patient had acute gastric ulceration admitted in February  Ischemia   Had peritonitis and pneumoperitoneum  Was on octreotide   February 6 had exploratory laparotomy and celiotomy due to acute symptoms  2/7/24: Another surgery  2/9/24: antrectomy, Billroth 2 reconstruction;  wound vac placed    Not bad with pain   Saw endocrinology , sugars are a little better   Insulinoma is gone     High fiber   high protein,  and reduced simple sugars  gets some variability with sugars   Dumping syndrome     3-5 BMS per day     Tried to get GI appointment     Started running and weight is same   Eating every 2 hrs     Some glosittis     Taking bariatric vitamin    history of GERD with gastric fundoplication in 2019           Review of Systems    Objective   /72   Pulse 65   Ht 1.803 m (5' 11\")   Wt 67.1 kg (148 lb)   SpO2 95%   BMI 20.64 kg/m²     Physical Exam  Constitutional:       Appearance: Normal appearance. He is well-developed.      Comments: Thin   Cardiovascular:      Rate and Rhythm: Normal rate and regular rhythm.      Heart sounds: Normal heart sounds. No murmur heard.  Pulmonary:      Effort: Pulmonary effort is normal.      Breath sounds: Normal breath sounds.   Abdominal:      General: Abdomen is flat.      Palpations: Abdomen is soft.      Comments: Nontender  Scars from previous surgery   Neurological:      General: No focal deficit present.      Mental Status: He is alert and oriented to person, place, and time.   Psychiatric:         Mood and Affect: Mood normal.         Assessment/Plan   Problem List Items Addressed This Visit             ICD-10-CM    Hypoglycemia - Primary E16.2    Relevant Orders    Fecal Fat, " Quantitative    Calprotectin, Fecal    Referral to Gastroenterology    Thyroid Stimulating Hormone    Thyroxine, Free    Comprehensive Metabolic Panel    CBC and Auto Differential    Vitamin D 25-Hydroxy,Total (for eval of Vitamin D levels)    Vitamin B12    Vitamin A    Vitamin E    Iron and TIBC    Folate    Copper, serum    Methylmalonic acid, serum    Zinc    PTH, intact    Homocysteine, serum    Vitamin K    Selenium, Blood    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 E16.1    Malabsorption (HHS-HCC) K90.9    Relevant Orders    Fecal Fat, Quantitative    Calprotectin, Fecal    Referral to Gastroenterology    Thyroid Stimulating Hormone    Thyroxine, Free    Comprehensive Metabolic Panel    CBC and Auto Differential    Vitamin D 25-Hydroxy,Total (for eval of Vitamin D levels)    Vitamin B12    Vitamin A    Vitamin E    Iron and TIBC    Folate    Copper, serum    Methylmalonic acid, serum    Zinc    PTH, intact    Homocysteine, serum    Vitamin K    Selenium, Blood     Other Visit Diagnoses         Codes    Weight loss     R63.4    Relevant Orders    Fecal Fat, Quantitative    Calprotectin, Fecal    Referral to Gastroenterology    Thyroid Stimulating Hormone    Thyroxine, Free    Comprehensive Metabolic Panel    CBC and Auto Differential    Vitamin D 25-Hydroxy,Total (for eval of Vitamin D levels)    Vitamin B12    Vitamin A    Vitamin E    Iron and TIBC    Folate    Copper, serum    Methylmalonic acid, serum    Zinc    PTH, intact    Homocysteine, serum    Vitamin K    Selenium, Blood    Vision changes     H53.9    Relevant Orders    Fecal Fat, Quantitative    Calprotectin, Fecal    Referral to Gastroenterology    Thyroid Stimulating Hormone    Thyroxine, Free    Comprehensive Metabolic Panel    CBC and Auto Differential    Vitamin D 25-Hydroxy,Total (for eval of Vitamin D levels)    Vitamin B12    Vitamin A    Vitamin E    Iron and TIBC    Folate    Copper, serum    Methylmalonic  acid, serum    Zinc    PTH, intact    Homocysteine, serum    Vitamin K    Selenium, Blood    Anemia, unspecified type     D64.9    Relevant Orders    Fecal Fat, Quantitative    Calprotectin, Fecal    Referral to Gastroenterology    Thyroid Stimulating Hormone    Thyroxine, Free    Comprehensive Metabolic Panel    CBC and Auto Differential    Vitamin D 25-Hydroxy,Total (for eval of Vitamin D levels)    Vitamin B12    Vitamin A    Vitamin E    Iron and TIBC    Folate    Copper, serum    Methylmalonic acid, serum    Zinc    PTH, intact    Homocysteine, serum    Vitamin K    Selenium, Blood    Loose stools     R19.5    Relevant Orders    Fecal Fat, Quantitative    Calprotectin, Fecal    Referral to Gastroenterology    Thyroid Stimulating Hormone    Thyroxine, Free    Comprehensive Metabolic Panel    CBC and Auto Differential    Vitamin D 25-Hydroxy,Total (for eval of Vitamin D levels)    Vitamin B12    Vitamin A    Vitamin E    Iron and TIBC    Folate    Copper, serum    Methylmalonic acid, serum    Zinc    PTH, intact    Homocysteine, serum    Vitamin K    Selenium, Blood    Glossitis     K14.0    Relevant Orders    Fecal Fat, Quantitative    Calprotectin, Fecal    Referral to Gastroenterology    Thyroid Stimulating Hormone    Thyroxine, Free    Comprehensive Metabolic Panel    CBC and Auto Differential    Vitamin D 25-Hydroxy,Total (for eval of Vitamin D levels)    Vitamin B12    Vitamin A    Vitamin E    Iron and TIBC    Folate    Copper, serum    Methylmalonic acid, serum    Zinc    PTH, intact    Homocysteine, serum    Vitamin K    Selenium, Blood

## 2025-02-04 NOTE — PATIENT INSTRUCTIONS
Agree with GI referral to assess for pancreatic insufficiency  Appointment made  Will check the stool studies and if there is increased fecal fat would try to get him into see GI sooner      Multiple frequent meals throughout the course of the day  Increase protein intake    It is important to increase the protein in taking your diet.  Most episodes of low sugar are caused by something called postprandial hypoglycemia.  This usually involves your sugar escalating after having a meal that is higher in carbs and sugars them your blood sugar bottoms out several hours after that meal or snack.  The way to combat this is to eat frequent smaller meals or snacks throughout the course of the day.  We usually recommend 6-8 small meals or snacks rather than 3 large meals.  It is important that you have more protein and fat in each of these meals which will better sustain your sugar and prevent some of these symptoms such as dizziness, sweating, nausea.  These episodes of low sugar are not related to diabetes in most people or any significant underlying pathological abnormality.  We see it more commonly in women, specially young women.  Caffeine  can make the symptoms worse.  It is also important that you  stay hydrated by drinking at least 32 ounces of fluid per day.      Push calories       Recommend seeing the eye doctor

## 2025-02-06 LAB
25(OH)D3+25(OH)D2 SERPL-MCNC: 33 NG/ML (ref 30–100)
A-TOCOPHEROL VIT E SERPL-MCNC: NORMAL
ALBUMIN SERPL-MCNC: 4.2 G/DL (ref 3.6–5.1)
ALP SERPL-CCNC: 69 U/L (ref 35–144)
ALT SERPL-CCNC: 25 U/L (ref 9–46)
ANION GAP SERPL CALCULATED.4IONS-SCNC: 8 MMOL/L (CALC) (ref 7–17)
AST SERPL-CCNC: 23 U/L (ref 10–35)
BASOPHILS # BLD AUTO: 29 CELLS/UL (ref 0–200)
BASOPHILS NFR BLD AUTO: 0.7 %
BETA+GAMMA TOCOPHEROL SERPL-MCNC: NORMAL MG/DL
BILIRUB SERPL-MCNC: 0.3 MG/DL (ref 0.2–1.2)
BUN SERPL-MCNC: 28 MG/DL (ref 7–25)
CALCIUM SERPL-MCNC: 8.8 MG/DL (ref 8.6–10.3)
CHLORIDE SERPL-SCNC: 106 MMOL/L (ref 98–110)
CO2 SERPL-SCNC: 26 MMOL/L (ref 20–32)
COPPER BLD-MCNC: NORMAL UG/DL
CREAT SERPL-MCNC: 0.97 MG/DL (ref 0.7–1.3)
EGFRCR SERPLBLD CKD-EPI 2021: 92 ML/MIN/1.73M2
EOSINOPHIL # BLD AUTO: 71 CELLS/UL (ref 15–500)
EOSINOPHIL NFR BLD AUTO: 1.7 %
ERYTHROCYTE [DISTWIDTH] IN BLOOD BY AUTOMATED COUNT: 13.4 % (ref 11–15)
FOLATE SERPL-MCNC: 16.3 NG/ML
GLUCOSE SERPL-MCNC: 84 MG/DL (ref 65–139)
HCT VFR BLD AUTO: 39.2 % (ref 38.5–50)
HCYS SERPL-SCNC: 7.5 UMOL/L
HGB BLD-MCNC: 12.5 G/DL (ref 13.2–17.1)
IRON SATN MFR SERPL: 9 % (CALC) (ref 20–48)
IRON SERPL-MCNC: 37 MCG/DL (ref 50–180)
LYMPHOCYTES # BLD AUTO: 1285 CELLS/UL (ref 850–3900)
LYMPHOCYTES NFR BLD AUTO: 30.6 %
MCH RBC QN AUTO: 26.9 PG (ref 27–33)
MCHC RBC AUTO-ENTMCNC: 31.9 G/DL (ref 32–36)
MCV RBC AUTO: 84.3 FL (ref 80–100)
METHYLMALONATE SERPL-SCNC: NORMAL
MONOCYTES # BLD AUTO: 328 CELLS/UL (ref 200–950)
MONOCYTES NFR BLD AUTO: 7.8 %
NEUTROPHILS # BLD AUTO: 2486 CELLS/UL (ref 1500–7800)
NEUTROPHILS NFR BLD AUTO: 59.2 %
PHYTONADIONE SERPL-MCNC: NORMAL NG/L
PLATELET # BLD AUTO: 205 THOUSAND/UL (ref 140–400)
PMV BLD REES-ECKER: 11.1 FL (ref 7.5–12.5)
POTASSIUM SERPL-SCNC: 4.2 MMOL/L (ref 3.5–5.3)
PROT SERPL-MCNC: 6.5 G/DL (ref 6.1–8.1)
PTH-INTACT SERPL-MCNC: 84 PG/ML (ref 16–77)
RBC # BLD AUTO: 4.65 MILLION/UL (ref 4.2–5.8)
SELENIUM SERPL-MCNC: NORMAL NG/ML
SODIUM SERPL-SCNC: 140 MMOL/L (ref 135–146)
T4 FREE SERPL-MCNC: 1 NG/DL (ref 0.8–1.8)
TIBC SERPL-MCNC: 427 MCG/DL (CALC) (ref 250–425)
TSH SERPL-ACNC: 1.08 MIU/L (ref 0.4–4.5)
VIT A SERPL-MCNC: NORMAL UG/ML
VIT B12 SERPL-MCNC: 1614 PG/ML (ref 200–1100)
WBC # BLD AUTO: 4.2 THOUSAND/UL (ref 3.8–10.8)
ZINC SERPL-MCNC: NORMAL UG/ML

## 2025-02-09 ENCOUNTER — APPOINTMENT (OUTPATIENT)
Dept: RADIOLOGY | Facility: HOSPITAL | Age: 55
End: 2025-02-09
Payer: COMMERCIAL

## 2025-02-09 ENCOUNTER — HOSPITAL ENCOUNTER (EMERGENCY)
Facility: HOSPITAL | Age: 55
Discharge: OTHER INSTITUTION | End: 2025-02-10
Attending: EMERGENCY MEDICINE
Payer: COMMERCIAL

## 2025-02-09 DIAGNOSIS — R10.9 ABDOMINAL PAIN, UNSPECIFIED ABDOMINAL LOCATION: Primary | ICD-10-CM

## 2025-02-09 DIAGNOSIS — K56.609 SMALL BOWEL OBSTRUCTION (MULTI): ICD-10-CM

## 2025-02-09 LAB
ALBUMIN SERPL BCP-MCNC: 4.3 G/DL (ref 3.4–5)
ALP SERPL-CCNC: 76 U/L (ref 33–120)
ALT SERPL W P-5'-P-CCNC: 30 U/L (ref 10–52)
ANION GAP SERPL CALC-SCNC: 14 MMOL/L (ref 10–20)
AST SERPL W P-5'-P-CCNC: 23 U/L (ref 9–39)
BASOPHILS # BLD AUTO: 0.03 X10*3/UL (ref 0–0.1)
BASOPHILS NFR BLD AUTO: 0.3 %
BILIRUB SERPL-MCNC: 0.4 MG/DL (ref 0–1.2)
BUN SERPL-MCNC: 28 MG/DL (ref 6–23)
CALCIUM SERPL-MCNC: 9.6 MG/DL (ref 8.6–10.3)
CHLORIDE SERPL-SCNC: 100 MMOL/L (ref 98–107)
CO2 SERPL-SCNC: 27 MMOL/L (ref 21–32)
CREAT SERPL-MCNC: 0.98 MG/DL (ref 0.5–1.3)
EGFRCR SERPLBLD CKD-EPI 2021: >90 ML/MIN/1.73M*2
EOSINOPHIL # BLD AUTO: 0.1 X10*3/UL (ref 0–0.7)
EOSINOPHIL NFR BLD AUTO: 1.1 %
ERYTHROCYTE [DISTWIDTH] IN BLOOD BY AUTOMATED COUNT: 13.6 % (ref 11.5–14.5)
GLUCOSE SERPL-MCNC: 109 MG/DL (ref 74–99)
HCT VFR BLD AUTO: 41.2 % (ref 41–52)
HGB BLD-MCNC: 13.3 G/DL (ref 13.5–17.5)
IMM GRANULOCYTES # BLD AUTO: 0.02 X10*3/UL (ref 0–0.7)
IMM GRANULOCYTES NFR BLD AUTO: 0.2 % (ref 0–0.9)
LACTATE SERPL-SCNC: 1.5 MMOL/L (ref 0.4–2)
LYMPHOCYTES # BLD AUTO: 1.95 X10*3/UL (ref 1.2–4.8)
LYMPHOCYTES NFR BLD AUTO: 20.5 %
MAGNESIUM SERPL-MCNC: 1.83 MG/DL (ref 1.6–2.4)
MCH RBC QN AUTO: 26.9 PG (ref 26–34)
MCHC RBC AUTO-ENTMCNC: 32.3 G/DL (ref 32–36)
MCV RBC AUTO: 83 FL (ref 80–100)
MONOCYTES # BLD AUTO: 0.65 X10*3/UL (ref 0.1–1)
MONOCYTES NFR BLD AUTO: 6.8 %
NEUTROPHILS # BLD AUTO: 6.76 X10*3/UL (ref 1.2–7.7)
NEUTROPHILS NFR BLD AUTO: 71.1 %
NRBC BLD-RTO: 0 /100 WBCS (ref 0–0)
PLATELET # BLD AUTO: 199 X10*3/UL (ref 150–450)
POTASSIUM SERPL-SCNC: 4 MMOL/L (ref 3.5–5.3)
PROT SERPL-MCNC: 7.2 G/DL (ref 6.4–8.2)
RBC # BLD AUTO: 4.94 X10*6/UL (ref 4.5–5.9)
SODIUM SERPL-SCNC: 137 MMOL/L (ref 136–145)
WBC # BLD AUTO: 9.5 X10*3/UL (ref 4.4–11.3)

## 2025-02-09 PROCEDURE — 74177 CT ABD & PELVIS W/CONTRAST: CPT

## 2025-02-09 PROCEDURE — 36415 COLL VENOUS BLD VENIPUNCTURE: CPT | Performed by: NURSE PRACTITIONER

## 2025-02-09 PROCEDURE — 80053 COMPREHEN METABOLIC PANEL: CPT | Performed by: NURSE PRACTITIONER

## 2025-02-09 PROCEDURE — 2500000004 HC RX 250 GENERAL PHARMACY W/ HCPCS (ALT 636 FOR OP/ED): Performed by: NURSE PRACTITIONER

## 2025-02-09 PROCEDURE — 96375 TX/PRO/DX INJ NEW DRUG ADDON: CPT

## 2025-02-09 PROCEDURE — 2550000001 HC RX 255 CONTRASTS: Performed by: NURSE PRACTITIONER

## 2025-02-09 PROCEDURE — 99285 EMERGENCY DEPT VISIT HI MDM: CPT | Mod: 25 | Performed by: EMERGENCY MEDICINE

## 2025-02-09 PROCEDURE — 96374 THER/PROPH/DIAG INJ IV PUSH: CPT | Mod: 59

## 2025-02-09 PROCEDURE — 74177 CT ABD & PELVIS W/CONTRAST: CPT | Mod: FOREIGN READ | Performed by: RADIOLOGY

## 2025-02-09 PROCEDURE — 83605 ASSAY OF LACTIC ACID: CPT | Performed by: NURSE PRACTITIONER

## 2025-02-09 PROCEDURE — 85025 COMPLETE CBC W/AUTO DIFF WBC: CPT | Performed by: NURSE PRACTITIONER

## 2025-02-09 PROCEDURE — 83735 ASSAY OF MAGNESIUM: CPT | Performed by: NURSE PRACTITIONER

## 2025-02-09 PROCEDURE — 96361 HYDRATE IV INFUSION ADD-ON: CPT

## 2025-02-09 RX ORDER — FAMOTIDINE 10 MG/ML
20 INJECTION INTRAVENOUS ONCE
Status: COMPLETED | OUTPATIENT
Start: 2025-02-09 | End: 2025-02-09

## 2025-02-09 RX ORDER — ONDANSETRON HYDROCHLORIDE 2 MG/ML
4 INJECTION, SOLUTION INTRAVENOUS ONCE
Status: COMPLETED | OUTPATIENT
Start: 2025-02-09 | End: 2025-02-09

## 2025-02-09 RX ORDER — MORPHINE SULFATE 4 MG/ML
4 INJECTION INTRAVENOUS ONCE
Status: COMPLETED | OUTPATIENT
Start: 2025-02-09 | End: 2025-02-09

## 2025-02-09 RX ORDER — HYDROMORPHONE HYDROCHLORIDE 1 MG/ML
1 INJECTION, SOLUTION INTRAMUSCULAR; INTRAVENOUS; SUBCUTANEOUS ONCE
Status: COMPLETED | OUTPATIENT
Start: 2025-02-09 | End: 2025-02-09

## 2025-02-09 RX ADMIN — SODIUM CHLORIDE 1000 ML: 9 INJECTION, SOLUTION INTRAVENOUS at 23:43

## 2025-02-09 RX ADMIN — ONDANSETRON 4 MG: 2 INJECTION, SOLUTION INTRAMUSCULAR; INTRAVENOUS at 21:23

## 2025-02-09 RX ADMIN — HYDROMORPHONE HYDROCHLORIDE 1 MG: 1 INJECTION, SOLUTION INTRAMUSCULAR; INTRAVENOUS; SUBCUTANEOUS at 21:23

## 2025-02-09 RX ADMIN — MORPHINE SULFATE 4 MG: 4 INJECTION INTRAVENOUS at 23:43

## 2025-02-09 RX ADMIN — IOHEXOL 75 ML: 350 INJECTION, SOLUTION INTRAVENOUS at 21:35

## 2025-02-09 RX ADMIN — FAMOTIDINE 20 MG: 10 INJECTION, SOLUTION INTRAVENOUS at 21:23

## 2025-02-09 RX ADMIN — SODIUM CHLORIDE 1000 ML: 9 INJECTION, SOLUTION INTRAVENOUS at 21:23

## 2025-02-09 ASSESSMENT — LIFESTYLE VARIABLES
HAVE PEOPLE ANNOYED YOU BY CRITICIZING YOUR DRINKING: NO
EVER HAD A DRINK FIRST THING IN THE MORNING TO STEADY YOUR NERVES TO GET RID OF A HANGOVER: NO
HAVE YOU EVER FELT YOU SHOULD CUT DOWN ON YOUR DRINKING: NO
EVER FELT BAD OR GUILTY ABOUT YOUR DRINKING: NO
TOTAL SCORE: 0

## 2025-02-09 ASSESSMENT — PAIN SCALES - GENERAL: PAINLEVEL_OUTOF10: 7

## 2025-02-09 ASSESSMENT — PAIN - FUNCTIONAL ASSESSMENT: PAIN_FUNCTIONAL_ASSESSMENT: 0-10

## 2025-02-09 ASSESSMENT — COLUMBIA-SUICIDE SEVERITY RATING SCALE - C-SSRS
2. HAVE YOU ACTUALLY HAD ANY THOUGHTS OF KILLING YOURSELF?: NO
6. HAVE YOU EVER DONE ANYTHING, STARTED TO DO ANYTHING, OR PREPARED TO DO ANYTHING TO END YOUR LIFE?: NO
1. IN THE PAST MONTH, HAVE YOU WISHED YOU WERE DEAD OR WISHED YOU COULD GO TO SLEEP AND NOT WAKE UP?: NO

## 2025-02-09 ASSESSMENT — PAIN DESCRIPTION - LOCATION: LOCATION: ABDOMEN

## 2025-02-10 ENCOUNTER — APPOINTMENT (OUTPATIENT)
Dept: RADIOLOGY | Facility: HOSPITAL | Age: 55
End: 2025-02-10
Payer: COMMERCIAL

## 2025-02-10 ENCOUNTER — HOSPITAL ENCOUNTER (INPATIENT)
Facility: HOSPITAL | Age: 55
LOS: 2 days | Discharge: HOME | End: 2025-02-12
Attending: EMERGENCY MEDICINE | Admitting: SURGERY
Payer: COMMERCIAL

## 2025-02-10 VITALS
WEIGHT: 148 LBS | OXYGEN SATURATION: 100 % | RESPIRATION RATE: 18 BRPM | BODY MASS INDEX: 20.72 KG/M2 | HEIGHT: 71 IN | DIASTOLIC BLOOD PRESSURE: 85 MMHG | SYSTOLIC BLOOD PRESSURE: 134 MMHG | HEART RATE: 72 BPM | TEMPERATURE: 97.9 F

## 2025-02-10 DIAGNOSIS — K90.49 MALABSORPTION DUE TO INTOLERANCE, NOT ELSEWHERE CLASSIFIED: ICD-10-CM

## 2025-02-10 DIAGNOSIS — K56.609 SBO (SMALL BOWEL OBSTRUCTION) (MULTI): Primary | ICD-10-CM

## 2025-02-10 LAB
25(OH)D3+25(OH)D2 SERPL-MCNC: 33 NG/ML (ref 30–100)
A-TOCOPHEROL VIT E SERPL-MCNC: 11.3 MG/L (ref 5.7–19.9)
ABO GROUP (TYPE) IN BLOOD: NORMAL
ALBUMIN SERPL-MCNC: 4.2 G/DL (ref 3.6–5.1)
ALP SERPL-CCNC: 69 U/L (ref 35–144)
ALT SERPL-CCNC: 25 U/L (ref 9–46)
ANION GAP SERPL CALC-SCNC: 11 MMOL/L (ref 10–20)
ANION GAP SERPL CALCULATED.4IONS-SCNC: 8 MMOL/L (CALC) (ref 7–17)
ANTIBODY SCREEN: NORMAL
AST SERPL-CCNC: 23 U/L (ref 10–35)
BASOPHILS # BLD AUTO: 29 CELLS/UL (ref 0–200)
BASOPHILS NFR BLD AUTO: 0.7 %
BETA+GAMMA TOCOPHEROL SERPL-MCNC: <1 MG/L
BILIRUB SERPL-MCNC: 0.3 MG/DL (ref 0.2–1.2)
BUN SERPL-MCNC: 22 MG/DL (ref 6–23)
BUN SERPL-MCNC: 28 MG/DL (ref 7–25)
CALCIUM SERPL-MCNC: 8.7 MG/DL (ref 8.6–10.6)
CALCIUM SERPL-MCNC: 8.8 MG/DL (ref 8.6–10.3)
CHLORIDE SERPL-SCNC: 104 MMOL/L (ref 98–107)
CHLORIDE SERPL-SCNC: 106 MMOL/L (ref 98–110)
CO2 SERPL-SCNC: 26 MMOL/L (ref 20–32)
CO2 SERPL-SCNC: 26 MMOL/L (ref 21–32)
COPPER BLD-MCNC: 89 MCG/DL
CREAT SERPL-MCNC: 0.68 MG/DL (ref 0.5–1.3)
CREAT SERPL-MCNC: 0.97 MG/DL (ref 0.7–1.3)
EGFRCR SERPLBLD CKD-EPI 2021: 92 ML/MIN/1.73M2
EGFRCR SERPLBLD CKD-EPI 2021: >90 ML/MIN/1.73M*2
EOSINOPHIL # BLD AUTO: 71 CELLS/UL (ref 15–500)
EOSINOPHIL NFR BLD AUTO: 1.7 %
ERYTHROCYTE [DISTWIDTH] IN BLOOD BY AUTOMATED COUNT: 13.2 % (ref 11.5–14.5)
ERYTHROCYTE [DISTWIDTH] IN BLOOD BY AUTOMATED COUNT: 13.4 % (ref 11–15)
FOLATE SERPL-MCNC: 16.3 NG/ML
GLUCOSE BLD MANUAL STRIP-MCNC: 102 MG/DL (ref 74–99)
GLUCOSE BLD MANUAL STRIP-MCNC: 104 MG/DL (ref 74–99)
GLUCOSE SERPL-MCNC: 107 MG/DL (ref 74–99)
GLUCOSE SERPL-MCNC: 84 MG/DL (ref 65–139)
HCT VFR BLD AUTO: 34.2 % (ref 41–52)
HCT VFR BLD AUTO: 39.2 % (ref 38.5–50)
HCYS SERPL-SCNC: 7.5 UMOL/L
HGB BLD-MCNC: 11.6 G/DL (ref 13.5–17.5)
HGB BLD-MCNC: 12.5 G/DL (ref 13.2–17.1)
IRON SATN MFR SERPL: 9 % (CALC) (ref 20–48)
IRON SERPL-MCNC: 37 MCG/DL (ref 50–180)
LYMPHOCYTES # BLD AUTO: 1285 CELLS/UL (ref 850–3900)
LYMPHOCYTES NFR BLD AUTO: 30.6 %
MAGNESIUM SERPL-MCNC: 1.75 MG/DL (ref 1.6–2.4)
MCH RBC QN AUTO: 26.7 PG (ref 26–34)
MCH RBC QN AUTO: 26.9 PG (ref 27–33)
MCHC RBC AUTO-ENTMCNC: 31.9 G/DL (ref 32–36)
MCHC RBC AUTO-ENTMCNC: 33.9 G/DL (ref 32–36)
MCV RBC AUTO: 79 FL (ref 80–100)
MCV RBC AUTO: 84.3 FL (ref 80–100)
METHYLMALONATE SERPL-SCNC: 95 NMOL/L (ref 55–335)
MONOCYTES # BLD AUTO: 328 CELLS/UL (ref 200–950)
MONOCYTES NFR BLD AUTO: 7.8 %
NEUTROPHILS # BLD AUTO: 2486 CELLS/UL (ref 1500–7800)
NEUTROPHILS NFR BLD AUTO: 59.2 %
NRBC BLD-RTO: 0 /100 WBCS (ref 0–0)
PHYTONADIONE SERPL-MCNC: 254 PG/ML (ref 130–1500)
PLATELET # BLD AUTO: 158 X10*3/UL (ref 150–450)
PLATELET # BLD AUTO: 205 THOUSAND/UL (ref 140–400)
PMV BLD REES-ECKER: 11.1 FL (ref 7.5–12.5)
POTASSIUM SERPL-SCNC: 4 MMOL/L (ref 3.5–5.3)
POTASSIUM SERPL-SCNC: 4.2 MMOL/L (ref 3.5–5.3)
PROT SERPL-MCNC: 6.5 G/DL (ref 6.1–8.1)
PTH-INTACT SERPL-MCNC: 84 PG/ML (ref 16–77)
RBC # BLD AUTO: 4.34 X10*6/UL (ref 4.5–5.9)
RBC # BLD AUTO: 4.65 MILLION/UL (ref 4.2–5.8)
RH FACTOR (ANTIGEN D): NORMAL
SELENIUM SERPL-MCNC: 134 MCG/L (ref 63–160)
SODIUM SERPL-SCNC: 137 MMOL/L (ref 136–145)
SODIUM SERPL-SCNC: 140 MMOL/L (ref 135–146)
T4 FREE SERPL-MCNC: 1 NG/DL (ref 0.8–1.8)
TIBC SERPL-MCNC: 427 MCG/DL (CALC) (ref 250–425)
TSH SERPL-ACNC: 1.08 MIU/L (ref 0.4–4.5)
VIT A SERPL-MCNC: 43 MCG/DL (ref 38–98)
VIT B12 SERPL-MCNC: 1614 PG/ML (ref 200–1100)
WBC # BLD AUTO: 4.2 THOUSAND/UL (ref 3.8–10.8)
WBC # BLD AUTO: 6.3 X10*3/UL (ref 4.4–11.3)
ZINC SERPL-MCNC: 57 MCG/DL (ref 60–130)

## 2025-02-10 PROCEDURE — 86901 BLOOD TYPING SEROLOGIC RH(D): CPT

## 2025-02-10 PROCEDURE — 83735 ASSAY OF MAGNESIUM: CPT

## 2025-02-10 PROCEDURE — 85027 COMPLETE CBC AUTOMATED: CPT

## 2025-02-10 PROCEDURE — 99285 EMERGENCY DEPT VISIT HI MDM: CPT | Performed by: EMERGENCY MEDICINE

## 2025-02-10 PROCEDURE — 1100000001 HC PRIVATE ROOM DAILY

## 2025-02-10 PROCEDURE — 2500000001 HC RX 250 WO HCPCS SELF ADMINISTERED DRUGS (ALT 637 FOR MEDICARE OP)

## 2025-02-10 PROCEDURE — 74018 RADEX ABDOMEN 1 VIEW: CPT | Mod: FOREIGN READ | Performed by: RADIOLOGY

## 2025-02-10 PROCEDURE — 96376 TX/PRO/DX INJ SAME DRUG ADON: CPT

## 2025-02-10 PROCEDURE — 82947 ASSAY GLUCOSE BLOOD QUANT: CPT

## 2025-02-10 PROCEDURE — 74018 RADEX ABDOMEN 1 VIEW: CPT

## 2025-02-10 PROCEDURE — 2500000004 HC RX 250 GENERAL PHARMACY W/ HCPCS (ALT 636 FOR OP/ED)

## 2025-02-10 PROCEDURE — 2500000004 HC RX 250 GENERAL PHARMACY W/ HCPCS (ALT 636 FOR OP/ED): Mod: JZ

## 2025-02-10 PROCEDURE — 36415 COLL VENOUS BLD VENIPUNCTURE: CPT

## 2025-02-10 PROCEDURE — 80048 BASIC METABOLIC PNL TOTAL CA: CPT

## 2025-02-10 PROCEDURE — 2500000004 HC RX 250 GENERAL PHARMACY W/ HCPCS (ALT 636 FOR OP/ED): Mod: JZ | Performed by: EMERGENCY MEDICINE

## 2025-02-10 PROCEDURE — 2500000004 HC RX 250 GENERAL PHARMACY W/ HCPCS (ALT 636 FOR OP/ED): Performed by: NURSE PRACTITIONER

## 2025-02-10 PROCEDURE — 96374 THER/PROPH/DIAG INJ IV PUSH: CPT

## 2025-02-10 RX ORDER — MAGNESIUM SULFATE HEPTAHYDRATE 40 MG/ML
2 INJECTION, SOLUTION INTRAVENOUS ONCE
Status: COMPLETED | OUTPATIENT
Start: 2025-02-10 | End: 2025-02-10

## 2025-02-10 RX ORDER — ONDANSETRON 4 MG/1
4 TABLET, ORALLY DISINTEGRATING ORAL EVERY 8 HOURS PRN
COMMUNITY

## 2025-02-10 RX ORDER — ONDANSETRON 4 MG/1
4 TABLET, FILM COATED ORAL EVERY 8 HOURS PRN
Status: DISCONTINUED | OUTPATIENT
Start: 2025-02-10 | End: 2025-02-10

## 2025-02-10 RX ORDER — ACETAMINOPHEN 325 MG/1
650 TABLET ORAL
Status: DISCONTINUED | OUTPATIENT
Start: 2025-02-10 | End: 2025-02-10

## 2025-02-10 RX ORDER — ENOXAPARIN SODIUM 100 MG/ML
40 INJECTION SUBCUTANEOUS EVERY 24 HOURS
Status: DISCONTINUED | OUTPATIENT
Start: 2025-02-10 | End: 2025-02-12 | Stop reason: HOSPADM

## 2025-02-10 RX ORDER — HYDROMORPHONE HYDROCHLORIDE 1 MG/ML
1 INJECTION, SOLUTION INTRAMUSCULAR; INTRAVENOUS; SUBCUTANEOUS ONCE
Status: COMPLETED | OUTPATIENT
Start: 2025-02-10 | End: 2025-02-10

## 2025-02-10 RX ORDER — OXYCODONE HYDROCHLORIDE 5 MG/1
5 TABLET ORAL EVERY 4 HOURS PRN
Status: DISCONTINUED | OUTPATIENT
Start: 2025-02-10 | End: 2025-02-10

## 2025-02-10 RX ORDER — ONDANSETRON HYDROCHLORIDE 2 MG/ML
4 INJECTION, SOLUTION INTRAVENOUS EVERY 8 HOURS PRN
Status: DISCONTINUED | OUTPATIENT
Start: 2025-02-10 | End: 2025-02-12 | Stop reason: HOSPADM

## 2025-02-10 RX ORDER — OXYCODONE HYDROCHLORIDE 5 MG/1
2.5 TABLET ORAL EVERY 6 HOURS PRN
Status: DISCONTINUED | OUTPATIENT
Start: 2025-02-10 | End: 2025-02-10

## 2025-02-10 RX ORDER — HYDROMORPHONE HYDROCHLORIDE 0.2 MG/ML
0.2 INJECTION INTRAMUSCULAR; INTRAVENOUS; SUBCUTANEOUS EVERY 4 HOURS PRN
Status: DISCONTINUED | OUTPATIENT
Start: 2025-02-10 | End: 2025-02-12

## 2025-02-10 RX ORDER — PROCHLORPERAZINE MALEATE 10 MG
10 TABLET ORAL EVERY 6 HOURS PRN
Status: DISCONTINUED | OUTPATIENT
Start: 2025-02-10 | End: 2025-02-10

## 2025-02-10 RX ORDER — PROCHLORPERAZINE EDISYLATE 5 MG/ML
10 INJECTION INTRAMUSCULAR; INTRAVENOUS EVERY 6 HOURS PRN
Status: DISCONTINUED | OUTPATIENT
Start: 2025-02-10 | End: 2025-02-12 | Stop reason: HOSPADM

## 2025-02-10 RX ORDER — SODIUM CHLORIDE, SODIUM LACTATE, POTASSIUM CHLORIDE, CALCIUM CHLORIDE 600; 310; 30; 20 MG/100ML; MG/100ML; MG/100ML; MG/100ML
75 INJECTION, SOLUTION INTRAVENOUS CONTINUOUS
Status: DISCONTINUED | OUTPATIENT
Start: 2025-02-10 | End: 2025-02-10

## 2025-02-10 RX ORDER — NALOXONE HYDROCHLORIDE 0.4 MG/ML
0.2 INJECTION, SOLUTION INTRAMUSCULAR; INTRAVENOUS; SUBCUTANEOUS EVERY 5 MIN PRN
Status: DISCONTINUED | OUTPATIENT
Start: 2025-02-10 | End: 2025-02-12 | Stop reason: HOSPADM

## 2025-02-10 RX ORDER — SODIUM CHLORIDE, SODIUM LACTATE, POTASSIUM CHLORIDE, CALCIUM CHLORIDE 600; 310; 30; 20 MG/100ML; MG/100ML; MG/100ML; MG/100ML
75 INJECTION, SOLUTION INTRAVENOUS CONTINUOUS
Status: ACTIVE | OUTPATIENT
Start: 2025-02-10 | End: 2025-02-11

## 2025-02-10 RX ORDER — ACETAMINOPHEN 10 MG/ML
1000 INJECTION, SOLUTION INTRAVENOUS EVERY 6 HOURS PRN
Status: DISCONTINUED | OUTPATIENT
Start: 2025-02-10 | End: 2025-02-12 | Stop reason: HOSPADM

## 2025-02-10 RX ADMIN — HYDROMORPHONE HYDROCHLORIDE 0.2 MG: 0.2 INJECTION, SOLUTION INTRAMUSCULAR; INTRAVENOUS; SUBCUTANEOUS at 18:32

## 2025-02-10 RX ADMIN — ONDANSETRON 4 MG: 2 INJECTION INTRAMUSCULAR; INTRAVENOUS at 14:59

## 2025-02-10 RX ADMIN — HYDROMORPHONE HYDROCHLORIDE 1 MG: 1 INJECTION, SOLUTION INTRAMUSCULAR; INTRAVENOUS; SUBCUTANEOUS at 05:05

## 2025-02-10 RX ADMIN — PROCHLORPERAZINE EDISYLATE 10 MG: 5 INJECTION INTRAMUSCULAR; INTRAVENOUS at 16:49

## 2025-02-10 RX ADMIN — SODIUM CHLORIDE, POTASSIUM CHLORIDE, SODIUM LACTATE AND CALCIUM CHLORIDE 75 ML/HR: 600; 310; 30; 20 INJECTION, SOLUTION INTRAVENOUS at 18:34

## 2025-02-10 RX ADMIN — ONDANSETRON 4 MG: 2 INJECTION INTRAMUSCULAR; INTRAVENOUS at 20:15

## 2025-02-10 RX ADMIN — HYDROMORPHONE HYDROCHLORIDE 1 MG: 1 INJECTION, SOLUTION INTRAMUSCULAR; INTRAVENOUS; SUBCUTANEOUS at 01:19

## 2025-02-10 RX ADMIN — ENOXAPARIN SODIUM 40 MG: 100 INJECTION SUBCUTANEOUS at 09:50

## 2025-02-10 RX ADMIN — ACETAMINOPHEN 650 MG: 325 TABLET ORAL at 14:43

## 2025-02-10 RX ADMIN — ACETAMINOPHEN 650 MG: 325 TABLET ORAL at 09:49

## 2025-02-10 RX ADMIN — SODIUM CHLORIDE, POTASSIUM CHLORIDE, SODIUM LACTATE AND CALCIUM CHLORIDE 75 ML/HR: 600; 310; 30; 20 INJECTION, SOLUTION INTRAVENOUS at 03:13

## 2025-02-10 RX ADMIN — MAGNESIUM SULFATE HEPTAHYDRATE 2 G: 40 INJECTION, SOLUTION INTRAVENOUS at 14:43

## 2025-02-10 RX ADMIN — ACETAMINOPHEN 1000 MG: 1000 INJECTION, SOLUTION INTRAVENOUS at 20:15

## 2025-02-10 RX ADMIN — OXYCODONE 2.5 MG: 5 TABLET ORAL at 12:07

## 2025-02-10 RX ADMIN — Medication 1 SPRAY: at 13:14

## 2025-02-10 SDOH — ECONOMIC STABILITY: INCOME INSECURITY: IN THE PAST 12 MONTHS HAS THE ELECTRIC, GAS, OIL, OR WATER COMPANY THREATENED TO SHUT OFF SERVICES IN YOUR HOME?: NO

## 2025-02-10 SDOH — SOCIAL STABILITY: SOCIAL INSECURITY: DO YOU FEEL UNSAFE GOING BACK TO THE PLACE WHERE YOU ARE LIVING?: NO

## 2025-02-10 SDOH — SOCIAL STABILITY: SOCIAL INSECURITY: WERE YOU ABLE TO COMPLETE ALL THE BEHAVIORAL HEALTH SCREENINGS?: YES

## 2025-02-10 SDOH — SOCIAL STABILITY: SOCIAL INSECURITY: WITHIN THE LAST YEAR, HAVE YOU BEEN HUMILIATED OR EMOTIONALLY ABUSED IN OTHER WAYS BY YOUR PARTNER OR EX-PARTNER?: NO

## 2025-02-10 SDOH — ECONOMIC STABILITY: FOOD INSECURITY: WITHIN THE PAST 12 MONTHS, THE FOOD YOU BOUGHT JUST DIDN'T LAST AND YOU DIDN'T HAVE MONEY TO GET MORE.: NEVER TRUE

## 2025-02-10 SDOH — SOCIAL STABILITY: SOCIAL INSECURITY: ARE THERE ANY APPARENT SIGNS OF INJURIES/BEHAVIORS THAT COULD BE RELATED TO ABUSE/NEGLECT?: NO

## 2025-02-10 SDOH — SOCIAL STABILITY: SOCIAL INSECURITY: DOES ANYONE TRY TO KEEP YOU FROM HAVING/CONTACTING OTHER FRIENDS OR DOING THINGS OUTSIDE YOUR HOME?: NO

## 2025-02-10 SDOH — ECONOMIC STABILITY: FOOD INSECURITY: WITHIN THE PAST 12 MONTHS, YOU WORRIED THAT YOUR FOOD WOULD RUN OUT BEFORE YOU GOT THE MONEY TO BUY MORE.: NEVER TRUE

## 2025-02-10 SDOH — SOCIAL STABILITY: SOCIAL INSECURITY: DO YOU FEEL ANYONE HAS EXPLOITED OR TAKEN ADVANTAGE OF YOU FINANCIALLY OR OF YOUR PERSONAL PROPERTY?: NO

## 2025-02-10 SDOH — SOCIAL STABILITY: SOCIAL INSECURITY
WITHIN THE LAST YEAR, HAVE YOU BEEN RAPED OR FORCED TO HAVE ANY KIND OF SEXUAL ACTIVITY BY YOUR PARTNER OR EX-PARTNER?: NO

## 2025-02-10 SDOH — SOCIAL STABILITY: SOCIAL INSECURITY: HAS ANYONE EVER THREATENED TO HURT YOUR FAMILY OR YOUR PETS?: NO

## 2025-02-10 SDOH — SOCIAL STABILITY: SOCIAL INSECURITY: HAVE YOU HAD ANY THOUGHTS OF HARMING ANYONE ELSE?: NO

## 2025-02-10 SDOH — SOCIAL STABILITY: SOCIAL INSECURITY: ABUSE: ADULT

## 2025-02-10 SDOH — SOCIAL STABILITY: SOCIAL INSECURITY: WITHIN THE LAST YEAR, HAVE YOU BEEN AFRAID OF YOUR PARTNER OR EX-PARTNER?: NO

## 2025-02-10 SDOH — SOCIAL STABILITY: SOCIAL INSECURITY
WITHIN THE LAST YEAR, HAVE YOU BEEN KICKED, HIT, SLAPPED, OR OTHERWISE PHYSICALLY HURT BY YOUR PARTNER OR EX-PARTNER?: NO

## 2025-02-10 SDOH — SOCIAL STABILITY: SOCIAL INSECURITY: ARE YOU OR HAVE YOU BEEN THREATENED OR ABUSED PHYSICALLY, EMOTIONALLY, OR SEXUALLY BY ANYONE?: NO

## 2025-02-10 SDOH — SOCIAL STABILITY: SOCIAL INSECURITY: HAVE YOU HAD THOUGHTS OF HARMING ANYONE ELSE?: NO

## 2025-02-10 ASSESSMENT — COGNITIVE AND FUNCTIONAL STATUS - GENERAL
PATIENT BASELINE BEDBOUND: NO
MOBILITY SCORE: 24
DAILY ACTIVITIY SCORE: 24
MOBILITY SCORE: 24
DAILY ACTIVITIY SCORE: 24
DAILY ACTIVITIY SCORE: 24
MOBILITY SCORE: 24

## 2025-02-10 ASSESSMENT — ACTIVITIES OF DAILY LIVING (ADL)
WALKS IN HOME: INDEPENDENT
FEEDING YOURSELF: INDEPENDENT
DRESSING YOURSELF: INDEPENDENT
ADEQUATE_TO_COMPLETE_ADL: YES
BATHING: INDEPENDENT
PATIENT'S MEMORY ADEQUATE TO SAFELY COMPLETE DAILY ACTIVITIES?: YES
GROOMING: INDEPENDENT
JUDGMENT_ADEQUATE_SAFELY_COMPLETE_DAILY_ACTIVITIES: YES
HEARING - LEFT EAR: FUNCTIONAL
TOILETING: INDEPENDENT
HEARING - RIGHT EAR: FUNCTIONAL
LACK_OF_TRANSPORTATION: NO
LACK_OF_TRANSPORTATION: NO

## 2025-02-10 ASSESSMENT — LIFESTYLE VARIABLES
PRESCIPTION_ABUSE_PAST_12_MONTHS: NO
HOW OFTEN DO YOU HAVE A DRINK CONTAINING ALCOHOL: NEVER
SKIP TO QUESTIONS 9-10: 1
HOW OFTEN DO YOU HAVE 6 OR MORE DRINKS ON ONE OCCASION: NEVER
AUDIT-C TOTAL SCORE: 0
SUBSTANCE_ABUSE_PAST_12_MONTHS: NO
AUDIT-C TOTAL SCORE: 0
HOW MANY STANDARD DRINKS CONTAINING ALCOHOL DO YOU HAVE ON A TYPICAL DAY: PATIENT DOES NOT DRINK

## 2025-02-10 ASSESSMENT — PAIN SCALES - GENERAL
PAINLEVEL_OUTOF10: 0 - NO PAIN
PAINLEVEL_OUTOF10: 3
PAINLEVEL_OUTOF10: 0 - NO PAIN
PAINLEVEL_OUTOF10: 5 - MODERATE PAIN
PAINLEVEL_OUTOF10: 0 - NO PAIN
PAINLEVEL_OUTOF10: 2
PAINLEVEL_OUTOF10: 5 - MODERATE PAIN
PAINLEVEL_OUTOF10: 3
PAINLEVEL_OUTOF10: 5 - MODERATE PAIN
PAINLEVEL_OUTOF10: 7
PAINLEVEL_OUTOF10: 6

## 2025-02-10 ASSESSMENT — PATIENT HEALTH QUESTIONNAIRE - PHQ9
SUM OF ALL RESPONSES TO PHQ9 QUESTIONS 1 & 2: 0
2. FEELING DOWN, DEPRESSED OR HOPELESS: NOT AT ALL
1. LITTLE INTEREST OR PLEASURE IN DOING THINGS: NOT AT ALL

## 2025-02-10 ASSESSMENT — PAIN - FUNCTIONAL ASSESSMENT
PAIN_FUNCTIONAL_ASSESSMENT: 0-10

## 2025-02-10 ASSESSMENT — PAIN DESCRIPTION - LOCATION
LOCATION: HEAD
LOCATION: ABDOMEN
LOCATION: ABDOMEN

## 2025-02-10 ASSESSMENT — COLUMBIA-SUICIDE SEVERITY RATING SCALE - C-SSRS
1. IN THE PAST MONTH, HAVE YOU WISHED YOU WERE DEAD OR WISHED YOU COULD GO TO SLEEP AND NOT WAKE UP?: NO
2. HAVE YOU ACTUALLY HAD ANY THOUGHTS OF KILLING YOURSELF?: NO
6. HAVE YOU EVER DONE ANYTHING, STARTED TO DO ANYTHING, OR PREPARED TO DO ANYTHING TO END YOUR LIFE?: NO

## 2025-02-10 NOTE — PROGRESS NOTES
02/10/25 1115   Discharge Planning   Living Arrangements Spouse/significant other   Support Systems Spouse/significant other   Assistance Needed None   Type of Residence Private residence   Home or Post Acute Services None   Expected Discharge Disposition Home   Does the patient need discharge transport arranged? No   Financial Resource Strain   How hard is it for you to pay for the very basics like food, housing, medical care, and heating? Not hard   Housing Stability   In the last 12 months, was there a time when you were not able to pay the mortgage or rent on time? N   Transportation Needs   In the past 12 months, has lack of transportation kept you from medical appointments or from getting medications? no       DC Planning:  Went in and met with the pt, confirmed demographics.     Transitional Care Coordination Progress Note:  Patient discussed during interdisciplinary rounds.     Plan per Medical/Surgical team:    Home Care choice for home going needs, [place]  Home. No home going needs anticipated for this pt at this time.        Discharge disposition: Home. No home going needs anticipated for this pt at this time.      Potential Barriers: None    ADOD:  2/12    This TCC will continue to follow for home going needs and safe DC plan.      Daina Reaves. JENNIFER. TCC.

## 2025-02-10 NOTE — ED TRIAGE NOTES
Presented to ED for RLQ abd pain onset this afternoon.  Hx of Partial Gastrectomy 11/2024.  Report not passing Gas since this am

## 2025-02-10 NOTE — H&P
[facility]  ACUTE CARE SURGERY - HISTORY AND PHYSICAL / CONSULT    Patient Name: Trevor Yarbrough  MRN: [id number]  Admit Date: 210  : 1970  AGE: 54 y.o.   GENDER: male  ==============================================================================  TODAY'S ASSESSMENT AND PLAN OF CARE:  NGT, NPO  IVF  Pain management, minimize narcotics  Gastrograffin challenge in 48 hours  Admit to [facility]    Patient discussed with Pau Mann MD, Attending Physician Dr. Lopez.    Patricia Brewer MD PGY-1  General Surgery Night Float  Available via [employer]-  Acute Care Surgery  Surgical Oncology  Colon & Rectal Surgery  ==============================================================================  CHIEF COMPLAINT/REASON FOR CONSULT:  C/f SBO  54M with complicated PSH including insulinoma s/p fundoplication (), c/b gastric perforation and subsequent subtotal gastrectomy, and finally antrectomy and Billroth II reconstruction (2024) presenting as transfer from [facility] for c/f SBO. Patient states that  midday he noticed new R abdomen pain accompanied by nausea and small volume emesis. Since this time, patient has not had gas or BM. Last BM 2/ AM, typically has x3 Bms daily. Denies nausea at this time. CT c/f SBO at mid to distal small bowel.    NGT placed in ED with 20cc yellow output. Patient requesting that surgical team call wife Divina Yarbrough with updates.    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 2024    Nausea and vomiting 2022    Other chest pain 2022    Atypical chest pain    Personal history of other diseases of the digestive system  05/09/2022    History of gastroesophageal reflux (GERD)    Personal history of other diseases of the digestive system 01/27/2020    History of hiatal hernia    Personal history of other diseases of the digestive system 11/22/2019    History of esophagitis    Personal history of other specified conditions 01/31/2022    History of dysphagia       PSH:   Past Surgical History:   Procedure Laterality Date    OTHER SURGICAL HISTORY  12/03/2018    Complete colonoscopy    OTHER SURGICAL HISTORY  12/03/2018    Knee arthroscopy    OTHER SURGICAL HISTORY  12/03/2018    Appendectomy    OTHER SURGICAL HISTORY  01/27/2020    Hernia repair    OTHER SURGICAL HISTORY  01/27/2020    Nissen fundoplication laparoscopic    OTHER SURGICAL HISTORY  01/27/2020    Diaphragmatic hernia repair     FH:   Family History   Problem Relation Name Age of Onset    Hypertension Mother      Lung cancer Mother      Coronary artery disease Father      Hyperlipidemia Father      Diabetes Brother      Other (polyp of large intestine) Brother      Colon cancer Father's Brother  60    Colon cancer Other uncle     Other (polyp of large intestine) Other uncle      SOCIAL HISTORY:    Smoking: Denies      Alcohol: Socially      Drug use: Denies    MEDICATIONS:   Prior to Admission medications    Medication Sig Start Date End Date Taking? Authorizing Provider   Dexcom G7 Sensor device Change sensor every 10 days as directed 3/20/24   Dusty Birmingham MD   docusate sodium (Colace) 100 mg capsule Take 1 capsule (100 mg) by mouth 2 times a day.  Patient not taking: Reported on 2/4/2025    Historical Provider, MD   ferrous sulfate, 325 mg ferrous sulfate, tablet Take 1 tablet by mouth once daily with breakfast.    Historical Provider, MD   pediatric multivitamin tablet,chewable Chew.    Historical Provider, MD     ALLERGIES:   Allergies   Allergen Reactions    Octreotide GI Upset       REVIEW OF SYSTEMS:  Review of Systems  Endorsing R abdomen pain, denies headache,  chest pain  PHYSICAL EXAM:  Physical Exam  Vitals reviewed.   HENT:      Head: Normocephalic and atraumatic.      Nose: Nose normal.      Mouth/Throat:      Mouth: Mucous membranes are moist.   Eyes:      General:         Right eye: No discharge.         Left eye: No discharge.      Extraocular Movements: Extraocular movements intact.   Cardiovascular:      Rate and Rhythm: Normal rate.   Pulmonary:      Effort: Pulmonary effort is normal. No respiratory distress.      Breath sounds: No stridor.   Abdominal:      Tenderness: There is no rebound.      Hernia: No hernia is present.      Comments: L abdomen soft, nontender  R abdomen firm, tender to palpation, may be component of guarding  Non-peritonitic  Midline pink scar, mutliple lap site scars     Musculoskeletal:         General: No swelling or deformity. Normal range of motion.      Cervical back: Normal range of motion.   Skin:     General: Skin is warm and dry.   Neurological:      General: No focal deficit present.      Mental Status: He is alert.   Psychiatric:         Mood and Affect: Mood normal.       IMAGING SUMMARY:  (summary of findings, not a copy of dictation)  CT 2/9: Distal small bowel obstruction with moderate dilatation and multiple  air-fluid levels, unclear etiology, but may be related to postsurgical  Adhesions.    LABS:  Results from last 7 days   Lab Units 02/09/25 2130 02/04/25  0000   WBC AUTO x10*3/uL 9.5  --    [facility] WBC AUTO [place]/uL  --  4.2   HEMOGLOBIN g/dL 13.3*  --    [facility] HEMOGLOBIN g/dL  --  12.5*   HEMATOCRIT % 41.2  --    [facility] HEMATOCRIT %  --  39.2   PLATELETS AUTO x10*3/uL 199  --    [facility] PLATELETS AUTO [place]/uL  --  205   NEUTROS PCT AUTO % 71.1  --    LYMPHS PCT AUTO % 20.5  --    [facility] LYMPHO PCT MAN %  --  30.6   MONOS PCT AUTO % 6.8  --    [facility] MONO PCT MAN %  --  7.8   EOS PCT AUTO % 1.1  --    [facility] EOSINO PCT MAN %  --  1.7         Results from last 7 days   Lab Units 02/09/25 2130 02/04/25  0000   [facility]  SODIUM mmol/L  --  140   SODIUM mmol/L 137  --    [facility] POTASSIUM mmol/L  --  4.2   POTASSIUM mmol/L 4.0  --    [facility] CHLORIDE mmol/L  --  106   CHLORIDE mmol/L 100  --    [facility] CO2 mmol/L  --  26   CO2 mmol/L 27  --    BUN mg/dL 28*  --    [facility] BUN mg/dL  --  28*   CREATININE mg/dL 0.98  --    [facility] CREATININE mg/dL  --  0.97   [facility] CALCIUM mg/dL  --  8.8   CALCIUM mg/dL 9.6  --    [facility] PROTEIN TOTAL g/dL  --  6.5   PROTEIN TOTAL g/dL 7.2  --    BILIRUBIN TOTAL mg/dL 0.4  --    [facility] BILIRUBIN TOTAL mg/dL  --  0.3   [facility] ALK PHOS U/L  --  69   ALK PHOS U/L 76  --    [facility] ALT U/L  --  25   ALT U/L 30  --    [facility] AST U/L  --  23   AST U/L 23  --    [facility] GLUCOSE mg/dL  --  84   GLUCOSE mg/dL 109*  --      Results from last 7 days   Lab Units 02/09/25 2130 02/04/25  0000   BILIRUBIN TOTAL mg/dL 0.4  --    [facility] BILIRUBIN TOTAL mg/dL  --  0.3             I have reviewed all laboratory and imaging results ordered/pertinent for this encounter.    Head is atraumatic. Head shape is symmetrical.

## 2025-02-10 NOTE — ED PROVIDER NOTES
[facility]  ED  Encounter Note  Admit Date/RoomTime: 2025  8:42 PM  ED [facility]: [facility]  NAME: Trevor Yarbrough  : 1970  MRN: [id number]     Chief Complaint:  Abdominal Pain (HX sub total Gastrectomy 2024)    HISTORY OF PRESENT ILLNESS        Trevor Yarbrough is a 54 y.o. male who presents to the ED for evaluation of abdominal pain.    Pt with history of persumed insulinoma and toupe fundoplication in  with Dr. Tracy who presented with peritonitis and pneumoperitoneum. Found to have a gastric perforation in lesser curve. Patient taken to the OR as listed below:      24: exlap, subtotal gastrectomy  24: washout  24: antrectomy, Billroth 2 reconstruction; fascia closed, skin open with wound vac placed     His postop course was complicated by superficial fascial dehiscence of superior aspect of midline laparotomy and duodenal stump leak controlled with intra-abdominal drains. This was done by Dr Roderick Lopez downtown.    He stated that he had acute abd pain at 230 pm and has worsening pain all day with nausea vomiting. He feels like similar when he had the perf.     He appears severe uncomfortable and doubled over in pain.      ROS   Pertinent positives and negatives are stated within HPI, all other systems reviewed and are negative.    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    Surgical History:  has a past surgical history that  includes Other surgical history (12/03/2018); Other surgical history (12/03/2018); Other surgical history (12/03/2018); Other surgical history (01/27/2020); Other surgical history (01/27/2020); and Other surgical history (01/27/2020).    Social History:  reports that he has never smoked. He has never used smokeless tobacco. He reports that he does not currently use alcohol. He reports that he does not use drugs.    Family History: family history includes Colon cancer in an other family member; Colon cancer (age of onset: 60) in his father's brother; Coronary artery disease in his father; Diabetes in his brother; Hyperlipidemia in his father; Hypertension in his mother; Lung cancer in his mother; polyp of large intestine in his brother and another family member.     Allergies: Octreotide    PHYSICAL EXAM   Oxygen Saturation Interpretation: Normal.    Physical Exam  Constitutional/General: Alert and oriented x3, well appearing, non toxic  HEENT:  NC/NT. PERRLA.  Airway patent.  Neck: Supple, full ROM. No midline vertebral tenderness or crepitus.   Respiratory: Lung sounds clear to auscultation bilaterally. No wheezes, rhonchi or stridor. Not in respiratory distress.  CV:  Regular rate. Regular rhythm. No murmurs or rubs. 2+ distal pulses.  GI:  Abdomen soft, tender, non-distended. +BS. + rebound, guarding, no rigidity. No pulsatile masses.  Musculoskeletal: Moves all extremities x 4. Warm and well perfused. Capillary refill <3 seconds  Integument: Skin warm and dry. No rashes.   Neurologic: Alert and oriented with no focal deficits, symmetric strength 5/5 in the upper and lower extremities bilaterally.  Psychiatric: Normal affect.    Lab / Imaging Results   (All laboratory and radiology results have been personally reviewed by myself)  Labs:  Results for orders placed or performed during the hospital encounter of 02/09/25   CBC and Auto Differential    Collection Time: 02/09/25  9:30 PM   Result Value Ref Range    WBC  9.5 4.4 - 11.3 x10*3/uL    nRBC 0.0 0.0 - 0.0 /100 WBCs    RBC 4.94 4.50 - 5.90 x10*6/uL    Hemoglobin 13.3 (L) 13.5 - 17.5 g/dL    Hematocrit 41.2 41.0 - 52.0 %    MCV 83 80 - 100 fL    MCH 26.9 26.0 - 34.0 pg    MCHC 32.3 32.0 - 36.0 g/dL    RDW 13.6 11.5 - 14.5 %    Platelets 199 150 - 450 x10*3/uL    Neutrophils % 71.1 40.0 - 80.0 %    Immature Granulocytes %, Automated 0.2 0.0 - 0.9 %    Lymphocytes % 20.5 13.0 - 44.0 %    Monocytes % 6.8 2.0 - 10.0 %    Eosinophils % 1.1 0.0 - 6.0 %    Basophils % 0.3 0.0 - 2.0 %    Neutrophils Absolute 6.76 1.20 - 7.70 x10*3/uL    Immature Granulocytes Absolute, Automated 0.02 0.00 - 0.70 x10*3/uL    Lymphocytes Absolute 1.95 1.20 - 4.80 x10*3/uL    Monocytes Absolute 0.65 0.10 - 1.00 x10*3/uL    Eosinophils Absolute 0.10 0.00 - 0.70 x10*3/uL    Basophils Absolute 0.03 0.00 - 0.10 x10*3/uL   Magnesium    Collection Time: 02/09/25  9:30 PM   Result Value Ref Range    Magnesium 1.83 1.60 - 2.40 mg/dL   Comprehensive metabolic panel    Collection Time: 02/09/25  9:30 PM   Result Value Ref Range    Glucose 109 (H) 74 - 99 mg/dL    Sodium 137 136 - 145 mmol/L    Potassium 4.0 3.5 - 5.3 mmol/L    Chloride 100 98 - 107 mmol/L    Bicarbonate 27 21 - 32 mmol/L    Anion Gap 14 10 - 20 mmol/L    Urea Nitrogen 28 (H) 6 - 23 mg/dL    Creatinine 0.98 0.50 - 1.30 mg/dL    eGFR >90 >60 mL/min/1.73m*2    Calcium 9.6 8.6 - 10.3 mg/dL    Albumin 4.3 3.4 - 5.0 g/dL    Alkaline Phosphatase 76 33 - 120 U/L    Total Protein 7.2 6.4 - 8.2 g/dL    AST 23 9 - 39 U/L    Bilirubin, Total 0.4 0.0 - 1.2 mg/dL    ALT 30 10 - 52 U/L   Lactate    Collection Time: 02/09/25  9:30 PM   Result Value Ref Range    Lactate 1.5 0.4 - 2.0 mmol/L     Imaging:  All Radiology results interpreted by Radiologist unless otherwise noted.  CT abdomen pelvis w IV contrast   Final Result   Prior gastrectomy and duodenectomy, consistent with prior Billroth II   procedure...   Distal small bowel obstruction with moderate  dilatation and multiple   air-fluid levels, unclear etiology, but may be related to postsurgical   adhesions; clinical correlation and follow-up studies suggested to   confirm resolution.   Chronic sigmoid diverticulosis, including diffuse sigmoid wall   thickening likely chronic inflammatory etiology, but no definite   evidence of acute diverticulitis or diverticular abscess   Mildly enlarged heterogeneous prostate.   Signed by Aaron Kerr MD          ED Course / Medical Decision Making     Medications   sodium chloride 0.9 % bolus 1,000 mL (has no administration in time range)   morphine injection 4 mg (has no administration in time range)   sodium chloride 0.9 % bolus 1,000 mL (1,000 mL intravenous New Bag 2/9/25 2123)   HYDROmorphone (Dilaudid) injection 1 mg (1 mg intravenous Given 2/9/25 2123)   ondansetron (Zofran) injection 4 mg (4 mg intravenous Given 2/9/25 2123)   famotidine PF (Pepcid) injection 20 mg (20 mg intravenous Given 2/9/25 2123)   iohexol (OMNIPaque) 350 mg iodine/mL solution 75 mL (75 mL intravenous Given 2/9/25 2135)     Diagnoses as of 02/09/25 2312   Abdominal pain, unspecified abdominal location   Small bowel obstruction (Multi)     Re-examination:    Patient’s condition little improvement    Consult(s):   Dr Lopez - > to ED Dr Santo from ED aware    Procedure(s):   NG - pt declined until he spoke with Dr Lopez     MDM:       Trevor Yarbrough is a 54 y.o. male who presents to the ED for evaluation of abdominal pain.    Pt with history of persumed insulinoma and toupe fundoplication in 2021 with Dr. Tracy who presented with peritonitis and pneumoperitoneum. Found to have a gastric perforation in lesser curve. Patient taken to the OR as listed below:      2/6/24: exlap, subtotal gastrectomy  2/7/24: washout  2/9/24: antrectomy, Billroth 2 reconstruction; fascia closed, skin open with wound vac placed     His postop course was complicated by superficial fascial dehiscence of superior  aspect of midline laparotomy and duodenal stump leak controlled with intra-abdominal drains.  This was done by Dr Roderick Lopez downtown.    He stated that he had acute abd pain at 230 pm and has worsening pain all day with nausea vomiting. He feels like similar when he had the perf.     He appears severe uncomfortable and doubled over in pain.    ED course stable  Ct scan abd pelvis showed  distal small bowel obstruction, declined NG until speaks with Dr Lopez due to the billroth.  Cbc cmp wnl, vss stable.  Transfer to [facility] ED for evaluation  Ddx: small bowel obstruction     Plan of Care/Counseling:  I reviewed today's visit with the patient and wife  in addition to providing specific details for the plan of care and counseling regarding the diagnosis and prognosis.  Questions are answered at this time and are agreeable with the plan.    ASSESSMENT     1. Abdominal pain, unspecified abdominal location    2. Small bowel obstruction (Multi)      PLAN   TRANSFER TO [facility] ED, DRS TRAN AND MARIE    New Medications     New Medications Ordered This Visit   Medications    sodium chloride 0.9 % bolus 1,000 mL    HYDROmorphone (Dilaudid) injection 1 mg    ondansetron (Zofran) injection 4 mg    famotidine PF (Pepcid) injection 20 mg    iohexol (OMNIPaque) 350 mg iodine/mL solution 75 mL    sodium chloride 0.9 % bolus 1,000 mL    morphine injection 4 mg     Electronically signed by JUAN LUIS Clifton     **This report was transcribed using voice recognition software. Every effort was made to ensure accuracy; however, inadvertent computerized transcription errors may be present.  END OF ED PROVIDER NOTE     JUAN LUIS Clifton  02/09/25 3751

## 2025-02-10 NOTE — ED TRIAGE NOTES
Transfer from [facility] for ACS consult, pt stated he experienced gas pains started at 2:30p yesterday along with N/V, pt hx total gastrectomy 1 year ago, pt states abd pain 3/10

## 2025-02-10 NOTE — PROGRESS NOTES
Pharmacy Medication History Review    Trevor Yarbrough is a 54 y.o. male admitted for SBO (small bowel obstruction) (Multi). Pharmacy reviewed the patient's [id number] medications and allergies for accuracy.    Medications ADDED:  Bariatric Fusion Multivitamin  Zofran  Vitamin B12/Folate  Medications CHANGED:  none  Medications REMOVED:   Pediatric vitamin     The list below reflects the updated PTA list.   Prior to Admission Medications   Prescriptions Last Dose Informant   Dexcom G7 Sensor device  Self   Sig: Change sensor every 10 days as directed   UNABLE TO FIND  Self   Sig: Take 1 tablet by mouth once daily. Med Name: Bariatric Fusion Multivitamin   UNABLE TO FIND  Self   Sig: Take 1 tablet by mouth once daily. Med Name: Vitamin B12/Folate   aspirin-acetaminophen-caffeine (Excedrin Migraine) 250-250-65 mg tablet  Self   Sig: Take 2 tablets by mouth every 6 hours if needed for headaches.   docusate sodium (Colace) 100 mg capsule  Self   Sig: Take 1 capsule (100 mg) by mouth 2 times a day as needed for constipation.   ferrous sulfate, 325 mg ferrous sulfate, tablet Not Taking Self   Sig: Take 1 tablet by mouth once daily with breakfast.   Patient not taking: Reported on 2/10/2025   ondansetron ODT (Zofran-ODT) 4 mg disintegrating tablet  Self   Sig: Dissolve 1 tablet (4 mg) in the mouth every 8 hours if needed for nausea or vomiting.      Facility-Administered Medications: None        The list below reflects the updated allergy list. Please review each documented allergy for additional clarification and justification.  Allergies  Reviewed by Juancarlos Menard on 2/10/2025        Severity Reactions Comments    Octreotide Not Specified GI Upset             Patient accepts M2B at discharge.     Sources:   [facility]  Pharmacy dispense history  Patient interview Good historian  Chart Review  2/4/25 Office visit - Primary Care - [place]      Additional Comments:  Patient reports taking Vitamin B12/Folate at home;  "however, his levels are high so he will discontinue taking.  Patient reports taking a bariatric vitamin, not pediatric vitamin.  Patient reports not taking ferrous sulfate because it causes constipation.        TINY GRIFFIN  Pharmacy Technician  02/10/25     Secure Chat preferred   If no response call [id number] or [employer] \"Med Rec\"    "

## 2025-02-10 NOTE — ED PROVIDER NOTES
The patient was seen by the midlevel/resident.  I have personally saw the patient and made/approved the management plan and take responsibility for the patient management.  I reviewed the EKG's (when done) and agree with the interpretation.  I have seen and examined the patient; agree with the workup, evaluation, MDM, and diagnosis.  The care plan has been discussed with the midlevel/resident; I have reviewed the note and agree with the documented findings.     Patient presents with abdominal pain and vomiting.  He has a complicated past medical history and has had more than 1 abdominal surgery including a Billroth II reconstruction.  He was found to have a small bowel obstruction.  Due to his complicated past medical history we spoke to the surgeon Dr. Lopez who recommends transfer to [facility] we transferred to the ED.  Patient is awaiting transport.  Will hold off on the NG due to his previous abdominal surgeries.  Diagnoses as of 02/09/25 2304   Abdominal pain, unspecified abdominal location   Small bowel obstruction (Multi)     MD Robi Montesinos MD  02/09/25 3859

## 2025-02-10 NOTE — ED PROVIDER NOTES
Emergency Department Provider Note        History of Present Illness     History provided by: Patient  Limitations to History: None  External Records Reviewed with Brief Summary:  [facility] ED provider note patient presented with abdominal pain and was found to have evidence of SBO on CT.  They were transferred here for further evaluation management.     HPI:  Trevor Yarbrough is a 54 y.o. male with a past medical history of presumed insulinoma and toupee fundoplication  c/b gastric perforation s/p exlap, subtotal gastrectomy with Billroth 2 reconstruction presenting as transfer from [facility] with abdominal pain bloating and not passing flatus. CTAP with concern for SBO.  Patient denies any fever, chills, chest pain, shortness of breath.    Physical Exam   Triage vitals:  T 36.7 °C (98 °F)  HR 76  /86  RR 18  O2 96 % None (Room air)    General: Awake, alert, in no acute distress  Eyes: Gaze conjugate.  No scleral icterus or injection  HENT: Normo-cephalic, atraumatic. No stridor  CV: Regular rate, regular rhythm. Radial pulses 2+ bilaterally  Resp: Breathing non-labored, speaking in full sentences.  Clear to auscultation bilaterally  GI: Soft, distended, tender to palpation. No rebound or guarding.  Midline incision well-healed  : Deferred  MSK/Extremities: No gross bony deformities. Moving all extremities.    Skin: Warm. Appropriate color  Neuro: Alert. Oriented. Face symmetric. Speech is fluent.  Gross strength and sensation intact in b/l UE and LEs  Psych: Appropriate mood and affect    Medical Decision Making & ED Course   Medical Decision Makin y.o. male with a past medical history of presumed insulinoma and toupee fundoplication  c/b gastric perforation s/p exlap, subtotal gastrectomy with Billroth 2 reconstruction presenting as transfer from [facility] with abdominal pain, bloating and not passing flatus. CTAP with concern for SBO. Treatments including pain is fluids and dilaudid for pain were  administered.  ACS was consulted and placed an NG tube and admitted the patient.  Patient and/or patient's representative was counseled regarding labs, imaging, likely diagnosis, and plan. All questions were answered.  Patient was ultimately admitted to ACS for SBO.  ----     Social Determinants of Health which Significantly Impact Care: None identified     EKG Independent Interpretation: EKG not obtained    Independent Result Review and Interpretation: Relevant laboratory and radiographic results were reviewed and independently interpreted by myself.  As necessary, they are commented on in the ED Course.    Chronic conditions affecting the patient's care: As documented above in [facility]    The patient was discussed with the following consultants/services:  Acute care surgery who admitted the patient    Care Considerations: As documented above in [facility]    ED Course:  Diagnoses as of 02/10/25 0655   SBO (small bowel obstruction) (Multi)     Disposition   As a result of their workup, the patient will require admission to the hospital.  The patient was informed of his diagnosis.  The patient was given the opportunity to ask questions and I answered them. The patient agreed to be admitted to the hospital.    Procedures   Procedures    Patient seen and discussed with ED attending physician.    Constanza Rod MD  Emergency Medicine     Constanza Rod MD  Resident  02/10/25 0702

## 2025-02-10 NOTE — CARE PLAN
The patient's goals for the shift include  assist patient when needed    The clinical goals for the shift include patient will remain HDS this shift      Problem: Pain - Adult  Goal: Verbalizes/displays adequate comfort level or baseline comfort level  Outcome: Progressing     Problem: Safety - Adult  Goal: Free from fall injury  Outcome: Progressing     Problem: Discharge Planning  Goal: Discharge to home or other facility with appropriate resources  Outcome: Progressing     Problem: Chronic Conditions and Co-morbidities  Goal: Patient's chronic conditions and co-morbidity symptoms are monitored and maintained or improved  Outcome: Progressing     Problem: Nutrition  Goal: Nutrient intake appropriate for maintaining nutritional needs  Outcome: Progressing

## 2025-02-11 ENCOUNTER — APPOINTMENT (OUTPATIENT)
Dept: RADIOLOGY | Facility: HOSPITAL | Age: 55
End: 2025-02-11
Payer: COMMERCIAL

## 2025-02-11 LAB
ALBUMIN SERPL BCP-MCNC: 3.7 G/DL (ref 3.4–5)
ANION GAP SERPL CALC-SCNC: 12 MMOL/L (ref 10–20)
BUN SERPL-MCNC: 13 MG/DL (ref 6–23)
CALCIUM SERPL-MCNC: 9.1 MG/DL (ref 8.6–10.6)
CHLORIDE SERPL-SCNC: 102 MMOL/L (ref 98–107)
CO2 SERPL-SCNC: 28 MMOL/L (ref 21–32)
CREAT SERPL-MCNC: 0.64 MG/DL (ref 0.5–1.3)
EGFRCR SERPLBLD CKD-EPI 2021: >90 ML/MIN/1.73M*2
ERYTHROCYTE [DISTWIDTH] IN BLOOD BY AUTOMATED COUNT: 13.5 % (ref 11.5–14.5)
GLUCOSE BLD MANUAL STRIP-MCNC: 89 MG/DL (ref 74–99)
GLUCOSE BLD MANUAL STRIP-MCNC: 98 MG/DL (ref 74–99)
GLUCOSE SERPL-MCNC: 85 MG/DL (ref 74–99)
HCT VFR BLD AUTO: 34.4 % (ref 41–52)
HGB BLD-MCNC: 11.2 G/DL (ref 13.5–17.5)
MAGNESIUM SERPL-MCNC: 1.87 MG/DL (ref 1.6–2.4)
MCH RBC QN AUTO: 26.9 PG (ref 26–34)
MCHC RBC AUTO-ENTMCNC: 32.6 G/DL (ref 32–36)
MCV RBC AUTO: 83 FL (ref 80–100)
NRBC BLD-RTO: 0 /100 WBCS (ref 0–0)
PHOSPHATE SERPL-MCNC: 3.3 MG/DL (ref 2.5–4.9)
PLATELET # BLD AUTO: 163 X10*3/UL (ref 150–450)
POTASSIUM SERPL-SCNC: 3.9 MMOL/L (ref 3.5–5.3)
RBC # BLD AUTO: 4.17 X10*6/UL (ref 4.5–5.9)
SODIUM SERPL-SCNC: 138 MMOL/L (ref 136–145)
WBC # BLD AUTO: 5.4 X10*3/UL (ref 4.4–11.3)

## 2025-02-11 PROCEDURE — 82947 ASSAY GLUCOSE BLOOD QUANT: CPT

## 2025-02-11 PROCEDURE — 2500000004 HC RX 250 GENERAL PHARMACY W/ HCPCS (ALT 636 FOR OP/ED)

## 2025-02-11 PROCEDURE — 2550000001 HC RX 255 CONTRASTS: Performed by: SURGERY

## 2025-02-11 PROCEDURE — 74248 X-RAY SM INT F-THRU STD: CPT

## 2025-02-11 PROCEDURE — 84100 ASSAY OF PHOSPHORUS: CPT | Performed by: NURSE PRACTITIONER

## 2025-02-11 PROCEDURE — 83735 ASSAY OF MAGNESIUM: CPT | Performed by: NURSE PRACTITIONER

## 2025-02-11 PROCEDURE — 2500000004 HC RX 250 GENERAL PHARMACY W/ HCPCS (ALT 636 FOR OP/ED): Performed by: NURSE PRACTITIONER

## 2025-02-11 PROCEDURE — 2500000001 HC RX 250 WO HCPCS SELF ADMINISTERED DRUGS (ALT 637 FOR MEDICARE OP)

## 2025-02-11 PROCEDURE — 74248 X-RAY SM INT F-THRU STD: CPT | Performed by: RADIOLOGY

## 2025-02-11 PROCEDURE — 99232 SBSQ HOSP IP/OBS MODERATE 35: CPT | Performed by: SURGERY

## 2025-02-11 PROCEDURE — 36415 COLL VENOUS BLD VENIPUNCTURE: CPT | Performed by: NURSE PRACTITIONER

## 2025-02-11 PROCEDURE — 85027 COMPLETE CBC AUTOMATED: CPT | Performed by: NURSE PRACTITIONER

## 2025-02-11 PROCEDURE — 1100000001 HC PRIVATE ROOM DAILY

## 2025-02-11 PROCEDURE — 74240 X-RAY XM UPR GI TRC 1CNTRST: CPT | Performed by: RADIOLOGY

## 2025-02-11 RX ORDER — DIATRIZOATE MEGLUMINE AND DIATRIZOATE SODIUM 660; 100 MG/ML; MG/ML
300 SOLUTION ORAL; RECTAL ONCE
Status: COMPLETED | OUTPATIENT
Start: 2025-02-11 | End: 2025-02-11

## 2025-02-11 RX ORDER — MAGNESIUM SULFATE HEPTAHYDRATE 40 MG/ML
2 INJECTION, SOLUTION INTRAVENOUS ONCE
Status: COMPLETED | OUTPATIENT
Start: 2025-02-11 | End: 2025-02-11

## 2025-02-11 RX ORDER — POTASSIUM CHLORIDE 14.9 MG/ML
20 INJECTION INTRAVENOUS ONCE
Status: COMPLETED | OUTPATIENT
Start: 2025-02-11 | End: 2025-02-11

## 2025-02-11 RX ADMIN — ACETAMINOPHEN 1000 MG: 1000 INJECTION, SOLUTION INTRAVENOUS at 14:21

## 2025-02-11 RX ADMIN — HYDROMORPHONE HYDROCHLORIDE 0.2 MG: 0.2 INJECTION, SOLUTION INTRAMUSCULAR; INTRAVENOUS; SUBCUTANEOUS at 12:07

## 2025-02-11 RX ADMIN — DIATRIZOATE MEGLUMINE AND DIATRIZOATE SODIUM 300 ML: 660; 100 LIQUID ORAL; RECTAL at 13:41

## 2025-02-11 RX ADMIN — Medication 1 SPRAY: at 08:01

## 2025-02-11 RX ADMIN — SODIUM CHLORIDE, POTASSIUM CHLORIDE, SODIUM LACTATE AND CALCIUM CHLORIDE 75 ML/HR: 600; 310; 30; 20 INJECTION, SOLUTION INTRAVENOUS at 08:04

## 2025-02-11 RX ADMIN — ENOXAPARIN SODIUM 40 MG: 100 INJECTION SUBCUTANEOUS at 05:16

## 2025-02-11 RX ADMIN — ACETAMINOPHEN 1000 MG: 1000 INJECTION, SOLUTION INTRAVENOUS at 08:01

## 2025-02-11 RX ADMIN — POTASSIUM CHLORIDE 20 MEQ: 14.9 INJECTION, SOLUTION INTRAVENOUS at 09:58

## 2025-02-11 RX ADMIN — HYDROMORPHONE HYDROCHLORIDE 0.2 MG: 0.2 INJECTION, SOLUTION INTRAMUSCULAR; INTRAVENOUS; SUBCUTANEOUS at 05:16

## 2025-02-11 RX ADMIN — Medication 1 SPRAY: at 03:24

## 2025-02-11 RX ADMIN — MAGNESIUM SULFATE HEPTAHYDRATE 2 G: 40 INJECTION, SOLUTION INTRAVENOUS at 12:02

## 2025-02-11 RX ADMIN — ACETAMINOPHEN 1000 MG: 1000 INJECTION, SOLUTION INTRAVENOUS at 19:52

## 2025-02-11 ASSESSMENT — COGNITIVE AND FUNCTIONAL STATUS - GENERAL
MOBILITY SCORE: 24
DAILY ACTIVITIY SCORE: 24

## 2025-02-11 ASSESSMENT — PAIN SCALES - GENERAL
PAINLEVEL_OUTOF10: 5 - MODERATE PAIN
PAINLEVEL_OUTOF10: 0 - NO PAIN
PAINLEVEL_OUTOF10: 5 - MODERATE PAIN
PAINLEVEL_OUTOF10: 5 - MODERATE PAIN
PAINLEVEL_OUTOF10: 4
PAINLEVEL_OUTOF10: 3
PAINLEVEL_OUTOF10: 5 - MODERATE PAIN
PAINLEVEL_OUTOF10: 2
PAINLEVEL_OUTOF10: 0 - NO PAIN

## 2025-02-11 ASSESSMENT — PAIN SCALES - WONG BAKER
WONGBAKER_NUMERICALRESPONSE: HURTS LITTLE BIT
WONGBAKER_NUMERICALRESPONSE: HURTS LITTLE BIT

## 2025-02-11 ASSESSMENT — PAIN DESCRIPTION - LOCATION
LOCATION: HEAD
LOCATION: HEAD
LOCATION: ABDOMEN
LOCATION: ABDOMEN

## 2025-02-11 ASSESSMENT — PAIN - FUNCTIONAL ASSESSMENT
PAIN_FUNCTIONAL_ASSESSMENT: 0-10
PAIN_FUNCTIONAL_ASSESSMENT: UNABLE TO SELF-REPORT
PAIN_FUNCTIONAL_ASSESSMENT: 0-10
PAIN_FUNCTIONAL_ASSESSMENT: UNABLE TO SELF-REPORT

## 2025-02-11 ASSESSMENT — PAIN DESCRIPTION - ORIENTATION
ORIENTATION: ANTERIOR
ORIENTATION: ANTERIOR

## 2025-02-11 NOTE — CARE PLAN
Problem: Pain - Adult  Goal: Verbalizes/displays adequate comfort level or baseline comfort level  Outcome: Progressing     Problem: Safety - Adult  Goal: Free from fall injury  Outcome: Progressing     Problem: Discharge Planning  Goal: Discharge to home or other facility with appropriate resources  Outcome: Progressing     Problem: Chronic Conditions and Co-morbidities  Goal: Patient's chronic conditions and co-morbidity symptoms are monitored and maintained or improved  Outcome: Progressing     Problem: Nutrition  Goal: Nutrient intake appropriate for maintaining nutritional needs  Outcome: Progressing   The patient's goals for the shift include      The clinical goals for the shift include to remain HD stable

## 2025-02-11 NOTE — CARE PLAN
The patient's goals for the shift include      The clinical goals for the shift include pt will remain HDS      Problem: Pain - Adult  Goal: Verbalizes/displays adequate comfort level or baseline comfort level  Outcome: Progressing     Problem: Safety - Adult  Goal: Free from fall injury  Outcome: Progressing     Problem: Discharge Planning  Goal: Discharge to home or other facility with appropriate resources  Outcome: Progressing     Problem: Chronic Conditions and Co-morbidities  Goal: Patient's chronic conditions and co-morbidity symptoms are monitored and maintained or improved  Outcome: Progressing     Problem: Nutrition  Goal: Nutrient intake appropriate for maintaining nutritional needs  Outcome: Progressing

## 2025-02-11 NOTE — NURSING NOTE
4 Eyes Skin Assessment    Reason for assessment? Weekly skin rounds  Does the patient have a wound? no  Wound RN consult placed? N/A  Preventative foam dressing applied? No      Four eyes skin check performed with RN.

## 2025-02-11 NOTE — PROGRESS NOTES
[facility]  ACUTE CARE SURGERY - PROGRESS NOTE    Patient Name: Trevor Yarbrough  MRN: [id number]  Admit Date: 210  : 1970  AGE: 54 y.o.   GENDER: male  ==============================================================================  TODAY'S ASSESSMENT AND PLAN OF CARE:  54M with complicated PSH including insulinoma s/p fundoplication (), c/b gastric perforation and subsequent subtotal gastrectomy, and finally antrectomy and Billroth II reconstruction (2024) presenting as transfer from [facility] for c/f SBO. Patient states that  midday he noticed new R abdomen pain accompanied by nausea and small volume emesis. Since this time, patient has not had gas or BM. Last BM  AM, typically has x3 Bms daily. Denies nausea at this time. CT c/f SBO at mid to distal small bowel.     Update : NGT was placed in the ED. Patient was having bowel function, so NGT trial attempted 2/10 however he had emesis and started feeling nauseated so NGT was placed back to [facility].     Neuro:  Pain: IV acetaminophen PRN, dilaudid 0.2 PRN    Cardiovascular:   - NTD    Pulmonary:  - Pulmonary hygiene    GI:   - Continue NGT to LIWS  - NPO   - UGI with SBFT ordered today   - zofran, compazine PRN     :  - voiding    Renal:  - Replete lytes PRN     FEN:   - LR 75 ml/h while NPO    PPX: lovenox, SCDs  Dispo: RNF    Discussed with Dr. Oh Berry MD  PGY-1 General Surgery  Acute Care Surgery [id number]    ==============================================================================  CHIEF COMPLAINT / EVENTS LAST 24HRS / HPI:  NAOE. Hiccupping intermittently. State no more vomiting. Endorses passing gas.     MEDICAL HISTORY / ROS:   Admission history and ROS reviewed. Pertinent changes as follows:  See HPI     PHYSICAL EXAM:  Heart Rate:  [64-79]   Temp:  [36.2 °C (97.2 °F)-37.3 °C (99.1 °F)]   Resp:  [16-18]   BP: (111-133)/(58-72)   SpO2:  [97 %-98 %]     Physical Exam  General: Pleasant and  conversant, no acute distress  HEENT: NGT on LIWS draining gastric contents  Neuro: Alert and oriented x 3  Cardiac: RRR, no murmers/rubs/gallops. Hemodynamically stable.   Pulmonary: Non-labored breathing on RA  Abdomen:  Soft, nontender, nondistended, no guarding. Non peritonitic. Midline pink scar, multiple healed lap site scar.   Extremities: Moves extremities spontaneously.    ROS: Refer to HPI.     IMAGING SUMMARY:  CT abdomen pelvis w IV contrast    Result Date: 2/9/2025  Prior gastrectomy and duodenectomy, consistent with prior Billroth II procedure... Distal small bowel obstruction with moderate dilatation and multiple air-fluid levels, unclear etiology, but may be related to postsurgical adhesions; clinical correlation and follow-up studies suggested to confirm resolution. Chronic sigmoid diverticulosis, including diffuse sigmoid wall thickening likely chronic inflammatory etiology, but no definite evidence of acute diverticulitis or diverticular abscess Mildly enlarged heterogeneous prostate. Signed by Aaron Kerr MD       LABS:  Results from last 7 days   Lab Units 02/11/25  0539 02/10/25  0550 02/09/25  2130   WBC AUTO x10*3/uL 5.4 6.3 9.5   HEMOGLOBIN g/dL 11.2* 11.6* 13.3*   HEMATOCRIT % 34.4* 34.2* 41.2   PLATELETS AUTO x10*3/uL 163 158 199   NEUTROS PCT AUTO %  --   --  71.1   LYMPHS PCT AUTO %  --   --  20.5   MONOS PCT AUTO %  --   --  6.8   EOS PCT AUTO %  --   --  1.1         Results from last 7 days   Lab Units 02/11/25  0538 02/10/25  0550 02/09/25  2130   SODIUM mmol/L 138 137 137   POTASSIUM mmol/L 3.9 4.0 4.0   CHLORIDE mmol/L 102 104 100   CO2 mmol/L 28 26 27   BUN mg/dL 13 22 28*   CREATININE mg/dL 0.64 0.68 0.98   CALCIUM mg/dL 9.1 8.7 9.6   PROTEIN TOTAL g/dL  --   --  7.2   BILIRUBIN TOTAL mg/dL  --   --  0.4   ALK PHOS U/L  --   --  76   ALT U/L  --   --  30   AST U/L  --   --  23   GLUCOSE mg/dL 85 107* 109*     Results from last 7 days   Lab Units 02/09/25  2130   BILIRUBIN TOTAL  mg/dL 0.4           I have reviewed all medications, laboratory results, and imaging pertinent for today's encounter.

## 2025-02-11 NOTE — PROGRESS NOTES
Trevor Yarbrough is a 54 y.o. male on day 1 of admission presenting with SBO (small bowel obstruction) (Multi).      Transitional Care Coordination Progress Note:  Patient discussed during interdisciplinary rounds.      Plan per Medical/Surgical team:    Home Care choice for home going needs, [place]  Home. No home going needs anticipated for this pt at this time.          Discharge disposition: Home. No home going needs anticipated for this pt at this time.       Potential Barriers: None     ADOD:  2/12     This TCC will continue to follow for home going needs and safe DC plan.      YOU LEONARDO

## 2025-02-12 VITALS
HEIGHT: 71 IN | HEART RATE: 65 BPM | DIASTOLIC BLOOD PRESSURE: 70 MMHG | WEIGHT: 153.44 LBS | TEMPERATURE: 98.2 F | BODY MASS INDEX: 21.48 KG/M2 | SYSTOLIC BLOOD PRESSURE: 126 MMHG | RESPIRATION RATE: 18 BRPM | OXYGEN SATURATION: 97 %

## 2025-02-12 LAB
ALBUMIN SERPL BCP-MCNC: 3.6 G/DL (ref 3.4–5)
ANION GAP SERPL CALC-SCNC: 9 MMOL/L (ref 10–20)
BUN SERPL-MCNC: 13 MG/DL (ref 6–23)
CALCIUM SERPL-MCNC: 9.2 MG/DL (ref 8.6–10.6)
CHLORIDE SERPL-SCNC: 102 MMOL/L (ref 98–107)
CO2 SERPL-SCNC: 32 MMOL/L (ref 21–32)
CREAT SERPL-MCNC: 0.82 MG/DL (ref 0.5–1.3)
EGFRCR SERPLBLD CKD-EPI 2021: >90 ML/MIN/1.73M*2
ERYTHROCYTE [DISTWIDTH] IN BLOOD BY AUTOMATED COUNT: 13.3 % (ref 11.5–14.5)
GLUCOSE SERPL-MCNC: 84 MG/DL (ref 74–99)
HCT VFR BLD AUTO: 34.7 % (ref 41–52)
HGB BLD-MCNC: 11.2 G/DL (ref 13.5–17.5)
MAGNESIUM SERPL-MCNC: 1.9 MG/DL (ref 1.6–2.4)
MCH RBC QN AUTO: 26.7 PG (ref 26–34)
MCHC RBC AUTO-ENTMCNC: 32.3 G/DL (ref 32–36)
MCV RBC AUTO: 83 FL (ref 80–100)
NRBC BLD-RTO: 0 /100 WBCS (ref 0–0)
PHOSPHATE SERPL-MCNC: 3.5 MG/DL (ref 2.5–4.9)
PLATELET # BLD AUTO: 161 X10*3/UL (ref 150–450)
POTASSIUM SERPL-SCNC: 4.2 MMOL/L (ref 3.5–5.3)
RBC # BLD AUTO: 4.19 X10*6/UL (ref 4.5–5.9)
SODIUM SERPL-SCNC: 139 MMOL/L (ref 136–145)
WBC # BLD AUTO: 5.2 X10*3/UL (ref 4.4–11.3)

## 2025-02-12 PROCEDURE — 85027 COMPLETE CBC AUTOMATED: CPT | Performed by: NURSE PRACTITIONER

## 2025-02-12 PROCEDURE — 36415 COLL VENOUS BLD VENIPUNCTURE: CPT | Performed by: NURSE PRACTITIONER

## 2025-02-12 PROCEDURE — 83735 ASSAY OF MAGNESIUM: CPT | Performed by: NURSE PRACTITIONER

## 2025-02-12 PROCEDURE — 99239 HOSP IP/OBS DSCHRG MGMT >30: CPT | Performed by: NURSE PRACTITIONER

## 2025-02-12 PROCEDURE — 2500000004 HC RX 250 GENERAL PHARMACY W/ HCPCS (ALT 636 FOR OP/ED)

## 2025-02-12 PROCEDURE — 2500000004 HC RX 250 GENERAL PHARMACY W/ HCPCS (ALT 636 FOR OP/ED): Performed by: NURSE PRACTITIONER

## 2025-02-12 PROCEDURE — 80069 RENAL FUNCTION PANEL: CPT | Performed by: NURSE PRACTITIONER

## 2025-02-12 RX ORDER — MAGNESIUM SULFATE HEPTAHYDRATE 40 MG/ML
2 INJECTION, SOLUTION INTRAVENOUS ONCE
Status: COMPLETED | OUTPATIENT
Start: 2025-02-12 | End: 2025-02-12

## 2025-02-12 RX ORDER — PANCRELIPASE 60000; 12000; 38000 [USP'U]/1; [USP'U]/1; [USP'U]/1
1 CAPSULE, DELAYED RELEASE PELLETS ORAL
Qty: 100 CAPSULE | Refills: 1 | Status: SHIPPED | OUTPATIENT
Start: 2025-02-12 | End: 2025-04-13

## 2025-02-12 RX ORDER — PANCRELIPASE 60000; 12000; 38000 [USP'U]/1; [USP'U]/1; [USP'U]/1
1 CAPSULE, DELAYED RELEASE PELLETS ORAL
Qty: 100 CAPSULE | Refills: 1 | Status: SHIPPED | OUTPATIENT
Start: 2025-02-12 | End: 2025-02-12

## 2025-02-12 RX ADMIN — ENOXAPARIN SODIUM 40 MG: 100 INJECTION SUBCUTANEOUS at 05:48

## 2025-02-12 RX ADMIN — MAGNESIUM SULFATE HEPTAHYDRATE 2 G: 40 INJECTION, SOLUTION INTRAVENOUS at 09:12

## 2025-02-12 RX ADMIN — ACETAMINOPHEN 1000 MG: 1000 INJECTION, SOLUTION INTRAVENOUS at 03:41

## 2025-02-12 ASSESSMENT — PAIN SCALES - GENERAL
PAINLEVEL_OUTOF10: 5 - MODERATE PAIN
PAINLEVEL_OUTOF10: 0 - NO PAIN
PAINLEVEL_OUTOF10: 0 - NO PAIN

## 2025-02-12 ASSESSMENT — PAIN - FUNCTIONAL ASSESSMENT: PAIN_FUNCTIONAL_ASSESSMENT: UNABLE TO SELF-REPORT

## 2025-02-12 NOTE — CARE PLAN
Problem: Pain - Adult  Goal: Verbalizes/displays adequate comfort level or baseline comfort level  2/12/2025 1614 by Anita Aldridge RN  Outcome: Progressing  2/12/2025 0744 by Anita Aldridge RN  Outcome: Progressing     Problem: Safety - Adult  Goal: Free from fall injury  2/12/2025 1614 by Anita Aldridge RN  Outcome: Progressing  2/12/2025 0744 by Anita Aldridge RN  Outcome: Progressing     Problem: Discharge Planning  Goal: Discharge to home or other facility with appropriate resources  2/12/2025 1614 by Anita Aldridge RN  Outcome: Progressing  2/12/2025 0744 by Anita Aldridge RN  Outcome: Progressing     Problem: Chronic Conditions and Co-morbidities  Goal: Patient's chronic conditions and co-morbidity symptoms are monitored and maintained or improved  2/12/2025 1614 by Anita Aldridge RN  Outcome: Progressing  2/12/2025 0744 by Anita Aldridge RN  Outcome: Progressing     Problem: Nutrition  Goal: Nutrient intake appropriate for maintaining nutritional needs  2/12/2025 1614 by Anita Aldridge RN  Outcome: Progressing  2/12/2025 0744 by Anita Aldridge RN  Outcome: Progressing   The patient's goals for the shift include      The clinical goals for the shift include pt will tolerate advanced diet

## 2025-02-12 NOTE — CONSULTS
"Nutrition Initial Assessment:   Nutrition Assessment    Reason for Assessment: Provider consult order    Patient is a 54 y.o. male presenting with c/f SBO. Planning to discharge today.     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 He has concerns for absorption. He says he is eating but not gaining any weight. For breakfast he will eat protein shake or protein bar and lateran egg and simms breakfast sandwich, lunch is usually a protein (burger or chicken) and a salad, dinner is a small portion of pasta with meatballs, PM snack is usually low fat cottage cheese.     Pt does have adequate protein intake however it does appear diet is lacking appropriate amount of carbohydrates and fat. Pt has hx of having 5-6 BM a day. He says he does not tolerate milk products or higher fat foods. He stays away from anything with high added sugar because he does not want to have any hypo/hyperglycemic episodes. He will not eat the croutons on his salad. Today he was taking the chicken out of his quesadilla. He takes a bariatric multivitamin, B12, and vitamin D.       Anthropometrics:  Height: 180.3 cm (5' 11\")   Weight: 69.6 kg (153 lb 7 oz)   BMI (Calculated): 21.41  IBW/kg (Dietitian Calculated): 78.2 kg  Percent of IBW: 89 %       Weight History:   Wt Readings from Last 20 Encounters:   02/10/25 69.6 kg (153 lb 7 oz)   02/09/25 67.1 kg (148 lb)   02/04/25 67.1 kg (148 lb)   01/10/25 68 kg (150 lb)   07/03/24 70.3 kg (155 lb)   05/07/24 71.7 kg (158 lb)   04/23/24 71.8 kg (158 lb 6 oz)   04/16/24 71.7 kg (158 lb)   04/09/24 72.6 kg (160 lb)   04/05/24 71.2 kg (157 lb)   04/02/24 72.1 kg (159 " lb)   03/29/24 72.1 kg (159 lb)   03/19/24 73 kg (161 lb)   03/19/24 73 kg (161 lb)   03/05/24 74.8 kg (165 lb)   03/01/24 73 kg (161 lb)   02/27/24 73.5 kg (162 lb)   02/21/24 73.5 kg (162 lb 0.6 oz)   02/06/24 83.5 kg (184 lb 1.4 oz)   12/15/23 83.5 kg (184 lb)         Weight Change %:  Weight History / % Weight Change: Pt says his UBW was ~ 185 prior to his procedure 1 year ago. Comapred to admit wt, pt has lost 20% of his body weight in 1 year.  Significant Weight Loss: Yes  Interpretation of Weight Loss: >5% in 1 month    Nutrition Focused Physical Exam Findings:  Subcutaneous Fat Loss:   Orbital Fat Pads: Mild-Moderate (slight dark circles and slight hollowing)  Buccal Fat Pads: Mild-Moderate (flat cheeks, minimal bounce)  Muscle Wasting:  Temporalis: Mild-Moderate (slight depression)  Pectoralis (Clavicular Region): Mild-Moderate (some protrusion of clavicle)  Deltoid/Trapezius: Mild-Moderate (slight protrusion of acromion process)  Edema:  Edema: none  Physical Findings:  Nails: Positive (Pt says he has noticed changes in his nails)    Nutrition Significant Labs:  CBC Trend:   Results from last 7 days   Lab Units 02/12/25  0544 02/11/25  0539 02/10/25  0550 02/09/25  2130   WBC AUTO x10*3/uL 5.2 5.4 6.3 9.5   RBC AUTO x10*6/uL 4.19* 4.17* 4.34* 4.94   HEMOGLOBIN g/dL 11.2* 11.2* 11.6* 13.3*   HEMATOCRIT % 34.7* 34.4* 34.2* 41.2   MCV fL 83 83 79* 83   PLATELETS AUTO x10*3/uL 161 163 158 199    , BMP Trend:   Results from last 7 days   Lab Units 02/12/25  0544 02/11/25  0538 02/10/25  0550 02/09/25  2130   GLUCOSE mg/dL 84 85 107* 109*   CALCIUM mg/dL 9.2 9.1 8.7 9.6   SODIUM mmol/L 139 138 137 137   POTASSIUM mmol/L 4.2 3.9 4.0 4.0   CO2 mmol/L 32 28 26 27   CHLORIDE mmol/L 102 102 104 100   BUN mg/dL 13 13 22 28*   CREATININE mg/dL 0.82 0.64 0.68 0.98    , Renal Lab Trend:   Results from last 7 days   Lab Units 02/12/25  0544 02/11/25  0538 02/10/25  0550 02/10/25  0550 02/09/25  2130   POTASSIUM mmol/L 4.2  3.9  --  4.0 4.0   PHOSPHORUS mg/dL 3.5 3.3   < >  --   --    SODIUM mmol/L 139 138  --  137 137   MAGNESIUM mg/dL 1.90 1.87  --  1.75 1.83   EGFR mL/min/1.73m*2 >90 >90  --  >90 >90   BUN mg/dL 13 13  --  22 28*   CREATININE mg/dL 0.82 0.64  --  0.68 0.98    < > = values in this interval not displayed.    , Vit D:   Lab Results   Component Value Date    VITD25 33 02/04/2025    , Vit B12:   Lab Results   Component Value Date    TRKNSYJI27 1,614 (H) 02/04/2025        Nutrition Specific Medications:  Scheduled medications  enoxaparin, 40 mg, subcutaneous, q24h      Continuous medications     PRN medications  PRN medications: acetaminophen, naloxone, [DISCONTINUED] ondansetron **OR** ondansetron, phenoL, [DISCONTINUED] prochlorperazine **OR** prochlorperazine      I/O: I/O last 3 completed shifts:  In: - (0 mL/kg)   Out: 400 (5.7 mL/kg) [Emesis/NG output:400]  Weight: 69.6 kg   No intake/output data recorded.      Dietary Orders (From admission, onward)       Start     Ordered    02/12/25 0644  Adult diet Regular  Diet effective now        Question:  Diet type  Answer:  Regular    02/12/25 0643    02/10/25 0952  May Participate in Room Service  ( ROOM SERVICE MAY PARTICIPATE)  Once        Question:  .  Answer:  Yes    02/10/25 0951                     Estimated Needs:   Total Energy Estimated Needs in 24 hours (kCal): 2000 kCal  Method for Estimating Needs: 30kcal/kg  x 67.1kg (+)  Total Protein Estimated Needs in 24 Hours (g): 100 g  Method for Estimating 24 Hour Protein Needs: 1.5g/kg x 67.1kg          Nutrition Diagnosis   Malnutrition Diagnosis  Patient has Malnutrition Diagnosis: Yes  Diagnosis Status: New  Malnutrition Diagnosis: Moderate malnutrition related to chronic disease or condition  Related to: poor absoprtion  As Evidenced by: 20% weight loss in 1 year, moderate fat + muscle loss, < 75% estimated energy needs for > 1 month.    Nutrition Diagnosis  Patient has Nutrition Diagnosis: Yes  Diagnosis Status  (1): New  Nutrition Diagnosis 1: Altered GI function  Related to (1): altered physiology  As Evidenced by (1): PSH subtotal gastrectomy, billroth II       Nutrition Interventions/Recommendations   Nutrition prescription for oral nutrition    Nutrition Recommendations:  Individualized Nutrition Prescription Provided for    Regular diet + ONS on discharge    Chewable bariatric MVI    Consider pancrelipase with meals to promote absorption- reached out to NP to discuss possibility of starting at discharge    Continue Vitamin D (1,000 international unit D3 daily)     Would recommend 500mg Calcium Citrate daily       Nutrition Interventions/Goals:   Interventions: Meals and snacks, Medical food supplement  Meals and Snacks: General healthful diet  Medical Food Supplement: Commercial beverage medical food supplement therapy  Goal: ONS BID      Education Documentation  Encouraged intake of 5-6 small meals a day, incorporation more carbohydrates, and more fat, and to ensure food is chewed thoroughly. Explained that he will need to monitor tolerance of foods. Try white toast/pasta if having ongoing diarrhea. Recommended continued daily bariatric MVI.     Nutrition Monitoring and Evaluation   Food/Nutrient Related History Monitoring  Monitoring and Evaluation Plan: Estimated Energy Intake  Estimated Energy Intake: Energy intake greater or equal to 75% of estimated energy needs    Anthropometric Measurements  Monitoring and Evaluation Plan: Body weight  Body Weight: Body weight - Promote weight restoration    Biochemical Data, Medical Tests and Procedures  Monitoring and Evaluation Plan: Electrolyte/renal panel, Glucose/endocrine profile, Nutritional anemia profile, GI profile  Criteria: WNL              Time Spent (min): 90 minutes

## 2025-02-12 NOTE — DISCHARGE SUMMARY
Discharge Diagnosis  SBO (small bowel obstruction) (Multi)    Issues Requiring Follow-Up  Follow up with PCP and Gastroenterology     Test Results Pending At Discharge  Pending Labs       No current pending labs.            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 CT concerning for SBO at mid to distal small bowel. NGT placed and patient managed with conservative medical management. Hospital day 1 patient began passing flatus and pain improved. He underwent an NGT gravity trial but began having nausea and vomiting, therefore NGT placed back to suction and patient continued on bowel rest. The following day patient underwent a formal SBFT and contrast made it to the colon with no signs of obstruction. Patient began having bowel function and no nausea during study with resolution of his obstruction. NGT removed and patient started back on his home diet regimen for which he tolerated well.   Prior to discharge patient was seen by Dietician due to poor malabsorption. Patient having difficulty gaining weight and typically moves his bowel three times a day. Likely a result from decrease pancreatic secretion. Will start him on pancrelipase on discharge to take three times a day with meals. Patient currently undergoing further workup as an outpatient with his PCP and has an appointment with GI in July. Referral placed for GI in attempts to be seen sooner.   Patient encouraged to return to the ED if symptoms reoccur.     Total time spent on discharge planning and education approximately forty minutes.     Pertinent Physical Exam At Time of Discharge  Physical Exam  Constitutional:       Appearance: Normal appearance.   Eyes:      Extraocular Movements: Extraocular movements intact.    Cardiovascular:      Comments: Non cyanotic  Pulmonary:      Effort: Pulmonary effort is normal.   Abdominal:      Comments: Flat, soft, non tender to palpitation, well healed scar   Musculoskeletal:         General: Normal range of motion.   Skin:     General: Skin is warm and dry.   Neurological:      Mental Status: He is alert and oriented to person, place, and time.   Psychiatric:         Behavior: Behavior normal.         Home Medications     Medication List      START taking these medications     Creon 12,000-38,000 -60,000 unit capsule; Generic drug: pancrelipase   (Lip-Prot-Amyl); Take 1 capsule by mouth 3 times daily (morning, midday,   late afternoon). Take with meals     CONTINUE taking these medications     aspirin-acetaminophen-caffeine 250-250-65 mg tablet; Commonly known as:   Excedrin Migraine   Dexcom G7 Sensor device; Generic drug: blood-glucose sensor; Change   sensor every 10 days as directed   docusate sodium 100 mg capsule; Commonly known as: Colace   ondansetron ODT 4 mg disintegrating tablet; Commonly known as:   Zofran-ODT   UNABLE TO FIND   UNABLE TO FIND     ASK your doctor about these medications     ferrous sulfate (325 mg ferrous sulfate) tablet       Outpatient Follow-Up  Future Appointments   Date Time Provider Department Center   7/15/2025  3:20 PM Gio Humphries DO [id number]-CNP

## 2025-02-12 NOTE — CARE PLAN
The patient's goals for the shift include      The clinical goals for the shift include pt will remain HDS throughout shift    Problem: Pain - Adult  Goal: Verbalizes/displays adequate comfort level or baseline comfort level  Outcome: Progressing     Problem: Safety - Adult  Goal: Free from fall injury  Outcome: Progressing     Problem: Discharge Planning  Goal: Discharge to home or other facility with appropriate resources  Outcome: Progressing     Problem: Chronic Conditions and Co-morbidities  Goal: Patient's chronic conditions and co-morbidity symptoms are monitored and maintained or improved  Outcome: Progressing     Problem: Nutrition  Goal: Nutrient intake appropriate for maintaining nutritional needs  Outcome: Progressing

## 2025-02-12 NOTE — CARE PLAN
Problem: Pain - Adult  Goal: Verbalizes/displays adequate comfort level or baseline comfort level  Outcome: Progressing     Problem: Safety - Adult  Goal: Free from fall injury  Outcome: Progressing     Problem: Discharge Planning  Goal: Discharge to home or other facility with appropriate resources  Outcome: Progressing     Problem: Chronic Conditions and Co-morbidities  Goal: Patient's chronic conditions and co-morbidity symptoms are monitored and maintained or improved  Outcome: Progressing     Problem: Nutrition  Goal: Nutrient intake appropriate for maintaining nutritional needs  Outcome: Progressing   The patient's goals for the shift include      The clinical goals for the shift include pt will tolerate advanced diet

## 2025-02-12 NOTE — HOSPITAL COURSE
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 CT concerning for SBO at mid to distal small bowel. NGT placed and patient managed with conservative medical management. Hospital day 1 patient began passing flatus and pain improved. He underwent an NGT gravity trial but began having nausea and vomiting, therefore NGT placed back to suction and patient continued on bowel rest. The following day patient underwent a formal SBFT and contrast made it to the colon with no signs of obstruction. Patient began having bowel function and no nausea during study with resolution of his obstruction. NGT removed and patient started back on his home diet regimen for which he tolerated well.   Prior to discharge patient was seen by Dietician due to poor malabsorption. Patient having difficulty gaining weight and typically moves his bowel three times a day. Likely a result from decrease pancreatic secretion. Will start him on pancrelipase on discharge to take three times a day with meals. Patient currently undergoing further workup as an outpatient with his PCP and has an appointment with GI in July. Referral placed for GI in attempts to be seen sooner.   Patient encouraged to return to the ED if symptoms reoccur.

## 2025-02-17 ENCOUNTER — PATIENT OUTREACH (OUTPATIENT)
Dept: CARE COORDINATION | Facility: CLINIC | Age: 55
End: 2025-02-17
Payer: COMMERCIAL

## 2025-02-17 NOTE — PROGRESS NOTES
Outreach call to patient to support a smooth transition of care from recent admission.  Left voicemail message for patient with my contact information.    Alana Sepulveda RN BSN  [facility] Care Manager  [phone]

## 2025-02-19 NOTE — DOCUMENTATION CLARIFICATION NOTE
"    PATIENT:               RUPESH IBANEZ  ACCT #:                  [id number]  MRN:                       [id number]  :                       1970  ADMIT DATE:       2/10/2025 2:09 AM  DISCH DATE:        2025 5:55 PM  RESPONDING PROVIDER #:        [id number]          PROVIDER RESPONSE TEXT:    I agree with dietician diagnosis of moderate malnutrition on 25.    CDI QUERY TEXT:    Clarification        Instruction:    Based on your assessment of the patient and the clinical information, please provide the requested documentation by clicking on the appropriate radio button and enter any additional information if prompted.    Question: Please further clarify this patient nutritional status as    When answering this query, please exercise your independent professional judgment. The fact that a question is being asked, does not imply that any particular answer is desired or expected.    The patient's clinical indicators include:  Clinical Information: 54 yr old male  hx GERD and surgical hx of  s/p fundoplication (), c/b gastric perforation and subsequent subtotal gastrectomy, and finally antrectomy and Billroth II reconstruction (2024) who was transferred from h with abd pain and found to have SBO.    Clinical Indicators: Pt says since last Feb has lost a significant amount of weight.  Says he eats but not gaining weight.  BMI - 21.41   Documentation from nutrition consult on  shows \"Diagnosis Status: New  Malnutrition Diagnosis: Moderate malnutrition related to chronic disease or condition  Related to: poor absoprtion as evidenced by: 20% weight loss in 1 year, moderate fat + muscle loss, < 75% estimated energy needs for > 1 month.\"    Treatment: Nutrition consult which showed \"General healthful diet, commercial beverage medical food supplement, 5-6 meals per day    Risk Factors: past surgical hx subtotal gastrectomy, billroth II  Options provided:  -- I agree with dietician diagnosis of moderate " malnutrition on 2/12/25.  -- Other - I will add my own diagnosis  -- Refer to Clinical Documentation Reviewer    Query created by: Gaviota Hoover on 2/18/2025 2:07 PM      Electronically signed by:  ALBERT MCKNIGHT 2/19/2025 6:00 AM

## 2025-02-20 ENCOUNTER — PATIENT OUTREACH (OUTPATIENT)
Dept: CARE COORDINATION | Facility: CLINIC | Age: 55
End: 2025-02-20
Payer: COMMERCIAL

## 2025-02-20 NOTE — PROGRESS NOTES
Outreach call to patient to support a smooth transition of care from recent admission.  Left voicemail message for patient with my contact information.    Alana Sepulveda RN BSN  [facility] Care Manager  [phone]

## 2025-02-24 ENCOUNTER — PATIENT OUTREACH (OUTPATIENT)
Dept: CARE COORDINATION | Facility: CLINIC | Age: 55
End: 2025-02-24
Payer: COMMERCIAL

## 2025-02-24 NOTE — PROGRESS NOTES
Unable to reach patient to support a smooth transition of care from recent admission after multiple attempts.  Left voicemail message for patient with my contact information.       Alana Sepulveda RN BSN  [facility] Care Manager  [phone]

## 2025-02-26 PROCEDURE — RXMED WILLOW AMBULATORY MEDICATION CHARGE

## 2025-02-27 ENCOUNTER — PHARMACY VISIT (OUTPATIENT)
Dept: PHARMACY | Facility: CLINIC | Age: 55
End: 2025-02-27
Payer: COMMERCIAL

## 2025-03-20 LAB
CALPROTECTIN STL-MCNT: 302 MCG/G
COLLECT DURATION TIME STL: NORMAL H
FAT 24H STL-MRATE: NORMAL G/24 H
QUEST PERCENT LIPID(FECAL FAT): 11.04 %
SPECIMEN WT ?TM STL QN: 55 G

## 2025-03-24 ENCOUNTER — TELEPHONE (OUTPATIENT)
Dept: PRIMARY CARE | Facility: CLINIC | Age: 55
End: 2025-03-24
Payer: COMMERCIAL

## 2025-03-24 NOTE — TELEPHONE ENCOUNTER
Calprotectin level is elevated in patient's stool  Please see what symptoms patient is currently having   LVM x2 requesting pt update us on his sx's.

## 2025-03-24 NOTE — TELEPHONE ENCOUNTER
----- Message from Monika BURKS sent at 3/18/2025 11:49 AM EDT -----  lvm  ----- Message -----  From: Gio Donohue MD  Sent: 3/18/2025   8:16 AM EDT  To: #    Calprotectin level is elevated in patient's stool  Please see what symptoms patient is currently having

## 2025-03-27 DIAGNOSIS — E16.2 HYPOGLYCEMIA: ICD-10-CM

## 2025-03-31 ENCOUNTER — TELEPHONE (OUTPATIENT)
Dept: PRIMARY CARE | Facility: CLINIC | Age: 55
End: 2025-03-31
Payer: COMMERCIAL

## 2025-03-31 PROCEDURE — RXMED WILLOW AMBULATORY MEDICATION CHARGE

## 2025-03-31 RX ORDER — BLOOD-GLUCOSE SENSOR
EACH MISCELLANEOUS
Qty: 3 EACH | Refills: 11 | Status: SHIPPED | OUTPATIENT
Start: 2025-03-31

## 2025-04-01 PROCEDURE — RXMED WILLOW AMBULATORY MEDICATION CHARGE

## 2025-04-03 ENCOUNTER — PHARMACY VISIT (OUTPATIENT)
Dept: PHARMACY | Facility: CLINIC | Age: 55
End: 2025-04-03
Payer: COMMERCIAL

## 2025-04-30 PROCEDURE — RXMED WILLOW AMBULATORY MEDICATION CHARGE

## 2025-05-05 ENCOUNTER — PHARMACY VISIT (OUTPATIENT)
Dept: PHARMACY | Facility: CLINIC | Age: 55
End: 2025-05-05
Payer: COMMERCIAL

## 2025-06-04 PROCEDURE — RXMED WILLOW AMBULATORY MEDICATION CHARGE

## 2025-06-05 ENCOUNTER — PHARMACY VISIT (OUTPATIENT)
Dept: PHARMACY | Facility: CLINIC | Age: 55
End: 2025-06-05
Payer: COMMERCIAL

## 2025-06-06 ENCOUNTER — PHARMACY VISIT (OUTPATIENT)
Dept: PHARMACY | Facility: CLINIC | Age: 55
End: 2025-06-06
Payer: COMMERCIAL

## 2025-06-06 DIAGNOSIS — R11.0 NAUSEA: Primary | ICD-10-CM

## 2025-06-06 PROCEDURE — RXMED WILLOW AMBULATORY MEDICATION CHARGE

## 2025-06-06 RX ORDER — ONDANSETRON 4 MG/1
4 TABLET, ORALLY DISINTEGRATING ORAL EVERY 8 HOURS PRN
Qty: 30 TABLET | Refills: 6 | Status: SHIPPED | OUTPATIENT
Start: 2025-06-06

## 2025-06-09 ENCOUNTER — APPOINTMENT (OUTPATIENT)
Facility: CLINIC | Age: 55
End: 2025-06-09
Payer: COMMERCIAL

## 2025-06-09 VITALS — OXYGEN SATURATION: 98 % | HEIGHT: 72 IN | HEART RATE: 86 BPM | BODY MASS INDEX: 22.75 KG/M2 | WEIGHT: 168 LBS

## 2025-06-09 DIAGNOSIS — R19.5 LOOSE STOOLS: ICD-10-CM

## 2025-06-09 DIAGNOSIS — Z98.0 HISTORY OF BILLROTH II OPERATION: ICD-10-CM

## 2025-06-09 DIAGNOSIS — Z12.11 COLON CANCER SCREENING: Primary | ICD-10-CM

## 2025-06-09 DIAGNOSIS — R19.5 ELEVATED FECAL CALPROTECTIN: ICD-10-CM

## 2025-06-09 PROCEDURE — 99203 OFFICE O/P NEW LOW 30 MIN: CPT | Performed by: STUDENT IN AN ORGANIZED HEALTH CARE EDUCATION/TRAINING PROGRAM

## 2025-06-09 PROCEDURE — 3008F BODY MASS INDEX DOCD: CPT | Performed by: STUDENT IN AN ORGANIZED HEALTH CARE EDUCATION/TRAINING PROGRAM

## 2025-06-09 PROCEDURE — 1036F TOBACCO NON-USER: CPT | Performed by: STUDENT IN AN ORGANIZED HEALTH CARE EDUCATION/TRAINING PROGRAM

## 2025-06-09 ASSESSMENT — ENCOUNTER SYMPTOMS
CHILLS: 0
CONSTIPATION: 0
TROUBLE SWALLOWING: 0
ANAL BLEEDING: 0
SHORTNESS OF BREATH: 0
BLOOD IN STOOL: 0
VOMITING: 0
UNEXPECTED WEIGHT CHANGE: 1
ABDOMINAL PAIN: 0
FEVER: 0
RECTAL PAIN: 0
COLOR CHANGE: 0
DIARRHEA: 1
ABDOMINAL DISTENTION: 0
NAUSEA: 1

## 2025-06-09 NOTE — ASSESSMENT & PLAN NOTE
BM 2-3 times daily, Arthur 6.  Endocrinology suggested trial of Creon given loose stool. Could be related to low fiber intake vs SIBO vs EPI   - increase fiber intake, goal 25 -30 g daily  - check pancreatic elastase , can wait to start creon until stool testing and colonoscopy   - consider SIBO testing based on above

## 2025-06-09 NOTE — PROGRESS NOTES
Chief Complaint:  Chief Complaint   Patient presents with    Weight Gain    abdominal discomfort     malnurishment     Long term effects      Here to establish care. Hx of insulinoma s/p fundoplication (2021), c/b gastric perforation and subsequent subtotal gastrectomy, and finally antrectomy and Billroth II reconstruction (2/2024). SBO managed conservatively in 2/2025, resolved with UGIS.    Worried about developing malnutrition 2/2 surgeries.  Overall he feels more run down and older than his age.  Can not gain weight.  Baseline weight 178-180lbs. Currently ~150-160s. Feels thin    Has followed with bariatric nutritionist of   Recommendations of eating more protein, high fiber  Tries to eat every 2-3 hours  Water intake is limited    Takes MVI, B complex, Vitamin D  Oral iron caused constipation    Intermittent abdominal pain, right sided. Imaging reviewed by  who felt it may be due to his intestines being close to abdominal wall and some kind of neuropathic component.     Intermittent dumping syndrome based on diet so tries to focus on high protein foods and high fiber. Avoids simple sugars    Endocrinology recommend Creon which he has not started     BM 2-3 times daily  Rare nocturnal BM, 1-2 times a month  [place]  Uses Lomotil PRN for diarrhea about 1 month ago for increased frequency    No fiber supplementation    Fruit:2   Veggies: 5-6 servings, salads,spinach  Whole Grains: 2 servings  Water: 36 ounces     Large polyp in brother but told benign. Unsure of if adenoma or hyperplastic     Review of Systems   Constitutional:  Positive for unexpected weight change. Negative for chills and fever.   HENT:  Negative for trouble swallowing.    Respiratory:  Negative for shortness of breath.    Cardiovascular:  Negative for chest pain.   Gastrointestinal:  Positive for diarrhea and nausea. Negative for abdominal distention, abdominal pain, anal bleeding, blood in stool, constipation, rectal pain  "and vomiting.   Skin:  Negative for color change.     Family History[1]    Medications  Current Medications[2]    Vitals  Pulse 86   Ht 1.829 m (6')   Wt 76.2 kg (168 lb)   SpO2 98%   BMI 22.78 kg/m²     Physical Exam  Constitutional:       General: He is not in acute distress.     Appearance: Normal appearance. He is normal weight. He is not toxic-appearing.   HENT:      Head: Normocephalic.      Nose: Nose normal.   Eyes:      General: No scleral icterus.     Pupils: Pupils are equal, round, and reactive to light.   Cardiovascular:      Rate and Rhythm: Normal rate.   Pulmonary:      Effort: Pulmonary effort is normal. No respiratory distress.      Breath sounds: No wheezing.   Abdominal:      General: Abdomen is flat. Bowel sounds are normal. There is no distension.      Palpations: Abdomen is soft.      Tenderness: There is no abdominal tenderness. There is no guarding or rebound.      Comments: Well healed midline vertical scar and incisional scars   Skin:     General: Skin is warm.      Coloration: Skin is not jaundiced.   Neurological:      General: No focal deficit present.      Mental Status: He is alert and oriented to person, place, and time.   Psychiatric:         Mood and Affect: Mood normal.           Labs:  No results found for: \"AFP\"No results found for: \"ASMAB\", \"MITOAB\"No results found for: \"AVEL\"No results found for: \"ASMAB\", \"MITOAB\"  Lab Results   Component Value Date    BZYXZATH74 1,614 (H) 02/04/2025   No results found for: \"HEPCAB\"No results found for: \"HEPATOT\", \"HEPAIGM\", \"HEPBCIGM\", \"HEPBCAB\", \"HBEAG\", \"HEPCAB\"No results found for: \"HIV1X2\"  Lab Results   Component Value Date    IRON 37 (L) 02/04/2025    TIBC 427 (H) 02/04/2025     Lab Results   Component Value Date    INR 1.0 02/09/2024    INR 1.0 02/09/2024    INR 1.3 (H) 02/08/2024    PROTIME 11.5 02/09/2024    PROTIME 11.7 02/09/2024    PROTIME 15.1 (H) 02/08/2024     Lab Results   Component Value Date    TSH 1.08 02/04/2025 "       Radiology    PACS Images     Show images for CT abdomen pelvis w IV contrast  Signed by    Signed Time Phone Pager   Aaron Kerr MD 2/09/2025 22:15 [phone]      Exam Information    Status Exam Begun Exam Ended   Final 2/09/2025 21:35 2/09/2025 21:41     Study Result    Narrative & Impression   STUDY:  CT Abdomen and Pelvis with IV Contrast; 02/09/2025 9:44 PM   INDICATION:  Severe abdominal pain.  History of gastrostomy one year ago.    COMPARISON:  CT abdomen/pelvis 07/10/2024, 02/19/2025.  XR abdomen 02/09/2025.  MR  abdomen MRCP 10/13/2023.   ACCESSION NUMBER(S):  [id number]  ORDERING CLINICIAN:  VIVIAN CALDWELL  TECHNIQUE:  CT of the abdomen and pelvis was performed.  Contiguous axial images  were obtained at 3 mm slice thickness through the abdomen and pelvis.   Coronal and sagittal reconstructions at 3 mm slice thickness were  performed.  Omnipaque 350:75 mL was administered intravenously.    FINDINGS:  LOWER CHEST:  No cardiomegaly.  No pericardial effusion.  Lung bases are clear.     ABDOMEN:     LIVER:  No hepatomegaly.  Smooth surface contour.  Normal attenuation.  Multiple small hepatic cystic lesions likely benign.     BILE DUCTS:  No intrahepatic or extrahepatic biliary ductal dilatation.     GALLBLADDER:  The gallbladder is present..  STOMACH:  Prior partial gastrectomy.     PANCREAS:  No masses or ductal dilatation.     SPLEEN:  No splenomegaly or focal splenic lesion.     ADRENAL GLANDS:  No thickening or nodules.     KIDNEYS AND URETERS:  Kidneys are normal in size and location.  No renal or ureteral  calculi.     PELVIS:     BLADDER:  No abnormalities identified. No intrinsic bladder abnormality. Mildly  enlarged heterogeneous prostate.     REPRODUCTIVE ORGANS:  No abnormalities identified.     BOWEL:  Multiple loops of moderately dilated proximal, and mid small bowel,  likely mid to distal small bowel obstruction, which may be related to  postsurgical adhesions. Air-fluid  levels are noted. The distal small  bowel however remains nondilated. Distal inflection point is noted  with no definite lesion at this level. Additionally, there is moderate  sigmoid diverticulosis with sigmoid wall thickening but no definite or  significant perisigmoidal infiltration or diverticulitis. No definite  evidence of pelvic abscess.     VESSELS:  No abnormalities identified.  Abdominal aorta is normal in caliber.      PERITONEUM/RETROPERITONEUM/LYMPH NODES:  No free fluid.  No pneumoperitoneum.  No lymphadenopathy.     ABDOMINAL WALL:  No abnormalities identified.  SOFT TISSUES:   No abnormalities identified.     BONES:  No acute fracture or aggressive osseous lesion.  IMPRESSION:  Prior gastrectomy and duodenectomy, consistent with prior Billroth II  procedure...  Distal small bowel obstruction with moderate dilatation and multiple  air-fluid levels, unclear etiology, but may be related to postsurgical  adhesions; clinical correlation and follow-up studies suggested to  confirm resolution.  Chronic sigmoid diverticulosis, including diffuse sigmoid wall  thickening likely chronic inflammatory etiology, but no definite  evidence of acute diverticulitis or diverticular abscess  Mildly enlarged heterogeneous prostate.  Signed by Aaron Kerr MD     No image results found.      A/P   Trevor was seen today for weight gain, abdominal discomfort  and malnurishment.  Diagnoses and all orders for this visit:  Colon cancer screening (Primary)  -     Colonoscopy Screening; High Risk Patient; Future  -     Follow Up In Gastroenterology; Future  History of Billroth II operation  -     Protime-INR; Future  -     Vitamin B1, Whole Blood; Future  -     Vitamin C; Future  -     Methylmalonic Acid; Future  -     Selenium, Blood; Future  -     Copper, Blood; Future  -     Ferritin; Future  -     Protime-INR  -     Vitamin B1, Whole Blood  -     Vitamin C  -     Methylmalonic Acid  -     Selenium, Blood  -     Copper,  "Blood  -     Ferritin  -     Follow Up In Gastroenterology; Future  Loose stools  -     Pancreatic Elastase, Fecal; Future  -     Pancreatic Elastase, Fecal  Elevated fecal calprotectin     Problem List Items Addressed This Visit           ICD-10-CM    Colon cancer screening - Primary Z12.11    Last scope 2019 was normal. Brother with large polyp, per last colonoscopy \"Advanced adenoma\" Recall 5 yrs recommended  - recall now especially given abnormal fecal calprotectin          Relevant Orders    Colonoscopy Screening; High Risk Patient    Follow Up In Gastroenterology    History of Billroth II operation Z98.0    Will check micronutrient levels that have not been checked within the year  C/w MVI, Calcium supplement 1000-1200mg daily   F.up bariatric nutriotionist          Relevant Orders    Protime-INR    Vitamin B1, Whole Blood    Vitamin C    Methylmalonic Acid    Selenium, Blood    Copper, Blood    Ferritin    Follow Up In Gastroenterology    Loose stools R19.5    BM 2-3 times daily, [place].  Endocrinology suggested trial of Creon given loose stool. Could be related to low fiber intake vs SIBO vs EPI   - increase fiber intake, goal 25 -30 g daily  - check pancreatic elastase , can wait to start creon until stool testing and colonoscopy   - consider SIBO testing based on above          Relevant Orders    Pancreatic Elastase, Fecal            [1]   Family History  Problem Relation Name Age of Onset    Hypertension Mother      Lung cancer Mother      Coronary artery disease Father      Hyperlipidemia Father      Diabetes Brother      Other (polyp of large intestine) Brother      Colon cancer Father's Brother  60    Colon cancer Other uncle     Other (polyp of large intestine) Other uncle    [2]   Current Outpatient Medications:     aspirin-acetaminophen-caffeine (Excedrin Migraine) 250-250-65 mg tablet, Take 2 tablets by mouth every 6 hours if needed for headaches., Disp: , Rfl:     Dexcom G7 Sensor device, " Change sensor every 10 days as directed, Disp: 3 each, Rfl: 11    ondansetron ODT (Zofran-ODT) 4 mg disintegrating tablet, Dissolve 1 tablet (4 mg) in the mouth every 8 hours if needed for nausea or vomiting., Disp: 30 tablet, Rfl: 6    UNABLE TO FIND, Take 1 tablet by mouth once daily. Med Name: Bariatric Fusion Multivitamin, Disp: , Rfl:     UNABLE TO FIND, Take 1 tablet by mouth once daily. Med Name: Vitamin B12/Folate, Disp: , Rfl:     ferrous sulfate, 325 mg ferrous sulfate, tablet, Take 1 tablet by mouth once daily with breakfast. (Patient not taking: Reported on 2/10/2025), Disp: , Rfl:     pancrelipase, Lip-Prot-Amyl, (Creon) 12,000-38,000 -60,000 unit capsule, Take 1 capsule by mouth 3 times daily (morning, midday, late afternoon). Take with meals (Patient not taking: Reported on 6/9/2025), Disp: 100 capsule, Rfl: 1

## 2025-06-09 NOTE — PATIENT INSTRUCTIONS
Continue your Bariatric Multivitamin  Continue calcium supplement with goal 1000-1200mg daily and Vitamin D.  We will check for micronutrient deficiencies that have not been checked yet    Your loose stool could be from pancreatic insufficiency so we can check a stool test for that. It is also possible from low fiber intake.    Try to increase your fiber with goal fiber intake 25 to 40 grams daily   Since your stool test for inflammation was high we will follow-up with a colonoscopy, you are due for a repeat for cancer screening at this time    You could have also SIBO (small intestinal bacterial overgrowth) If all work-up including colonoscopy is normal we can check for this with a breath test

## 2025-06-09 NOTE — ASSESSMENT & PLAN NOTE
"Last scope 2019 was normal. Brother with large polyp, per last colonoscopy \"Advanced adenoma\" Recall 5 yrs recommended  - recall now especially given abnormal fecal calprotectin   "

## 2025-06-09 NOTE — ASSESSMENT & PLAN NOTE
Will check micronutrient levels that have not been checked within the year  C/w MVI, Calcium supplement 1000-1200mg daily   F.up bariatric nutriotionist

## 2025-06-11 ENCOUNTER — ANESTHESIA EVENT (OUTPATIENT)
Dept: OPERATING ROOM | Facility: HOSPITAL | Age: 55
End: 2025-06-11
Payer: COMMERCIAL

## 2025-06-11 RX ORDER — DROPERIDOL 2.5 MG/ML
0.62 INJECTION, SOLUTION INTRAMUSCULAR; INTRAVENOUS ONCE AS NEEDED
OUTPATIENT
Start: 2025-06-11

## 2025-06-11 RX ORDER — ONDANSETRON HYDROCHLORIDE 2 MG/ML
4 INJECTION, SOLUTION INTRAVENOUS ONCE AS NEEDED
OUTPATIENT
Start: 2025-06-11

## 2025-06-11 RX ORDER — SODIUM CHLORIDE, SODIUM LACTATE, POTASSIUM CHLORIDE, CALCIUM CHLORIDE 600; 310; 30; 20 MG/100ML; MG/100ML; MG/100ML; MG/100ML
20 INJECTION, SOLUTION INTRAVENOUS CONTINUOUS
OUTPATIENT
Start: 2025-06-11 | End: 2025-06-12

## 2025-06-12 ENCOUNTER — APPOINTMENT (OUTPATIENT)
Dept: OPERATING ROOM | Facility: HOSPITAL | Age: 55
End: 2025-06-12
Payer: COMMERCIAL

## 2025-06-12 ENCOUNTER — ANESTHESIA (OUTPATIENT)
Dept: OPERATING ROOM | Facility: HOSPITAL | Age: 55
End: 2025-06-12
Payer: COMMERCIAL

## 2025-07-02 PROCEDURE — RXMED WILLOW AMBULATORY MEDICATION CHARGE

## 2025-07-07 ENCOUNTER — PHARMACY VISIT (OUTPATIENT)
Dept: PHARMACY | Facility: CLINIC | Age: 55
End: 2025-07-07
Payer: COMMERCIAL

## 2025-07-08 ENCOUNTER — TELEPHONE (OUTPATIENT)
Dept: PREADMISSION TESTING | Facility: HOSPITAL | Age: 55
End: 2025-07-08
Payer: COMMERCIAL

## 2025-07-08 RX ORDER — BISMUTH SUBSALICYLATE 262 MG
1 TABLET,CHEWABLE ORAL DAILY
COMMUNITY

## 2025-07-08 NOTE — TELEPHONE ENCOUNTER
Hold supplements and Excedrin prior to procedure.    Pre-procedure PAT phone assessment completed. Pre-operative and medication instructions reviewed with patient. Patient verbalizes understanding of instructions.  SURGERY PRE-OPERATIVE INSTRUCTIONS    *You will receive a phone call the day before your procedure  after 2pm, (or the Friday before your surgery if scheduled on a Monday.) Generally the hospital will be calling you with this information after that time.    *If you are taking GLP1 medications for type 2 diabetes or weight loss, hold these medications on the day of the procedure. START a clear liquid diet 24 hours before your surgery. On the day of your surgery/procedure, STOP all clear liquids 2 hours before your arrival time to the hospital. A clear liquid diet consists of clear liquids and foods that melt into a clear liquid. Clear liquids can and should contain sugar. This is only if you are taking a GLP1 medication.     *You are not to eat after midnight the night before the surgery. You may have up to 10 ounces of clear liquids up until 2 hours prior to arriving to the hospital. The exception is with medications you were instructed to take day of surgery.     *You may take tylenol for pain/discomfort as needed.     *Stop taking all aspirin products, ibuprofen (motrin/advil), naproxen (aleve/naprosyn) for one week prior to surgery.    *Stop taking all vitamins and supplements one week prior to surgery.     *You should not have alcoholic beverages for 24 hours before surgery.     *You should not smoke 24 hours prior to surgery.     *To help prevent surgical infections bathe/shower with Dial soap the evening before surgery.    *You can wear deodorant but no lotion, powder, or perfume/cologne. You should remove all make-up and nail polish at home.    *If you wear glasses, please bring a case for the glasses with you.    *You will be asked to remove dentures and contacts.     *Please leave all valuables  at home.    *You should wear loose, comfortable clothing that will accommodate bandages and/or casts.    *You should notify your doctor of any change in your condition (fever, cold, rash, etc). Surgery may need to be re-scheduled until a time you are in better health.    *A responsible adult is required to accompany you to and from the hospital if you are receiving anesthesia or a sedative. Patients are not permitted to drive for 24 hours after anesthesia.     *You can use the [employer] parking if you wish.     *If you have any further questions please call [phone].

## 2025-07-10 ENCOUNTER — HOSPITAL ENCOUNTER (OUTPATIENT)
Dept: OPERATING ROOM | Facility: HOSPITAL | Age: 55
Setting detail: OUTPATIENT SURGERY
Discharge: HOME | End: 2025-07-10
Payer: COMMERCIAL

## 2025-07-10 VITALS
OXYGEN SATURATION: 100 % | RESPIRATION RATE: 18 BRPM | BODY MASS INDEX: 20.19 KG/M2 | SYSTOLIC BLOOD PRESSURE: 108 MMHG | HEART RATE: 59 BPM | WEIGHT: 144.18 LBS | HEIGHT: 71 IN | TEMPERATURE: 98.1 F | DIASTOLIC BLOOD PRESSURE: 65 MMHG

## 2025-07-10 DIAGNOSIS — Z12.11 COLON CANCER SCREENING: ICD-10-CM

## 2025-07-10 LAB — GLUCOSE BLD MANUAL STRIP-MCNC: 87 MG/DL (ref 74–99)

## 2025-07-10 PROCEDURE — 3600000007 HC OR TIME - EACH INCREMENTAL 1 MINUTE - PROCEDURE LEVEL TWO: Performed by: ANESTHESIOLOGY

## 2025-07-10 PROCEDURE — 45380 COLONOSCOPY AND BIOPSY: CPT | Performed by: STUDENT IN AN ORGANIZED HEALTH CARE EDUCATION/TRAINING PROGRAM

## 2025-07-10 PROCEDURE — 3700000002 HC GENERAL ANESTHESIA TIME - EACH INCREMENTAL 1 MINUTE: Performed by: ANESTHESIOLOGY

## 2025-07-10 PROCEDURE — 2500000004 HC RX 250 GENERAL PHARMACY W/ HCPCS (ALT 636 FOR OP/ED): Performed by: REGISTERED NURSE

## 2025-07-10 PROCEDURE — 3700000001 HC GENERAL ANESTHESIA TIME - INITIAL BASE CHARGE: Performed by: ANESTHESIOLOGY

## 2025-07-10 PROCEDURE — 3600000002 HC OR TIME - INITIAL BASE CHARGE - PROCEDURE LEVEL TWO: Performed by: ANESTHESIOLOGY

## 2025-07-10 PROCEDURE — 99221 1ST HOSP IP/OBS SF/LOW 40: CPT | Performed by: STUDENT IN AN ORGANIZED HEALTH CARE EDUCATION/TRAINING PROGRAM

## 2025-07-10 PROCEDURE — 82947 ASSAY GLUCOSE BLOOD QUANT: CPT

## 2025-07-10 PROCEDURE — 7100000009 HC PHASE TWO TIME - INITIAL BASE CHARGE: Performed by: ANESTHESIOLOGY

## 2025-07-10 PROCEDURE — 7100000010 HC PHASE TWO TIME - EACH INCREMENTAL 1 MINUTE: Performed by: ANESTHESIOLOGY

## 2025-07-10 PROCEDURE — 2500000004 HC RX 250 GENERAL PHARMACY W/ HCPCS (ALT 636 FOR OP/ED): Performed by: ANESTHESIOLOGY

## 2025-07-10 RX ORDER — ALBUTEROL SULFATE 0.83 MG/ML
2.5 SOLUTION RESPIRATORY (INHALATION) ONCE AS NEEDED
Status: DISCONTINUED | OUTPATIENT
Start: 2025-07-10 | End: 2025-07-11 | Stop reason: HOSPADM

## 2025-07-10 RX ORDER — DEXMEDETOMIDINE IN 0.9 % NACL 20 MCG/5ML
SYRINGE (ML) INTRAVENOUS AS NEEDED
Status: DISCONTINUED | OUTPATIENT
Start: 2025-07-10 | End: 2025-07-10

## 2025-07-10 RX ORDER — LIDOCAINE HCL/PF 100 MG/5ML
SYRINGE (ML) INTRAVENOUS AS NEEDED
Status: DISCONTINUED | OUTPATIENT
Start: 2025-07-10 | End: 2025-07-10

## 2025-07-10 RX ORDER — SODIUM CHLORIDE, SODIUM LACTATE, POTASSIUM CHLORIDE, CALCIUM CHLORIDE 600; 310; 30; 20 MG/100ML; MG/100ML; MG/100ML; MG/100ML
100 INJECTION, SOLUTION INTRAVENOUS CONTINUOUS
Status: ACTIVE | OUTPATIENT
Start: 2025-07-10 | End: 2025-07-10

## 2025-07-10 RX ORDER — ACETAMINOPHEN 325 MG/1
650 TABLET ORAL EVERY 4 HOURS PRN
Status: DISCONTINUED | OUTPATIENT
Start: 2025-07-10 | End: 2025-07-11 | Stop reason: HOSPADM

## 2025-07-10 RX ORDER — ONDANSETRON HYDROCHLORIDE 2 MG/ML
4 INJECTION, SOLUTION INTRAVENOUS ONCE AS NEEDED
Status: DISCONTINUED | OUTPATIENT
Start: 2025-07-10 | End: 2025-07-11 | Stop reason: HOSPADM

## 2025-07-10 RX ORDER — PROPOFOL 10 MG/ML
INJECTION, EMULSION INTRAVENOUS AS NEEDED
Status: DISCONTINUED | OUTPATIENT
Start: 2025-07-10 | End: 2025-07-10

## 2025-07-10 RX ORDER — ONDANSETRON HYDROCHLORIDE 2 MG/ML
8 INJECTION, SOLUTION INTRAVENOUS ONCE
Status: DISCONTINUED | OUTPATIENT
Start: 2025-07-10 | End: 2025-07-10 | Stop reason: SDUPTHER

## 2025-07-10 RX ORDER — SODIUM CHLORIDE, SODIUM LACTATE, POTASSIUM CHLORIDE, CALCIUM CHLORIDE 600; 310; 30; 20 MG/100ML; MG/100ML; MG/100ML; MG/100ML
20 INJECTION, SOLUTION INTRAVENOUS CONTINUOUS
Status: DISCONTINUED | OUTPATIENT
Start: 2025-07-10 | End: 2025-07-11 | Stop reason: HOSPADM

## 2025-07-10 RX ORDER — DROPERIDOL 2.5 MG/ML
0.62 INJECTION, SOLUTION INTRAMUSCULAR; INTRAVENOUS ONCE AS NEEDED
Status: DISCONTINUED | OUTPATIENT
Start: 2025-07-10 | End: 2025-07-11 | Stop reason: HOSPADM

## 2025-07-10 RX ADMIN — Medication 8 MCG: at 11:58

## 2025-07-10 RX ADMIN — PROPOFOL 40 MG: 10 INJECTION, EMULSION INTRAVENOUS at 12:04

## 2025-07-10 RX ADMIN — PROPOFOL 30 MG: 10 INJECTION, EMULSION INTRAVENOUS at 12:07

## 2025-07-10 RX ADMIN — PROPOFOL 30 MG: 10 INJECTION, EMULSION INTRAVENOUS at 12:03

## 2025-07-10 RX ADMIN — SODIUM CHLORIDE, SODIUM LACTATE, POTASSIUM CHLORIDE, AND CALCIUM CHLORIDE 100 ML/HR: .6; .31; .03; .02 INJECTION, SOLUTION INTRAVENOUS at 10:54

## 2025-07-10 RX ADMIN — PROPOFOL 20 MG: 10 INJECTION, EMULSION INTRAVENOUS at 12:13

## 2025-07-10 RX ADMIN — PROPOFOL 10 MG: 10 INJECTION, EMULSION INTRAVENOUS at 12:18

## 2025-07-10 RX ADMIN — LIDOCAINE HYDROCHLORIDE 80 MG: 20 INJECTION INTRAVENOUS at 11:58

## 2025-07-10 RX ADMIN — PROPOFOL 70 MG: 10 INJECTION, EMULSION INTRAVENOUS at 11:58

## 2025-07-10 SDOH — HEALTH STABILITY: MENTAL HEALTH: CURRENT SMOKER: 0

## 2025-07-10 ASSESSMENT — PAIN SCALES - GENERAL
PAINLEVEL_OUTOF10: 0 - NO PAIN

## 2025-07-10 ASSESSMENT — ENCOUNTER SYMPTOMS
TROUBLE SWALLOWING: 0
ABDOMINAL PAIN: 0
COLOR CHANGE: 0
ABDOMINAL DISTENTION: 0
ANAL BLEEDING: 0
VOMITING: 0
UNEXPECTED WEIGHT CHANGE: 0
NAUSEA: 0
CONSTIPATION: 0
FEVER: 0
DIARRHEA: 0
CHILLS: 0
RECTAL PAIN: 0
SHORTNESS OF BREATH: 0
BLOOD IN STOOL: 0

## 2025-07-10 ASSESSMENT — PAIN - FUNCTIONAL ASSESSMENT
PAIN_FUNCTIONAL_ASSESSMENT: 0-10

## 2025-07-10 NOTE — H&P
"History Of Present Illness  Trevor Yarbrough is a 55 y.o. male presenting for screening colonoscopy    Last scope 2019 was normal. Brother with large polyp, per last colonoscopy \"Advanced adenoma\" Recall 5 yrs recommended     Fecal calpro incidentally noted to be 302    Past Medical History  Medical History[1]  Surgical History  Surgical History[2]  Social History  He reports that he has never smoked. He has never used smokeless tobacco. He reports that he does not currently use alcohol. He reports that he does not use drugs.    Family History  Family History[3]     Allergies  Allergies[4]  Review of Systems   Constitutional:  Negative for chills, fever and unexpected weight change.   HENT:  Negative for trouble swallowing.    Respiratory:  Negative for shortness of breath.    Cardiovascular:  Negative for chest pain.   Gastrointestinal:  Negative for abdominal distention, abdominal pain, anal bleeding, blood in stool, constipation, diarrhea, nausea, rectal pain and vomiting.   Skin:  Negative for color change.        Physical Exam  Constitutional:       General: He is not in acute distress.     Appearance: Normal appearance. He is not toxic-appearing.   HENT:      Head: Normocephalic.      Nose: Nose normal.   Eyes:      General: No scleral icterus.     Pupils: Pupils are equal, round, and reactive to light.   Cardiovascular:      Rate and Rhythm: Normal rate.   Pulmonary:      Effort: Pulmonary effort is normal. No respiratory distress.      Breath sounds: No wheezing.   Abdominal:      General: Abdomen is flat. Bowel sounds are normal. There is no distension.      Palpations: Abdomen is soft.      Tenderness: There is no abdominal tenderness. There is no guarding or rebound.   Skin:     Coloration: Skin is not jaundiced.   Neurological:      General: No focal deficit present.      Mental Status: He is alert.   Psychiatric:         Mood and Affect: Mood normal.          Last Recorded Vitals  Blood pressure 113/70, " "pulse 66, temperature 36.7 °C (98.1 °F), resp. rate 16, height 1.803 m (5' 11\"), weight 65.4 kg (144 lb 2.9 oz), SpO2 98%.    Assessment/Plan   Encounter for Screening Colonoscopy  - proceed with colonoscopy        Stephanie Gastelum MD         [1]   Past Medical History:  Diagnosis Date    Atypical chest pain 01/31/2022    Disease due to severe acute respiratory syndrome coronavirus 2 (SARS-CoV-2) 08/03/2022    Comment on above: COVID POSITIVE, N/V  Comment on above: COVID POSITIVE, N/V    Epigastric pain 05/09/2022    Abdominal pain, acute, epigastric    History of dysphagia 01/31/2022    History of esophagitis 11/22/2019    History of gastroesophageal reflux (GERD) 05/09/2022    History of hiatal hernia 01/27/2020    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    Nausea and vomiting 08/03/2022   [2]   Past Surgical History:  Procedure Laterality Date    APPENDECTOMY  12/03/2018    COLONOSCOPY  12/03/2018    GASTRECTOMY N/A     open, had surgery 3 times    HERNIA REPAIR  01/27/2020    OTHER SURGICAL HISTORY  12/03/2018    Knee arthroscopy    OTHER SURGICAL HISTORY  01/27/2020    Nissen fundoplication laparoscopic    OTHER SURGICAL HISTORY  01/27/2020    Diaphragmatic hernia repair   [3]   Family History  Problem Relation Name Age of Onset    Hypertension Mother      Lung cancer Mother      Coronary artery disease Father      Hyperlipidemia Father      Diabetes Brother      Other (polyp of large intestine) Brother      Colon cancer Father's Brother  60    Colon cancer Other uncle     Other (polyp of large intestine) Other uncle    [4]   Allergies  Allergen Reactions    Octreotide GI Upset     "

## 2025-07-10 NOTE — ANESTHESIA POSTPROCEDURE EVALUATION
Patient: Trevor Yarbrough    Procedure Summary       Date: 07/10/25 Room / Location: [facility] OR    Anesthesia Start: 1155 Anesthesia Stop: 1228    Procedure: COLONOSCOPY Diagnosis: Colon cancer screening    Scheduled Providers: Stephanie Gastelum MD; Brooks Fofana MD Responsible Provider: Brooks Fofana MD    Anesthesia Type: MAC ASA Status: 2            Anesthesia Type: MAC    Vitals Value Taken Time   /65 07/10/25 12:53   Temp 36.7 °C (98.1 °F) 07/10/25 12:23   Pulse 59 07/10/25 12:53   Resp 18 07/10/25 12:53   SpO2 100 % 07/10/25 12:53       Anesthesia Post Evaluation    Patient location during evaluation: bedside  Patient participation: complete - patient participated  Level of consciousness: awake and alert  Pain management: adequate  Airway patency: patent  Cardiovascular status: acceptable  Respiratory status: acceptable  Hydration status: acceptable  Postoperative Nausea and Vomiting: none        There were no known notable events for this encounter.

## 2025-07-10 NOTE — DISCHARGE INSTRUCTIONS
Patient Instructions after a Colonoscopy      The anesthetics, sedatives or narcotics which were given to you today will be acting in your body for the next 24 hours, so you might feel a little sleepy or groggy.  This feeling should slowly wear off. Carefully read and follow the instructions.     You received sedation today:  - Do not drive or operate any machinery or power tools of any kind.   - No alcoholic beverages today, not even beer or wine.  - Do not make any important decisions or sign any legal documents.  - No over the counter medications that contain alcohol or that may cause drowsiness.  - Do not make any important decisions or sign any legal documents.  - Make sure you have someone with you for first 24 hours.    While it is common to experience mild to moderate abdominal distention, gas, or belching after your procedure, if any of these symptoms occur following discharge from the GI Lab or within one week of having your procedure, call the [facility] to be advised whether a visit to your nearest Urgent Care or Emergency Department is indicated.  Take this paper with you if you go.     - If you develop an allergic reaction to the medications that were given during your procedure such as difficulty breathing, rash, hives, severe nausea, vomiting or lightheadedness.  - If you experience chest pain, shortness of breath, severe abdominal pain, fevers and chills.  -If you develop signs and symptoms of bleeding such as blood in your spit, if your stools turn black, tarry, or bloody  - If you have not urinated within 8 hours following your procedure.  - If your IV site becomes painful, red, inflamed, or looks infected.    If you received a biopsy/polypectomy/sphincterotomy the following instructions apply below:    __ Do not use Aspirin containing products, non-steroidal medications or anti-coagulants for one week following your procedure. (Examples of these types of medications are: Advil,  Arthrotec, Aleve, Coumadin, Ecotrin, Heparin, Ibuprofen, Indocin, Motrin, Naprosyn, Nuprin, Plavix, Vioxx, and Voltarin, or their generic forms.  This list is not all-inclusive.  Check with your physician or pharmacist before resuming medications.)   __ Eat a soft diet today.  Avoid foods that are poorly digested for the next 24 hours.  These foods would include: nuts, beans, lettuce, red meats, and fried foods. Start with liquids and advance your diet as tolerated, gradually work up to eating solids.   __ Do not have a Barium Study or Enema for one week.    Your physician recommends the additional following instructions:    -You have a contact number available for emergencies. The signs and symptoms of potential delayed complications were discussed with you. You may return to normal activities tomorrow.  -Resume your previous diet.  -Continue your present medications.   -We are waiting for your pathology results.  -Your physician has recommended a repeat colonoscopy (date to be determined after pending pathology results are reviewed) for surveillance based on pathology results.  -The findings and recommendations have been discussed with you.  -The findings and recommendations were discussed with your family.  - Please see Medication Reconciliation Form for new medication/medications prescribed.       If you experience any problems or have any questions following discharge from the GI Lab, please call:        Nurse Signature                                                                        Date___________________                                                                            Patient/Responsible Party Signature                                        Date___________________

## 2025-07-10 NOTE — ANESTHESIA PREPROCEDURE EVALUATION
Patient: Trevor Yarbrough    Procedure Information       Date/Time: 07/10/25 1115    Scheduled providers: Stephanie Gastelum MD; Brooks Fofana MD    Procedure: COLONOSCOPY    Location: [facility] OR            Relevant Problems   Anesthesia (within normal limits)      Cardiac (within normal limits)      Pulmonary (within normal limits)      Neuro (within normal limits)      GI (within normal limits)      /Renal (within normal limits)      Liver (within normal limits)      Endocrine (within normal limits)      Hematology (within normal limits)      Musculoskeletal (within normal limits)      HEENT (within normal limits)      ID (within normal limits)      Skin (within normal limits)       Clinical information reviewed:    Allergies                NPO Detail:  No data recorded     Physical Exam    Airway  Mallampati: I  TM distance: >3 FB  Neck ROM: full  Mouth opening: 3 or more finger widths     Cardiovascular - normal exam   Dental - normal exam     Pulmonary - normal exam   Abdominal - normal exam           Anesthesia Plan    History of general anesthesia?: yes  History of complications of general anesthesia?: no    ASA 2     MAC     The patient is not a current smoker.    intravenous induction   Anesthetic plan and risks discussed with patient.  Use of blood products discussed with patient who consented to blood products.

## 2025-07-15 ENCOUNTER — APPOINTMENT (OUTPATIENT)
Dept: GASTROENTEROLOGY | Facility: CLINIC | Age: 55
End: 2025-07-15
Payer: COMMERCIAL

## 2025-07-15 LAB
LABORATORY COMMENT REPORT: NORMAL
PATH REPORT.FINAL DX SPEC: NORMAL
PATH REPORT.GROSS SPEC: NORMAL
PATH REPORT.RELEVANT HX SPEC: NORMAL
PATH REPORT.TOTAL CANCER: NORMAL

## 2025-08-05 PROCEDURE — RXMED WILLOW AMBULATORY MEDICATION CHARGE

## 2025-08-08 ENCOUNTER — PHARMACY VISIT (OUTPATIENT)
Dept: PHARMACY | Facility: CLINIC | Age: 55
End: 2025-08-08
Payer: COMMERCIAL

## 2025-08-14 ENCOUNTER — PHARMACY VISIT (OUTPATIENT)
Dept: PHARMACY | Facility: CLINIC | Age: 55
End: 2025-08-14
Payer: COMMERCIAL

## 2025-08-14 PROCEDURE — RXMED WILLOW AMBULATORY MEDICATION CHARGE

## 2025-09-04 PROCEDURE — RXMED WILLOW AMBULATORY MEDICATION CHARGE

## 2025-09-05 ENCOUNTER — PHARMACY VISIT (OUTPATIENT)
Dept: PHARMACY | Facility: CLINIC | Age: 55
End: 2025-09-05
Payer: COMMERCIAL

## (undated) DEVICE — STAPLER, LINEAR, RELOADABLE, 60MM 4.8, GREEN

## (undated) DEVICE — Device

## (undated) DEVICE — KIT, ABTHERA DRESSING W/SENSA TRAC

## (undated) DEVICE — DRAPE, SHEET, LAPAROTOMY, W/ISO-BAC, W/ARMBOARD COVERS, 98 X 122 IN, DISPOSABLE, LF, STERILE

## (undated) DEVICE — CUTTER,  PROX LINEAR, 75MM, THICK TISSUE, W/ SAFETY LOCK OUT

## (undated) DEVICE — SPONGE, HEMOSTATIC, CELLULOSE, SURGICEL, 2 X 14 IN

## (undated) DEVICE — MANIFOLD, 4 PORT NEPTUNE STANDARD

## (undated) DEVICE — DRESSING KIT, VACUUM ASSISTED CLOSURE, W/DRAPE/TUBING, SMALL, FOAM, BLACK

## (undated) DEVICE — LIGASURE IMPACT, 18CM

## (undated) DEVICE — CUTTER,  LINEAR RELOAD, THICK TISSUE, 75MM, GREEN

## (undated) DEVICE — WOUND VAC KIT, W/CANNISTER, 500ML, 5/PK

## (undated) DEVICE — DRAPE, TIBURON W/ADHESIVE, 19 X 30